# Patient Record
Sex: FEMALE | Race: OTHER | HISPANIC OR LATINO | URBAN - METROPOLITAN AREA
[De-identification: names, ages, dates, MRNs, and addresses within clinical notes are randomized per-mention and may not be internally consistent; named-entity substitution may affect disease eponyms.]

---
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
## 2023-04-17 ENCOUNTER — INPATIENT (INPATIENT)
Facility: HOSPITAL | Age: 18
LOS: 59 days | Discharge: HOME | DRG: 754 | End: 2023-06-16
Attending: PSYCHIATRY & NEUROLOGY | Admitting: PSYCHIATRY & NEUROLOGY
Payer: MEDICAID

## 2023-04-17 VITALS
HEIGHT: 60 IN | TEMPERATURE: 99 F | SYSTOLIC BLOOD PRESSURE: 137 MMHG | HEART RATE: 109 BPM | WEIGHT: 173.94 LBS | DIASTOLIC BLOOD PRESSURE: 94 MMHG | OXYGEN SATURATION: 98 % | RESPIRATION RATE: 18 BRPM

## 2023-04-17 LAB
ANION GAP SERPL CALC-SCNC: 12 MMOL/L — SIGNIFICANT CHANGE UP (ref 7–14)
APAP SERPL-MCNC: <5 UG/ML — LOW (ref 10–30)
BASOPHILS # BLD AUTO: 0.06 K/UL — SIGNIFICANT CHANGE UP (ref 0–0.2)
BASOPHILS NFR BLD AUTO: 0.4 % — SIGNIFICANT CHANGE UP (ref 0–1)
BUN SERPL-MCNC: 16 MG/DL — SIGNIFICANT CHANGE UP (ref 10–20)
CALCIUM SERPL-MCNC: 9.5 MG/DL — SIGNIFICANT CHANGE UP (ref 8.4–10.5)
CHLORIDE SERPL-SCNC: 100 MMOL/L — SIGNIFICANT CHANGE UP (ref 98–110)
CO2 SERPL-SCNC: 25 MMOL/L — SIGNIFICANT CHANGE UP (ref 17–32)
CREAT SERPL-MCNC: 0.7 MG/DL — SIGNIFICANT CHANGE UP (ref 0.3–1)
EGFR: 128 ML/MIN/1.73M2 — SIGNIFICANT CHANGE UP
EOSINOPHIL # BLD AUTO: 0.08 K/UL — SIGNIFICANT CHANGE UP (ref 0–0.7)
EOSINOPHIL NFR BLD AUTO: 0.5 % — SIGNIFICANT CHANGE UP (ref 0–8)
ETHANOL SERPL-MCNC: <10 MG/DL — SIGNIFICANT CHANGE UP
GLUCOSE SERPL-MCNC: 100 MG/DL — HIGH (ref 70–99)
HCT VFR BLD CALC: 39.6 % — SIGNIFICANT CHANGE UP (ref 37–47)
HGB BLD-MCNC: 13.5 G/DL — SIGNIFICANT CHANGE UP (ref 12–16)
IMM GRANULOCYTES NFR BLD AUTO: 0.7 % — HIGH (ref 0.1–0.3)
LYMPHOCYTES # BLD AUTO: 23.2 % — SIGNIFICANT CHANGE UP (ref 20.5–51.1)
LYMPHOCYTES # BLD AUTO: 3.67 K/UL — HIGH (ref 1.2–3.4)
MCHC RBC-ENTMCNC: 28.8 PG — SIGNIFICANT CHANGE UP (ref 27–31)
MCHC RBC-ENTMCNC: 34.1 G/DL — SIGNIFICANT CHANGE UP (ref 32–37)
MCV RBC AUTO: 84.6 FL — SIGNIFICANT CHANGE UP (ref 81–99)
MONOCYTES # BLD AUTO: 0.84 K/UL — HIGH (ref 0.1–0.6)
MONOCYTES NFR BLD AUTO: 5.3 % — SIGNIFICANT CHANGE UP (ref 1.7–9.3)
NEUTROPHILS # BLD AUTO: 11.03 K/UL — HIGH (ref 1.4–6.5)
NEUTROPHILS NFR BLD AUTO: 69.9 % — SIGNIFICANT CHANGE UP (ref 42.2–75.2)
NRBC # BLD: 0 /100 WBCS — SIGNIFICANT CHANGE UP (ref 0–0)
PLATELET # BLD AUTO: 435 K/UL — HIGH (ref 130–400)
PMV BLD: 9.2 FL — SIGNIFICANT CHANGE UP (ref 7.4–10.4)
POTASSIUM SERPL-MCNC: 3.6 MMOL/L — SIGNIFICANT CHANGE UP (ref 3.5–5)
POTASSIUM SERPL-SCNC: 3.6 MMOL/L — SIGNIFICANT CHANGE UP (ref 3.5–5)
RBC # BLD: 4.68 M/UL — SIGNIFICANT CHANGE UP (ref 4.2–5.4)
RBC # FLD: 13 % — SIGNIFICANT CHANGE UP (ref 11.5–14.5)
SALICYLATES SERPL-MCNC: <0.3 MG/DL — LOW (ref 4–30)
SODIUM SERPL-SCNC: 137 MMOL/L — SIGNIFICANT CHANGE UP (ref 135–146)
WBC # BLD: 15.79 K/UL — HIGH (ref 4.8–10.8)
WBC # FLD AUTO: 15.79 K/UL — HIGH (ref 4.8–10.8)

## 2023-04-17 PROCEDURE — 99285 EMERGENCY DEPT VISIT HI MDM: CPT

## 2023-04-17 PROCEDURE — 93010 ELECTROCARDIOGRAM REPORT: CPT

## 2023-04-18 DIAGNOSIS — F32.9 MAJOR DEPRESSIVE DISORDER, SINGLE EPISODE, UNSPECIFIED: ICD-10-CM

## 2023-04-18 DIAGNOSIS — F64.0 TRANSSEXUALISM: ICD-10-CM

## 2023-04-18 LAB
APPEARANCE UR: CLEAR — SIGNIFICANT CHANGE UP
BACTERIA # UR AUTO: ABNORMAL
BILIRUB UR-MCNC: NEGATIVE — SIGNIFICANT CHANGE UP
COLOR SPEC: YELLOW — SIGNIFICANT CHANGE UP
DIFF PNL FLD: NEGATIVE — SIGNIFICANT CHANGE UP
DRUG SCREEN 1, URINE RESULT: SIGNIFICANT CHANGE UP
EPI CELLS # UR: ABNORMAL /HPF
GLUCOSE UR QL: NEGATIVE MG/DL — SIGNIFICANT CHANGE UP
KETONES UR-MCNC: NEGATIVE — SIGNIFICANT CHANGE UP
LEUKOCYTE ESTERASE UR-ACNC: NEGATIVE — SIGNIFICANT CHANGE UP
NITRITE UR-MCNC: NEGATIVE — SIGNIFICANT CHANGE UP
PH UR: 6 — SIGNIFICANT CHANGE UP (ref 5–8)
PROT UR-MCNC: 30 MG/DL
RBC CASTS # UR COMP ASSIST: SIGNIFICANT CHANGE UP /HPF
SARS-COV-2 RNA SPEC QL NAA+PROBE: SIGNIFICANT CHANGE UP
SP GR SPEC: >=1.03 (ref 1.01–1.03)
UROBILINOGEN FLD QL: 0.2 MG/DL — SIGNIFICANT CHANGE UP
WBC UR QL: SIGNIFICANT CHANGE UP /HPF

## 2023-04-18 PROCEDURE — 80307 DRUG TEST PRSMV CHEM ANLYZR: CPT

## 2023-04-18 PROCEDURE — 81001 URINALYSIS AUTO W/SCOPE: CPT

## 2023-04-18 PROCEDURE — 84443 ASSAY THYROID STIM HORMONE: CPT

## 2023-04-18 PROCEDURE — 80178 ASSAY OF LITHIUM: CPT

## 2023-04-18 PROCEDURE — 80061 LIPID PANEL: CPT

## 2023-04-18 PROCEDURE — 83036 HEMOGLOBIN GLYCOSYLATED A1C: CPT

## 2023-04-18 PROCEDURE — 80354 DRUG SCREENING FENTANYL: CPT

## 2023-04-18 PROCEDURE — 83735 ASSAY OF MAGNESIUM: CPT

## 2023-04-18 PROCEDURE — 93005 ELECTROCARDIOGRAM TRACING: CPT

## 2023-04-18 PROCEDURE — 80053 COMPREHEN METABOLIC PANEL: CPT

## 2023-04-18 PROCEDURE — 99232 SBSQ HOSP IP/OBS MODERATE 35: CPT

## 2023-04-18 PROCEDURE — 73130 X-RAY EXAM OF HAND: CPT | Mod: RT

## 2023-04-18 PROCEDURE — 85027 COMPLETE CBC AUTOMATED: CPT

## 2023-04-18 RX ORDER — MIRTAZAPINE 45 MG/1
15 TABLET, ORALLY DISINTEGRATING ORAL AT BEDTIME
Refills: 0 | Status: DISCONTINUED | OUTPATIENT
Start: 2023-04-18 | End: 2023-04-18

## 2023-04-18 RX ORDER — SERTRALINE 25 MG/1
50 TABLET, FILM COATED ORAL DAILY
Refills: 0 | Status: DISCONTINUED | OUTPATIENT
Start: 2023-04-18 | End: 2023-04-20

## 2023-04-18 RX ORDER — HYDROXYZINE HCL 10 MG
50 TABLET ORAL EVERY 6 HOURS
Refills: 0 | Status: DISCONTINUED | OUTPATIENT
Start: 2023-04-18 | End: 2023-05-24

## 2023-04-18 RX ORDER — CLONAZEPAM 1 MG
0.25 TABLET ORAL DAILY
Refills: 0 | Status: DISCONTINUED | OUTPATIENT
Start: 2023-04-18 | End: 2023-04-18

## 2023-04-18 RX ORDER — HALOPERIDOL DECANOATE 100 MG/ML
5 INJECTION INTRAMUSCULAR EVERY 6 HOURS
Refills: 0 | Status: DISCONTINUED | OUTPATIENT
Start: 2023-04-18 | End: 2023-06-16

## 2023-04-18 RX ORDER — LANOLIN ALCOHOL/MO/W.PET/CERES
3 CREAM (GRAM) TOPICAL AT BEDTIME
Refills: 0 | Status: DISCONTINUED | OUTPATIENT
Start: 2023-04-18 | End: 2023-06-16

## 2023-04-18 RX ADMIN — Medication 3 MILLIGRAM(S): at 20:46

## 2023-04-18 RX ADMIN — Medication 0.25 MILLIGRAM(S): at 08:51

## 2023-04-18 RX ADMIN — SERTRALINE 50 MILLIGRAM(S): 25 TABLET, FILM COATED ORAL at 08:51
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
## 2023-04-18 NOTE — BH INPATIENT PSYCHIATRY ASSESSMENT NOTE - HPI (INCLUDE ILLNESS QUALITY, SEVERITY, DURATION, TIMING, CONTEXT, MODIFYING FACTORS, ASSOCIATED SIGNS AND SYMPTOMS)
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 suicide attempt.    Attempted to contact Dr. Cuenca at [facility]- phone number listed on hospital website no longer functioning    Med Rec confirmed with [facility] ([address] [place] [address])  - Mirtazapine 15 mg at bedtime  - Sertraline 25 mg daily  - Klonopin 0.5 mg PRN daily

## 2023-04-18 NOTE — CONSULT NOTE ADULT - SUBJECTIVE AND OBJECTIVE BOX
HOSPITALIST CONSULT for IPP History and Physical     LEIA PAZ  17y, Female  Allergy: No Known Allergies      CHIEF COMPLAINT:     HPI:    HPI:  The patient is a 18 yo transgender F->M, domiciled with brother, unemployed, with psych hx of gender dysphoria, anxiety and depression, no past psychiatric hospitalizations, no previous suicide attempts, who is presenting at recommendation of brother and cousin for increasing SI.    Interview was conducted with  Anthony ID [id number].    The patient on interview appears extremely flat, withdrawn, soft-spoken. She only provides very short responses. She says she is here because she told her brother about having suicidal thoughts recently. It is difficult to get a sense of the timeline of patient's symptoms. She says she has had SI on and off since 2015 which has been worsening recently. This afternoon, her thoughts progressed to wanting to overdose on medications or to cut her wrists or stab herself. Denies any prior attempt, and is unable to name any deterrent/protective factors other than fear of hurting her family members. Describes intense and persistent feeling of not wanting to be alive. Says very things like that she is living in "constant fear," that she doesn't think she is good enough, and that she is exasperated waiting and hoping to feel better. Describes poor sleep for at least several months, eating more than usual under stress, chronic low energy, and again no desire to live. Throughout interview patient at times is anxious and fidgety, other times more stressed appearing with strained/painful expression, and at times on verge of tears. Patient is agreeable to staying in the hospital to get further help.    See SW note for collateral from patient's cousin Vickie. (18 Apr 2023 03:26)    FAMILY HISTORY:    PAST MEDICAL & SURGICAL HISTORY:  Depression with suicidal ideation      No significant past surgical history          SOCIAL HISTORY  Social History:      Home Medications:      ROS  General: Denies fevers, chills, nightsweats, weight loss  HEENT: Denies headache, rhinorrhea, sore throat, eye pain  CV: Denies CP, palpitations  PULM: Denies SOB, cough  GI: Denies abdominal pain, diarrhea  : Denies dysuria, hematuria  MSK: Denies arthralgias  SKIN: Denies rash   NEURO: Denies paresthesias, weakness  PSYCH: Denies depression    VITALS:  T(F): 97, Max: 99 (04-17-23 @ 21:10)  HR: 91  BP: 123/85  RR: 16Vital Signs Last 24 Hrs  T(C): 36.1 (18 Apr 2023 05:33), Max: 37.2 (17 Apr 2023 21:10)  T(F): 97 (18 Apr 2023 05:33), Max: 99 (17 Apr 2023 21:10)  HR: 91 (18 Apr 2023 05:33) (91 - 109)  BP: 123/85 (18 Apr 2023 05:33) (123/85 - 137/94)  BP(mean): --  RR: 16 (18 Apr 2023 05:33) (16 - 18)  SpO2: 98% (18 Apr 2023 05:33) (98% - 98%)    Parameters below as of 17 Apr 2023 21:10  Patient On (Oxygen Delivery Method): room air        PHYSICAL EXAM:  Gen: NAD, resting in bed  HEENT: Normocephalic, atraumatic  Neck: supple, no lymphadenopathy  CV: Regular rate & regular rhythm  Lungs: CTABL no wheeze  Abdomen: Soft, NTND+ BS present  Ext: Warm, well perfused no CCE  Neuro: non focal, awake, CN II-XII intact   Skin: no rash, no erythema  Psych: no SI, HI, Hallucination     TESTS & MEASUREMENTS:                        13.5   15.79 )-----------( 435      ( 17 Apr 2023 22:20 )             39.6     04-17    137  |  100  |  16  ----------------------------<  100<H>  3.6   |  25  |  0.7    Ca    9.5      17 Apr 2023 22:20              COVID-19 PCR: NotDetec (18 Apr 2023 00:25)      QRS axis to [] ° and NSR at a rate of [] BPM. There was no atrial enlargement. There was no ventricular hypertrophy. There were no ST-T changes and all intervals were normal.      INFECTIOUS DISEASES TESTING      RADIOLOGY & ADDITIONAL TESTS:  I have personally reviewed the last Chest xray  CXR      CT      CARDIOLOGY TESTING  12 Lead ECG:   Ventricular Rate 102 BPM    Atrial Rate 102 BPM    P-R Interval 148 ms    QRS Duration 64 ms    Q-T Interval 346 ms    QTC Calculation(Bazett) 450 ms    P Axis 54 degrees    R Axis 89 degrees    T Axis 39 degrees    Diagnosis Line Sinus tachycardia  Otherwise normal ECG    Confirmed by Homero Fu ([id number]) on 4/18/2023 7:51:57 AM (04-17-23 @ 21:44)      MEDICATIONS  (STANDING):  clonazePAM  Tablet 0.25 milliGRAM(s) Oral daily  mirtazapine 15 milliGRAM(s) Oral at bedtime  sertraline 50 milliGRAM(s) Oral daily    MEDICATIONS  (PRN):      ANTIBIOTICS:      All available historical data has been reviewed    ASSESSMENT  17y F admitted with Major depressive disorder with single episode        PROBLEMS   HOSPITALIST CONSULT for IPP History and Physical     LEIA PAZ  17y, Female  Allergy: No Known Allergies      CHIEF COMPLAINT:     HPI:    HPI:  The patient is a 16 yo transgender F->M, domiciled with brother, unemployed, with psych hx of gender dysphoria, anxiety and depression, no past psychiatric hospitalizations, no previous suicide attempts, who is presenting at recommendation of brother and cousin for increasing SI.    Interview was conducted with  Anthony ID [id number].    The patient on interview appears extremely flat, withdrawn, soft-spoken. She only provides very short responses. She says she is here because she told her brother about having suicidal thoughts recently. It is difficult to get a sense of the timeline of patient's symptoms. She says she has had SI on and off since 2015 which has been worsening recently. This afternoon, her thoughts progressed to wanting to overdose on medications or to cut her wrists or stab herself. Denies any prior attempt, and is unable to name any deterrent/protective factors other than fear of hurting her family members. Describes intense and persistent feeling of not wanting to be alive. Says very things like that she is living in "constant fear," that she doesn't think she is good enough, and that she is exasperated waiting and hoping to feel better. Describes poor sleep for at least several months, eating more than usual under stress, chronic low energy, and again no desire to live. Throughout interview patient at times is anxious and fidgety, other times more stressed appearing with strained/painful expression, and at times on verge of tears. Patient is agreeable to staying in the hospital to get further help.    See SW note for collateral from patient's cousin Vickie. (18 Apr 2023 03:26)    FAMILY HISTORY:    PAST MEDICAL & SURGICAL HISTORY:  Depression with suicidal ideation      No significant past surgical history          SOCIAL HISTORY  Social History:      Home Medications:      ROS  General: Denies fevers, chills, nightsweats, weight loss  HEENT: Denies headache, rhinorrhea, sore throat, eye pain  CV: Denies CP, palpitations  PULM: Denies SOB, cough  GI: Denies abdominal pain, diarrhea  : Denies dysuria, hematuria  MSK: Denies arthralgias  SKIN: Denies rash   NEURO: Denies paresthesias, weakness  PSYCH: Denies depression    VITALS:  T(F): 97, Max: 99 (04-17-23 @ 21:10)  HR: 91  BP: 123/85  RR: 16Vital Signs Last 24 Hrs  T(C): 36.1 (18 Apr 2023 05:33), Max: 37.2 (17 Apr 2023 21:10)  T(F): 97 (18 Apr 2023 05:33), Max: 99 (17 Apr 2023 21:10)  HR: 91 (18 Apr 2023 05:33) (91 - 109)  BP: 123/85 (18 Apr 2023 05:33) (123/85 - 137/94)  BP(mean): --  RR: 16 (18 Apr 2023 05:33) (16 - 18)  SpO2: 98% (18 Apr 2023 05:33) (98% - 98%)    Parameters below as of 17 Apr 2023 21:10  Patient On (Oxygen Delivery Method): room air        PHYSICAL EXAM:  Gen: NAD, resting in bed, Khmer speaking - staff translated - pref to be called boaz  HEENT: Normocephalic, atraumatic  Neck: supple, no lymphadenopathy  CV: Regular rate & regular rhythm  Lungs: CTABL no wheeze  Abdomen: Soft, NTND+ BS present  Ext: Warm, well perfused no CCE  Neuro: non focal, awake, CN II-XII intact   Skin: no rash, no erythema  Psych: no SI, HI, Hallucination     TESTS & MEASUREMENTS:                        13.5   15.79 )-----------( 435      ( 17 Apr 2023 22:20 )             39.6     04-17    137  |  100  |  16  ----------------------------<  100<H>  3.6   |  25  |  0.7    Ca    9.5      17 Apr 2023 22:20              COVID-19 PCR: NotDetec (18 Apr 2023 00:25)      QRS axis to [] ° and NSR at a rate of [] BPM. There was no atrial enlargement. There was no ventricular hypertrophy. There were no ST-T changes and all intervals were normal.      INFECTIOUS DISEASES TESTING      RADIOLOGY & ADDITIONAL TESTS:  I have personally reviewed the last Chest xray  CXR      CT      CARDIOLOGY TESTING  12 Lead ECG:   Ventricular Rate 102 BPM    Atrial Rate 102 BPM    P-R Interval 148 ms    QRS Duration 64 ms    Q-T Interval 346 ms    QTC Calculation(Bazett) 450 ms    P Axis 54 degrees    R Axis 89 degrees    T Axis 39 degrees    Diagnosis Line Sinus tachycardia  Otherwise normal ECG    Confirmed by Homero Fu ([id number]) on 4/18/2023 7:51:57 AM (04-17-23 @ 21:44)      MEDICATIONS  (STANDING):  clonazePAM  Tablet 0.25 milliGRAM(s) Oral daily  mirtazapine 15 milliGRAM(s) Oral at bedtime  sertraline 50 milliGRAM(s) Oral daily    MEDICATIONS  (PRN):      ANTIBIOTICS:      All available historical data has been reviewed    ASSESSMENT  17y F admitted with Major depressive disorder with single episode        PROBLEMS

## 2023-04-18 NOTE — BH INPATIENT PSYCHIATRY ASSESSMENT NOTE - DESCRIPTION
The patient currently lives with his older brother in [place], he immigrated from [place] 7 months ago (though born in [place])and does not understand or speak English. His brother asked him to move to live with him because he was concerned that he was not doing well psychologically, and thought he may get better care in the [place]. He currently sees a psychiatrist and therapist at [facility], both of them which he likes. Parents still live in [place].

## 2023-04-18 NOTE — BH INPATIENT PSYCHIATRY ASSESSMENT NOTE - NSBHCHARTREVIEWVS_PSY_A_CORE FT
Vital Signs Last 24 Hrs  T(C): 36.1 (04-18-23 @ 05:33), Max: 37.2 (04-17-23 @ 21:10)  T(F): 97 (04-18-23 @ 05:33), Max: 99 (04-17-23 @ 21:10)  HR: 96 (04-18-23 @ 09:55) (91 - 109)  BP: 120/70 (04-18-23 @ 09:55) (120/70 - 137/94)  BP(mean): --  RR: 16 (04-18-23 @ 09:55) (16 - 18)  SpO2: 98% (04-18-23 @ 05:33) (98% - 98%)

## 2023-04-18 NOTE — ED BEHAVIORAL HEALTH NOTE - BEHAVIORAL HEALTH NOTE
========================     FOR EACH COLLATERAL     ========================     Collateral below has requested that the information provided remain confidential: Yes [  ] No [ X ]     Collateral below has provided information that patient is/may be unaware of: Yes [  ] No [ X ]     Patient gives permission to obtain collateral from _____:     (  ) Yes     ( X )  No     Rationale for overriding objection               (  ) Lack of capacity. Details: ________               ( X ) Assessing risk of danger to self/others. Details: ________     Rationale for selecting specific collateral source               (  ) Potential to impact risk of danger to self/others and no alternative equivalent. Details: _____     NAME: Vickie Carmen     NUMBER: [phone]     RELATIONSHIP: Cousin.      RELIABILITY: Reliable.      ========================     PATIENT DEMOGRAPHICS:     ========================     HPI     BASELINE FUNCTIONING: Patient is a 17-year-old female, domiciled with older brother, recently moved to [place] from [place], pphx of major depressive disorder, followed by a psychiatrist Dr. Vincent, has current medication list, no pmhx, no known allergies, hx of SI. At baseline, patient is quiet, belongs to a [employer] but does not engage, and does not socialize with peers.     DATE HPI STARTED: Two weeks ago.      DECOMPENSATION: Patient allegedly attempted to choke herself two weeks ago and had severe panic attacks. The patient was taken to the [facility] ER where she was discharged two hours later. Yesterday, the patient called her brother while he was at work saying goodbye and that she was going to overdose on her medications. The patient’s brother called her cousin and asked her to come over to his house to help pt. The brother drove home from work while the patient stayed on the phone. The cousin and brother both spoke with the patient and got her to agree to go to the emergency room.      VIOLENCE: Denied.      SUBSTANCE: Denied.      ========================     PAST PSYCHIATRIC HISTORY     ========================     DATE PAST PSYCHIATRIC HISTORY STARTED: A month ago.      MAIN PSYCHIATRIC DIAGNOSIS: Major Depressive Disorder.      PSYCHIATRIC HOSPITALIZATIONS: No hx of IPP admissions.      PRIOR ILLNESS: Patient is currently seen by psychiatrist, Dr. Vincent. Collateral was unable to provide the contact information but reported pt is being seeing once a week.      SUICIDALITY: Two weeks ago, patient endorsed SI with a plan to choke and stab herself. Patient has been getting random thoughts that she does not belong.          VIOLENCE: Denied.      SUBSTANCE USE: Denied.      ==============     OTHER HISTORY     ==============     CURRENT MEDICATION: Patient is prescribed list sertraline 25 mg clonazepam 0.5 mg, mirtazapine 15 mg.     MEDICAL HISTORY: No pmhx.      ALLERGIES: Denied.     LEGAL ISSUES: Denied.      FIREARM ACCESS: Denied.      SOCIAL HISTORY: Patient is currently transitioning from female to male. Patient reported a stressor as being judged for what she wears and thinks people are talking about her.      FAMILY HISTORY: Cousin and grandfather on mother’s side of the family has depression, Father also has depression.     DEVELOPMENTAL HISTORY: Unknown.      -----------------------------------------------     COVID Exposure Screen- collateral (i.e. third-party, chart review, belongings, etc; include EMS and ED staff)     ---------------------------------------------------     1. Has the patient had a COVID-19 test in the last 90 days? Unknown.     2. Has the patient tested positive for COVID-19 antibodies? Unknown.     3.Has the patient received 2 doses of the COVID-19 vaccine?  Unknown.     4. In the past 10 days, has the patient been around anyone with a positive COVID-19 test?* Unknown.     5.Has the patient been out of [place] within the past 10 days? Unknown.

## 2023-04-18 NOTE — UM REPORT PROGRESS NOTE - NSUMRMPROVIDER_GEN_A_CORE FT
INSURANCE: [employer]   ID# [id number]  [phone]      4/18- Spoke to Josefa, Auth # [id number]. Patient is approved from 4/18-4/22 with review due on 4/21.No CM is assigned please fax in clinicals on 4/21 to [phone].    4/19- [facility] approved 30 days. 4/18-5/17. Authorization # is [id number]. [facility] will reach out on 4/24 for the first weekly collaboration faith INSURANCE: [employer]   ID# [id number]  [phone]      4/18- Spoke to Josefa, Auth # [id number]. Patient is approved from 4/18-4/22 with review due on 4/21.No CM is assigned please fax in clinicals on 4/21 to [phone].    4/19- [facility] approved 30 days. 4/18-5/17. Authorization # is [id number]. [facility] will reach out on 4/24 for the first weekly collaboration upda  5-17 clinicals emailed to [place] awaiting determination  INSURANCE: [employer]   ID# [id number]  [phone]      4/18- Spoke to Josefa, Auth # [id number]. Patient is approved from 4/18-4/22 with review due on 4/21.No CM is assigned please fax in clinicals on 4/21 to [phone].    4/19- [facility] approved 30 days. 4/18-5/17. Authorization # is [id number]. [facility] will reach out on 4/24 for the first weekly collaboration upd  5-17 clinicals emailed to [place] awaiting determination approved LCD and review 5-24  INSURANCE: [employer]   ID# [id number]  [phone]      4/18- Spoke to Josefa, Auth # [id number]. Patient is approved from 4/18-4/22 with review due on 4/21.No CM is assigned please fax in clinicals on 4/21 to [phone].    4/19- [facility] approved 30 days. 4/18-5/17. Authorization # is [id number]. [facility] will reach out on 4/24 for the first weekly collaboration upda  5-17 clinicals emailed to [place] awaiting determination approved LCD and review 5-24 5-24 clinicals emailed to [place] awaiting determination   INSURANCE: [employer]   ID# [id number]  [phone]      4/18- Spoke to Josefa, Auth # [id number]. Patient is approved from 4/18-4/22 with review due on 4/21.No CM is assigned please fax in clinicals on 4/21 to [phone].    4/19- [facility] approved 30 days. 4/18-5/17. Authorization # is [id number]. [facility] will reach out on 4/24 for the first weekly collaboration upda    5-17 clinicals emailed to [place] awaiting determination approved LCD and review 5-24 5-24 clinicals emailed to [place] awaiting determination    5/25- Received a VM from [facility] approving 7 additional days. Covered 5/24-5/31. [facility]   INSURANCE: [employer]   ID# [id number]  [phone]      4/18- Spoke to Josefa, Auth # [id number]. Patient is approved from 4/18-4/22 with review due on 4/21.No CM is assigned please fax in clinicals on 4/21 to [phone].    4/19- [facility] approved 30 days. 4/18-5/17. Authorization # is [id number]. [facility] will reach out on 4/24 for the first weekly collaboration upda    5-17 clinicals emailed to [place] awaiting determination approved LCD and review 5-24 5-24 clinicals emailed to [place] awaiting determination    5/25- Received a VM from [facility] approving 7 additional days. Covered 5/24-5/31. Review on 5/31 with [facility].    5-31 clinicals emailed to [place] awaiting determination   INSURANCE: [employer]   ID# [id number]  [phone]      4/18- Spoke to Josefa, Auth # [id number]. Patient is approved from 4/18-4/22 with review due on 4/21.No CM is assigned please fax in clinicals on 4/21 to [phone].    4/19- [facility] approved 30 days. 4/18-5/17. Authorization # is [id number]. [facility] will reach out on 4/24 for the first weekly collaboration upda    5-17 clinicals emailed to [place] awaiting determination approved LCD and review 5-24 5-24 clinicals emailed to [place] awaiting determination    5/25- Received a VM from [facility] approving 7 additional days. Covered 5/24-5/31. [facility]    5-31 clinicals emailed to [place] awaiting determination approved LCD and [facility] 6[facility]   INSURANCE: [employer]   ID# [id number]  [phone]      4/18- Spoke to Josefa, Auth # [id number]. Patient is approved from 4/18-4/22 with review due on 4/21.No CM is assigned please fax in clinicals on 4/21 to [phone].    4/19- [facility] approved 30 days. 4/18-5/17. Authorization # is [id number]. [facility] will reach out on 4/24 for the first weekly collaboration upda    5-17 clinicals emailed to [place] awaiting determination approved LCD and review 5-24 5-24 clinicals emailed to [place] awaiting determination    5/25- Received a VM from [facility] approving 7 additional days. Covered 5/24-5/31. Review on 5/31 with [facility].    5-31 clinicals emailed to [place] awaiting determination approved LCD and review 6-5 w [facility]  6-5 clincial emailed to [facility] approved LCD and review 6-8 INSURANCE: [employer]   ID# [id number]  [phone]      4/18- Spoke to Josefa, Auth # [id number]. Patient is approved from 4/18-4/22 with review due on 4/21.No CM is assigned please fax in clinicals on 4/21 to [phone].    4/19- [facility] approved 30 days. 4/18-5/17. Authorization # is [id number]. [facility] will reach out on 4/24 for the first weekly collaboration upda    5-17 clinicals emailed to [place] awaiting determination approved LCD and review 5-24 5-24 clinicals emailed to [place] awaiting determination    5/25- Received a VM from [facility] approving 7 additional days. Covered 5/24-5/31. Review on 5/31 with [facility].    5-31 clinicals emailed to [place] awaiting determination approved LCD and review 6-5 w [facility]  6-5 clincial emailed to [facility] approved LCD and review 6-8 6/8/23 - Rita - emailed progress note to [facility] - waiting for determination INSURANCE: [employer]   ID# [id number]  [phone]      4/18- Spoke to Josefa, Auth # [id number]. Patient is approved from 4/18-4/22 with review due on 4/21.No CM is assigned please fax in clinicals on 4/21 to [phone].    4/19- [facility] approved 30 days. 4/18-5/17. Authorization # is [id number]. [facility] will reach out on 4/24 for the first weekly collaboration upda    5-17 clinicals emailed to [place] awaiting determination approved LCD and review 5-24 5-24 clinicals emailed to [place] awaiting determination    5/25- Received a VM from [facility] approving 7 additional days. Covered 5/24-5/31. Review on 5/31 with [facility].    5-31 clinicals emailed to [place] awaiting determination approved LCD and review 6-5 w [facility]  6-5 clincial emailed to [facility] approved LCD and review 6-8 6/8/23 - Rita - emailed progress note to [facility] - waiting for determination  6/9/23 (ANGELA Mei) Spoke to [facility], continued stay approved from 6/8 - 6/14.  LCD and review due on 6/14 INSURANCE: [employer]   ID# [id number]  [phone]      4/18- Spoke to Josefa, Auth # [id number]. Patient is approved from 4/18-4/22 with review due on 4/21.No CM is assigned please fax in clinicals on 4/21 to [phone].    4/19- [facility] approved 30 days. 4/18-5/17. Authorization # is [id number]. [facility] will reach out on 4/24 for the first weekly collaboration upda    5-17 clinicals emailed to [place] awaiting determination approved LCD and review 5-24 5-24 clinicals emailed to [place] awaiting determination    5/25- Received a VM from [facility] approving 7 additional days. Covered 5/24-5/31. Review on 5/31 with [facility].    5-31 clinicals emailed to [place] awaiting determination approved LCD and review 6-5 w [facility]  6-5 clincial emailed to [facility] approved LCD and review 6-8 6/8/23 - Rita - emailed progress note to [facility] - waiting for determination  6/9/23 (ANGELA Mei) Spoke to [facility], continued stay approved from 6/8 - 6/14.  LCD and review due on 6/14 6-15 review w [facility] 6-14, approved w LCD and review 6-16

## 2023-04-18 NOTE — ED BEHAVIORAL HEALTH ASSESSMENT NOTE - RISK ASSESSMENT
risk factors: withdrawn, unable to participate fully in assessment, recent SI, social isolation, socio-cultural stressors  protective: supportive family, engages in care, no past suicide attempts

## 2023-04-18 NOTE — ED PROVIDER NOTE - OBJECTIVE STATEMENT
17 year old female past medical history of anxiety, depression, prior suicide attempt with prior IPP comes to emergency room for feeling depressed and wants to kill herself. patient explains that she has had increasing depressive throughts and states she tried to kill herself last week by taking pills. was seen at [facility] and d/c as per patient and family. patient today expressing that she wants to cut or stab herself.

## 2023-04-18 NOTE — BH PATIENT PROFILE - TELEPHONIC ID NUMBER OF THE INTERPRETER
admission was done by Gray SANCHEZ interview who speaks Swedish fluently.  Documentation done By Morteza SANCHEZ.   admission was done by Gray SANCHEZ interview who speaks Irish fluently. Patient requested for interview to be done by RN.  Documentation done By Morteza SANCHEZ.

## 2023-04-18 NOTE — BH INPATIENT PSYCHIATRY ASSESSMENT NOTE - OTHER PAST PSYCHIATRIC HISTORY (INCLUDE DETAILS REGARDING ONSET, COURSE OF ILLNESS, INPATIENT/OUTPATIENT TREATMENT)
Sees outpatient psychiatrist Dr. Hodge, has therapist Ides there as well at [facility]  Never hospitalized

## 2023-04-18 NOTE — ED BEHAVIORAL HEALTH ASSESSMENT NOTE - NSBHINFOSOURCE_PSY_ALL_CORE
Regarding: diarrhea, abdominal pain  ----- Message from Marvin Shoemaker sent at 1/6/2020  6:04 AM CST -----  Patient Name: Sanjuanita Romo  Specialist or PCP Full Name:Suzie Chambers NP  Pregnant (If Yes, how long?):no  Symptoms:Persistent diarrhea, abdominal pain  Call Back #:[phone]  Is the patient’s permanent residence located in [place], [place], or a compact State? Yes [facility] [address]  Call Center Account #:[id number]             Patient/Collateral...

## 2023-04-18 NOTE — ED BEHAVIORAL HEALTH ASSESSMENT NOTE - HPI (INCLUDE ILLNESS QUALITY, SEVERITY, DURATION, TIMING, CONTEXT, MODIFYING FACTORS, ASSOCIATED SIGNS AND SYMPTOMS)
The patient is a 18 yo transgender F->M, domiciled with brother, unemployed, with psych hx of gender dysphoria, anxiety and depression, no past psychiatric hospitalizations, no previous suicide attempts, who is presenting at recommendation of brother and cousin for increasing SI.    Interview was conducted with  Anthony ID [id number].    The patient on interview appears extremely flat, withdrawn, soft-spoken. She only provides very short responses. She says she is here because she told her brother about having suicidal thoughts recently. It is difficult to get a sense of the timeline of patient's symptoms. She says she has had SI on and off since 2015 which has been worsening recently. This afternoon, her thoughts progressed to wanting to overdose on medications or to cut her wrists or stab herself. Denies any prior attempt, and is unable to name any deterrent/protective factors other than fear of hurting her family members. Describes intense and persistent feeling of not wanting to be alive. Says very things like that she is living in "constant fear," that she doesn't think she is good enough, and that she is exasperated waiting and hoping to feel better. Describes poor sleep for at least several months, eating more than usual under stress, chronic low energy, and again no desire to live. Throughout interview patient at times is anxious and fidgety, other times more stressed appearing with strained/painful expression, and at times on verge of tears. Patient is agreeable to staying in the hospital to get further help.    See SW note for collateral from patient's cousin Vickie.

## 2023-04-18 NOTE — ED BEHAVIORAL HEALTH ASSESSMENT NOTE - DESCRIPTION
calm, cooperative born in [facility] but lived a few years then moved back to [place], lived there 13y and came back. Parents remain in [place]. Pt lives with 21yo brother none

## 2023-04-18 NOTE — CONSULT NOTE ADULT - ASSESSMENT
#gender dysphoria, anxiety/depression - with suicidal ideation - medications and treatement per psych   #no significant medical hz - can check TSH, RPR, HIv, utox     recall prn

## 2023-04-18 NOTE — ED PROVIDER NOTE - CLINICAL SUMMARY MEDICAL DECISION MAKING FREE TEXT BOX
18 yo F, hx of depression, anxiety, previous suicide attempts here for assessment of worsening depression with plan to harm self either via overdose or stabbing herself.     Patient not sure why her symptoms are worsening. Denies substance use.    VS notable for tachycardia.    Patient well appearing, in no distress, has flat affect but is quite clear in her intent to harm herself.    She was medically cleared, seen by telepsych and accepted for involuntary IPP admission.

## 2023-04-18 NOTE — BH INPATIENT PSYCHIATRY ASSESSMENT NOTE - NSCOMMENTSUICRISKFACT_PSY_ALL_CORE
Chronic risk factors include history of trauma, history of persistent depressive symptoms, past suicide attempt and history of suicidal ideation. Current risk factors include recent suicide attempt via choking, recent immigration, not understanding/speaking well language of current residence, current gender dysphoria, and curre Chronic risk factors include history of trauma, history of persistent depressive symptoms,   and history of suicidal ideation. Current risk factors include recent self inflicted choking, recent immigration, not understanding/speaking well language of current residence, current gender dysphoria, and curre

## 2023-04-18 NOTE — BH INPATIENT PSYCHIATRY ASSESSMENT NOTE - CURRENT MEDICATION
MEDICATIONS  (STANDING):  clonazePAM  Tablet 0.25 milliGRAM(s) Oral daily  mirtazapine 15 milliGRAM(s) Oral at bedtime  sertraline 50 milliGRAM(s) Oral daily    MEDICATIONS  (PRN):   MEDICATIONS  (STANDING):  clonazePAM  Tablet 0.25 milliGRAM(s) Oral daily  melatonin. 3 milliGRAM(s) Oral at bedtime  sertraline 50 milliGRAM(s) Oral daily    MEDICATIONS  (PRN):  haloperidol     Tablet 5 milliGRAM(s) Oral every 6 hours PRN agitation  hydrOXYzine hydrochloride 50 milliGRAM(s) Oral every 6 hours PRN anxiety  LORazepam     Tablet 2 milliGRAM(s) Oral every 6 hours PRN agitation

## 2023-04-18 NOTE — BH INPATIENT PSYCHIATRY ASSESSMENT NOTE - NSTREATMENTCERTY_PSY_ALL_CORE
For information on Fall & Injury Prevention, visit: [email or web address] OR  [email or web address] OR  https://www.cdc.gov/steadi/patient.html Private car That inpatient services furnished since the previous certification or recertification were, and continue to be required: for treatment that could reasonably be expected to improve the patient's condition; or, for diagnostic study;    That the hospital records show that the services furnished were: intensive treatment services; admission and related services necessary for diagnostic study or equivalent services;    That the patient continues to need, on a daily basis active inpatient treatment furnished directly by or requiring the supervision of inpatient psychiatric facility personnel.

## 2023-04-18 NOTE — BH INPATIENT PSYCHIATRY ASSESSMENT NOTE - NSBHASSESSSUMMFT_PSY_ALL_CORE
The patient is a 16 yo transgender F->M, Belarusian-speaking, recently immigrated from [place], domiciled with brother, unemployed, with psych hx of gender dysphoria, anxiety and depression, no past psychiatric hospitalizations, previous suicide attempt via choking, who is presenting at recommendation of brother and cousin for increasing SI.    Upon admission patient endorses strong feelings of depression and exhibits very dysphoric and minimally reactive affect. He endorses escalating suicidal thoughts related to feelings of worthlessness and unhappiness over his appearance. The patient is currently admitted involuntarily on (:39 legals as he is at acutely elevated risk of suicide. Goals of care will be to decrease patient's suicidality. Will plan to gradually titrate down Klonopin as it can contribute to impulsivity as well as optimize antidepressant regimen.    04/18/23- Patient acutely dysphoric. Plan to dc mirtazapine as he notes excessive lethargy and hyperphagia, continue sertraline 50 mg    Impression: MDD vs gender dysphoria, R/O bipolar    #MDD #Gender dysphoria  - 9.39 legals  - Continue sertraline to 50mg (increased 04/18)  - Discontinue mirtazapine 15mg qhs as patient has hyperphagia and lethargy  - PRN melatonin for sleep phase disorder  - Continue Klonopin to 0.25mg daily, plan to titrate down  - Referral for outpatient endocrinologist    #Agitation  - For episodes of acute agitation can offer PO Haldol 5 mg/Ativan 2 mg/Benadryl 50 mg Q6H PRN. If refusing PO and is in acute risk of harm to self/other, can offer IM Haldol 5 mg/Ativan 2 mg/Benadryl 50 mg Q6H PRN. If given, please repeat EKG and hold antipsychotics if QTC>500  The patient is a 16 yo transgender F->M, Mexican-speaking, recently immigrated from [place], domiciled with brother, unemployed, with psych hx of gender dysphoria, anxiety and depression, no past psychiatric hospitalizations, previous suicide attempt via choking, who is presenting at recommendation of brother and cousin for increasing SI.    Upon admission patient endorses strong feelings of depression and exhibits very dysphoric and minimally reactive affect. He endorses escalating suicidal thoughts related to feelings of worthlessness and unhappiness over his appearance. The patient is currently admitted involuntarily on (:39 legals as he is at acutely elevated risk of suicide. Goals of care will be to decrease patient's suicidality. Will plan to gradually titrate down Klonopin as it can contribute to impulsivity as well as optimize antidepressant regimen.    04/18/23- Patient acutely dysphoric. Plan to dc mirtazapine as he notes excessive lethargy and hyperphagia, continue sertraline 50 mg    Impression: MDD vs gender dysphoria, R/O bipolar    #MDD #Gender dysphoria  - 9.39 legals  - Continue sertraline to 50mg (increased 04/18)  - Discontinue mirtazapine 15mg qhs as patient has hyperphagia and lethargy  - Melatonin for sleep phase disorder  - Continue Klonopin to 0.25mg daily, plan to titrate down  - Referral for outpatient endocrinologist    #Panic attacks  - PRN Atarax  - Continue to monitor  - Continue to decrease Klonopin    #Agitation  - For episodes of acute agitation can offer PO Haldol 5 mg/Ativan 2 mg/Benadryl 50 mg Q6H PRN. If refusing PO and is in acute risk of harm to self/other, can offer IM Haldol 5 mg/Ativan 2 mg/Benadryl 50 mg Q6H PRN. If given, please repeat EKG and hold antipsychotics if QTC>500  The patient is a 16 yo transgender F->M, Swazi-speaking, recently immigrated from [place], domiciled with brother, unemployed, with psych hx of gender dysphoria, anxiety and depression, no past psychiatric hospitalizations, previous suicide attempt via choking, who is presenting at recommendation of brother and cousin for increasing SI.    Upon admission patient endorses strong feelings of depression and exhibits very dysphoric and minimally reactive affect. He endorses escalating suicidal thoughts related to feelings of worthlessness and unhappiness over his appearance. The patient is currently admitted involuntarily on 9:39 legals as he is at acutely elevated risk of suicide. Goals of care will be to decrease patient's suicidality. Will plan to gradually titrate down Klonopin as it can contribute to impulsivity as well as optimize antidepressant regimen.    04/18/23- Patient acutely dysphoric. Plan to dc mirtazapine as he notes excessive lethargy and hyperphagia, continue sertraline 50 mg    Impression: MDD vs gender dysphoria, R/O bipolar    #MDD #Gender dysphoria  - 9.39 legals  - Continue sertraline to 50mg (increased 04/18)  - Discontinue mirtazapine 15mg qhs as patient has hyperphagia and lethargy  - Melatonin for sleep phase disorder  - Referral for outpatient endocrinologist  - Obtain working phone number for outpatient psychiatrist Dr. Cuenca at [facility]      #Panic attacks  - PRN Atarax  - Continue Klonopin to 0.25mg PRN daily  - Continue to monitor      #Agitation  - For episodes of acute agitation can offer PO Haldol 5 mg/Ativan 2 mg/Benadryl 50 mg Q6H PRN. If refusing PO and is in acute risk of harm to self/other, can offer IM Haldol 5 mg/Ativan 2 mg/Benadryl 50 mg Q6H PRN. If given, please repeat EKG and hold antipsychotics if QTC>500

## 2023-04-18 NOTE — BH INPATIENT PSYCHIATRY ASSESSMENT NOTE - NSREFERRALSOURCE_PSY_ALL_CORE
ED Admission Cimetidine Counseling:  I discussed with the patient the risks of Cimetidine including but not limited to gynecomastia, headache, diarrhea, nausea, drowsiness, arrhythmias, pancreatitis, skin rashes, psychosis, bone marrow suppression and kidney toxicity.

## 2023-04-18 NOTE — BH INPATIENT PSYCHIATRY ASSESSMENT NOTE - NSBHMETABOLIC_PSY_ALL_CORE_FT
BMI: BMI (kg/m2): 32.5 (04-18-23 @ 05:33)  HbA1c:   Glucose:   BP: 120/70 (04-18-23 @ 09:55) (120/70 - 137/94)  Lipid Panel:

## 2023-04-18 NOTE — ED BEHAVIORAL HEALTH NOTE - BEHAVIORAL HEALTH NOTE
===================     PRE-HOSPITAL COURSE     ===================     SOURCE: RN and secondhand ED documentation      DETAILS: Pt self-presented with family      ============     ED COURSE     ============     SOURCE: RN and secondhand ED documentation      ARRIVAL: Pt' self-presented with family      BELONGINGS: No belongings of note. All belongings were provided to hospital security, and patient currently in a gown with a 1:1 staff member.     BEHAVIOR:  Per RN, pt has been calm, cooperative and in behavioral control. RN reports pt is presenting due to endorsing depressive symptoms, and wanting to hurt herself. RN reports pt is AOx3, linear thought process, good eye contact and good hygiene/grooming. RN denies visible marks or bruises on pt. RN denies pt endorsing SI/HI in ED.      TREATMENT: None given     VISITORS RN reports pt’s cousins were at bedside earlier, however at this time pt does not have any family or social supports at bedside

## 2023-04-18 NOTE — ED PROVIDER NOTE - DISCUSSED CASE WITH MULTISELECT OPTIONS
Neurosurgery History & Physical    Patient ID: Abdullahi Salazar is a 72 y.o. male.    Chief Complaint   Patient presents with    Follow-up       Review of Systems   Constitutional: Negative for activity change, chills, fatigue and unexpected weight change.   HENT: Negative for hearing loss, tinnitus, trouble swallowing and voice change.    Eyes: Negative for visual disturbance.   Respiratory: Negative for apnea, chest tightness and shortness of breath.    Cardiovascular: Negative for chest pain and palpitations.   Gastrointestinal: Negative for abdominal pain, constipation, diarrhea, nausea and vomiting.   Genitourinary: Negative for difficulty urinating, dysuria and frequency.   Musculoskeletal: Negative for back pain, gait problem, neck pain and neck stiffness.   Skin: Negative for wound.   Neurological: Negative for dizziness, tremors, seizures, facial asymmetry, speech difficulty, weakness, light-headedness, numbness and headaches.   Psychiatric/Behavioral: Negative for confusion and decreased concentration.       Past Medical History:   Diagnosis Date    Actinic keratoses     Altered mental status 3/22/2015    Anticoagulant long-term use     Atrial fibrillation     CAD (coronary artery disease)     stents after bypass    Carotid artery stenosis 3/31/2015    CHF (congestive heart failure) 03/2018    Colon polyps 2011    repeat colonoscopy 2014    Combined hyperlipidemia associated with type 2 diabetes mellitus     Diabetes mellitus type II, uncontrolled     dx 2004    GERD (gastroesophageal reflux disease)     Grand mal seizure     last 2/2017    Hard of hearing     History of cardiac pacemaker 3/31/2015    HTN (hypertension)     Hydrocephalus 09/08/2017    Influenza A 1/2/2018    He got influenza a recently and was in the hospital in [place].  He had rhabdomyolyisis and acute kidney injury.  He also had an increased troponin.  He had a flu shot in early December.    Mitral valve insufficiency      Presence of automatic (implantable) cardiac defibrillator     Sleep apnea with use of continuous positive airway pressure (CPAP)     patient states he does not use CPAP anymore    Syncope 3/30/2015    Wears dentures     upper and lower    Wears hearing aid     sometimes     Social History     Socioeconomic History    Marital status:      Spouse name: Not on file    Number of children: Not on file    Years of education: Not on file    Highest education level: Not on file   Social Needs    Financial resource strain: Not on file    Food insecurity - worry: Not on file    Food insecurity - inability: Not on file    Transportation needs - medical: Not on file    Transportation needs - non-medical: Not on file   Occupational History    Not on file   Tobacco Use    Smoking status: Former Smoker     Packs/day: 2.00     Years: 25.00     Pack years: 50.00     Types: Cigarettes     Last attempt to quit: 1983     Years since quittin.7    Smokeless tobacco: Never Used    Tobacco comment: quit     Substance and Sexual Activity    Alcohol use: Yes     Alcohol/week: 0.6 oz     Types: 1 Cans of beer per week     Comment: rarely    Drug use: No    Sexual activity: Not on file   Other Topics Concern    Not on file   Social History Narrative    Not on file     Family History   Problem Relation Age of Onset    Stomach cancer Mother     Lung cancer Father     Diabetes Sister     Hypertension Sister     Heart disease Neg Hx     Stroke Neg Hx      Review of patient's allergies indicates:  No Known Allergies    Current Outpatient Medications:     aspirin (ECOTRIN) 81 MG EC tablet, Take 1 tablet (81 mg total) by mouth once daily., Disp: , Rfl: 0    atorvastatin (LIPITOR) 40 MG tablet, Take 1 tablet (40 mg total) by mouth once daily., Disp: 90 tablet, Rfl: 3    benazepril (LOTENSIN) 40 MG tablet, Take 1 tablet (40 mg total) by mouth once daily., Disp: 90 tablet, Rfl: 3    blood sugar  "diagnostic (ACCU-CHEK CLEOPATRA PLUS TEST STRP) Strp, TEST BLOOD SUGARS 4 TIMES DAILY, Disp: 150 strip, Rfl: PRN    carvedilol (COREG) 25 MG tablet, Take 1 tablet (25 mg total) by mouth 2 (two) times daily with meals., Disp: 180 tablet, Rfl: 3    clopidogrel (PLAVIX) 75 mg tablet, , Disp: , Rfl:     dabigatran etexilate (PRADAXA) 75 mg Cap, Take 1 capsule (75 mg total) by mouth 2 (two) times daily. "Do NOT break, chew, or open capsules.", Disp: 180 capsule, Rfl: 3    ezetimibe (ZETIA) 10 mg tablet, TAKE 1 TABLET ONE TIME DAILY, Disp: 90 tablet, Rfl: 3    insulin aspart U-100 (NOVOLOG) 100 unit/mL InPn pen, Humalog Sliding Scale tid before meals: 150-200 give 4 units 201-250 give 6 units 251-300 give 8 units 301-350 give 10 units > 350 give 12 units, Disp: 15 mL, Rfl: 0    insulin glargine, TOUJEO, (TOUJEO SOLOSTAR U-300 INSULIN) 300 unit/mL (1.5 mL) InPn pen, Inject 48 Units into the skin once daily., Disp: 6 Syringe, Rfl: 0    isosorbide mononitrate (IMDUR) 120 MG 24 hr tablet, Take 1 tablet (120 mg total) by mouth once daily., Disp: 30 tablet, Rfl: 1    isosorbide mononitrate (IMDUR) 60 MG 24 hr tablet, , Disp: , Rfl:     lancets (ACCU-CHEK SOFTCLIX LANCETS) Misc, 1 each by Misc.(Non-Drug; Combo Route) route 2 (two) times daily., Disp: 100 each, Rfl: 11    levETIRAcetam (KEPPRA) 500 MG Tab, Take 1.5 tablets (750 mg total) by mouth 2 (two) times daily., Disp: 270 tablet, Rfl: 0    multivitamin capsule, Take 1 capsule by mouth once daily., Disp: , Rfl:     nitroGLYCERIN (NITROSTAT) 0.4 MG SL tablet, Place 1 tablet (0.4 mg total) under the tongue every 5 (five) minutes as needed for Chest pain., Disp: 100 tablet, Rfl: 11    pen needle, diabetic (BD ULTRA-FINE SHORT PEN NEEDLE) 31 gauge x 5/16" Ndle, USE FOUR TIMES DAILY, Disp: 360 each, Rfl: 3    polyethylene glycol (GLYCOLAX) 17 gram PwPk, Take 17 g by mouth once daily., Disp: 30 packet, Rfl: 0    potassium chloride (KLOR-CON) 10 MEQ TbSR, Take 1 tablet " "(10 mEq total) by mouth once daily., Disp: 30 tablet, Rfl: 11    tamsulosin (FLOMAX) 0.4 mg Cp24, Take 1 capsule (0.4 mg total) by mouth once daily., Disp: 90 capsule, Rfl: 3    torsemide (DEMADEX) 20 MG Tab, Take 1 tablet (20 mg total) by mouth every morning., Disp: 90 tablet, Rfl: 3    hydrALAZINE (APRESOLINE) 100 MG tablet, Take 1 tablet (100 mg total) by mouth 2 (two) times daily., Disp: 60 tablet, Rfl: 2    Vitals:    09/26/18 1514   BP: (!) 184/93   BP Location: Right arm   Patient Position: Sitting   BP Method: Large (Automatic)   Pulse: 81   Resp: 17   Weight: 95.8 kg (211 lb 3.2 oz)   Height: 5' 7" (1.702 m)       Physical Exam   Constitutional: He is oriented to person, place, and time. He appears well-developed and well-nourished.   HENT:   Head: Normocephalic and atraumatic.   Eyes: Pupils are equal, round, and reactive to light.   Neck: Normal range of motion. Neck supple.   Cardiovascular: Normal rate.   Pulmonary/Chest: Effort normal.   Abdominal: He exhibits no distension.   Musculoskeletal: Normal range of motion. He exhibits no edema.   Neurological: He is alert and oriented to person, place, and time. He has an abnormal Tandem Gait Test. He has a normal Finger-Nose-Finger Test, a normal Heel to Villeda Test and a normal Romberg Test. Gait normal.   Reflex Scores:       Tricep reflexes are 2+ on the right side and 2+ on the left side.       Bicep reflexes are 2+ on the right side and 2+ on the left side.       Brachioradialis reflexes are 2+ on the right side and 2+ on the left side.       Patellar reflexes are 2+ on the right side and 2+ on the left side.       Achilles reflexes are 2+ on the right side and 2+ on the left side.  Skin: Skin is warm and dry.   Psychiatric: He has a normal mood and affect. His speech is normal and behavior is normal. Judgment and thought content normal.   Nursing note and vitals reviewed.      Neurologic Exam     Mental Status   Oriented to person, place, and time. "   Oriented to person.   Oriented to place.   Oriented to time.   Follows 3 step commands.   Attention: normal. Concentration: normal.   Speech: speech is normal   Level of consciousness: alert  Knowledge: consistent with education.   Able to name object. Able to read. Able to repeat. Able to write. Normal comprehension.     Cranial Nerves     CN II   Visual acuity: normal  Right visual field deficit: none  Left visual field deficit: none     CN III, IV, VI   Pupils are equal, round, and reactive to light.  Right pupil: Size: 3 mm. Shape: regular. Reactivity: brisk. Consensual response: intact.   Left pupil: Size: 3 mm. Shape: regular. Reactivity: brisk. Consensual response: intact.   CN III: no CN III palsy  CN VI: no CN VI palsy  Nystagmus: none   Diplopia: none  Ophthalmoparesis: none  Conjugate gaze: present    CN V   Right facial sensation deficit: none  Left facial sensation deficit: none    CN VII   Right facial weakness: none  Left facial weakness: none    CN VIII   Hearing: intact    CN IX, X   CN IX normal.   CN X normal.     CN XI   Right sternocleidomastoid strength: normal  Left sternocleidomastoid strength: normal  Right trapezius strength: normal  Left trapezius strength: normal    CN XII   Fasciculations: absent  Tongue deviation: none    Motor Exam   Muscle bulk: normal  Overall muscle tone: normal  Right arm pronator drift: absent  Left arm pronator drift: absent    Strength   Right neck flexion: 5/5  Left neck flexion: 5/5  Right neck extension: 5/5  Left neck extension: 5/5  Right deltoid: 5/5  Left deltoid: 5/5  Right biceps: 5/5  Left biceps: 5/5  Right triceps: 5/5  Left triceps: 5/5  Right wrist flexion: 5/5  Left wrist flexion: 5/5  Right wrist extension: 5/5  Left wrist extension: 5/5  Right interossei: 5/5  Left interossei: 5/5  Right abdominals: 5/5  Left abdominals: 5/5  Right iliopsoas: 5/5  Left iliopsoas: 5/5  Right quadriceps: 5/5  Left quadriceps: 5/5  Right hamstrin/5  Left  hamstrin/5  Right glutei: 5/5  Left glutei: 5/5  Right anterior tibial: 5/5  Left anterior tibial: 5/5  Right posterior tibial: 5/5  Left posterior tibial: 5/5  Right peroneal: 5/5  Left peroneal: 5/5  Right gastroc: 5/5  Left gastroc: 5/5    Sensory Exam   Right arm light touch: normal  Left arm light touch: normal  Right leg light touch: normal  Left leg light touch: normal  Right arm vibration: normal  Left arm vibration: normal  Right arm pinprick: normal  Left arm pinprick: normal    Gait, Coordination, and Reflexes     Gait  Gait: normal    Coordination   Romberg: negative  Finger to nose coordination: normal  Heel to shin coordination: normal  Tandem walking coordination: abnormal    Tremor   Resting tremor: absent  Intention tremor: absent  Action tremor: absent    Reflexes   Right brachioradialis: 2+  Left brachioradialis: 2+  Right biceps: 2+  Left biceps: 2+  Right triceps: 2+  Left triceps: 2+  Right patellar: 2+  Left patellar: 2+  Right achilles: 2+  Left achilles: 2+  Right Chavez: absent  Left Chavez: absent  Right ankle clonus: absent  Left ankle clonus: absent      Provider dictation:    Mr. Salazar presents is a known patient in our clinic.  He is 10 months status post  shunt for NPH.  He is following up due to chronic subdural hematomas present bilaterally last office visit.  He had continued aspirin and Plavix due to chronicity of the fluid collections.  He reports today with his wife with no new complications.  Denies headache denies any worsening of his cognition memory gait and urinary incontinence.  He is here today only for follow-up at my recommendation.    On physical examination, he has an abnormal tandem walk and a magnetic gate. He had an episode of incontinence while in the clinic.  He has a positive Romberg.      CT done on 2018 demonstrates resolution of right subdural hematoma, however left subdural hematoma has changed in chronicity to be a more acute 5 mm collection in  the left frontal.    I discussed with Mr. Salazar and his wife my concerns with new bleed in the left frontal.  I discussed the case with Dr. Oconnor.  We have recommended him to stop aspirin and Plavix for the next 2 weeks.  I've increased shunt to 2.5.  We will repeat CT of the head in 2 weeks.  If left subdural hematoma is stable without any acute hemorrhage, he will be able to start aspirin 325.  We will follow him closely to ensure resolution of left frontal hematoma.  Patient is currently asymptomatic however we discussed new image findings and ER precautions if he has any worsening symptoms.  He is currently on levetiracetam and will continue that for seizure prophylaxis.  I'll follow up with him in 2 weeks.    Visit Diagnosis:  Gait difficulty  -     Ambulatory Referral to Physical/Occupational Therapy    Normal pressure hydrocephalus syndrome  -     Ambulatory Referral to Physical/Occupational Therapy                Neurosurgery History & Physical    Patient ID: Abdullahi Salazar is a 72 y.o. male.    Chief Complaint   Patient presents with    Follow-up       Review of Systems   Constitutional: Negative for activity change, chills, fatigue and unexpected weight change.   HENT: Negative for hearing loss, tinnitus, trouble swallowing and voice change.    Eyes: Negative for visual disturbance.   Respiratory: Negative for apnea, chest tightness and shortness of breath.    Cardiovascular: Negative for chest pain and palpitations.   Gastrointestinal: Negative for abdominal pain, constipation, diarrhea, nausea and vomiting.   Genitourinary: Negative for difficulty urinating, dysuria and frequency.   Musculoskeletal: Negative for back pain, gait problem, neck pain and neck stiffness.   Skin: Negative for wound.   Neurological: Negative for dizziness, tremors, seizures, facial asymmetry, speech difficulty, weakness, light-headedness, numbness and headaches.   Psychiatric/Behavioral: Negative for confusion and decreased  concentration.       Past Medical History:   Diagnosis Date    Actinic keratoses     Altered mental status 3/22/2015    Anticoagulant long-term use     Atrial fibrillation     CAD (coronary artery disease)     stents after bypass    Carotid artery stenosis 3/31/2015    CHF (congestive heart failure) 2018    Colon polyps     repeat colonoscopy     Combined hyperlipidemia associated with type 2 diabetes mellitus     Diabetes mellitus type II, uncontrolled     dx 2004    GERD (gastroesophageal reflux disease)     Grand mal seizure     last 2017    Hard of hearing     History of cardiac pacemaker 3/31/2015    HTN (hypertension)     Hydrocephalus 2017    Influenza A 2018    He got influenza a recently and was in the hospital in [place].  He had rhabdomyolyisis and acute kidney injury.  He also had an increased troponin.  He had a flu shot in early December.    Mitral valve insufficiency     Presence of automatic (implantable) cardiac defibrillator     Sleep apnea with use of continuous positive airway pressure (CPAP)     patient states he does not use CPAP anymore    Syncope 3/30/2015    Wears dentures     upper and lower    Wears hearing aid     sometimes     Social History     Socioeconomic History    Marital status:      Spouse name: Not on file    Number of children: Not on file    Years of education: Not on file    Highest education level: Not on file   Social Needs    Financial resource strain: Not on file    Food insecurity - worry: Not on file    Food insecurity - inability: Not on file    Transportation needs - medical: Not on file    Transportation needs - non-medical: Not on file   Occupational History    Not on file   Tobacco Use    Smoking status: Former Smoker     Packs/day: 2.00     Years: 25.00     Pack years: 50.00     Types: Cigarettes     Last attempt to quit: 1983     Years since quittin.7    Smokeless tobacco: Never Used     "Tobacco comment: quit 1983    Substance and Sexual Activity    Alcohol use: Yes     Alcohol/week: 0.6 oz     Types: 1 Cans of beer per week     Comment: rarely    Drug use: No    Sexual activity: Not on file   Other Topics Concern    Not on file   Social History Narrative    Not on file     Family History   Problem Relation Age of Onset    Stomach cancer Mother     Lung cancer Father     Diabetes Sister     Hypertension Sister     Heart disease Neg Hx     Stroke Neg Hx      Review of patient's allergies indicates:  No Known Allergies    Current Outpatient Medications:     aspirin (ECOTRIN) 81 MG EC tablet, Take 1 tablet (81 mg total) by mouth once daily., Disp: , Rfl: 0    atorvastatin (LIPITOR) 40 MG tablet, Take 1 tablet (40 mg total) by mouth once daily., Disp: 90 tablet, Rfl: 3    benazepril (LOTENSIN) 40 MG tablet, Take 1 tablet (40 mg total) by mouth once daily., Disp: 90 tablet, Rfl: 3    blood sugar diagnostic (ACCU-CHEK CLEOPATRA PLUS TEST STRP) Strp, TEST BLOOD SUGARS 4 TIMES DAILY, Disp: 150 strip, Rfl: PRN    carvedilol (COREG) 25 MG tablet, Take 1 tablet (25 mg total) by mouth 2 (two) times daily with meals., Disp: 180 tablet, Rfl: 3    clopidogrel (PLAVIX) 75 mg tablet, , Disp: , Rfl:     dabigatran etexilate (PRADAXA) 75 mg Cap, Take 1 capsule (75 mg total) by mouth 2 (two) times daily. "Do NOT break, chew, or open capsules.", Disp: 180 capsule, Rfl: 3    ezetimibe (ZETIA) 10 mg tablet, TAKE 1 TABLET ONE TIME DAILY, Disp: 90 tablet, Rfl: 3    insulin aspart U-100 (NOVOLOG) 100 unit/mL InPn pen, Humalog Sliding Scale tid before meals: 150-200 give 4 units 201-250 give 6 units 251-300 give 8 units 301-350 give 10 units > 350 give 12 units, Disp: 15 mL, Rfl: 0    insulin glargine, TOUJEO, (TOUJEO SOLOSTAR U-300 INSULIN) 300 unit/mL (1.5 mL) InPn pen, Inject 48 Units into the skin once daily., Disp: 6 Syringe, Rfl: 0    isosorbide mononitrate (IMDUR) 120 MG 24 hr tablet, Take 1 tablet " "(120 mg total) by mouth once daily., Disp: 30 tablet, Rfl: 1    isosorbide mononitrate (IMDUR) 60 MG 24 hr tablet, , Disp: , Rfl:     lancets (ACCU-CHEK SOFTCLIX LANCETS) Misc, 1 each by Misc.(Non-Drug; Combo Route) route 2 (two) times daily., Disp: 100 each, Rfl: 11    levETIRAcetam (KEPPRA) 500 MG Tab, Take 1.5 tablets (750 mg total) by mouth 2 (two) times daily., Disp: 270 tablet, Rfl: 0    multivitamin capsule, Take 1 capsule by mouth once daily., Disp: , Rfl:     nitroGLYCERIN (NITROSTAT) 0.4 MG SL tablet, Place 1 tablet (0.4 mg total) under the tongue every 5 (five) minutes as needed for Chest pain., Disp: 100 tablet, Rfl: 11    pen needle, diabetic (BD ULTRA-FINE SHORT PEN NEEDLE) 31 gauge x 5/16" Ndle, USE FOUR TIMES DAILY, Disp: 360 each, Rfl: 3    polyethylene glycol (GLYCOLAX) 17 gram PwPk, Take 17 g by mouth once daily., Disp: 30 packet, Rfl: 0    potassium chloride (KLOR-CON) 10 MEQ TbSR, Take 1 tablet (10 mEq total) by mouth once daily., Disp: 30 tablet, Rfl: 11    tamsulosin (FLOMAX) 0.4 mg Cp24, Take 1 capsule (0.4 mg total) by mouth once daily., Disp: 90 capsule, Rfl: 3    torsemide (DEMADEX) 20 MG Tab, Take 1 tablet (20 mg total) by mouth every morning., Disp: 90 tablet, Rfl: 3    hydrALAZINE (APRESOLINE) 100 MG tablet, Take 1 tablet (100 mg total) by mouth 2 (two) times daily., Disp: 60 tablet, Rfl: 2    Vitals:    09/26/18 1514   BP: (!) 184/93   BP Location: Right arm   Patient Position: Sitting   BP Method: Large (Automatic)   Pulse: 81   Resp: 17   Weight: 95.8 kg (211 lb 3.2 oz)   Height: 5' 7" (1.702 m)       Physical Exam   Constitutional: He is oriented to person, place, and time. He appears well-developed and well-nourished.   HENT:   Head: Normocephalic and atraumatic.   Eyes: Pupils are equal, round, and reactive to light.   Neck: Normal range of motion. Neck supple.   Cardiovascular: Normal rate.    Pulmonary/Chest: Effort normal.   Abdominal: He exhibits no distension. "   Musculoskeletal: Normal range of motion. He exhibits no edema.   Neurological: He is alert and oriented to person, place, and time. He has an abnormal Tandem Gait Test. He has a normal Finger-Nose-Finger Test, a normal Heel to Villeda Test and a normal Romberg Test. Gait normal.   Reflex Scores:       Tricep reflexes are 2+ on the right side and 2+ on the left side.       Bicep reflexes are 2+ on the right side and 2+ on the left side.       Brachioradialis reflexes are 2+ on the right side and 2+ on the left side.       Patellar reflexes are 2+ on the right side and 2+ on the left side.       Achilles reflexes are 2+ on the right side and 2+ on the left side.  Skin: Skin is warm and dry.   Psychiatric: He has a normal mood and affect. His speech is normal and behavior is normal. Judgment and thought content normal.   Nursing note and vitals reviewed.      Neurologic Exam     Mental Status   Oriented to person, place, and time.   Oriented to person.   Oriented to place.   Oriented to time.   Follows 3 step commands.   Attention: normal. Concentration: normal.   Speech: speech is normal   Level of consciousness: alert  Knowledge: consistent with education.   Able to name object. Able to read. Able to repeat. Able to write. Normal comprehension.     Cranial Nerves     CN II   Visual acuity: normal  Right visual field deficit: none  Left visual field deficit: none     CN III, IV, VI   Pupils are equal, round, and reactive to light.  Right pupil: Size: 3 mm. Shape: regular. Reactivity: brisk. Consensual response: intact.   Left pupil: Size: 3 mm. Shape: regular. Reactivity: brisk. Consensual response: intact.   CN III: no CN III palsy  CN VI: no CN VI palsy  Nystagmus: none   Diplopia: none  Ophthalmoparesis: none  Conjugate gaze: present    CN V   Right facial sensation deficit: none  Left facial sensation deficit: none    CN VII   Right facial weakness: none  Left facial weakness: none    CN VIII   Hearing:  intact    CN IX, X   CN IX normal.   CN X normal.     CN XI   Right sternocleidomastoid strength: normal  Left sternocleidomastoid strength: normal  Right trapezius strength: normal  Left trapezius strength: normal    CN XII   Fasciculations: absent  Tongue deviation: none    Motor Exam   Muscle bulk: normal  Overall muscle tone: normal  Right arm pronator drift: absent  Left arm pronator drift: absent    Strength   Right neck flexion: 5/5  Left neck flexion: 5/5  Right neck extension: 5/5  Left neck extension: 5/5  Right deltoid: 5/5  Left deltoid: 5/5  Right biceps: 5/5  Left biceps: 5/5  Right triceps: 5/5  Left triceps: 5/5  Right wrist flexion: 5/5  Left wrist flexion: 5/5  Right wrist extension: 5/5  Left wrist extension: 5/5  Right interossei: 5/5  Left interossei: 5/5  Right abdominals: 5/5  Left abdominals: 5/5  Right iliopsoas: 5/5  Left iliopsoas: 5/5  Right quadriceps: 5/5  Left quadriceps: 5/5  Right hamstrin/5  Left hamstrin/5  Right glutei: 5/5  Left glutei: 5/5  Right anterior tibial: 5/5  Left anterior tibial: 5/5  Right posterior tibial: 5/5  Left posterior tibial: 5/5  Right peroneal: 5/5  Left peroneal: 5/5  Right gastroc: 5/5  Left gastroc: 5/5    Sensory Exam   Right arm light touch: normal  Left arm light touch: normal  Right leg light touch: normal  Left leg light touch: normal  Right arm vibration: normal  Left arm vibration: normal  Right arm pinprick: normal  Left arm pinprick: normal    Gait, Coordination, and Reflexes     Gait  Gait: normal    Coordination   Romberg: negative  Finger to nose coordination: normal  Heel to shin coordination: normal  Tandem walking coordination: abnormal    Tremor   Resting tremor: absent  Intention tremor: absent  Action tremor: absent    Reflexes   Right brachioradialis: 2+  Left brachioradialis: 2+  Right biceps: 2+  Left biceps: 2+  Right triceps: 2+  Left triceps: 2+  Right patellar: 2+  Left patellar: 2+  Right achilles: 2+  Left achilles:  2+  Right Chavez: absent  Left Chavez: absent  Right ankle clonus: absent  Left ankle clonus: absent      Provider dictation:    Mr. Marie billings is a known patient in our clinic.  He is presenting today for follow-up evaluation of his normal pressure hydrocephalus and subdural hematoma.  He initially was scheduled with Dr. Oconnor for a carotid endarterectomy due to multiple TIAs.  It was determined that after a CHF exacerbation canceled his surgery that he would have endovascular stenting done of the carotid.  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 The patient recovered well and was discharged home.  He has had worsening of his gait and incontinence recently.  His shunt was turned off last office visit due to the subdural hematoma.    On physical examination, he continues to have a slow magnetic gait.  He is pleasant and cooperative.    CT of the head shows chronic membrane formation in the left convexity no evidence of acute or chronic subdural at this time.    At this time I will turn his shunt back to 1.5 as it was previously.  I have discussed avoidance of falls.  We will follow-up with him in 1 month to see if he has made any improvement as he has had significant regression of his NPH since he has not been able to have his shunt programmed on.  I would like him to start physical therapy for gait retraining fall prevention.    Visit Diagnosis:  Gait difficulty  -     Ambulatory Referral to Physical/Occupational Therapy    Normal pressure hydrocephalus syndrome  -     Ambulatory Referral to Physical/Occupational Therapy                   Consultant

## 2023-04-19 LAB
AMPHET UR-MCNC: NEGATIVE — SIGNIFICANT CHANGE UP
BARBITURATES UR SCN-MCNC: NEGATIVE — SIGNIFICANT CHANGE UP
BENZODIAZ UR-MCNC: NEGATIVE — SIGNIFICANT CHANGE UP
COCAINE METAB.OTHER UR-MCNC: NEGATIVE — SIGNIFICANT CHANGE UP
FENTANYL UR QL: NEGATIVE — SIGNIFICANT CHANGE UP
METHADONE UR-MCNC: NEGATIVE — SIGNIFICANT CHANGE UP
OPIATES UR-MCNC: NEGATIVE — SIGNIFICANT CHANGE UP
OXYCODONE UR-MCNC: NEGATIVE — SIGNIFICANT CHANGE UP
PCP UR-MCNC: NEGATIVE — SIGNIFICANT CHANGE UP
PROPOXYPHENE QUALITATIVE URINE RESULT: NEGATIVE — SIGNIFICANT CHANGE UP
THC UR QL: NEGATIVE — SIGNIFICANT CHANGE UP

## 2023-04-19 PROCEDURE — 99231 SBSQ HOSP IP/OBS SF/LOW 25: CPT

## 2023-04-19 RX ADMIN — Medication 3 MILLIGRAM(S): at 20:55

## 2023-04-19 RX ADMIN — SERTRALINE 50 MILLIGRAM(S): 25 TABLET, FILM COATED ORAL at 12:01

## 2023-04-19 NOTE — BH INPATIENT PSYCHIATRY PROGRESS NOTE - NSBHCHARTREVIEWVS_PSY_A_CORE FT
Vital Signs Last 24 Hrs  T(C): --  T(F): --  HR: 102 (04-19-23 @ 15:49) (62 - 102)  BP: 114/74 (04-19-23 @ 15:49) (103/80 - 122/76)  BP(mean): --  RR: 16 (04-19-23 @ 15:49) (16 - 18)  SpO2: --

## 2023-04-19 NOTE — BH INPATIENT PSYCHIATRY PROGRESS NOTE - PRN MEDS
MEDICATIONS  (PRN):  clonazePAM  Tablet 0.25 milliGRAM(s) Oral daily PRN panic attacks  haloperidol     Tablet 5 milliGRAM(s) Oral every 6 hours PRN agitation  hydrOXYzine hydrochloride 50 milliGRAM(s) Oral every 6 hours PRN anxiety  LORazepam     Tablet 2 milliGRAM(s) Oral every 6 hours PRN agitation

## 2023-04-19 NOTE — BH INPATIENT PSYCHIATRY PROGRESS NOTE - NSBHASSESSSUMMFT_PSY_ALL_CORE
The patient is a 16 yo transgender F->M, Niuean-speaking, recently immigrated from [place], domiciled with brother, unemployed, with psych hx of gender dysphoria, anxiety and depression, no past psychiatric hospitalizations, previous suicide attempt via choking, who is presenting at recommendation of brother and cousin for increasing SI.    Upon admission patient endorses strong feelings of depression and exhibits very dysphoric and minimally reactive affect. He endorses escalating suicidal thoughts related to feelings of worthlessness and unhappiness over his appearance. The patient is currently admitted involuntarily on 9:39 legals as he is at acutely elevated risk of suicide. Goals of care will be to decrease patient's suicidality. Will plan to gradually titrate down Klonopin as it can contribute to impulsivity as well as optimize antidepressant regimen.    04/18/23- Patient acutely dysphoric. Plan to dc mirtazapine as he notes excessive lethargy and hyperphagia, continue sertraline 50 mg    04/19/2023  Patient remains with thoughts of suicide and concerns about his future, however he feels safe while in a structured setting with people around him  Impression: MDD vs gender dysphoria, R/O bipolar    #MDD #Gender dysphoria  - 9.39 legals  - Continue sertraline to 50mg (increased 04/18)  - Discontinue mirtazapine 15mg qhs as patient has hyperphagia and lethargy  - Melatonin for sleep phase disorder  - Referral for outpatient endocrinologist  - Obtain working phone number for outpatient psychiatrist Dr. Cuenca at [facility]      #Panic attacks  - PRN Atarax  - Continue Klonopin to 0.25mg PRN daily  - Continue to monitor      #Agitation  - For episodes of acute agitation can offer PO Haldol 5 mg/Ativan 2 mg/Benadryl 50 mg Q6H PRN. If refusing PO and is in acute risk of harm to self/other, can offer IM Haldol 5 mg/Ativan 2 mg/Benadryl 50 mg Q6H PRN. If given, please repeat EKG and hold antipsychotics if QTC>500

## 2023-04-19 NOTE — BH INPATIENT PSYCHIATRY PROGRESS NOTE - NSBHFUPINTERVALHXFT_PSY_A_CORE
Met with Radamse, a 17-year-old man from [facility].  He reports feeling a little better than he did when he first came in, at which time he was pretty scared and confused.  He reports he feels like his mood is a little better and we discussed possibly the medication being helpful.   He laughs good-naturedly at the writer's broken Sami.

## 2023-04-19 NOTE — BH INPATIENT PSYCHIATRY PROGRESS NOTE - NSBHMETABOLIC_PSY_ALL_CORE_FT
BMI: BMI (kg/m2): 32.5 (04-18-23 @ 05:33)  HbA1c:   Glucose:   BP: 114/74 (04-19-23 @ 15:49) (103/80 - 137/94)  Lipid Panel:

## 2023-04-19 NOTE — BH INPATIENT PSYCHIATRY PROGRESS NOTE - CURRENT MEDICATION
MEDICATIONS  (STANDING):  melatonin. 3 milliGRAM(s) Oral at bedtime  sertraline 50 milliGRAM(s) Oral daily    MEDICATIONS  (PRN):  clonazePAM  Tablet 0.25 milliGRAM(s) Oral daily PRN panic attacks  haloperidol     Tablet 5 milliGRAM(s) Oral every 6 hours PRN agitation  hydrOXYzine hydrochloride 50 milliGRAM(s) Oral every 6 hours PRN anxiety  LORazepam     Tablet 2 milliGRAM(s) Oral every 6 hours PRN agitation

## 2023-04-20 PROCEDURE — 99231 SBSQ HOSP IP/OBS SF/LOW 25: CPT

## 2023-04-20 RX ORDER — SERTRALINE 25 MG/1
75 TABLET, FILM COATED ORAL DAILY
Refills: 0 | Status: DISCONTINUED | OUTPATIENT
Start: 2023-04-20 | End: 2023-04-23

## 2023-04-20 RX ADMIN — SERTRALINE 50 MILLIGRAM(S): 25 TABLET, FILM COATED ORAL at 08:05

## 2023-04-20 RX ADMIN — Medication 3 MILLIGRAM(S): at 20:23

## 2023-04-20 NOTE — BH INPATIENT PSYCHIATRY PROGRESS NOTE - NSBHCHARTREVIEWVS_PSY_A_CORE FT
Vital Signs Last 24 Hrs  T(C): --  T(F): --  HR: 89 (04-20-23 @ 09:14) (89 - 102)  BP: 125/80 (04-20-23 @ 09:14) (114/74 - 125/80)  BP(mean): --  RR: 18 (04-20-23 @ 09:14) (16 - 18)  SpO2: --

## 2023-04-20 NOTE — BH INPATIENT PSYCHIATRY PROGRESS NOTE - NSBHMETABOLIC_PSY_ALL_CORE_FT
BMI: BMI (kg/m2): 32.5 (04-18-23 @ 05:33)  HbA1c:   Glucose:   BP: 125/80 (04-20-23 @ 09:14) (103/80 - 137/94)  Lipid Panel:

## 2023-04-20 NOTE — BH INPATIENT PSYCHIATRY PROGRESS NOTE - NSBHMSESPABN_PSY_A_CORE
Decreased productivity Implemented All Universal Safety Interventions:  [place] to call system. Call bell, personal items and telephone within reach. Instruct patient to call for assistance. Room bathroom lighting operational. Non-slip footwear when patient is off stretcher. Physically safe environment: no spills, clutter or unnecessary equipment. Stretcher in lowest position, wheels locked, appropriate side rails in place.

## 2023-04-20 NOTE — BH INPATIENT PSYCHIATRY PROGRESS NOTE - NSBHASSESSSUMMFT_PSY_ALL_CORE
The patient is a 16 yo transgender F->M, Uzbek-speaking, recently immigrated from [place], domiciled with brother, unemployed, with psych hx of gender dysphoria, anxiety and depression, no past psychiatric hospitalizations, previous suicide attempt via choking, who is presenting at recommendation of brother and cousin for increasing SI.    Upon admission patient endorses strong feelings of depression and exhibits very dysphoric and minimally reactive affect. He endorses escalating suicidal thoughts related to feelings of worthlessness and unhappiness over his appearance. The patient is currently admitted involuntarily on 9:39 legals as he is at acutely elevated risk of suicide. Goals of care will be to decrease patient's suicidality. Will plan to gradually titrate down Klonopin as it can contribute to impulsivity as well as optimize antidepressant regimen.    04/18/23- Patient acutely dysphoric. Plan to dc mirtazapine as he notes excessive lethargy and hyperphagia, continue sertraline 50 mg  04/19/2023  Patient remains with thoughts of suicide and concerns about his future, however he feels safe while in a structured setting with people around him  04/20/23 Patient had no thoughts of suicide during interview, appeared brighter and occasionally smiled. Agreeable to increase antidepressants    Impression: MDD vs gender dysphoria, R/O bipolar    #MDD #Gender dysphoria  - 9.39 legals  - Increase sertraline to 75mg (increased 04/18)  - Discontinue mirtazapine 15mg qhs as patient has hyperphagia and lethargy  - Melatonin for sleep phase disorder  - Referral for outpatient endocrinologist  - Obtain working phone number for outpatient psychiatrist Dr. Cuenca at [facility]      #Panic attacks  - PRN Atarax  - Continue Klonopin to 0.25mg PRN daily  - Continue to monitor      #Agitation  - For episodes of acute agitation can offer PO Haldol 5 mg/Ativan 2 mg/Benadryl 50 mg Q6H PRN. If refusing PO and is in acute risk of harm to self/other, can offer IM Haldol 5 mg/Ativan 2 mg/Benadryl 50 mg Q6H PRN. If given, please repeat EKG and hold antipsychotics if QTC>500

## 2023-04-20 NOTE — BH INPATIENT PSYCHIATRY PROGRESS NOTE - NSBHFUPINTERVALHXFT_PSY_A_CORE
Nursing reports no acute events overnight. Per chart review the patient has not required any behavioral PRNS since the last assessment.    Chart reviewed, patient seen and evaluated in AM. On approach, the patient was lying in his bed, but sat up to speak with the writer. He appears brighter than the day before yesterday. Despite appearing brighter he was laconic, and answered rather superficially stating "fine" to most of the writer's open-ended questions. We discussed his strangulation attempt two weeks ago, and he clarified that it was an attempt to distract himself from the pain he felt in his chest, rather than an actual suicide attempt. He stated that he attempted it one other time, about a month ago, for the same reason.  He denies any current thoughts of wanting to hurt himself or end his life. He is agreeable having his medication increased.  Patient endorsed OK sleep and appetite.     No SI/HI/AVH elicited.

## 2023-04-20 NOTE — PSYCHIATRIC REHAB INITIAL EVALUATION - TELEPHONIC ID NUMBER OF THE INTERPRETER
admission was done by Gray SANCHEZ interview who speaks Estonian fluently. Patient requested for interview to be done by RN.  Documentation done By Morteza SANCHEZ.

## 2023-04-21 PROCEDURE — 99231 SBSQ HOSP IP/OBS SF/LOW 25: CPT

## 2023-04-21 RX ADMIN — SERTRALINE 75 MILLIGRAM(S): 25 TABLET, FILM COATED ORAL at 08:09

## 2023-04-21 RX ADMIN — Medication 3 MILLIGRAM(S): at 20:30

## 2023-04-21 NOTE — BH SAFETY PLAN - ENVIRONMENT SAFETY 1:
One way I would make my environment safe would be to stay away from certain people, places and things.

## 2023-04-21 NOTE — BH SAFETY PLAN - ENVIRONMENT SAFETY 2:
Another way I would make my environment safe would be to call for help once I get into one of these bad moods.

## 2023-04-21 NOTE — BH INPATIENT PSYCHIATRY PROGRESS NOTE - NSBHMETABOLIC_PSY_ALL_CORE_FT
BMI: BMI (kg/m2): 32.5 (04-18-23 @ 05:33)  HbA1c:   Glucose:   BP: 127/65 (04-24-23 @ 08:48) (104/72 - 128/72)  Lipid Panel:

## 2023-04-21 NOTE — BH INPATIENT PSYCHIATRY PROGRESS NOTE - NSBHASSESSSUMMFT_PSY_ALL_CORE
The patient is a 18 yo transgender F->M, Liberian-speaking, recently immigrated from [place], domiciled with brother, unemployed, with psych hx of gender dysphoria, anxiety and depression, no past psychiatric hospitalizations, previous suicide attempt via choking, who is presenting at recommendation of brother and cousin for increasing SI.    Upon admission patient endorses strong feelings of depression and exhibits very dysphoric and minimally reactive affect. He endorses escalating suicidal thoughts related to feelings of worthlessness and unhappiness over his appearance. The patient is currently admitted involuntarily on 9:39 legals as he is at acutely elevated risk of suicide. Goals of care will be to decrease patient's suicidality. Will plan to gradually titrate down Klonopin as it can contribute to impulsivity as well as optimize antidepressant regimen.    04/18/23- Patient acutely dysphoric. Plan to dc mirtazapine as he notes excessive lethargy and hyperphagia, continue sertraline 50 mg  04/19/2023  Patient remains with thoughts of suicide and concerns about his future, however he feels safe while in a structured setting with people around him  04/20/23 Patient had no thoughts of suicide during interview, appeared brighter and occasionally smiled. Agreeable to increase antidepressants    Impression: MDD vs gender dysphoria, R/O bipolar    #MDD #Gender dysphoria  - 9.39 legals  - Increase sertraline to 75mg (increased 04/18)  - Discontinue mirtazapine 15mg qhs as patient has hyperphagia and lethargy  - Melatonin for sleep phase disorder  - Referral for outpatient endocrinologist  - Obtain working phone number for outpatient psychiatrist Dr. Cuenca at [facility]      #Panic attacks  - PRN Atarax  - Continue Klonopin to 0.25mg PRN daily  - Continue to monitor      #Agitation  - For episodes of acute agitation can offer PO Haldol 5 mg/Ativan 2 mg/Benadryl 50 mg Q6H PRN. If refusing PO and is in acute risk of harm to self/other, can offer IM Haldol 5 mg/Ativan 2 mg/Benadryl 50 mg Q6H PRN. If given, please repeat EKG and hold antipsychotics if QTC>500

## 2023-04-21 NOTE — BH INPATIENT PSYCHIATRY PROGRESS NOTE - NSBHCHARTREVIEWVS_PSY_A_CORE FT
Vital Signs Last 24 Hrs  T(C): 36.8 (04-24-23 @ 08:48), Max: 36.8 (04-24-23 @ 08:48)  T(F): 98.2 (04-24-23 @ 08:48), Max: 98.2 (04-24-23 @ 08:48)  HR: 68 (04-24-23 @ 08:48) (68 - 94)  BP: 127/65 (04-24-23 @ 08:48) (124/64 - 127/65)  BP(mean): --  RR: 18 (04-24-23 @ 08:48) (18 - 18)  SpO2: --

## 2023-04-21 NOTE — BH INPATIENT PSYCHIATRY PROGRESS NOTE - NSBHFUPINTERVALHXFT_PSY_A_CORE
Nursing reports no acute events overnight. Per chart review the patient has not required any behavioral PRNS since the last assessment.    Chart reviewed, patient seen and evaluated in AM. On approach, the patient was in the dining room, waiting to have lunch. He remains very quiet, and appeared somewhat nervous (continued to play with his rubber band on his wrist throughout the interview), but he smiled throughout the interview. He continues to give short, superficial answers to how he's doing, generally stating that he is "fine".     He denies any current thoughts of wanting to hurt himself or end his life.   Patient endorsed OK sleep and appetite.     No SI/HI/AVH elicited.

## 2023-04-21 NOTE — BH INPATIENT PSYCHIATRY PROGRESS NOTE - CURRENT MEDICATION
MEDICATIONS  (STANDING):  melatonin. 3 milliGRAM(s) Oral at bedtime  sertraline 100 milliGRAM(s) Oral daily    MEDICATIONS  (PRN):  clonazePAM  Tablet 0.25 milliGRAM(s) Oral daily PRN panic attacks  haloperidol     Tablet 5 milliGRAM(s) Oral every 6 hours PRN agitation  hydrOXYzine hydrochloride 50 milliGRAM(s) Oral every 6 hours PRN anxiety  LORazepam     Tablet 2 milliGRAM(s) Oral every 6 hours PRN agitation

## 2023-04-22 PROCEDURE — 99231 SBSQ HOSP IP/OBS SF/LOW 25: CPT | Mod: GC

## 2023-04-22 RX ADMIN — Medication 3 MILLIGRAM(S): at 20:12

## 2023-04-22 RX ADMIN — SERTRALINE 75 MILLIGRAM(S): 25 TABLET, FILM COATED ORAL at 08:58

## 2023-04-22 NOTE — BH INPATIENT PSYCHIATRY PROGRESS NOTE - NSBHCHARTREVIEWVS_PSY_A_CORE FT
Vital Signs Last 24 Hrs  T(C): 36.2 (04-22-23 @ 07:36), Max: 36.7 (04-21-23 @ 15:50)  T(F): 97.2 (04-22-23 @ 07:36), Max: 98 (04-21-23 @ 15:50)  HR: 86 (04-22-23 @ 07:36) (86 - 86)  BP: 119/53 (04-22-23 @ 07:36) (119/53 - 119/82)  BP(mean): --  RR: 18 (04-22-23 @ 07:36) (16 - 18)  SpO2: --

## 2023-04-22 NOTE — BH INPATIENT PSYCHIATRY PROGRESS NOTE - CURRENT MEDICATION
MEDICATIONS  (STANDING):  melatonin. 3 milliGRAM(s) Oral at bedtime  sertraline 75 milliGRAM(s) Oral daily    MEDICATIONS  (PRN):  clonazePAM  Tablet 0.25 milliGRAM(s) Oral daily PRN panic attacks  haloperidol     Tablet 5 milliGRAM(s) Oral every 6 hours PRN agitation  hydrOXYzine hydrochloride 50 milliGRAM(s) Oral every 6 hours PRN anxiety  LORazepam     Tablet 2 milliGRAM(s) Oral every 6 hours PRN agitation

## 2023-04-22 NOTE — BH INPATIENT PSYCHIATRY PROGRESS NOTE - NSBHFUPINTERVALHXFT_PSY_A_CORE
Nursing reports no acute events overnight. Per chart review the patient has not required any behavioral PRNs since the last assessment.    Chart reviewed, patient seen and evaluated in AM. On approach, the patient was sitting in bed, snapping rubber band against his wrist. Patient interviewed in Singaporean by writer. Reports that he is snapping rubber band against wrist to help with his nerves. Offered medications to help cut down nerves, but declined. Declined to discuss what is causing anxiety/worry. States that his mood is better, rating depression as a 5 today compared to a 10 when he came in to inpatient.  Reports sleeping well overnight and eating, energy level is good. Denies AVH and paranoia. Denies thoughts of self harm and suicidal ideation. Smiled during interview, states he does not need anything.

## 2023-04-22 NOTE — BH INPATIENT PSYCHIATRY PROGRESS NOTE - NSBHASSESSSUMMFT_PSY_ALL_CORE
The patient is a 16 yo transgender F->M, Emirati-speaking, recently immigrated from [place], domiciled with brother, unemployed, with psych hx of gender dysphoria, anxiety and depression, no past psychiatric hospitalizations, previous suicide attempt via choking, who is presenting at recommendation of brother and cousin for increasing SI.    Upon admission patient endorsed strong feelings of depression and exhibited very dysphoric and minimally reactive affect. He endorsed escalating suicidal thoughts related to feelings of worthlessness and unhappiness over his appearance. Today, pt reported decrease in feelings of depression by about 50% since initial admission, smiled during certain parts of interview. Remains with signs of anxiety and depressed mood, though seeming to improve. Denies suicidal ideation and thoughts of self harm. Requires continued hospitalization for stabilization.    04/18/23- Patient acutely dysphoric. Plan to dc mirtazapine as he notes excessive lethargy and hyperphagia, continue sertraline 50 mg  04/19/2023  Patient remains with thoughts of suicide and concerns about his future, however he feels safe while in a structured setting with people around him  04/20/23 Patient had no thoughts of suicide during interview, appeared brighter and occasionally smiled. Agreeable to increase antidepressants    Impression: MDD vs gender dysphoria, R/O bipolar    #MDD #Gender dysphoria  - 9.39 legals  - Continue sertraline to 75mg (increased 04/18)  - Melatonin for sleep phase disorder  - Referral for outpatient endocrinologist  - Obtain working phone number for outpatient psychiatrist Dr. Cuenca at [facility]    #Panic attacks  - PRN Atarax  - Continue Klonopin to 0.25mg PRN daily  - Continue to monitor    #Agitation  - For episodes of acute agitation can offer PO Haldol 5 mg/Ativan 2 mg/Benadryl 50 mg Q6H PRN. If refusing PO and is in acute risk of harm to self/other, can offer IM Haldol 5 mg/Ativan 2 mg/Benadryl 50 mg Q6H PRN. If given, please repeat EKG and hold antipsychotics if QTC>500

## 2023-04-22 NOTE — BH INPATIENT PSYCHIATRY PROGRESS NOTE - NSBHMETABOLIC_PSY_ALL_CORE_FT
BMI: BMI (kg/m2): 32.5 (04-18-23 @ 05:33)  HbA1c:   Glucose:   BP: 119/53 (04-22-23 @ 07:36) (114/74 - 127/77)  Lipid Panel:

## 2023-04-22 NOTE — BH INPATIENT PSYCHIATRY PROGRESS NOTE - NSBHATTESTCOMMENTATTENDFT_PSY_A_CORE
Interviewed patient with the resident Dr. Buckley. As per nurses, patient has been doing well, without acute behavioral problems, staying in the room. Patient was seen fidgeting with the rubber bad, looking mildly anxious, soft-spoken, friendly and polite. Lexii reports feeling better, with improved mood, no SI, good sleep and appetite. Agree with resident's assessment and plan.

## 2023-04-23 PROCEDURE — 99231 SBSQ HOSP IP/OBS SF/LOW 25: CPT

## 2023-04-23 RX ORDER — SERTRALINE 25 MG/1
100 TABLET, FILM COATED ORAL DAILY
Refills: 0 | Status: DISCONTINUED | OUTPATIENT
Start: 2023-04-24 | End: 2023-04-27

## 2023-04-23 RX ADMIN — SERTRALINE 75 MILLIGRAM(S): 25 TABLET, FILM COATED ORAL at 08:08

## 2023-04-23 RX ADMIN — Medication 3 MILLIGRAM(S): at 20:18

## 2023-04-23 NOTE — BH INPATIENT PSYCHIATRY PROGRESS NOTE - NSBHCHARTREVIEWVS_PSY_A_CORE FT
Returned call no answer left message for a return call-GAVIN   Vital Signs Last 24 Hrs  T(C): 36.2 (04-23-23 @ 07:39), Max: 36.2 (04-23-23 @ 07:39)  T(F): 97.1 (04-23-23 @ 07:39), Max: 97.1 (04-23-23 @ 07:39)  HR: 74 (04-23-23 @ 07:39) (74 - 90)  BP: 104/72 (04-23-23 @ 07:39) (104/72 - 128/72)  BP(mean): --  RR: 18 (04-23-23 @ 07:39) (16 - 18)  SpO2: --

## 2023-04-23 NOTE — BH INPATIENT PSYCHIATRY PROGRESS NOTE - NSBHMETABOLIC_PSY_ALL_CORE_FT
BMI: BMI (kg/m2): 32.5 (04-18-23 @ 05:33)  HbA1c:   Glucose:   BP: 104/72 (04-23-23 @ 07:39) (104/72 - 128/72)  Lipid Panel:

## 2023-04-23 NOTE — BH INPATIENT PSYCHIATRY PROGRESS NOTE - CURRENT MEDICATION
MEDICATIONS  (STANDING):  melatonin. 3 milliGRAM(s) Oral at bedtime    MEDICATIONS  (PRN):  clonazePAM  Tablet 0.25 milliGRAM(s) Oral daily PRN panic attacks  haloperidol     Tablet 5 milliGRAM(s) Oral every 6 hours PRN agitation  hydrOXYzine hydrochloride 50 milliGRAM(s) Oral every 6 hours PRN anxiety  LORazepam     Tablet 2 milliGRAM(s) Oral every 6 hours PRN agitation

## 2023-04-23 NOTE — BH INPATIENT PSYCHIATRY PROGRESS NOTE - NSBHASSESSSUMMFT_PSY_ALL_CORE
Lexii Vaughan is 17 year old transgender man F->M with a history of Gender dysphoria, anxiety and depression, who was admitted to the psychiatric floor for worsening suicidal ideations.   Patient appears to be less depressed , continues to have suicidal thoughts but seemingly less intrusive .   At this time, patient continues to be a danger to herself and continues to need inpatient psychiatric hospitalization for medication mamamgment and symptom stabilization. We recommend increasing her Zoloft [id number] p.o daily .     Plan  1. Increase to Zoloft 100mg p.o daily   2. Continue other management

## 2023-04-23 NOTE — BH INPATIENT PSYCHIATRY PROGRESS NOTE - NSBHFUPINTERVALHXFT_PSY_A_CORE
Patient seen and interviewed in her room.   She reports that she understands some English . She reports that she is not as depressed as she used to be  bit continues to feels very depressed , when asked if she is still having suicidal thoughts , she states " little bit ". She was offered an increase in the dose of her Zoloft from 75mg p.o daily to 100mg p.o daily. patient was agreeable .   According to the nursing staff, patient is complaint with her medications , is in good behavioral control but is very depressed and isolates .

## 2023-04-24 DIAGNOSIS — F41.0 PANIC DISORDER [EPISODIC PAROXYSMAL ANXIETY]: ICD-10-CM

## 2023-04-24 PROCEDURE — 99231 SBSQ HOSP IP/OBS SF/LOW 25: CPT

## 2023-04-24 RX ADMIN — Medication 3 MILLIGRAM(S): at 20:33

## 2023-04-24 RX ADMIN — SERTRALINE 100 MILLIGRAM(S): 25 TABLET, FILM COATED ORAL at 08:00

## 2023-04-24 NOTE — BH INPATIENT PSYCHIATRY PROGRESS NOTE - NSBHMETABOLIC_PSY_ALL_CORE_FT
BMI: BMI (kg/m2): 32.5 (04-18-23 @ 05:33)  HbA1c:   Glucose:   BP: 126/59 (04-25-23 @ 09:27) (104/72 - 131/83)  Lipid Panel:

## 2023-04-24 NOTE — BH INPATIENT PSYCHIATRY PROGRESS NOTE - OTHER
Depression appears to be improving, he is more talkative today than on initial eval Anxious, less depressed, overall improving

## 2023-04-24 NOTE — BH INPATIENT PSYCHIATRY PROGRESS NOTE - NSBHCHARTREVIEWVS_PSY_A_CORE FT
Vital Signs Last 24 Hrs  T(C): 36.7 (04-25-23 @ 09:27), Max: 37.1 (04-24-23 @ 15:55)  T(F): 98.1 (04-25-23 @ 09:27), Max: 98.8 (04-24-23 @ 15:55)  HR: 81 (04-25-23 @ 09:27) (81 - 101)  BP: 126/59 (04-25-23 @ 09:27) (126/59 - 131/83)  BP(mean): --  RR: 16 (04-25-23 @ 09:27) (16 - 20)  SpO2: --

## 2023-04-24 NOTE — BH INPATIENT PSYCHIATRY PROGRESS NOTE - NSBHFUPINTERVALHXFT_PSY_A_CORE
Nursing reports no acute events overnight. Per chart review the patient has not required any behavioral PRNS since the last assessment.    Chart reviewed, patient seen and evaluated in AM. On approach, patient was sitting up in bed and greeted the writer. Patient reports that he feels "fine" now, but that last night he had a panic attack where his heart started beating very quickly and he felt like he was going to die. He called his cousin Vickie, which made him feel better and alleviated his distress. The writer informed him that he has PRN Klonopin for this instances. The patient also stated that he has had these kinds of panic attacks about 4x per month for the last few months, often brought on by anxiety. He states that being around family and snapping his rubber bands can help him, along with the Klonopin. In addition, the patient states that he has social anxiety which is part of what has been preventing him from working, or going out to find a larger trans community. He has a history of bullying and he worries that people will think he's "weird".   Patient endorsed OK sleep and appetite.     No SI/HI/AVH elicited.

## 2023-04-24 NOTE — BH INPATIENT PSYCHIATRY PROGRESS NOTE - NSBHASSESSSUMMFT_PSY_ALL_CORE
The patient is a 16 yo transgender F->M, Sinhala-speaking, recently immigrated from [place], domiciled with brother, unemployed, with psych hx of gender dysphoria, anxiety and depression, no past psychiatric hospitalizations, previous suicide attempt via choking, who is presenting at recommendation of brother and cousin for increasing SI.    Upon admission patient endorses strong feelings of depression and exhibits very dysphoric and minimally reactive affect. He endorses escalating suicidal thoughts related to feelings of worthlessness and unhappiness over his appearance. During the course of his hospitalization, his mood and affect has improved, he has become more talkative, and today on 04/24 he talked about his social anxiety and panic attacks. The patient is currently admitted involuntarily on 9:39 legals as he is at acutely elevated risk of suicide. Goals of care will be to decrease patient's suicidality. Will plan to gradually titrate down Klonopin as it can contribute to impulsivity as well as optimize antidepressant regimen.  Requires continued hospitalization for stabilization.    04/18/23- Patient acutely dysphoric. Plan to dc mirtazapine as he notes excessive lethargy and hyperphagia, continue sertraline 50 mg  04/19/2023  Patient remains with thoughts of suicide and concerns about his future, however he feels safe while in a structured setting with people around him  04/20/23 Patient had no thoughts of suicide during interview, appeared brighter and occasionally smiled. Agreeable to increase antidepressants  04/24 Patient notes panic attack last night, alleviated talking with cousin. Brighter, future oriented. Endorses social anxiety.      Impression: MDD vs gender dysphoria, R/O bipolar    #MDD #Gender dysphoria #Social Anxiety Diosrder #Panic attacks  - 9.39 legals  - Continue sertraline to 75mg (increased 04/18)  - Melatonin for sleep phase disorder  - Referral for outpatient endocrinologist  - Left voicemail with Dr. Vincent at [facility], [phone]  - Contact Kandice Acosta who work s with LGBTQ community for connection to trans affirming care; inform patient about Samuel    #Panic attacks  - PRN Atarax  - Continue Klonopin to 0.25mg PRN daily  - Continue to monitor    #Agitation  - For episodes of acute agitation can offer PO Haldol 5 mg/Ativan 2 mg/Benadryl 50 mg Q6H PRN. If refusing PO and is in acute risk of harm to self/other, can offer IM Haldol 5 mg/Ativan 2 mg/Benadryl 50 mg Q6H PRN. If given, please repeat EKG and hold antipsychotics if QTC>500

## 2023-04-25 PROCEDURE — 99231 SBSQ HOSP IP/OBS SF/LOW 25: CPT

## 2023-04-25 RX ORDER — ALPRAZOLAM 0.25 MG
0.5 TABLET ORAL ONCE
Refills: 0 | Status: DISCONTINUED | OUTPATIENT
Start: 2023-04-25 | End: 2023-04-25

## 2023-04-25 RX ORDER — CLONAZEPAM 1 MG
0.25 TABLET ORAL DAILY
Refills: 0 | Status: DISCONTINUED | OUTPATIENT
Start: 2023-04-26 | End: 2023-05-01

## 2023-04-25 RX ADMIN — Medication 0.5 MILLIGRAM(S): at 14:37

## 2023-04-25 RX ADMIN — Medication 3 MILLIGRAM(S): at 20:40

## 2023-04-25 RX ADMIN — SERTRALINE 100 MILLIGRAM(S): 25 TABLET, FILM COATED ORAL at 08:03
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
## 2023-04-25 NOTE — BH INPATIENT PSYCHIATRY PROGRESS NOTE - NSBHFUPINTERVALHXFT_PSY_A_CORE
Nursing reports no acute events overnight, state that he appears brighter. Per SW, patient's brother just lost his job and patient feels pressure to go back home to start working to help make money. Per chart review the patient has not required any behavioral PRNS since the last assessment.    Chart reviewed, patient seen and evaluated in AM. On approach, the patient was watching TV alone in the dayroom, while other patients were attending group. He intermittently smiled, and told the writer that he feels "better" and "less anxious". He played with the rubber bands around his wrist less than in the first few days that he was admitted. He reports that he has had no panic attack in the last 24 hours. The patient does report that his social anxiety interferes with his ability to ask for things from people on the unit, for example a medication when he feels like he will have a panic attack. He does however feel comfortable speaking with his family, and contact with them also helps him when he is having a panic attack. He states that he has been eating well, but that he woke up for about one hour last night in the middle of the night. He was able to fall asleep afterwards.  Patient endorsed OK sleep and appetite.     No SI/HI/AVH elicited.

## 2023-04-25 NOTE — BH INPATIENT PSYCHIATRY PROGRESS NOTE - OTHER
Depression appears to be improving, he continues to be more talkative today than on initial eval Still playing with rubber band around wrist, but less intensely than before Anxious, less depressed, overall improving

## 2023-04-25 NOTE — BH INPATIENT PSYCHIATRY PROGRESS NOTE - NSBHMETABOLIC_PSY_ALL_CORE_FT
BMI: BMI (kg/m2): 32.5 (04-18-23 @ 05:33)  HbA1c:   Glucose:   BP: 126/59 (04-25-23 @ 09:27) (104/72 - 131/83)  Lipid Panel:  BMI: BMI (kg/m2): 32.5 (04-18-23 @ 05:33)  HbA1c:   Glucose:   BP: 145/93 (04-25-23 @ 15:34) (104/72 - 145/93)  Lipid Panel:

## 2023-04-25 NOTE — BH INPATIENT PSYCHIATRY PROGRESS NOTE - NSBHASSESSSUMMFT_PSY_ALL_CORE
The patient is a 18 yo transgender F->M, Serbian-speaking, recently immigrated from [place], domiciled with brother, unemployed, with psych hx of gender dysphoria, anxiety and depression, no past psychiatric hospitalizations, previous suicide attempt via choking, who is presenting at recommendation of brother and cousin for increasing SI.    Upon admission patient endorses strong feelings of depression and exhibits very dysphoric and minimally reactive affect. He endorses escalating suicidal thoughts related to feelings of worthlessness and unhappiness over his appearance. During the course of his hospitalization, his mood and affect has improved, he has become more talkative. Today on 04/25 he spoke about how he did not have a panic attack in the last 24 hours, but how he still feels like his social anxiety prevents him from advocating for his needs while on the unit. Of note, the patient's brother recently lost his job and the patient has been feeling like he has a duty to get discharged in order to find a job and help financially. He is therefore at an increased risk of minimizing his symptoms. The patient is currently admitted involuntarily on 9:39 legals as he is at acutely elevated risk of suicide. Chronic risk factors include history of trauma, history of persistent depressive symptoms,   and history of suicidal ideation. Current risk factors include recent self inflicted choking, age, recent immigration, not understanding/speaking well language of current residence, current gender dysphoria, current social anxiety, current panic attacks and financial stressors. Goals of care will be to decrease patient's suicidality. Requires continued hospitalization for stabilization.    04/18/23- Patient acutely dysphoric. Plan to dc mirtazapine as he notes excessive lethargy and hyperphagia, continue sertraline 50 mg  04/19/2023  Patient remains with thoughts of suicide and concerns about his future, however he feels safe while in a structured setting with people around him  04/20/23 Patient had no thoughts of suicide during interview, appeared brighter and occasionally smiled. Agreeable to increase antidepressants  04/24 Patient notes panic attack last night, alleviated talking with cousin. Brighter, future oriented. Endorses social anxiety.  04/25/23 Patient did not have panic attack in last 24 hrs. Still appears brighter on unit and during interview today. Voiced that his social anxiety hinders his ability to advocate for himself while on unit    Impression: MDD vs gender dysphoria, R/O bipolar    #MDD #Gender dysphoria #Social Anxiety Diosrder #Panic attacks  - 9.39 legals  - Continue sertraline to 75mg (increased 04/18)  - Melatonin for sleep phase disorder  - Referral for outpatient endocrinologist  - Left voicemail with Dr. Vincent at [facility], [phone]  - Contact Pauljaime Acosta who work s with LGBTQ community for connection to trans affirming care; inform patient about Samuel    #Panic attacks  - PRN Atarax  - Continue Klonopin to 0.25mg PRN daily  - Continue to monitor    #Agitation  - For episodes of acute agitation can offer PO Haldol 5 mg/Ativan 2 mg/Benadryl 50 mg Q6H PRN. If refusing PO and is in acute risk of harm to self/other, can offer IM Haldol 5 mg/Ativan 2 mg/Benadryl 50 mg Q6H PRN. If given, please repeat EKG and hold antipsychotics if QTC>500   The patient is a 16 yo transgender F->M, Polish-speaking, recently immigrated from [place], domiciled with brother, unemployed, with psych hx of gender dysphoria, anxiety and depression, no past psychiatric hospitalizations, previous suicide attempt via choking, who is presenting at recommendation of brother and cousin for increasing SI.    Upon admission patient endorses strong feelings of depression and exhibits very dysphoric and minimally reactive affect. He endorses escalating suicidal thoughts related to feelings of worthlessness and unhappiness over his appearance. During the course of his hospitalization, his mood and affect has improved, he has become more talkative. Today on 04/25 he spoke about how he did not have a panic attack in the last 24 hours, but how he still feels like his social anxiety prevents him from advocating for his needs while on the unit. Of note, the patient's brother recently lost his job and the patient has been feeling like he has a duty to get discharged in order to find a job and help financially. He is therefore at an increased risk of minimizing his symptoms. The patient is currently admitted involuntarily on 9:39 legals as he is at acutely elevated risk of suicide. Chronic risk factors include history of trauma, history of persistent depressive symptoms,   and history of suicidal ideation. Current risk factors include recent self inflicted choking, age, recent immigration, not understanding/speaking well language of current residence, current gender dysphoria, current social anxiety, current panic attacks and financial stressors. Goals of care will be to decrease patient's suicidality. Requires continued hospitalization for stabilization.    04/18/23- Patient acutely dysphoric. Plan to dc mirtazapine as he notes excessive lethargy and hyperphagia, continue sertraline 50 mg  04/19/2023  Patient remains with thoughts of suicide and concerns about his future, however he feels safe while in a structured setting with people around him  04/20/23 Patient had no thoughts of suicide during interview, appeared brighter and occasionally smiled. Agreeable to increase antidepressants  04/24 Patient notes panic attack last night, alleviated talking with cousin. Brighter, future oriented. Endorses social anxiety.  04/25/23 Patient did not have panic attack in last 24 hrs. Still appears brighter on unit and during interview today. Voiced that his social anxiety hinders his ability to advocate for himself while on unit    Impression: MDD vs gender dysphoria, R/O bipolar    #MDD #Gender dysphoria #Social Anxiety Diosrder #Panic attacks  - 9.39 legals  - Continue sertraline to 75mg (increased 04/18)  - Melatonin for sleep phase disorder  - Referral for outpatient endocrinologist  - Left voicemail with Dr. Vincent at [facility], [phone]  - Contact Kandice Acosta who work s with LGBTQ community for connection to trans affirming care; inform patient about Samuel  - Plan for meeting with family 4 pm on Friday    #Panic attacks  - PRN Atarax  - Continue Klonopin to 0.25mg PRN daily  - Continue to monitor    #Agitation  - For episodes of acute agitation can offer PO Haldol 5 mg/Ativan 2 mg/Benadryl 50 mg Q6H PRN. If refusing PO and is in acute risk of harm to self/other, can offer IM Haldol 5 mg/Ativan 2 mg/Benadryl 50 mg Q6H PRN. If given, please repeat EKG and hold antipsychotics if QTC>500

## 2023-04-25 NOTE — BH INPATIENT PSYCHIATRY PROGRESS NOTE - NSBHCHARTREVIEWVS_PSY_A_CORE FT
Vital Signs Last 24 Hrs  T(C): 36.7 (04-25-23 @ 09:27), Max: 37.1 (04-24-23 @ 15:55)  T(F): 98.1 (04-25-23 @ 09:27), Max: 98.8 (04-24-23 @ 15:55)  HR: 81 (04-25-23 @ 09:27) (81 - 101)  BP: 126/59 (04-25-23 @ 09:27) (126/59 - 131/83)  BP(mean): --  RR: 16 (04-25-23 @ 09:27) (16 - 20)  SpO2: --     Vital Signs Last 24 Hrs  T(C): 36.7 (04-25-23 @ 09:27), Max: 37.1 (04-24-23 @ 15:55)  T(F): 98.1 (04-25-23 @ 09:27), Max: 98.8 (04-24-23 @ 15:55)  HR: 113 (04-25-23 @ 15:34) (81 - 113)  BP: 145/93 (04-25-23 @ 15:34) (126/59 - 145/93)  BP(mean): --  RR: 16 (04-25-23 @ 15:34) (16 - 20)  SpO2: --

## 2023-04-26 PROCEDURE — 99231 SBSQ HOSP IP/OBS SF/LOW 25: CPT

## 2023-04-26 RX ADMIN — Medication 3 MILLIGRAM(S): at 20:33

## 2023-04-26 RX ADMIN — SERTRALINE 100 MILLIGRAM(S): 25 TABLET, FILM COATED ORAL at 08:06

## 2023-04-26 NOTE — BH INPATIENT PSYCHIATRY PROGRESS NOTE - NSBHASSESSSUMMFT_PSY_ALL_CORE
The patient is a 18 yo transgender F->M, Syriac-speaking, recently immigrated from [place], domiciled with brother, unemployed, with psych hx of gender dysphoria, anxiety and depression, no past psychiatric hospitalizations, previous suicide attempt via choking, who is presenting at recommendation of brother and cousin for increasing SI.    Upon admission patient endorses strong feelings of depression and exhibits very dysphoric and minimally reactive affect. He endorses escalating suicidal thoughts related to feelings of worthlessness and unhappiness over his appearance. During the course of his hospitalization, his mood and affect has improved, he has become more talkative. Today on 04/25 he spoke about how he did not have a panic attack in the last 24 hours, but how he still feels like his social anxiety prevents him from advocating for his needs while on the unit. Of note, the patient's brother recently lost his job and the patient has been feeling like he has a duty to get discharged in order to find a job and help financially. He is therefore at an increased risk of minimizing his symptoms. The patient is currently admitted involuntarily on 9:39 legals as he is at acutely elevated risk of suicide. Chronic risk factors include history of trauma, history of persistent depressive symptoms,   and history of suicidal ideation. Current risk factors include recent self inflicted choking, age, recent immigration, not understanding/speaking well language of current residence, current gender dysphoria, current social anxiety, current panic attacks and financial stressors. Goals of care will be to decrease patient's suicidality. Requires continued hospitalization for stabilization.    04/18/23- Patient acutely dysphoric. Plan to dc mirtazapine as he notes excessive lethargy and hyperphagia, continue sertraline 50 mg  04/19/2023  Patient remains with thoughts of suicide and concerns about his future, however he feels safe while in a structured setting with people around him  04/20/23 Patient had no thoughts of suicide during interview, appeared brighter and occasionally smiled. Agreeable to increase antidepressants  04/24 Patient notes panic attack last night, alleviated talking with cousin. Brighter, future oriented. Endorses social anxiety.  04/25/23 Patient did not have panic attack in last 24 hrs. Still appears brighter on unit and during interview today. Voiced that his social anxiety hinders his ability to advocate for himself while on unit  04/26   Patient is somewhat less anxious,  spending  time outside his room and is cheerful at times.      Impression: MDD vs gender dysphoria, R/O bipolar    #MDD #Gender dysphoria #Social Anxiety Diosrder #Panic attacks  - 9.39 legals  - Continue sertraline to 75mg (increased 04/18)  - Melatonin for sleep phase disorder  - Referral for outpatient endocrinologist  - Left voicemail with Dr. Vincent at [facility], [phone]  - Contact Kandice Acosta who work s with LGBTQ community for connection to trans affirming care; inform patient about Jorge Alberto-rafa  - Plan for meeting with family 4 pm on Friday    #Panic attacks  - PRN Atarax  - Continue Klonopin to 0.25mg PRN daily  - Continue to monitor    #Agitation  - For episodes of acute agitation can offer PO Haldol 5 mg/Ativan 2 mg/Benadryl 50 mg Q6H PRN. If refusing PO and is in acute risk of harm to self/other, can offer IM Haldol 5 mg/Ativan 2 mg/Benadryl 50 mg Q6H PRN. If given, please repeat EKG and hold antipsychotics if QTC>500

## 2023-04-26 NOTE — BH INPATIENT PSYCHIATRY PROGRESS NOTE - NSBHMSEMOOD_PSY_A_CORE
Returned a call to pt, no answer. 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    Depressed/Anxious/Other
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
## 2023-04-26 NOTE — BH INPATIENT PSYCHIATRY PROGRESS NOTE - OTHER
Still playing with rubber band around wrist, but less intensely than before Anxious. Experiences depression intermittently. Depression appears to be improving,  His mood and affect seem to very between anxious and mildly dysphoric, and euthymic.

## 2023-04-26 NOTE — BH INPATIENT PSYCHIATRY PROGRESS NOTE - NSBHMETABOLIC_PSY_ALL_CORE_FT
BMI: BMI (kg/m2): 32.5 (04-18-23 @ 05:33)  HbA1c:   Glucose:   BP: 144/84 (04-26-23 @ 15:50) (126/59 - 145/93)  Lipid Panel:

## 2023-04-26 NOTE — BH INPATIENT PSYCHIATRY PROGRESS NOTE - NSBHCHARTREVIEWVS_PSY_A_CORE FT
Vital Signs Last 24 Hrs  T(C): 36.7 (04-26-23 @ 15:50), Max: 36.7 (04-26-23 @ 07:34)  T(F): 98 (04-26-23 @ 15:50), Max: 98.1 (04-26-23 @ 07:34)  HR: 107 (04-26-23 @ 15:50) (99 - 107)  BP: 144/84 (04-26-23 @ 15:50) (129/82 - 144/84)  BP(mean): --  RR: 18 (04-26-23 @ 15:50) (18 - 18)  SpO2: --

## 2023-04-27 PROCEDURE — 99232 SBSQ HOSP IP/OBS MODERATE 35: CPT

## 2023-04-27 RX ORDER — SERTRALINE 25 MG/1
150 TABLET, FILM COATED ORAL DAILY
Refills: 0 | Status: DISCONTINUED | OUTPATIENT
Start: 2023-04-27 | End: 2023-05-01

## 2023-04-27 RX ADMIN — SERTRALINE 100 MILLIGRAM(S): 25 TABLET, FILM COATED ORAL at 08:21

## 2023-04-27 RX ADMIN — Medication 3 MILLIGRAM(S): at 20:09

## 2023-04-27 RX ADMIN — Medication 50 MILLIGRAM(S): at 17:05

## 2023-04-27 RX ADMIN — Medication 0.25 MILLIGRAM(S): at 17:05

## 2023-04-27 NOTE — BH INPATIENT PSYCHIATRY PROGRESS NOTE - NSBHATTESTCOMMENTATTENDFT_PSY_A_CORE
The patient is a 18 yo transgender F->M, Indonesian-speaking, recently immigrated from [place], domiciled with brother, unemployed, with psych hx of gender dysphoria, anxiety and depression, no past psychiatric hospitalizations, previous suicide attempt via choking, who is presenting at recommendation of brother and cousin for increasing SI. Patient was seen. Case was discussed with resident physician. I agree with the above assessment and plan.

## 2023-04-27 NOTE — BH INPATIENT PSYCHIATRY PROGRESS NOTE - NSBHMETABOLIC_PSY_ALL_CORE_FT
BMI: BMI (kg/m2): 32.5 (04-18-23 @ 05:33)  HbA1c:   Glucose:   BP: 129/72 (04-27-23 @ 08:07) (126/59 - 145/93)  Lipid Panel:
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
## 2023-04-27 NOTE — BH INPATIENT PSYCHIATRY PROGRESS NOTE - NSBHATTESTBILLING_PSY_A_CORE
41401-Tcrqmizmrg OBS or IP - low complexity OR 25-34 mins 10085-Ivzowtpgel OBS or IP - moderate complexity OR 35-49 mins

## 2023-04-27 NOTE — BH INPATIENT PSYCHIATRY PROGRESS NOTE - NSBHASSESSSUMMFT_PSY_ALL_CORE
The patient is a 18 yo transgender F->M, Maltese-speaking, recently immigrated from [place], domiciled with brother, unemployed, with psych hx of gender dysphoria, anxiety and depression, no past psychiatric hospitalizations, previous suicide attempt via choking, who is presenting at recommendation of brother and cousin for increasing SI.    Upon admission patient endorses strong feelings of depression and exhibits very dysphoric and minimally reactive affect. He endorses escalating suicidal thoughts related to feelings of worthlessness and unhappiness over his appearance. During the course of his hospitalization, his mood and affect has improved, he has become more talkative. Today on 04/25 he spoke about how he did not have a panic attack in the last 24 hours, but how he still feels like his social anxiety prevents him from advocating for his needs while on the unit. Of note, the patient's brother recently lost his job and the patient has been feeling like he has a duty to get discharged in order to find a job and help financially. He is therefore at an increased risk of minimizing his symptoms. The patient is currently admitted involuntarily on 9:39 legals as he is at acutely elevated risk of suicide. Chronic risk factors include history of trauma, history of persistent depressive symptoms,   and history of suicidal ideation. Current risk factors include recent self inflicted choking, age, recent immigration, not understanding/speaking well language of current residence, current gender dysphoria, current social anxiety, current panic attacks and financial stressors. Goals of care will be to decrease patient's suicidality. Requires continued hospitalization for stabilization.    04/18/23- Patient acutely dysphoric. Plan to dc mirtazapine as he notes excessive lethargy and hyperphagia, continue sertraline 50 mg  04/19/2023  Patient remains with thoughts of suicide and concerns about his future, however he feels safe while in a structured setting with people around him  04/20/23 Patient had no thoughts of suicide during interview, appeared brighter and occasionally smiled. Agreeable to increase antidepressants  04/24 Patient notes panic attack last night, alleviated talking with cousin. Brighter, future oriented. Endorses social anxiety.  04/25/23 Patient did not have panic attack in last 24 hrs. Still appears brighter on unit and during interview today. Voiced that his social anxiety hinders his ability to advocate for himself while on unit  04/26   Patient is somewhat less anxious,  spending  time outside his room and is cheerful at times.      Impression: MDD vs gender dysphoria, R/O bipolar    #MDD #Gender dysphoria #Social Anxiety Diosrder #Panic attacks  - 9.39 legals  - Continue sertraline to 75mg (increased 04/18)  - Melatonin for sleep phase disorder  - Referral for outpatient endocrinologist  - Left voicemail with [facility] Vincent at [facility], [phone]  - Contact Kandice Acosta who work s with LGBTQ community for connection to trans affirming care; inform patient about Jorge Alberto-rafa  - Plan for meeting with family 4 pm on Friday    #Panic attacks  - PRN Atarax  - Continue Klonopin to 0.25mg PRN daily  - Continue to monitor    #Agitation  - For episodes of acute agitation can offer PO Haldol 5 mg/Ativan 2 mg/Benadryl 50 mg Q6H PRN. If refusing PO and is in acute risk of harm to self/other, can offer IM Haldol 5 mg/Ativan 2 mg/Benadryl 50 mg Q6H PRN. If given, please repeat EKG and hold antipsychotics if QTC>500   The patient is a 18 yo transgender F->M, Hungarian-speaking, recently immigrated from [place], domiciled with brother, unemployed, with psych hx of gender dysphoria, anxiety and depression, no past psychiatric hospitalizations, previous suicide attempt via choking, who is presenting at recommendation of brother and cousin for increasing SI.    Upon admission patient endorses strong feelings of depression and exhibits very dysphoric and minimally reactive affect. He endorses escalating suicidal thoughts related to feelings of worthlessness and unhappiness over his appearance. During the course of his hospitalization, his mood and affect has improved, he has become more talkative. . Of note, the patient's brother recently lost his job and the patient has been feeling like he has a duty to get discharged in order to find a job and help financially. He is therefore at an increased risk of minimizing his symptoms. The patient is currently admitted involuntarily on 9:39 legals as he is at acutely elevated risk of suicide. Chronic risk factors include history of trauma, history of persistent depressive symptoms,   and history of suicidal ideation. Current risk factors include recent self inflicted choking, age, recent immigration, not understanding/speaking well language of current residence, current gender dysphoria, current social anxiety, current panic attacks and financial stressors. Goals of care will be to decrease patient's suicidality. Requires continued hospitalization for stabilization.    04/18/23- Patient acutely dysphoric. Plan to dc mirtazapine as he notes excessive lethargy and hyperphagia, continue sertraline 50 mg  04/19/2023  Patient remains with thoughts of suicide and concerns about his future, however he feels safe while in a structured setting with people around him  04/20/23 Patient had no thoughts of suicide during interview, appeared brighter and occasionally smiled. Agreeable to increase antidepressants  04/24 Patient notes panic attack last night, alleviated talking with cousin. Brighter, future oriented. Endorses social anxiety.  04/25/23 Patient did not have panic attack in last 24 hrs. Still appears brighter on unit and during interview today. Voiced that his social anxiety hinders his ability to advocate for himself while on unit  04/26   Patient is somewhat less anxious,  spending  time outside his room and is cheerful at times.  04/27/23 Patient notes still having intermittent anxiety, however he is now able to leave his room and his speech is much more productive. Affect continues to be more supple than on initial presentation. Plan to increase Zoloft to 150 mg, with eventual plan to maximize it to treat his social anxiety disorder.      Impression: MDD vs Social Anxiety disorder with Panic attacks vs Gender dysphoria, R/O bipolar    #MDD #Gender dysphoria #Social Anxiety Diosrder #Panic attacks  - 9.39 legals  - Increase Sertraline to 150 mg (starting 04/27)  - Melatonin for sleep phase disorder  - Referral for outpatient endocrinologist  - Left voicemail with Dr. Vincent at [facility], [phone]  - Contact Kandice Acosta who work s with LGBTQ community for connection to trans affirming care; inform patient about Jorge AlbertoJoelKym  - Plan for meeting with family 4 pm on Friday    #Panic attacks  - PRN Atarax  - Continue Klonopin to 0.25mg PRN daily  - Continue to monitor    #Agitation  - For episodes of acute agitation can offer PO Haldol 5 mg/Ativan 2 mg/Benadryl 50 mg Q6H PRN. If refusing PO and is in acute risk of harm to self/other, can offer IM Haldol 5 mg/Ativan 2 mg/Benadryl 50 mg Q6H PRN. If given, please repeat EKG and hold antipsychotics if QTC>500   The patient is a 18 yo transgender F->M, Polish-speaking, recently immigrated from [place], domiciled with brother, unemployed, with psych hx of gender dysphoria, anxiety and depression, no past psychiatric hospitalizations, previous suicide attempt via choking, who is presenting at recommendation of brother and cousin for increasing SI.    Upon admission patient endorses strong feelings of depression and exhibits very dysphoric and minimally reactive affect. He endorses escalating suicidal thoughts related to feelings of worthlessness and unhappiness over his appearance. During the course of his hospitalization, his mood and affect has improved, he has become more talkative. . Of note, the patient's brother recently lost his job and the patient has been feeling like he has a duty to get discharged in order to find a job and help financially. He is therefore at an increased risk of minimizing his symptoms. The patient is currently admitted involuntarily on 9:39 legals as he is at acutely elevated risk of suicide. Chronic risk factors include history of trauma, history of persistent depressive symptoms,   and history of suicidal ideation. Current risk factors include recent self inflicted choking, age, recent immigration, not understanding/speaking well language of current residence, current gender dysphoria, current social anxiety, current panic attacks and financial stressors. Goals of care will be to decrease patient's suicidality. Requires continued hospitalization for stabilization.    04/18/23- Patient acutely dysphoric. Plan to dc mirtazapine as he notes excessive lethargy and hyperphagia, continue sertraline 50 mg  04/19/2023  Patient remains with thoughts of suicide and concerns about his future, however he feels safe while in a structured setting with people around him  04/20/23 Patient had no thoughts of suicide during interview, appeared brighter and occasionally smiled. Agreeable to increase antidepressants  04/24 Patient notes panic attack last night, alleviated talking with cousin. Brighter, future oriented. Endorses social anxiety.  04/25/23 Patient did not have panic attack in last 24 hrs. Still appears brighter on unit and during interview today. Voiced that his social anxiety hinders his ability to advocate for himself while on unit  04/26   Patient is somewhat less anxious,  spending  time outside his room and is cheerful at times.  04/27/23 Patient notes still having intermittent anxiety, however he is now able to leave his room and his speech is much more productive. Affect continues to be more supple than on initial presentation. Plan to increase Zoloft to 150 mg 04/28, with eventual plan to maximize it to treat his social anxiety disorder.      Impression: MDD vs Social Anxiety disorder with Panic attacks vs Gender dysphoria, R/O bipolar    #MDD #Gender dysphoria #Social Anxiety Diosrder #Panic attacks  - 9.39 legals  - Increase Sertraline to 150 mg (starting 04/28)  - Melatonin for sleep phase disorder  - Referral for outpatient endocrinologist  - Left voicemail with Dr. Vincent at [facility], [phone]  - Left VM with  Kandice Acosta who work s with LGBTQ community for connection to trans affirming care  - Call Teagan Braunaza [phone], patient's therapist  - Plan for meeting with family 4 pm on Friday  - Referral on discharge to the [employer] (suicide hotline) and Jorge Alberto Mejía    #Panic attacks  - PRN Atarax  - Continue Klonopin to 0.25mg PRN daily  - Continue to monitor    #Agitation  - For episodes of acute agitation can offer PO Haldol 5 mg/Ativan 2 mg/Benadryl 50 mg Q6H PRN. If refusing PO and is in acute risk of harm to self/other, can offer IM Haldol 5 mg/Ativan 2 mg/Benadryl 50 mg Q6H PRN. If given, please repeat EKG and hold antipsychotics if QTC>500

## 2023-04-27 NOTE — BH INPATIENT PSYCHIATRY PROGRESS NOTE - NSBHCHARTREVIEWVS_PSY_A_CORE FT
Vital Signs Last 24 Hrs  T(C): 36 (04-27-23 @ 08:07), Max: 36.7 (04-26-23 @ 15:50)  T(F): 96.8 (04-27-23 @ 08:07), Max: 98 (04-26-23 @ 15:50)  HR: 113 (04-27-23 @ 08:07) (107 - 113)  BP: 129/72 (04-27-23 @ 08:07) (129/72 - 144/84)  BP(mean): --  RR: 18 (04-27-23 @ 08:07) (18 - 18)  SpO2: --

## 2023-04-27 NOTE — BH INPATIENT PSYCHIATRY PROGRESS NOTE - OTHER
Still playing with rubber band around wrist, but less intensely than before Depression appears to be improving,  His mood and affect seem to very between anxious and mildly dysphoric, and euthymic. Anxious. Experiences depression intermittently. Productivity is increasing Depression appears to be improving,  still appears mildly anxious

## 2023-04-27 NOTE — BH INPATIENT PSYCHIATRY PROGRESS NOTE - CURRENT MEDICATION
MEDICATIONS  (STANDING):  melatonin. 3 milliGRAM(s) Oral at bedtime  sertraline 100 milliGRAM(s) Oral daily    MEDICATIONS  (PRN):  clonazePAM  Tablet 0.25 milliGRAM(s) Oral daily PRN panic attacks  haloperidol     Tablet 5 milliGRAM(s) Oral every 6 hours PRN agitation  hydrOXYzine hydrochloride 50 milliGRAM(s) Oral every 6 hours PRN anxiety  LORazepam     Tablet 2 milliGRAM(s) Oral every 6 hours PRN agitation   MEDICATIONS  (STANDING):  melatonin. 3 milliGRAM(s) Oral at bedtime  sertraline 150 milliGRAM(s) Oral daily    MEDICATIONS  (PRN):  clonazePAM  Tablet 0.25 milliGRAM(s) Oral daily PRN panic attacks  haloperidol     Tablet 5 milliGRAM(s) Oral every 6 hours PRN agitation  hydrOXYzine hydrochloride 50 milliGRAM(s) Oral every 6 hours PRN anxiety  LORazepam     Tablet 2 milliGRAM(s) Oral every 6 hours PRN agitation

## 2023-04-28 PROCEDURE — 99232 SBSQ HOSP IP/OBS MODERATE 35: CPT

## 2023-04-28 RX ADMIN — Medication 3 MILLIGRAM(S): at 21:03

## 2023-04-28 RX ADMIN — SERTRALINE 150 MILLIGRAM(S): 25 TABLET, FILM COATED ORAL at 08:12

## 2023-04-28 NOTE — BH INPATIENT PSYCHIATRY PROGRESS NOTE - OTHER
Anxious. Experiences depression intermittently. Depression appears to be improving,  still appears mildly anxious Productivity is increasing Anxious. Experiences depression intermittently. At time of the interview was smiling and had supple affect. Depression appears to be improving,  still appears mildly anxious; stated mood today is "good" however he continues to endorse feelings of self hatred and not wanting to live

## 2023-04-28 NOTE — BH INPATIENT PSYCHIATRY PROGRESS NOTE - NSBHMETABOLIC_PSY_ALL_CORE_FT
BMI: BMI (kg/m2): 32.5 (04-18-23 @ 05:33)  HbA1c:   Glucose:   BP: 129/89 (04-28-23 @ 08:10) (126/59 - 145/93)  Lipid Panel:  BMI: BMI (kg/m2): 32.5 (04-18-23 @ 05:33)  HbA1c:   Glucose:   BP: 129/89 (04-28-23 @ 08:10) (129/72 - 145/93)  Lipid Panel:

## 2023-04-28 NOTE — BH INPATIENT PSYCHIATRY PROGRESS NOTE - ADDITIONAL DETAILS / COMMENTS
More talkative.    More talkative. Feelings of self-hatred persist, patient believes may be alleviated by gender affirmative medical care

## 2023-04-28 NOTE — BH INPATIENT PSYCHIATRY PROGRESS NOTE - NSBHATTESTCOMMENTATTENDFT_PSY_A_CORE
The patient is a 18 yo transgender F->M, Turkish-speaking, recently immigrated from [place], domiciled with brother, unemployed, with psych hx of gender dysphoria, anxiety and depression, no past psychiatric hospitalizations, previous suicide attempt via choking, who is presenting at recommendation of brother and cousin for increasing SI. Patient was seen. Case was discussed with resident physician. I agree with the above assessment and plan.

## 2023-04-28 NOTE — BH INPATIENT PSYCHIATRY PROGRESS NOTE - NSBHASSESSSUMMFT_PSY_ALL_CORE
The patient is a 16 yo transgender F->M, Kinyarwanda-speaking, recently immigrated from [place], domiciled with brother, unemployed, with psych hx of gender dysphoria, anxiety and depression, no past psychiatric hospitalizations, previous suicide attempt via choking, who is presenting at recommendation of brother and cousin for increasing SI.    Upon admission patient endorses strong feelings of depression and exhibits very dysphoric and minimally reactive affect. He endorses escalating suicidal thoughts related to feelings of worthlessness and unhappiness over his appearance. During the course of his hospitalization, his mood and affect has improved, he has become more talkative. . Of note, the patient's brother recently lost his job and the patient has been feeling like he has a duty to get discharged in order to find a job and help financially. He is therefore at an increased risk of minimizing his symptoms. The patient is currently admitted involuntarily on 9:39 legals as he is at acutely elevated risk of suicide. Chronic risk factors include history of trauma, history of persistent depressive symptoms,   and history of suicidal ideation. Current risk factors include recent self inflicted choking, age, recent immigration, not understanding/speaking well language of current residence, current gender dysphoria, current social anxiety, current panic attacks and financial stressors. Goals of care will be to decrease patient's suicidality. Requires continued hospitalization for stabilization.    04/18/23- Patient acutely dysphoric. Plan to dc mirtazapine as he notes excessive lethargy and hyperphagia, continue sertraline 50 mg  04/19/2023  Patient remains with thoughts of suicide and concerns about his future, however he feels safe while in a structured setting with people around him  04/20/23 Patient had no thoughts of suicide during interview, appeared brighter and occasionally smiled. Agreeable to increase antidepressants  04/24 Patient notes panic attack last night, alleviated talking with cousin. Brighter, future oriented. Endorses social anxiety.  04/25/23 Patient did not have panic attack in last 24 hrs. Still appears brighter on unit and during interview today. Voiced that his social anxiety hinders his ability to advocate for himself while on unit  04/26   Patient is somewhat less anxious,  spending  time outside his room and is cheerful at times.  04/27/23 Patient notes still having intermittent anxiety, however he is now able to leave his room and his speech is much more productive. Affect continues to be more supple than on initial presentation. Plan to increase Zoloft to 150 mg 04/28, with eventual plan to maximize it to treat his social anxiety disorder.      Impression: MDD vs Social Anxiety disorder with Panic attacks vs Gender dysphoria, R/O bipolar    #MDD #Gender dysphoria #Social Anxiety Diosrder #Panic attacks  - 9.39 legals  - Increase Sertraline to 150 mg (starting 04/28)  - Melatonin for sleep phase disorder  - Referral for outpatient endocrinologist  - Left voicemail with Dr. Vincent at [facility], [phone]  - Left VM with  Kandice Acosta who work s with LGBTQ community for connection to trans affirming care  - Call Teagan Braunaza [phone], patient's therapist  - Plan for meeting with family 4 pm on Friday  - Referral on discharge to the [employer] (suicide hotline) and Jorge Alberto Mejía    #Panic attacks  - PRN Atarax  - Continue Klonopin to 0.25mg PRN daily  - Continue to monitor    #Agitation  - For episodes of acute agitation can offer PO Haldol 5 mg/Ativan 2 mg/Benadryl 50 mg Q6H PRN. If refusing PO and is in acute risk of harm to self/other, can offer IM Haldol 5 mg/Ativan 2 mg/Benadryl 50 mg Q6H PRN. If given, please repeat EKG and hold antipsychotics if QTC>500   The patient is a 16 yo transgender F->M, Tamazight-speaking, recently immigrated from [place], domiciled with brother, unemployed, with psych hx of gender dysphoria, anxiety and depression, no past psychiatric hospitalizations, previous suicide attempt via choking, who is presenting at recommendation of brother and cousin for increasing SI.    Upon admission patient endorses strong feelings of depression and exhibits very dysphoric and minimally reactive affect. He endorses escalating suicidal thoughts related to feelings of worthlessness and unhappiness over his appearance. During the course of his hospitalization, his mood and affect has improved, he has become more talkative. . Of note, the patient's brother recently lost his job and the patient has been feeling like he has a duty to get discharged in order to find a job and help financially. He is therefore at an increased risk of minimizing his symptoms. The patient is currently admitted involuntarily on 9:39 legals as he is at acutely elevated risk of suicide. Chronic risk factors include history of trauma, history of persistent depressive symptoms,   and history of suicidal ideation. Current risk factors include recent self inflicted choking, age, recent immigration, not understanding/speaking well language of current residence, current gender dysphoria, current social anxiety, current panic attacks and financial stressors. Goals of care will be to decrease patient's suicidality. Requires continued hospitalization for stabilization.    04/18/23- Patient acutely dysphoric. Plan to dc mirtazapine as he notes excessive lethargy and hyperphagia, continue sertraline 50 mg  04/19/2023  Patient remains with thoughts of suicide and concerns about his future, however he feels safe while in a structured setting with people around him  04/20/23 Patient had no thoughts of suicide during interview, appeared brighter and occasionally smiled. Agreeable to increase antidepressants  04/24 Patient notes panic attack last night, alleviated talking with cousin. Brighter, future oriented. Endorses social anxiety.  04/25/23 Patient did not have panic attack in last 24 hrs. Still appears brighter on unit and during interview today. Voiced that his social anxiety hinders his ability to advocate for himself while on unit  04/26   Patient is somewhat less anxious,  spending  time outside his room and is cheerful at times.  04/27/23 Patient notes still having intermittent anxiety, however he is now able to leave his room and his speech is much more productive. Affect continues to be more supple than on initial presentation. Plan to increase Zoloft to 150 mg 04/28, with eventual plan to maximize it to treat his social anxiety disorder.  04/28 Yesterday patient had another panic attack when bullied about his gender expression and received Klonopin and Atarax PRN. Though he states his mood is "good" and has a more supple affect than on initial presentation, he continues to endorse feelings of self hatred (which he believes may be alleviated by gender affirmative care) as well as wish to no longer be alive (though no active SI)    Impression: MDD vs Social Anxiety disorder with Panic attacks vs Gender dysphoria, R/O bipolar    #MDD #Gender dysphoria #Social Anxiety Diosrder #Panic attacks  - 9.39 legals  - Increase Sertraline to 150 mg (starting 04/28)  - Melatonin for sleep phase disorder  - Referral for outpatient endocrinologist  - Left voicemail with Dr. Vincent at [facility], [phone]  - Left VM with  Kandice Acosta who work s with LGBTQ community for connection to trans affirming care  - Call Teagan Braunaza [phone], patient's therapist  - Plan for meeting with family 4 pm on Friday  - Referral on discharge to the [employer] (suicide hotline) and Jorge Alberto Mejía    #Panic attacks  - PRN Atarax  - Continue Klonopin to 0.25mg PRN daily  - Continue to monitor    #Agitation  - For episodes of acute agitation can offer PO Haldol 5 mg/Ativan 2 mg/Benadryl 50 mg Q6H PRN. If refusing PO and is in acute risk of harm to self/other, can offer IM Haldol 5 mg/Ativan 2 mg/Benadryl 50 mg Q6H PRN. If given, please repeat EKG and hold antipsychotics if QTC>500

## 2023-04-28 NOTE — BH INPATIENT PSYCHIATRY PROGRESS NOTE - NSBHFUPINTERVALHXFT_PSY_A_CORE
Nursing reports no acute events overnight. Per chart review the patient required Atarax and Klonopin yesterday at 17:00.    Chart reviewed, patient seen and evaluated in AM. On approach, .... Patient reports ________________.   Patient endorsed OK sleep and appetite.     No SI/HI/AVH elicited.  Nursing reports that another patient bullied this patient about his gender presentation and the patient because very anxious and receded to his bathroom and sat in the shower. Per chart review the patient required Atarax and Klonopin yesterday at 17:00.    Chart reviewed, patient seen and evaluated in AM. On approach, the patient was watching TV in the day room. Patient reports that another patient had started bullying him about his gender presentation, taunting him and telling him that he looked like a woman. He states that he started panicking after this, and started feeling intense feelings of self hatred. He began hitting himself in the face to "punish" himself because he hates himself so much. He states that he thinks he mostly hates his appearance, and that he may feel better if he were to seek medical treatment for gender dysphoria. The writer discussed with him the option of going to [facility], which has American speaking providers as well as community support groups. The patient appeared a little undecided, especially in regards to the support groups. He states that his mood now is "good", though he states that he feels like life is not really worth living but he does not want to end it because of how it would hurt his family, so he would "suffer" to keep them happy. The patient reports no side effects from the medication.       No SI/HI/AVH elicited.

## 2023-04-28 NOTE — BH INPATIENT PSYCHIATRY PROGRESS NOTE - CURRENT MEDICATION
MEDICATIONS  (STANDING):  melatonin. 3 milliGRAM(s) Oral at bedtime  sertraline 150 milliGRAM(s) Oral daily    MEDICATIONS  (PRN):  clonazePAM  Tablet 0.25 milliGRAM(s) Oral daily PRN panic attacks  haloperidol     Tablet 5 milliGRAM(s) Oral every 6 hours PRN agitation  hydrOXYzine hydrochloride 50 milliGRAM(s) Oral every 6 hours PRN anxiety  LORazepam     Tablet 2 milliGRAM(s) Oral every 6 hours PRN agitation

## 2023-04-29 RX ADMIN — SERTRALINE 150 MILLIGRAM(S): 25 TABLET, FILM COATED ORAL at 08:04

## 2023-04-29 RX ADMIN — Medication 3 MILLIGRAM(S): at 20:26

## 2023-04-30 RX ADMIN — SERTRALINE 150 MILLIGRAM(S): 25 TABLET, FILM COATED ORAL at 07:58

## 2023-04-30 RX ADMIN — Medication 3 MILLIGRAM(S): at 20:21

## 2023-05-01 PROCEDURE — 99231 SBSQ HOSP IP/OBS SF/LOW 25: CPT

## 2023-05-01 RX ORDER — SERTRALINE 25 MG/1
200 TABLET, FILM COATED ORAL DAILY
Refills: 0 | Status: DISCONTINUED | OUTPATIENT
Start: 2023-05-01 | End: 2023-05-11

## 2023-05-01 RX ORDER — CLONAZEPAM 1 MG
0.25 TABLET ORAL DAILY
Refills: 0 | Status: DISCONTINUED | OUTPATIENT
Start: 2023-05-01 | End: 2023-05-05

## 2023-05-01 RX ADMIN — Medication 3 MILLIGRAM(S): at 20:07

## 2023-05-01 RX ADMIN — SERTRALINE 150 MILLIGRAM(S): 25 TABLET, FILM COATED ORAL at 08:04

## 2023-05-01 NOTE — BH INPATIENT PSYCHIATRY PROGRESS NOTE - NSBHFUPINTERVALHXFT_PSY_A_CORE
Nursing reports no acute events overnight. Per chart review the patient has not required any behavioral PRNS since the last assessment and has been taking his medications as prescribed.    Chart reviewed, patient seen and evaluated in AM. On approach, .... Patient reports ________________.   Patient endorsed OK sleep and appetite.     No SI/HI/AVH elicited.  Nursing reports no acute events overnight. Per chart review the patient has not required any behavioral PRNS since the last assessment and has been taking his medications as prescribed.    Chart reviewed, patient seen and evaluated in AM. On approach, patient was well-related, and talkative, but appeared anxious as he was fidgiting more than usual with his rubber band. Of note, a new medical student was present in the room with the writer and patient. Patient reports that overall the weekend was fine, and that he enjoyed some visits. However, he states that he still feels that "life is not for me" and voices passive suicidal ideation. He states that yesterday when he was thinking about what to do with his life he started feeling very panicky, and had a full blown panic attack. However, he felt like he would "bother" the staff by asking for medication or help. The writer encouraged him to ask, and that the purpose of the staff being present is just to help him. He shrugged and did not appear completely convinced to do so. He states that from childhood and early school experiences he has been very hesitant to ask for help. He denied hitting himself or attempting to hurt himself in any other way yesterday.      No active SI/HI/AVH elicited.

## 2023-05-01 NOTE — BH INPATIENT PSYCHIATRY PROGRESS NOTE - NSBHASSESSSUMMFT_PSY_ALL_CORE
The patient is a 16 yo transgender F->M, Faroese-speaking, recently immigrated from [place], domiciled with brother, unemployed, with psych hx of gender dysphoria, anxiety and depression, no past psychiatric hospitalizations, previous suicide attempt via choking, who is presenting at recommendation of brother and cousin for increasing SI.    Upon admission patient endorses strong feelings of depression and exhibits very dysphoric and minimally reactive affect. He endorses escalating suicidal thoughts related to feelings of worthlessness and unhappiness over his appearance. During the course of his hospitalization, his mood and affect has improved, he has become more talkative. . Of note, the patient's brother recently lost his job and the patient has been feeling like he has a duty to get discharged in order to find a job and help financially. He is therefore at an increased risk of minimizing his symptoms. The patient is currently admitted involuntarily on 9:39 legals as he is at acutely elevated risk of suicide. Chronic risk factors include history of trauma, history of persistent depressive symptoms,   and history of suicidal ideation. Current risk factors include recent self inflicted choking, age, recent immigration, not understanding/speaking well language of current residence, current gender dysphoria, current social anxiety, current panic attacks and financial stressors. Goals of care will be to decrease patient's suicidality. Requires continued hospitalization for stabilization.    04/18/23- Patient acutely dysphoric. Plan to dc mirtazapine as he notes excessive lethargy and hyperphagia, continue sertraline 50 mg  04/19/2023  Patient remains with thoughts of suicide and concerns about his future, however he feels safe while in a structured setting with people around him  04/20/23 Patient had no thoughts of suicide during interview, appeared brighter and occasionally smiled. Agreeable to increase antidepressants  04/24 Patient notes panic attack last night, alleviated talking with cousin. Brighter, future oriented. Endorses social anxiety.  04/25/23 Patient did not have panic attack in last 24 hrs. Still appears brighter on unit and during interview today. Voiced that his social anxiety hinders his ability to advocate for himself while on unit  04/26   Patient is somewhat less anxious,  spending  time outside his room and is cheerful at times.  04/27/23 Patient notes still having intermittent anxiety, however he is now able to leave his room and his speech is much more productive. Affect continues to be more supple than on initial presentation. Plan to increase Zoloft to 150 mg 04/28, with eventual plan to maximize it to treat his social anxiety disorder.  04/28 Yesterday patient had another panic attack when bullied about his gender expression and received Klonopin and Atarax PRN. Though he states his mood is "good" and has a more supple affect than on initial presentation, he continues to endorse feelings of self hatred (which he believes may be alleviated by gender affirmative care) as well as wish to no longer be alive (though no active SI)    Impression: MDD vs Social Anxiety disorder with Panic attacks vs Gender dysphoria, R/O bipolar    #MDD #Gender dysphoria #Social Anxiety Diosrder #Panic attacks  - 9.39 legals  - Increase Sertraline to 150 mg (starting 04/28)  - Melatonin for sleep phase disorder  - Referral for outpatient endocrinologist  - Left voicemail with Dr. Vincent at [facility], [phone]  - Left VM with  Kandice Acosta who work s with LGBTQ community for connection to trans affirming care  - Call Teagan Braunaza [phone], patient's therapist  - Plan for meeting with family 4 pm on Friday  - Referral on discharge to [facility] [employer] [facility] (suicide hotline) and [facility]    #Panic attacks  - PRN Atarax  - Continue Klonopin to 0.25mg PRN daily  - Continue to monitor    #Agitation  - For episodes of acute agitation can offer PO Haldol 5 mg/Ativan 2 mg/Benadryl 50 mg Q6H PRN. If refusing PO and is in acute risk of harm to self/other, can offer IM Haldol 5 mg/Ativan 2 mg/Benadryl 50 mg Q6H PRN. If given, please repeat EKG and hold antipsychotics if QTC>500   The patient is a 18 yo transgender F->M, Vietnamese-speaking, recently immigrated from [place], domiciled with brother, unemployed, with psych hx of gender dysphoria, anxiety and depression, no past psychiatric hospitalizations, previous suicide attempt via choking, who is presenting at recommendation of brother and cousin for increasing SI.    Upon admission patient endorses strong feelings of depression and exhibits very dysphoric and minimally reactive affect. He endorses escalating suicidal thoughts related to feelings of worthlessness and unhappiness over his appearance. During the course of his hospitalization, his mood and affect has improved, he has become more talkative. . Of note, the patient's brother recently lost his job and the patient has been feeling like he has a duty to get discharged in order to find a job and help financially. He is therefore at an increased risk of minimizing his symptoms. The patient is currently admitted involuntarily on 9:39 legals as he is at acutely elevated risk of suicide. Chronic risk factors include history of trauma, history of persistent depressive symptoms,   and history of suicidal ideation. Current risk factors include recent self inflicted choking, age, recent immigration, not understanding/speaking well language of current residence, current gender dysphoria, current social anxiety, current panic attacks and financial stressors. Goals of care will be to decrease patient's suicidality. Requires continued hospitalization for stabilization.    04/18/23- Patient acutely dysphoric. Plan to dc mirtazapine as he notes excessive lethargy and hyperphagia, continue sertraline 50 mg  04/19/2023  Patient remains with thoughts of suicide and concerns about his future, however he feels safe while in a structured setting with people around him  04/20/23 Patient had no thoughts of suicide during interview, appeared brighter and occasionally smiled. Agreeable to increase antidepressants  04/24 Patient notes panic attack last night, alleviated talking with cousin. Brighter, future oriented. Endorses social anxiety.  04/25/23 Patient did not have panic attack in last 24 hrs. Still appears brighter on unit and during interview today. Voiced that his social anxiety hinders his ability to advocate for himself while on unit  04/26   Patient is somewhat less anxious,  spending  time outside his room and is cheerful at times.  04/27/23 Patient notes still having intermittent anxiety, however he is now able to leave his room and his speech is much more productive. Affect continues to be more supple than on initial presentation. Plan to increase Zoloft to 150 mg 04/28, with eventual plan to maximize it to treat his social anxiety disorder.  04/28 Yesterday patient had another panic attack when bullied about his gender expression and received Klonopin and Atarax PRN. Though he states his mood is "good" and has a more supple affect than on initial presentation, he continues to endorse feelings of self hatred (which he believes may be alleviated by gender affirmative care) as well as wish to no longer be alive (though no active SI)  05/01 Patient continues to endorse passive SI, appeared anxious though smiling during interview. Reported panic attack yesterday. Ok with plan to increase Zoloft to 200 mg    Impression: MDD vs Social Anxiety disorder with Panic attacks vs Gender dysphoria, R/O bipolar    #MDD #Gender dysphoria #Social Anxiety Diosrder #Panic attacks  - 9.39 legals  - Increase Sertraline to 200 mg (starting 05/02)  - Melatonin for sleep phase disorder  - Referral for outpatient endocrinologist  - Left voicemail with Dr. Vincent at [facility], [phone]  - Left VM with  Kandice Acosta who work s with LGBTQ community for connection to trans affirming care  - Call Teagan Escobar [phone], patient's therapist  - Plan for meeting with family 4 pm on Friday  - Referral on discharge to the [email or web address] [facility] (suicide hotline) and [facility]    #Panic attacks  - PRN Atarax  - Continue Klonopin to 0.25mg PRN daily  - Continue to monitor    #Agitation  - For episodes of acute agitation can offer PO Haldol 5 mg/Ativan 2 mg/Benadryl 50 mg Q6H PRN. If refusing PO and is in acute risk of harm to self/other, can offer IM Haldol 5 mg/Ativan 2 mg/Benadryl 50 mg Q6H PRN. If given, please repeat EKG and hold antipsychotics if QTC>500

## 2023-05-01 NOTE — BH INPATIENT PSYCHIATRY PROGRESS NOTE - OTHER
Depression appears to be improving,  still appears mildly anxious; stated mood today is "good" however he continues to endorse feelings of self hatred and not wanting to live Anxious. Experiences depression intermittently. At time of the interview was smiling and had supple affect. Productivity is increasing Anxious. Experiences depression intermittently. At time of the smiling but appeared anxious Depression appears to be improving,  still appears anxious, snapping rubber band more today than usual

## 2023-05-01 NOTE — BH INPATIENT PSYCHIATRY PROGRESS NOTE - NSBHMETABOLIC_PSY_ALL_CORE_FT
BMI: BMI (kg/m2): 32.5 (04-18-23 @ 05:33)  HbA1c:   Glucose:   BP: 123/79 (04-30-23 @ 15:42) (122/88 - 129/93)  Lipid Panel:  BMI: BMI (kg/m2): 32.5 (04-18-23 @ 05:33)  HbA1c:   Glucose:   BP: 127/85 (05-01-23 @ 07:59) (122/88 - 129/93)  Lipid Panel:

## 2023-05-01 NOTE — BH INPATIENT PSYCHIATRY PROGRESS NOTE - NSBHCHARTREVIEWVS_PSY_A_CORE FT
Vital Signs Last 24 Hrs  T(C): 37.3 (04-30-23 @ 15:42), Max: 37.3 (04-30-23 @ 15:42)  T(F): 99.1 (04-30-23 @ 15:42), Max: 99.1 (04-30-23 @ 15:42)  HR: 90 (04-30-23 @ 15:42) (90 - 90)  BP: 123/79 (04-30-23 @ 15:42) (123/79 - 123/79)  BP(mean): --  RR: 18 (04-30-23 @ 15:42) (18 - 18)  SpO2: --     Vital Signs Last 24 Hrs  T(C): 36.7 (05-01-23 @ 07:59), Max: 37.3 (04-30-23 @ 15:42)  T(F): 98.1 (05-01-23 @ 07:59), Max: 99.1 (04-30-23 @ 15:42)  HR: 104 (05-01-23 @ 07:59) (90 - 104)  BP: 127/85 (05-01-23 @ 07:59) (123/79 - 127/85)  BP(mean): --  RR: 18 (05-01-23 @ 07:59) (18 - 18)  SpO2: --

## 2023-05-01 NOTE — BH INPATIENT PSYCHIATRY PROGRESS NOTE - ADDITIONAL DETAILS / COMMENTS
More talkative. Feelings of self-hatred persist, patient believes may be alleviated by gender affirmative medical care More talkative. Feelings of self-hatred persist, feels uncomfortable asking for help

## 2023-05-01 NOTE — BH INPATIENT PSYCHIATRY PROGRESS NOTE - NSBHATTESTCOMMENTATTENDFT_PSY_A_CORE
The patient is a 16 yo transgender F->M, Yakut-speaking, recently immigrated from [place], domiciled with brother, unemployed, with psych hx of gender dysphoria, anxiety and depression, no past psychiatric hospitalizations, previous suicide attempt via choking, who is presenting at recommendation of brother and cousin for increasing SI. Patient was seen. Case was discussed with resident physician. I agree with the above assessment and plan.  Case discussed with resident physician and patient was seen. I agree with the above assessment and plan.

## 2023-05-02 PROCEDURE — 99231 SBSQ HOSP IP/OBS SF/LOW 25: CPT

## 2023-05-02 RX ADMIN — SERTRALINE 200 MILLIGRAM(S): 25 TABLET, FILM COATED ORAL at 08:04

## 2023-05-02 RX ADMIN — Medication 3 MILLIGRAM(S): at 20:33

## 2023-05-02 NOTE — BH INPATIENT PSYCHIATRY PROGRESS NOTE - NSBHASSESSSUMMFT_PSY_ALL_CORE
The patient is a 18 yo transgender F->M, Mohawk-speaking, recently immigrated from [place], domiciled with brother, unemployed, with psych hx of gender dysphoria, anxiety and depression, no past psychiatric hospitalizations, previous suicide attempt via choking, who is presenting at recommendation of brother and cousin for increasing SI.    Upon admission patient endorses strong feelings of depression and exhibits very dysphoric and minimally reactive affect. He endorses escalating suicidal thoughts related to feelings of worthlessness and unhappiness over his appearance. During the course of his hospitalization, his mood and affect has improved, he has become more talkative. . Of note, the patient's brother recently lost his job and the patient has been feeling like he has a duty to get discharged in order to find a job and help financially. He is therefore at an increased risk of minimizing his symptoms. The patient is currently admitted involuntarily on 9:39 legals as he is at acutely elevated risk of suicide. Chronic risk factors include history of trauma, history of persistent depressive symptoms,   and history of suicidal ideation. Current risk factors include recent self inflicted choking, age, recent immigration, not understanding/speaking well language of current residence, current gender dysphoria, current social anxiety, current panic attacks and financial stressors. Goals of care will be to decrease patient's suicidality. Requires continued hospitalization for stabilization.    04/18/23- Patient acutely dysphoric. Plan to dc mirtazapine as he notes excessive lethargy and hyperphagia, continue sertraline 50 mg  04/19/2023  Patient remains with thoughts of suicide and concerns about his future, however he feels safe while in a structured setting with people around him  04/20/23 Patient had no thoughts of suicide during interview, appeared brighter and occasionally smiled. Agreeable to increase antidepressants  04/24 Patient notes panic attack last night, alleviated talking with cousin. Brighter, future oriented. Endorses social anxiety.  04/25/23 Patient did not have panic attack in last 24 hrs. Still appears brighter on unit and during interview today. Voiced that his social anxiety hinders his ability to advocate for himself while on unit  04/26   Patient is somewhat less anxious,  spending  time outside his room and is cheerful at times.  04/27/23 Patient notes still having intermittent anxiety, however he is now able to leave his room and his speech is much more productive. Affect continues to be more supple than on initial presentation. Plan to increase Zoloft to 150 mg 04/28, with eventual plan to maximize it to treat his social anxiety disorder.  04/28 Yesterday patient had another panic attack when bullied about his gender expression and received Klonopin and Atarax PRN. Though he states his mood is "good" and has a more supple affect than on initial presentation, he continues to endorse feelings of self hatred (which he believes may be alleviated by gender affirmative care) as well as wish to no longer be alive (though no active SI)  05/01 Patient continues to endorse passive SI, appeared anxious though smiling during interview. Reported panic attack yesterday. Ok with plan to increase Zoloft to 200 mg    Impression: MDD vs Social Anxiety disorder with Panic attacks vs Gender dysphoria, R/O bipolar    #MDD #Gender dysphoria #Social Anxiety Diosrder #Panic attacks  - 9.39 legals  - Increase Sertraline to 200 mg (starting 05/02)  - Melatonin for sleep phase disorder  - Referral for outpatient endocrinologist  - Left voicemail with Dr. Vincent at [facility], [phone]  - Left VM with  Kandice Acosta who work s with LGBTQ community for connection to trans affirming care  - Call Teagan Escobar [phone], patient's therapist  - Plan for meeting with family 4 pm on Friday  - Referral on discharge to the [employer] (suicide hotline) and [facility]    #Panic attacks  - PRN Atarax  - Continue Klonopin to 0.25mg PRN daily  - Continue to monitor    #Agitation  - For episodes of acute agitation can offer PO Haldol 5 mg/Ativan 2 mg/Benadryl 50 mg Q6H PRN. If refusing PO and is in acute risk of harm to self/other, can offer IM Haldol 5 mg/Ativan 2 mg/Benadryl 50 mg Q6H PRN. If given, please repeat EKG and hold antipsychotics if QTC>500   The patient is a 18 yo transgender F->M, Nepali-speaking, recently immigrated from [place], domiciled with brother, unemployed, with psych hx of gender dysphoria, anxiety and depression, no past psychiatric hospitalizations, previous suicide attempt via choking, who is presenting at recommendation of brother and cousin for increasing SI.    Upon admission patient endorses strong feelings of depression and exhibits very dysphoric and minimally reactive affect. He endorses escalating suicidal thoughts related to feelings of worthlessness and unhappiness over his appearance. During the course of his hospitalization, his mood and affect has improved, he has become more talkative. . Of note, the patient's brother recently lost his job and the patient has been feeling like he has a duty to get discharged in order to find a job and help financially. He is therefore at an increased risk of minimizing his symptoms. The patient is currently admitted involuntarily on 9:39 legals as he is at acutely elevated risk of suicide. Chronic risk factors include history of trauma, history of persistent depressive symptoms,   and history of suicidal ideation. Current risk factors include recent self inflicted choking, age, recent immigration, not understanding/speaking well language of current residence, current gender dysphoria, current social anxiety, current panic attacks and financial stressors. Goals of care will be to decrease patient's suicidality. Requires continued hospitalization for stabilization.    04/18/23- Patient acutely dysphoric. Plan to dc mirtazapine as he notes excessive lethargy and hyperphagia, continue sertraline 50 mg  04/19/2023  Patient remains with thoughts of suicide and concerns about his future, however he feels safe while in a structured setting with people around him  04/20/23 Patient had no thoughts of suicide during interview, appeared brighter and occasionally smiled. Agreeable to increase antidepressants  04/24 Patient notes panic attack last night, alleviated talking with cousin. Brighter, future oriented. Endorses social anxiety.  04/25/23 Patient did not have panic attack in last 24 hrs. Still appears brighter on unit and during interview today. Voiced that his social anxiety hinders his ability to advocate for himself while on unit  04/26   Patient is somewhat less anxious,  spending  time outside his room and is cheerful at times.  04/27/23 Patient notes still having intermittent anxiety, however he is now able to leave his room and his speech is much more productive. Affect continues to be more supple than on initial presentation. Plan to increase Zoloft to 150 mg 04/28, with eventual plan to maximize it to treat his social anxiety disorder.  04/28 Yesterday patient had another panic attack when bullied about his gender expression and received Klonopin and Atarax PRN. Though he states his mood is "good" and has a more supple affect than on initial presentation, he continues to endorse feelings of self hatred (which he believes may be alleviated by gender affirmative care) as well as wish to no longer be alive (though no active SI)  05/01 Patient continues to endorse passive SI, appeared anxious though smiling during interview. Reported panic attack yesterday. Ok with plan to increase Zoloft to 200 mg  05/02 Pt received first dose of 200 mg Zoloft. Had panic attack yesterday triggered by existential doubts and feelings of self hatred, which resulted in him hitting himself. Today less anxious but continues to endorse ambivalence about wanting to be alive.    Impression: MDD vs Social Anxiety disorder with Panic attacks vs Gender dysphoria, R/O bipolar    #MDD #Gender dysphoria #Social Anxiety Diosrder #Panic attacks  - 9.39 legals  - Increase Sertraline to 200 mg (starting 05/02)  - Melatonin for sleep phase disorder  - Referral for outpatient endocrinologist  - Left vo[facility] Vincent at [facility], [phone]  - Left VM with  Kandice Acosta who work s with LGBTQ community for connection to trans affirming care  - Call Teagan Escobar [phone], patient's therapist  - Plan for meeting with family 4 pm on Friday  - Referral on discharge to the [employer] (suicide hotline) and [facility]    #Panic attacks  - PRN Atarax  - Continue Klonopin to 0.25mg PRN daily  - Continue to monitor    #Agitation  - For episodes of acute agitation can offer PO Haldol 5 mg/Ativan 2 mg/Benadryl 50 mg Q6H PRN. If refusing PO and is in acute risk of harm to self/other, can offer IM Haldol 5 mg/Ativan 2 mg/Benadryl 50 mg Q6H PRN. If given, please repeat EKG and hold antipsychotics if QTC>500   The patient is a 16 yo transgender F->M, Maltese-speaking, recently immigrated from [place], domiciled with brother, unemployed, with psych hx of gender dysphoria, anxiety and depression, no past psychiatric hospitalizations, previous suicide attempt via choking, who is presenting at recommendation of brother and cousin for increasing SI.    Upon admission patient endorses strong feelings of depression and exhibits very dysphoric and minimally reactive affect. He endorses escalating suicidal thoughts related to feelings of worthlessness and unhappiness over his appearance. During the course of his hospitalization, his mood and affect has improved, he has become more talkative. . Of note, the patient's brother recently lost his job and the patient has been feeling like he has a duty to get discharged in order to find a job and help financially. He is therefore at an increased risk of minimizing his symptoms. The patient is currently admitted involuntarily on 9:39 legals as he is at acutely elevated risk of suicide. Chronic risk factors include history of trauma, history of persistent depressive symptoms,   and history of suicidal ideation. Current risk factors include recent self inflicted choking, age, recent immigration, not understanding/speaking well language of current residence, current gender dysphoria, current social anxiety, current panic attacks and financial stressors. Goals of care will be to decrease patient's suicidality. Requires continued hospitalization for stabilization.    04/18/23- Patient acutely dysphoric. Plan to dc mirtazapine as he notes excessive lethargy and hyperphagia, continue sertraline 50 mg  04/19/2023  Patient remains with thoughts of suicide and concerns about his future, however he feels safe while in a structured setting with people around him  04/20/23 Patient had no thoughts of suicide during interview, appeared brighter and occasionally smiled. Agreeable to increase antidepressants  04/24 Patient notes panic attack last night, alleviated talking with cousin. Brighter, future oriented. Endorses social anxiety.  04/25/23 Patient did not have panic attack in last 24 hrs. Still appears brighter on unit and during interview today. Voiced that his social anxiety hinders his ability to advocate for himself while on unit  04/26   Patient is somewhat less anxious,  spending  time outside his room and is cheerful at times.  04/27/23 Patient notes still having intermittent anxiety, however he is now able to leave his room and his speech is much more productive. Affect continues to be more supple than on initial presentation. Plan to increase Zoloft to 150 mg 04/28, with eventual plan to maximize it to treat his social anxiety disorder.  04/28 Yesterday patient had another panic attack when bullied about his gender expression and received Klonopin and Atarax PRN. Though he states his mood is "good" and has a more supple affect than on initial presentation, he continues to endorse feelings of self hatred (which he believes may be alleviated by gender affirmative care) as well as wish to no longer be alive (though no active SI)  05/01 Patient continues to endorse passive SI, appeared anxious though smiling during interview. Reported panic attack yesterday. Ok with plan to increase Zoloft to 200 mg  05/02 Pt received first dose of 200 mg Zoloft. Had panic attack yesterday triggered by existential doubts and feelings of self hatred, which resulted in him hitting himself. Today less anxious but continues to endorse ambivalence about wanting to be alive.    Impression: MDD vs Social Anxiety disorder with Panic attacks vs Gender dysphoria, R/O bipolar    #MDD #Gender dysphoria #Social Anxiety Diosrder #Panic attacks  - 9.39 legals  - Increase Sertraline to 200 mg (starting 05/02)  - Melatonin for sleep phase disorder  - Referral for outpatient endocrinologist  - Left voicemail with Dr. Vincent at [facility], [phone]  - Left VM with  Kandice Acosta who work s with LGBTQ community for connection to trans affirming care  - Left  Teagan Escobar [phone], patient's therapist  - Plan for meeting with family 4 pm on Friday  - Referral on discharge to the [employer] [facility] (suicide hotline) and [facility]  -  TSH    #Panic attacks  - PRN Atarax  - Continue Klonopin to 0.25mg PRN daily  - Continue to monitor    #Agitation  - For episodes of acute agitation can offer PO Haldol 5 mg/Ativan 2 mg/Benadryl 50 mg Q6H PRN. If refusing PO and is in acute risk of harm to self/other, can offer IM Haldol 5 mg/Ativan 2 mg/Benadryl 50 mg Q6H PRN. If given, please repeat EKG and hold antipsychotics if QTC>500

## 2023-05-02 NOTE — BH INPATIENT PSYCHIATRY PROGRESS NOTE - OTHER
Productivity is increasing Anxious. Experiences depression intermittently. At time of the smiling but appeared anxious Depression appears to be improving,  still appears anxious, snapping rubber band more today than usual Anxious. Experiences self hatred intermittently Appeared less anxious today, less fidgeting with rubberband

## 2023-05-02 NOTE — BH INPATIENT PSYCHIATRY PROGRESS NOTE - NSBHMETABOLIC_PSY_ALL_CORE_FT
BMI: BMI (kg/m2): 32.5 (04-18-23 @ 05:33)  HbA1c:   Glucose:   BP: 120/91 (05-01-23 @ 15:31) (120/91 - 129/93)  Lipid Panel:  BMI: BMI (kg/m2): 32.5 (04-18-23 @ 05:33)  HbA1c:   Glucose:   BP: 120/79 (05-02-23 @ 07:45) (120/79 - 127/85)  Lipid Panel:

## 2023-05-02 NOTE — BH INPATIENT PSYCHIATRY PROGRESS NOTE - CURRENT MEDICATION
MEDICATIONS  (STANDING):  melatonin. 3 milliGRAM(s) Oral at bedtime  sertraline 200 milliGRAM(s) Oral daily    MEDICATIONS  (PRN):  clonazePAM  Tablet 0.25 milliGRAM(s) Oral daily PRN panic attacks  haloperidol     Tablet 5 milliGRAM(s) Oral every 6 hours PRN agitation  hydrOXYzine hydrochloride 50 milliGRAM(s) Oral every 6 hours PRN anxiety  LORazepam     Tablet 2 milliGRAM(s) Oral every 6 hours PRN agitation

## 2023-05-02 NOTE — BH INPATIENT PSYCHIATRY PROGRESS NOTE - NSBHATTESTCOMMENTATTENDFT_PSY_A_CORE
The patient is a 18 yo transgender F->M, Luxembourgish-speaking, recently immigrated from [place], domiciled with brother, unemployed, with psych hx of gender dysphoria, anxiety and depression, no past psychiatric hospitalizations, previous suicide attempt via choking, who is presenting at recommendation of brother and cousin for increasing SI. Patient was seen. Case was discussed with resident physician. I agree with the above assessment and plan.  Case discussed with resident physician and patient was seen. I agree with the above assessment and plan.

## 2023-05-02 NOTE — BH INPATIENT PSYCHIATRY PROGRESS NOTE - ADDITIONAL DETAILS / COMMENTS
More talkative. Feelings of self-hatred persist, feels uncomfortable asking for help Feelings of self-hatred persist, feels uncomfortable asking for help. Panic attack yesterday resulting in patient hitting himself

## 2023-05-02 NOTE — BH INPATIENT PSYCHIATRY PROGRESS NOTE - NSBHFUPINTERVALHXFT_PSY_A_CORE
Nursing reports no acute events overnight. Per chart review the patient has not required any behavioral PRNS since the last assessment.    Chart reviewed, patient seen and evaluated in AM. On approach, .... Patient reports ________________.   Patient endorsed OK sleep and appetite.     No SI/HI/AVH elicited.  Nursing reports no acute events overnight. Nursing reports that he has been spending more time in the day room, but he is still sometimes seem reclusive in the bathroom. Per chart review the patient has not required any behavioral PRNS since the last assessment.    Chart reviewed, patient seen and evaluated in AM. Though his affect remains supple and he continues to smile intermittently, the patient reports feeling "mas o menos" (more or less ok), but that he had another panic attack yesterday in the context of thoughts of existential dread and not wanting to be alive. He states that at that time he started hitting his head. When the writer asked why he did not use his rubber band, he states that it is because he felt like he wanted something stronger. The writer mentioned that the patient could also use ice to self soothe, and let him know that the psychiatric staff would be made aware to offer that to him if they see him panicking.     Patient endorsed OK sleep and appetite.   No SI/HI/AVH elicited.

## 2023-05-02 NOTE — BH INPATIENT PSYCHIATRY PROGRESS NOTE - NSBHCHARTREVIEWVS_PSY_A_CORE FT
Vital Signs Last 24 Hrs  T(C): 36.8 (05-01-23 @ 15:31), Max: 36.8 (05-01-23 @ 15:31)  T(F): 98.3 (05-01-23 @ 15:31), Max: 98.3 (05-01-23 @ 15:31)  HR: 92 (05-01-23 @ 15:31) (92 - 104)  BP: 120/91 (05-01-23 @ 15:31) (120/91 - 127/85)  BP(mean): --  RR: 16 (05-01-23 @ 15:31) (16 - 18)  SpO2: --     Vital Signs Last 24 Hrs  T(C): 36.6 (05-02-23 @ 07:45), Max: 36.8 (05-01-23 @ 15:31)  T(F): 97.9 (05-02-23 @ 07:45), Max: 98.3 (05-01-23 @ 15:31)  HR: 96 (05-02-23 @ 07:45) (92 - 96)  BP: 120/79 (05-02-23 @ 07:45) (120/79 - 120/91)  BP(mean): --  RR: 16 (05-02-23 @ 07:45) (16 - 16)  SpO2: --

## 2023-05-03 LAB — TSH SERPL-MCNC: 1.37 UIU/ML — SIGNIFICANT CHANGE UP (ref 0.27–4.2)

## 2023-05-03 PROCEDURE — 99231 SBSQ HOSP IP/OBS SF/LOW 25: CPT

## 2023-05-03 RX ADMIN — SERTRALINE 200 MILLIGRAM(S): 25 TABLET, FILM COATED ORAL at 08:25

## 2023-05-03 RX ADMIN — Medication 3 MILLIGRAM(S): at 20:36

## 2023-05-03 NOTE — BH INPATIENT PSYCHIATRY PROGRESS NOTE - NSBHMETABOLIC_PSY_ALL_CORE_FT
BMI: BMI (kg/m2): 32.5 (04-18-23 @ 05:33)  HbA1c:   Glucose:   BP: 128/92 (05-03-23 @ 15:30) (119/85 - 128/92)  Lipid Panel:

## 2023-05-03 NOTE — BH INPATIENT PSYCHIATRY PROGRESS NOTE - OTHER
Productivity is increasing Anxious. Experiences self hatred intermittently Appeared less anxious today, less fidgeting with rubberband

## 2023-05-03 NOTE — BH INPATIENT PSYCHIATRY PROGRESS NOTE - NSBHFUPINTERVALHXFT_PSY_A_CORE
Patient seen by writer and to medical students,  1 of whom is a native [place] speaker  Patient clearly enjoyed speaking to someone and was fluent in his language.  On discussion he describes continuing fluctuant mood and panic attacks.  Panic attack is accompanied by dread and "self-hatred".  When asked if he wants to kill himself he replies that he would not kill himself because of his family but does deny an urge to kill himself.   On closer discussion he reports his despondency is due to feeling like he does not have "a place in the world".

## 2023-05-03 NOTE — BH INPATIENT PSYCHIATRY PROGRESS NOTE - NSBHCHARTREVIEWVS_PSY_A_CORE FT
Vital Signs Last 24 Hrs  T(C): 37.2 (05-03-23 @ 15:30), Max: 37.2 (05-03-23 @ 15:30)  T(F): 99 (05-03-23 @ 15:30), Max: 99 (05-03-23 @ 15:30)  HR: 99 (05-03-23 @ 15:30) (99 - 111)  BP: 128/92 (05-03-23 @ 15:30) (119/85 - 128/92)  BP(mean): --  RR: 16 (05-03-23 @ 15:30) (16 - 18)  SpO2: --

## 2023-05-03 NOTE — BH INPATIENT PSYCHIATRY PROGRESS NOTE - ADDITIONAL DETAILS / COMMENTS
Feelings of self-hatred persist, feels uncomfortable asking for help. Panic attack yesterday resulting in patient hitting himself

## 2023-05-03 NOTE — BH INPATIENT PSYCHIATRY PROGRESS NOTE - NSBHASSESSSUMMFT_PSY_ALL_CORE
The patient is a 18 yo transgender F->M, Swedish-speaking, recently immigrated from [place], domiciled with brother, unemployed, with psych hx of gender dysphoria, anxiety and depression, no past psychiatric hospitalizations, previous suicide attempt via choking, who is presenting at recommendation of brother and cousin for increasing SI.    Upon admission patient endorses strong feelings of depression and exhibits very dysphoric and minimally reactive affect. He endorses escalating suicidal thoughts related to feelings of worthlessness and unhappiness over his appearance. During the course of his hospitalization, his mood and affect has improved, he has become more talkative. . Of note, the patient's brother recently lost his job and the patient has been feeling like he has a duty to get discharged in order to find a job and help financially. He is therefore at an increased risk of minimizing his symptoms. The patient is currently admitted involuntarily on 9:39 legals as he is at acutely elevated risk of suicide. Chronic risk factors include history of trauma, history of persistent depressive symptoms,   and history of suicidal ideation. Current risk factors include recent self inflicted choking, age, recent immigration, not understanding/speaking well language of current residence, current gender dysphoria, current social anxiety, current panic attacks and financial stressors. Goals of care will be to decrease patient's suicidality. Requires continued hospitalization for stabilization.    04/18/23- Patient acutely dysphoric. Plan to dc mirtazapine as he notes excessive lethargy and hyperphagia, continue sertraline 50 mg  04/19/2023  Patient remains with thoughts of suicide and concerns about his future, however he feels safe while in a structured setting with people around him  04/20/23 Patient had no thoughts of suicide during interview, appeared brighter and occasionally smiled. Agreeable to increase antidepressants  04/24 Patient notes panic attack last night, alleviated talking with cousin. Brighter, future oriented. Endorses social anxiety.  04/25/23 Patient did not have panic attack in last 24 hrs. Still appears brighter on unit and during interview today. Voiced that his social anxiety hinders his ability to advocate for himself while on unit  04/26   Patient is somewhat less anxious,  spending  time outside his room and is cheerful at times.  04/27/23 Patient notes still having intermittent anxiety, however he is now able to leave his room and his speech is much more productive. Affect continues to be more supple than on initial presentation. Plan to increase Zoloft to 150 mg 04/28, with eventual plan to maximize it to treat his social anxiety disorder.  04/28 Yesterday patient had another panic attack when bullied about his gender expression and received Klonopin and Atarax PRN. Though he states his mood is "good" and has a more supple affect than on initial presentation, he continues to endorse feelings of self hatred (which he believes may be alleviated by gender affirmative care) as well as wish to no longer be alive (though no active SI)  05/01 Patient continues to endorse passive SI, appeared anxious though smiling during interview. Reported panic attack yesterday. Ok with plan to increase Zoloft to 200 mg  05/02 Pt received first dose of 200 mg Zoloft. Had panic attack yesterday triggered by existential doubts and feelings of self hatred, which resulted in him hitting himself. Today less anxious but continues to endorse ambivalence about wanting to be alive.  5/3/23   patient had significant panic attack accompanied by dread and thoughts of self destruction.  When asked if suicidal patient replies I cannot kill myself because of my family.    Impression: MDD vs Social Anxiety disorder with Panic attacks vs Gender dysphoria, R/O bipolar    #MDD #Gender dysphoria #Social Anxiety Diosrder #Panic attacks  - 9.39 legals  - Increase Sertraline to 200 mg (starting 05/02)  - Melatonin for sleep phase disorder  - Referral for outpatient endocrinologist  - Left voicemail with Dr. Vincent at [facility], [phone]  - Left VM with  Kandice Acosta who work s with LGBTQ community for connection to trans affirming care  - Left  Teagan Escobar [phone], patient's therapist  - Plan for meeting with family 4 pm on Friday  - Referral on discharge to [facility] (suicide hotline) and [facility]  -  TSH    #Panic attacks  - PRN Atarax  - Continue Klonopin to 0.25mg PRN daily  - Continue to monitor    #Agitation  - For episodes of acute agitation can offer PO Haldol 5 mg/Ativan 2 mg/Benadryl 50 mg Q6H PRN. If refusing PO and is in acute risk of harm to self/other, can offer IM Haldol 5 mg/Ativan 2 mg/Benadryl 50 mg Q6H PRN. If given, please repeat EKG and hold antipsychotics if QTC>500

## 2023-05-04 PROCEDURE — 99231 SBSQ HOSP IP/OBS SF/LOW 25: CPT

## 2023-05-04 RX ORDER — ARIPIPRAZOLE 15 MG/1
2 TABLET ORAL DAILY
Refills: 0 | Status: DISCONTINUED | OUTPATIENT
Start: 2023-05-04 | End: 2023-05-08

## 2023-05-04 RX ADMIN — Medication 3 MILLIGRAM(S): at 20:12

## 2023-05-04 RX ADMIN — ARIPIPRAZOLE 2 MILLIGRAM(S): 15 TABLET ORAL at 15:18

## 2023-05-04 RX ADMIN — SERTRALINE 200 MILLIGRAM(S): 25 TABLET, FILM COATED ORAL at 08:06

## 2023-05-04 NOTE — BH INPATIENT PSYCHIATRY PROGRESS NOTE - ADDITIONAL DETAILS / COMMENTS
Feelings of self-hatred persist, feels uncomfortable asking for help. Panic attack yesterday resulting in patient hitting himself Feelings of self-hatred persist

## 2023-05-04 NOTE — BH INPATIENT PSYCHIATRY PROGRESS NOTE - NSBHMETABOLIC_PSY_ALL_CORE_FT
BMI: BMI (kg/m2): 32.5 (04-18-23 @ 05:33)  HbA1c:   Glucose:   BP: 128/92 (05-03-23 @ 15:30) (119/85 - 128/92)  Lipid Panel:  BMI: BMI (kg/m2): 32.5 (04-18-23 @ 05:33)  HbA1c:   Glucose:   BP: 122/82 (05-04-23 @ 08:15) (119/85 - 128/92)  Lipid Panel:

## 2023-05-04 NOTE — BH INPATIENT PSYCHIATRY PROGRESS NOTE - NSBHCHARTREVIEWVS_PSY_A_CORE FT
Vital Signs Last 24 Hrs  T(C): 37.2 (05-03-23 @ 15:30), Max: 37.2 (05-03-23 @ 15:30)  T(F): 99 (05-03-23 @ 15:30), Max: 99 (05-03-23 @ 15:30)  HR: 99 (05-03-23 @ 15:30) (99 - 111)  BP: 128/92 (05-03-23 @ 15:30) (119/85 - 128/92)  BP(mean): --  RR: 16 (05-03-23 @ 15:30) (16 - 18)  SpO2: --     Vital Signs Last 24 Hrs  T(C): 36.6 (05-04-23 @ 08:15), Max: 37.2 (05-03-23 @ 15:30)  T(F): 97.8 (05-04-23 @ 08:15), Max: 99 (05-03-23 @ 15:30)  HR: 107 (05-04-23 @ 08:15) (99 - 107)  BP: 122/82 (05-04-23 @ 08:15) (122/82 - 128/92)  BP(mean): --  RR: 16 (05-04-23 @ 08:15) (16 - 16)  SpO2: --

## 2023-05-04 NOTE — BH INPATIENT PSYCHIATRY PROGRESS NOTE - NSBHASSESSSUMMFT_PSY_ALL_CORE
The patient is a 18 yo transgender F->M, Yi-speaking, recently immigrated from [place], domiciled with brother, unemployed, with psych hx of gender dysphoria, anxiety and depression, no past psychiatric hospitalizations, previous suicide attempt via choking, who is presenting at recommendation of brother and cousin for increasing SI.    Upon admission patient endorses strong feelings of depression and exhibits very dysphoric and minimally reactive affect. He endorses escalating suicidal thoughts related to feelings of worthlessness and unhappiness over his appearance. During the course of his hospitalization, his mood and affect has improved, he has become more talkative. . Of note, the patient's brother recently lost his job and the patient has been feeling like he has a duty to get discharged in order to find a job and help financially. He is therefore at an increased risk of minimizing his symptoms. The patient is currently admitted involuntarily on 9:39 legals as he is at acutely elevated risk of suicide. Chronic risk factors include history of trauma, history of persistent depressive symptoms,   and history of suicidal ideation. Current risk factors include recent self inflicted choking, age, recent immigration, not understanding/speaking well language of current residence, current gender dysphoria, current social anxiety, current panic attacks and financial stressors. Goals of care will be to decrease patient's suicidality. Requires continued hospitalization for stabilization.    04/18/23- Patient acutely dysphoric. Plan to dc mirtazapine as he notes excessive lethargy and hyperphagia, continue sertraline 50 mg  04/19/2023  Patient remains with thoughts of suicide and concerns about his future, however he feels safe while in a structured setting with people around him  04/20/23 Patient had no thoughts of suicide during interview, appeared brighter and occasionally smiled. Agreeable to increase antidepressants  04/24 Patient notes panic attack last night, alleviated talking with cousin. Brighter, future oriented. Endorses social anxiety.  04/25/23 Patient did not have panic attack in last 24 hrs. Still appears brighter on unit and during interview today. Voiced that his social anxiety hinders his ability to advocate for himself while on unit  04/26   Patient is somewhat less anxious,  spending  time outside his room and is cheerful at times.  04/27/23 Patient notes still having intermittent anxiety, however he is now able to leave his room and his speech is much more productive. Affect continues to be more supple than on initial presentation. Plan to increase Zoloft to 150 mg 04/28, with eventual plan to maximize it to treat his social anxiety disorder.  04/28 Yesterday patient had another panic attack when bullied about his gender expression and received Klonopin and Atarax PRN. Though he states his mood is "good" and has a more supple affect than on initial presentation, he continues to endorse feelings of self hatred (which he believes may be alleviated by gender affirmative care) as well as wish to no longer be alive (though no active SI)  05/01 Patient continues to endorse passive SI, appeared anxious though smiling during interview. Reported panic attack yesterday. Ok with plan to increase Zoloft to 200 mg  05/02 Pt received first dose of 200 mg Zoloft. Had panic attack yesterday triggered by existential doubts and feelings of self hatred, which resulted in him hitting himself. Today less anxious but continues to endorse ambivalence about wanting to be alive.  5/3/23   patient had significant panic attack accompanied by dread and thoughts of self destruction.  When asked if suicidal patient replies I cannot kill myself because of my family.    Impression: MDD vs Social Anxiety disorder with Panic attacks vs Gender dysphoria, R/O bipolar    #MDD #Gender dysphoria #Social Anxiety Diosrder #Panic attacks  - 9.39 legals  - Increase Sertraline to 200 mg (starting 05/02)  - Melatonin for sleep phase disorder  - Referral for outpatient endocrinologist  - Left voicemail with Dr. Vincent at [facility], [phone]  - Left VM with  Kandice Acosta who work s with LGBTQ community for connection to trans affirming care  - Left  Teagan Escobar [phone], patient's therapist  - Plan for meeting with family 4 pm on Friday  - Referral on discharge to [facility] (suicide hotline) and [facility]  -  TSH    #Panic attacks  - PRN Atarax  - Continue Klonopin to 0.25mg PRN daily  - Continue to monitor    #Agitation  - For episodes of acute agitation can offer PO Haldol 5 mg/Ativan 2 mg/Benadryl 50 mg Q6H PRN. If refusing PO and is in acute risk of harm to self/other, can offer IM Haldol 5 mg/Ativan 2 mg/Benadryl 50 mg Q6H PRN. If given, please repeat EKG and hold antipsychotics if QTC>500   The patient is a 18 yo transgender F->M, Wolof-speaking, recently immigrated from [place], domiciled with brother, unemployed, with psych hx of gender dysphoria, anxiety and depression, no past psychiatric hospitalizations, previous suicide attempt via choking, who is presenting at recommendation of brother and cousin for increasing SI.    Upon admission patient endorses strong feelings of depression and exhibits very dysphoric and minimally reactive affect. He endorses escalating suicidal thoughts related to feelings of worthlessness and unhappiness over his appearance. During the course of his hospitalization, his mood and affect has improved, he has become more talkative. . f note, the patient's brother recently lost his job and the patient has been feeling like he has a duty to get discharged in order to find a job and help financially. He is therefore at an increased risk of minimizing his symptoms. The patient was admitted involuntarily on 9:39 legals as he has been acutely elevated risk of suicide, now changed to voluntary legals. Chronic risk factors include history of trauma, history of persistent depressive symptoms,   and history of suicidal ideation. Current risk factors include recent self inflicted choking, age, recent immigration, not understanding/speaking well language of current residence, current gender dysphoria, current social anxiety, current panic attacks and financial stressors. Goals of care will be to decrease patient's suicidality. Requires continued hospitalization for stabilization.    04/18/23- Patient acutely dysphoric. Plan to dc mirtazapine as he notes excessive lethargy and hyperphagia, continue sertraline 50 mg  04/19/2023  Patient remains with thoughts of suicide and concerns about his future, however he feels safe while in a structured setting with people around him  04/20/23 Patient had no thoughts of suicide during interview, appeared brighter and occasionally smiled. Agreeable to increase antidepressants  04/24 Patient notes panic attack last night, alleviated talking with cousin. Brighter, future oriented. Endorses social anxiety.  04/25/23 Patient did not have panic attack in last 24 hrs. Still appears brighter on unit and during interview today. Voiced that his social anxiety hinders his ability to advocate for himself while on unit  04/26   Patient is somewhat less anxious,  spending  time outside his room and is cheerful at times.  04/27/23 Patient notes still having intermittent anxiety, however he is now able to leave his room and his speech is much more productive. Affect continues to be more supple than on initial presentation. Plan to increase Zoloft to 150 mg 04/28, with eventual plan to maximize it to treat his social anxiety disorder.  04/28 Yesterday patient had another panic attack when bullied about his gender expression and received Klonopin and Atarax PRN. Though he states his mood is "good" and has a more supple affect than on initial presentation, he continues to endorse feelings of self hatred (which he believes may be alleviated by gender affirmative care) as well as wish to no longer be alive (though no active SI)  05/01 Patient continues to endorse passive SI, appeared anxious though smiling during interview. Reported panic attack yesterday. Ok with plan to increase Zoloft to 200 mg  05/02 Pt received first dose of 200 mg Zoloft. Had panic attack yesterday triggered by existential doubts and feelings of self hatred, which resulted in him hitting himself. Today less anxious but continues to endorse ambivalence about wanting to be alive.  5/3/23   patient had significant panic attack accompanied by dread and thoughts of self destruction.  When asked if suicidal patient replies I cannot kill myself because of my family.  05/04- Acutely nervous, will add 3 mg abilify    Impression: MDD vs Social Anxiety disorder with Panic attacks vs Gender dysphoria, R/O bipolar    #MDD #Gender dysphoria #Social Anxiety Diosrder #Panic attacks  - 9.39 legals  - Increase Sertraline to 200 mg (starting 05/02)  - Melatonin for sleep phase disorder  - Referral for outpatient endocrinologist  - Left voicemail with Dr. Vincent at [facility], [phone]  - Left VM with  Kandice Acosta who work s with LGBTQ community for connection to trans affirming care  - Left  Teagan Escobar [phone], patient's therapist  - Plan for meeting with family 4 pm on Friday  - Referral on discharge to the [employer] [facility] (suicide hotline) and [facility]  -  TSH    #Panic attacks  - PRN Atarax  - Continue Klonopin to 0.25mg PRN daily  - Continue to monitor    #Agitation  - For episodes of acute agitation can offer PO Haldol 5 mg/Ativan 2 mg/Benadryl 50 mg Q6H PRN. If refusing PO and is in acute risk of harm to self/other, can offer IM Haldol 5 mg/Ativan 2 mg/Benadryl 50 mg Q6H PRN. If given, please repeat EKG and hold antipsychotics if QTC>500   The patient is a 18 yo transgender F->M, Serbian-speaking, recently immigrated from [place], domiciled with brother, unemployed, with psych hx of gender dysphoria, anxiety and depression, no past psychiatric hospitalizations, previous suicide attempt via choking, who is presenting at recommendation of brother and cousin for increasing SI.    Upon admission patient endorses strong feelings of depression and exhibits very dysphoric and minimally reactive affect. He endorses escalating suicidal thoughts related to feelings of worthlessness and unhappiness over his appearance. During the course of his hospitalization, his mood and affect has improved, he has become more talkative. . f note, the patient's brother recently lost his job and the patient has been feeling like he has a duty to get discharged in order to find a job and help financially. He is therefore at an increased risk of minimizing his symptoms. The patient was admitted involuntarily on 9:39 legals as he has been acutely elevated risk of suicide, now changed to voluntary legals. Chronic risk factors include history of trauma, history of persistent depressive symptoms,   and history of suicidal ideation. Current risk factors include recent self inflicted choking, age, recent immigration, not understanding/speaking well language of current residence, current gender dysphoria, current social anxiety, current panic attacks and financial stressors. Goals of care will be to decrease patient's suicidality as his current risk factors place him at acutely elevated risk. Requires continued hospitalization for stabilization.    04/18/23- Patient acutely dysphoric. Plan to dc mirtazapine as he notes excessive lethargy and hyperphagia, continue sertraline 50 mg  04/19/2023  Patient remains with thoughts of suicide and concerns about his future, however he feels safe while in a structured setting with people around him  04/20/23 Patient had no thoughts of suicide during interview, appeared brighter and occasionally smiled. Agreeable to increase antidepressants  04/24 Patient notes panic attack last night, alleviated talking with cousin. Brighter, future oriented. Endorses social anxiety.  04/25/23 Patient did not have panic attack in last 24 hrs. Still appears brighter on unit and during interview today. Voiced that his social anxiety hinders his ability to advocate for himself while on unit  04/26   Patient is somewhat less anxious,  spending  time outside his room and is cheerful at times.  04/27/23 Patient notes still having intermittent anxiety, however he is now able to leave his room and his speech is much more productive. Affect continues to be more supple than on initial presentation. Plan to increase Zoloft to 150 mg 04/28, with eventual plan to maximize it to treat his social anxiety disorder.  04/28 Yesterday patient had another panic attack when bullied about his gender expression and received Klonopin and Atarax PRN. Though he states his mood is "good" and has a more supple affect than on initial presentation, he continues to endorse feelings of self hatred (which he believes may be alleviated by gender affirmative care) as well as wish to no longer be alive (though no active SI)  05/01 Patient continues to endorse passive SI, appeared anxious though smiling during interview. Reported panic attack yesterday. Ok with plan to increase Zoloft to 200 mg  05/02 Pt received first dose of 200 mg Zoloft. Had panic attack yesterday triggered by existential doubts and feelings of self hatred, which resulted in him hitting himself. Today less anxious but continues to endorse ambivalence about wanting to be alive.  5/3/23   patient had significant panic attack accompanied by dread and thoughts of self destruction.  When asked if suicidal patient replies I cannot kill myself because of my family.  05/04- Acutely anxious, more so than previously (however interviewed in large group), will add 2 mg abilify. Vickie in process of enrolling [facility] as patient in [facility]    Impression: MDD vs Social Anxiety disorder with Panic attacks vs Gender dysphoria, R/O bipolar    #MDD #Gender dysphoria #Social Anxiety Disorder #Panic attacks  - 9.39 legals changed to 9.14  - Start Abilify 2 mg 05/04  - Cont Sertraline to 200 mg (starting 05/02)  - Melatonin for sleep phase disorder  - Referral for outpatient endocrinologist  - Left voicemail with Dr. Vincent at [facility], [phone]  - Left VM with  Kandice Acosta who work s with LGBTQ community for connection to trans affirming care  - Left  Teagan Escobar [phone], patient's therapist  - Referral on discharge to [facility] (suicide hotline) and [facility]  - [facility] WNL    #Panic attacks  - PRN Atarax  - Continue Klonopin to 0.25mg PRN daily  - Continue to monitor    #Agitation  - For episodes of acute agitation can offer PO Haldol 5 mg/Ativan 2 mg/Benadryl 50 mg Q6H PRN. If refusing PO and is in acute risk of harm to self/other, can offer IM Haldol 5 mg/Ativan 2 mg/Benadryl 50 mg Q6H PRN. If given, please repeat EKG and hold antipsychotics if QTC>500

## 2023-05-04 NOTE — BH INPATIENT PSYCHIATRY PROGRESS NOTE - NSBHFUPINTERVALHXFT_PSY_A_CORE
Nursing reports no acute events overnight. Per chart review the patient has not required any behavioral PRNS since the last assessment.    Chart reviewed, patient seen and evaluated in AM. On approach, .... Patient reports ________________.   Patient endorsed OK sleep and appetite.     No SI/HI/AVH elicited.  Nursing reports no acute events overnight. Per chart review the patient has not required any behavioral PRNS since the last assessment.    Chart reviewed, patient seen and evaluated in AM. The patient was interviewed in a setting with a relatively large number of people present, including social work staff, nursing and medical students. The patient appeared more anxious than on past interviews, he was fidgeting with his rubber band, and tugging at his clothes and hair. Despite his clear anxiety, he was still cooperative with interview and smiled at times. Patient reports that he feels like his medication is helping him, and that he did not have a panic attack in the last 24 hours, however he continues to endorse passive SI.       No SI/HI/AVH elicited.     Medical students reached out to Ramu- She will start filling out the forms for Nalini and begin the process of enrolling him as a patient. [facility] states that they have Bruneian speaking providers, they accept his insurance, and they have appointments as early as next week.

## 2023-05-04 NOTE — BH INPATIENT PSYCHIATRY PROGRESS NOTE - CURRENT MEDICATION
MEDICATIONS  (STANDING):  melatonin. 3 milliGRAM(s) Oral at bedtime  sertraline 200 milliGRAM(s) Oral daily    MEDICATIONS  (PRN):  clonazePAM  Tablet 0.25 milliGRAM(s) Oral daily PRN panic attacks  haloperidol     Tablet 5 milliGRAM(s) Oral every 6 hours PRN agitation  hydrOXYzine hydrochloride 50 milliGRAM(s) Oral every 6 hours PRN anxiety  LORazepam     Tablet 2 milliGRAM(s) Oral every 6 hours PRN agitation   MEDICATIONS  (STANDING):  ARIPiprazole 2 milliGRAM(s) Oral daily  melatonin. 3 milliGRAM(s) Oral at bedtime  sertraline 200 milliGRAM(s) Oral daily    MEDICATIONS  (PRN):  clonazePAM  Tablet 0.25 milliGRAM(s) Oral daily PRN panic attacks  haloperidol     Tablet 5 milliGRAM(s) Oral every 6 hours PRN agitation  hydrOXYzine hydrochloride 50 milliGRAM(s) Oral every 6 hours PRN anxiety  LORazepam     Tablet 2 milliGRAM(s) Oral every 6 hours PRN agitation

## 2023-05-04 NOTE — BH INPATIENT PSYCHIATRY PROGRESS NOTE - NSBHATTESTCOMMENTATTENDFT_PSY_A_CORE
Case discussed with resident physician and patient was seen. I agree with the above assessment and plan.

## 2023-05-04 NOTE — BH INPATIENT PSYCHIATRY PROGRESS NOTE - OTHER
Appeared less anxious today, less fidgeting with rubberband Productivity is increasing Anxious. Experiences self hatred intermittently More anxious than usual, he was interviewed in a relatively large group of ppl keeps stating "I have no place in this world" Nervous repetitive grooming movements, fidgeting with rubberband Passive SI Mostly constricted though smiling through parts of interview

## 2023-05-05 PROCEDURE — 99231 SBSQ HOSP IP/OBS SF/LOW 25: CPT

## 2023-05-05 RX ORDER — CLONAZEPAM 1 MG
0.25 TABLET ORAL DAILY
Refills: 0 | Status: DISCONTINUED | OUTPATIENT
Start: 2023-05-05 | End: 2023-05-11

## 2023-05-05 RX ADMIN — Medication 3 MILLIGRAM(S): at 20:01

## 2023-05-05 RX ADMIN — SERTRALINE 200 MILLIGRAM(S): 25 TABLET, FILM COATED ORAL at 08:22

## 2023-05-05 RX ADMIN — ARIPIPRAZOLE 2 MILLIGRAM(S): 15 TABLET ORAL at 08:21

## 2023-05-05 NOTE — BH INPATIENT PSYCHIATRY PROGRESS NOTE - NSBHMETABOLIC_PSY_ALL_CORE_FT
BMI: BMI (kg/m2): 32.5 (04-18-23 @ 05:33)  HbA1c:   Glucose:   BP: 116/77 (05-04-23 @ 15:43) (116/77 - 128/92)  Lipid Panel:

## 2023-05-05 NOTE — BH INPATIENT PSYCHIATRY PROGRESS NOTE - OTHER
Nervous repetitive grooming movements are improved from yesterday, fidgeting with rubberband increases when discussing panic attacks Improved compared to yesterday, interviewed in smaller group Anxious. Experiences self hatred intermittently keeps stating "I have no place in this world" Passive SI Mostly constricted though smiling through parts of interview

## 2023-05-05 NOTE — BH INPATIENT PSYCHIATRY PROGRESS NOTE - NSBHMETABOLIC_PSY_ALL_CORE_FT
BMI: BMI (kg/m2): 32.5 (04-18-23 @ 05:33)  HbA1c:   Glucose:   BP: 121/83 (05-05-23 @ 08:25) (116/77 - 128/92)  Lipid Panel:

## 2023-05-05 NOTE — BH INPATIENT PSYCHIATRY PROGRESS NOTE - NSBHFUPINTERVALHXFT_PSY_A_CORE
Nursing reports no events o/n, per chart review pt has not received behavioral PRNs.    On approach, Radames is sleeping comfortably in bed, however was arousable to voice and was cooperative during interview. Pt was less anxious than yesterday while being interviewed in a smaller group and smiles intermittently during interview. He reports a panic attack yesterday evening in which he punched his stomach and arms and hit his head against the wall. He states the attack was precipitated by thinking repeatedly about how he feels like he has no place in this world. He continues to endorse passive SI, but continues to state that he wants to stay in this world for the sake of his family. He continues to endorse an inability to verbally communicate his needs to staff, but worked with the team to create signs that he can show to staff in order to indicate his needs.    No HI, no AVH elicited.

## 2023-05-05 NOTE — BH INPATIENT PSYCHIATRY PROGRESS NOTE - NSBHCHARTREVIEWVS_PSY_A_CORE FT
Vital Signs Last 24 Hrs  T(C): 36.7 (05-05-23 @ 08:25), Max: 36.7 (05-04-23 @ 15:43)  T(F): 98.1 (05-05-23 @ 08:25), Max: 98.1 (05-05-23 @ 08:25)  HR: 111 (05-05-23 @ 08:25) (97 - 111)  BP: 121/83 (05-05-23 @ 08:25) (116/77 - 121/83)  BP(mean): --  RR: 18 (05-05-23 @ 08:25) (16 - 18)  SpO2: --

## 2023-05-05 NOTE — BH INPATIENT PSYCHIATRY PROGRESS NOTE - OTHER
keeps stating "I have no place in this world" Nervous repetitive grooming movements, fidgeting with rubberband Anxious. Experiences self hatred intermittently Mostly constricted though smiling through parts of interview Passive SI More anxious than usual, he was interviewed in a relatively large group of ppl

## 2023-05-05 NOTE — BH INPATIENT PSYCHIATRY PROGRESS NOTE - NSBHFUPINTERVALHXFT_PSY_A_CORE
Nursing reports no acute events overnight. Per chart review the patient has not required any behavioral PRNS since the last assessment.    Chart reviewed, patient seen and evaluated in AM. On approach, .... Patient reports ________________.   Patient endorsed OK sleep and appetite.     No SI/HI/AVH elicited.

## 2023-05-05 NOTE — BH INPATIENT PSYCHIATRY PROGRESS NOTE - NSBHATTESTCOMMENTATTENDFT_PSY_A_CORE
A palpitation is the feeling that your heartbeat is irregular or is faster than normal. It may feel like your heart is fluttering or skipping a beat. They may be caused by many things, including smoking, caffeine, alcohol, stress, and certain medicines. Although most causes of palpitations are not serious, palpitations can be a sign of a serious medical problem. Avoid caffeine, alcohol, and tobacco products at home. Try to reduce stress and anxiety and make sure to get plenty of rest.     SEEK IMMEDIATE MEDICAL CARE IF YOU HAVE ANY OF THE FOLLOWING SYMPTOMS: chest pain, shortness of breath, severe headache, dizziness/lightheadedness, or fainting.     You were evaluated in the Emergency Department for fever, cough and palpitatons  You were examined by a physician and an EKG and chest xray performed.     At this time, you do not meet our hospital's criteria for coronavirus testing and we are unable to test you specifically for the COVID-19 virus that is causing many infections around the world; however, we still recommend that you stay home and self-quarantine (avoid contact with other people) for the next 2 weeks.      We recommend that you:  1. Continue your home medications as prescribed.  2. Take Tylenol, 2 extra strength tablets, up to every 6 hours as needed for pain or any fever.  3. Drink plenty of fluids.  4. Call your primary care doctor tomorrow for follow-up.  5. Avoid contact with people and self-quarantine at home for the next 2-weeks.    *** Return immediately (or call 911) if you have increased difficulty breathing, vomiting, chest pain, or develop other new/concerning symptoms. ***    YOU WERE SEEN IN THE ED FOR A LIKELY VIRAL ILLNESS. WE CANNOT PERFORM DEFINITIVE TESTING AT THIS TIME FOR THE NOVEL CORONAVIRUS.    YOU SHOULD SELF-QUARANTINE FOR 14 DAYS TO AVOID POTENTIAL SPREAD OF THIS VIRUS.    For outpatient coronavirus testing centers you may contact the following resources:  -[place] Novel Coronavirus 24/7 Hotline: (198)-564-7072  -[facility]. To locate you local center: [employer]  -"[facility] Urgent Care: [phone]  -[facility] testing centers, including [facility] testing center: call [phone] for an appointment.    What is a coronavirus?  Coronaviruses are a large family of viruses that cause illnesses ranging from the common cold to more severe diseases such as Middle East Respiratory Syndrome (MERS) and Severe Acute Respiratory Syndrome (SARS).    What is Novel Coronavirus (COVID-19)?  The Centers for Disease Control and Prevention (CDC) is closely monitoring the outbreak caused by COVID-19. For the latest information about COVID-19, visit the CDC website at CDC.gov/Coronavirus    How are coronaviruses spread?  Coronaviruses can be transmitted from person to person, usually after close contact with an infected  person (for example, in a household, workplace, or healthcare setting), via droplets that become airborne after a cough or sneeze. These droplets can then infect a nearby person. Transmission can also occur by touching recently contaminated surfaces.    Is there a treatment for a COVID-19?  There is no specific treatment for disease caused by COVID-19. However, many of the symptoms can be treated based on the patient’s clinical condition. Supportive care for infected persons can be highly effective.    What are the symptoms of coronavirus infection?  It depends on the virus, but common signs include fever and/or respiratory symptoms such as cough and shortness of breath. In more severe cases, infection can cause pneumonia, severe acute respiratory syndrome, kidney failure and even death. Fortunately, most cases of COVID-19 have an illness no different than the influenza (flu), with a majority of these patients having mild symptoms and overall mortality which appears to be not much different than the flu.    What can I do to protect myself?  The best precautionary measures:  – washing your hands  – covering your cough  – disinfecting surfaces  – it is also advisable to avoid close contact with anyone showing symptoms of respiratory illness such as coughing and sneezing  – those with symptoms should wear a surgical mask when around others    What can I do to protect those around me?  If you have been identified as someone who may be infected with COVID-19, we recommend you follow the self-isolation procedures outlined on the following page to protect those around you and to limit the spread of this virus.    We recommend the below precautionary steps from now until 14 days from when you returned from your travel or date of your last known possible contact:    — Do not go to work, school or public areas. Avoid using public transportation, ridesharing or taxis.  — As much as possible, separate yourself from other people in your home. If you can, you should stay in a room and away from other people. Also, you should use a separate bathroom if available.  — Wear the supplied mask whenever you are around other people.  — If you have a non-urgent medical appointment, please reschedule for a later date. If the appointment is urgent, please call the health care provider and tell them that you are on self-isolation for possible COVID-19. This will help the health care provider’s office take steps to keep other people from getting infected or exposed. If you can reschedule routine appointments, do so.  — Wash your hands often with soap and water for at least 15 to 20 seconds or clean your hands with an alcohol-based hand  that contains 60 to 95% alcohol, covering all surfaces of your hands and rubbing them together until they feel dry. Soap and water should be used preferentially if hands are visibly dirty.  — Cover your mouth and nose with a tissue when you cough or sneeze. Throw used tissues in a lined trash can. Immediately wash your hands.  — Avoid touching your eyes, nose, and mouth with your hands.  — Avoid sharing personal household items. You should not share dishes, drinking glasses, cups, eating utensils, towels, or bedding with other people or pets in your home. After using these items, they should be washed thoroughly with soap and water.  — Clean and disinfect all “high-touch” surfaces every day. High touch surfaces include counters, tabletops, doorknobs, light switches, remote controls, bathroom fixtures, toilets, phones, keyboards, tablets, and bedside tables. Also, clean any surfaces that may have blood, stool, or body fluids on them. Case discussed with resident physician and patient was seen. I agree with the above assessment and plan.

## 2023-05-05 NOTE — BH CHART NOTE - NSEVENTNOTEFT_PSY_ALL_CORE
Called family member, Vickie, who reported that she has not yet started working on the intake form for [facility], but that she will ask Rhett, the pt's brother, to work on it. Vickie said she will call us back to update us on the progress of the form.

## 2023-05-05 NOTE — BH INPATIENT PSYCHIATRY PROGRESS NOTE - NSBHCHARTREVIEWVS_PSY_A_CORE FT
Vital Signs Last 24 Hrs  T(C): 36.7 (05-04-23 @ 15:43), Max: 36.7 (05-04-23 @ 15:43)  T(F): 98 (05-04-23 @ 15:43), Max: 98 (05-04-23 @ 15:43)  HR: 97 (05-04-23 @ 15:43) (97 - 107)  BP: 116/77 (05-04-23 @ 15:43) (116/77 - 122/82)  BP(mean): --  RR: 16 (05-04-23 @ 15:43) (16 - 16)  SpO2: --

## 2023-05-05 NOTE — BH INPATIENT PSYCHIATRY PROGRESS NOTE - NSBHASSESSSUMMFT_PSY_ALL_CORE
The patient is a 18 yo transgender F->M, Turkish-speaking, recently immigrated from [place], domiciled with brother, unemployed, with psych hx of gender dysphoria, anxiety and depression, no past psychiatric hospitalizations, previous suicide attempt via choking, who is presenting at recommendation of brother and cousin for increasing SI.    Upon admission patient endorses strong feelings of depression and exhibits very dysphoric and minimally reactive affect. He endorses escalating suicidal thoughts related to feelings of worthlessness and unhappiness over his appearance. During the course of his hospitalization, his mood and affect has improved, he has become more talkative. . f note, the patient's brother recently lost his job and the patient has been feeling like he has a duty to get discharged in order to find a job and help financially. He is therefore at an increased risk of minimizing his symptoms. The patient was admitted involuntarily on 9:39 legals as he has been acutely elevated risk of suicide, now changed to voluntary legals. Chronic risk factors include history of trauma, history of persistent depressive symptoms,   and history of suicidal ideation. Current risk factors include recent self inflicted choking, age, recent immigration, not understanding/speaking well language of current residence, current gender dysphoria, current social anxiety, current panic attacks and financial stressors. Goals of care will be to decrease patient's suicidality as his current risk factors place him at acutely elevated risk. Requires continued hospitalization for stabilization.    04/18/23- Patient acutely dysphoric. Plan to dc mirtazapine as he notes excessive lethargy and hyperphagia, continue sertraline 50 mg  04/19/2023  Patient remains with thoughts of suicide and concerns about his future, however he feels safe while in a structured setting with people around him  04/20/23 Patient had no thoughts of suicide during interview, appeared brighter and occasionally smiled. Agreeable to increase antidepressants  04/24 Patient notes panic attack last night, alleviated talking with cousin. Brighter, future oriented. Endorses social anxiety.  04/25/23 Patient did not have panic attack in last 24 hrs. Still appears brighter on unit and during interview today. Voiced that his social anxiety hinders his ability to advocate for himself while on unit  04/26   Patient is somewhat less anxious,  spending  time outside his room and is cheerful at times.  04/27/23 Patient notes still having intermittent anxiety, however he is now able to leave his room and his speech is much more productive. Affect continues to be more supple than on initial presentation. Plan to increase Zoloft to 150 mg 04/28, with eventual plan to maximize it to treat his social anxiety disorder.  04/28 Yesterday patient had another panic attack when bullied about his gender expression and received Klonopin and Atarax PRN. Though he states his mood is "good" and has a more supple affect than on initial presentation, he continues to endorse feelings of self hatred (which he believes may be alleviated by gender affirmative care) as well as wish to no longer be alive (though no active SI)  05/01 Patient continues to endorse passive SI, appeared anxious though smiling during interview. Reported panic attack yesterday. Ok with plan to increase Zoloft to 200 mg  05/02 Pt received first dose of 200 mg Zoloft. Had panic attack yesterday triggered by existential doubts and feelings of self hatred, which resulted in him hitting himself. Today less anxious but continues to endorse ambivalence about wanting to be alive.  5/3/23   patient had significant panic attack accompanied by dread and thoughts of self destruction.  When asked if suicidal patient replies I cannot kill myself because of my family.  05/04- Acutely anxious, more so than previously (however interviewed in large group), will add 2 mg abilify. Vickie in process of enrolling Radames as patient in [facility]    Impression: MDD vs Social Anxiety disorder with Panic attacks vs Gender dysphoria, R/O bipolar    #MDD #Gender dysphoria #Social Anxiety Disorder #Panic attacks  - 9.39 legals changed to 9.14  - Start Abilify 2 mg 05/04  - Cont Sertraline to 200 mg (starting 05/02)  - Melatonin for sleep phase disorder  - Referral for outpatient endocrinologist  - Left voicemail with Dr. [facility]cent at [facility], [phone]  - Left VM with  Kandice Acosta who work s with LGBTQ community for connection to trans affirming care  - Left  Teagan Escobar [phone], patient's therapist  - Referral on discharge to the [employer] [facility] (suicide hotline) and [facility]  - [facility] WNL    #Panic attacks  - PRN Atarax  - Continue Klonopin to 0.25mg PRN daily  - Continue to monitor    #Agitation  - For episodes of acute agitation can offer PO Haldol 5 mg/Ativan 2 mg/Benadryl 50 mg Q6H PRN. If refusing PO and is in acute risk of harm to self/other, can offer IM Haldol 5 mg/Ativan 2 mg/Benadryl 50 mg Q6H PRN. If given, please repeat EKG and hold antipsychotics if QTC>500

## 2023-05-05 NOTE — BH INPATIENT PSYCHIATRY PROGRESS NOTE - NSBHASSESSSUMMFT_PSY_ALL_CORE
The patient is a 18 yo transgender F->M, Polish-speaking, recently immigrated from [place], domiciled with brother, unemployed, with psych hx of gender dysphoria, anxiety and depression, no past psychiatric hospitalizations, previous suicide attempt via choking, who is presenting at recommendation of brother and cousin for increasing SI.    Upon admission patient endorses strong feelings of depression and exhibits very dysphoric and minimally reactive affect. He endorses escalating suicidal thoughts related to feelings of worthlessness and unhappiness over his appearance. During the course of his hospitalization, his mood and affect has improved, he has become more talkative. . Of note, the patient's brother recently lost his job and the patient has been feeling like he has a duty to get discharged in order to find a job and help financially. He is therefore at an increased risk of minimizing his symptoms. The patient was admitted involuntarily on 9:39 legals as he has been acutely elevated risk of suicide, now changed to voluntary legals. Chronic risk factors include history of trauma, history of persistent depressive symptoms,   and history of suicidal ideation. Current risk factors include recent self inflicted choking, age, recent immigration, not understanding/speaking well language of current residence, current gender dysphoria, current social anxiety, current panic attacks and financial stressors. Goals of care will be to decrease patient's suicidality as his current risk factors place him at acutely elevated risk. Requires continued hospitalization for stabilization.    04/18/23- Patient acutely dysphoric. Plan to dc mirtazapine as he notes excessive lethargy and hyperphagia, continue sertraline 50 mg  04/19/2023  Patient remains with thoughts of suicide and concerns about his future, however he feels safe while in a structured setting with people around him  04/20/23 Patient had no thoughts of suicide during interview, appeared brighter and occasionally smiled. Agreeable to increase antidepressants  04/24 Patient notes panic attack last night, alleviated talking with cousin. Brighter, future oriented. Endorses social anxiety.  04/25/23 Patient did not have panic attack in last 24 hrs. Still appears brighter on unit and during interview today. Voiced that his social anxiety hinders his ability to advocate for himself while on unit  04/26   Patient is somewhat less anxious,  spending  time outside his room and is cheerful at times.  04/27/23 Patient notes still having intermittent anxiety, however he is now able to leave his room and his speech is much more productive. Affect continues to be more supple than on initial presentation. Plan to increase Zoloft to 150 mg 04/28, with eventual plan to maximize it to treat his social anxiety disorder.  04/28 Yesterday patient had another panic attack when bullied about his gender expression and received Klonopin and Atarax PRN. Though he states his mood is "good" and has a more supple affect than on initial presentation, he continues to endorse feelings of self hatred (which he believes may be alleviated by gender affirmative care) as well as wish to no longer be alive (though no active SI)  05/01 Patient continues to endorse passive SI, appeared anxious though smiling during interview. Reported panic attack yesterday. Ok with plan to increase Zoloft to 200 mg  05/02 Pt received first dose of 200 mg Zoloft. Had panic attack yesterday triggered by existential doubts and feelings of self hatred, which resulted in him hitting himself. Today less anxious but continues to endorse ambivalence about wanting to be alive.  5/3/23   patient had significant panic attack accompanied by dread and thoughts of self destruction.  When asked if suicidal patient replies I cannot kill myself because of my family.  05/04- Acutely anxious, more so than previously (however interviewed in large group), will add 2 mg abilify. [facility]  5/[facility] - Pt endorses panic attack the evening before during which he hit himself in the stomach and arms and banged his head on the wall due to self hatred and feeling like he does not have a place in this world. Created signs with patient that he can use to communicate with staff nonverbally; pt stated that he will try his best to use them. Does not report adverse effects from Abilify.    Impression: MDD vs Social Anxiety disorder with Panic attacks vs Gender dysphoria, R/O bipolar    #MDD #Gender dysphoria #Social Anxiety Disorder #Panic attacks  - 9.39 legals changed to 9.14  - Start Abilify 2 mg 05/04  - Cont Sertraline to 200 mg (starting 05/02)  - Melatonin for sleep phase disorder  - Referral for outpatient endocrinologist  - Left vo[facility] at [facility], [phone]  - Left VM with  Kandice Acosta who work s with LGBTQ community for connection to trans affirming care  - Left VM Teagan Escobar [phone], patient's therapist  - Referral on discharge to the [employer] (suicide hotline) and [facility]  - [facility] WNL    #Panic attacks  - PRN Atarax  - Continue Klonopin to 0.25mg PRN daily  - Continue to monitor    #Agitation  - For episodes of acute agitation can offer PO Haldol 5 mg/Ativan 2 mg/Benadryl 50 mg Q6H PRN. If refusing PO and is in acute risk of harm to self/other, can offer IM Haldol 5 mg/Ativan 2 mg/Benadryl 50 mg Q6H PRN. If given, please repeat EKG and hold antipsychotics if QTC>500

## 2023-05-05 NOTE — BH INPATIENT PSYCHIATRY PROGRESS NOTE - CURRENT MEDICATION
MEDICATIONS  (STANDING):  ARIPiprazole 2 milliGRAM(s) Oral daily  melatonin. 3 milliGRAM(s) Oral at bedtime  sertraline 200 milliGRAM(s) Oral daily    MEDICATIONS  (PRN):  clonazePAM  Tablet 0.25 milliGRAM(s) Oral daily PRN panic attacks  haloperidol     Tablet 5 milliGRAM(s) Oral every 6 hours PRN agitation  hydrOXYzine hydrochloride 50 milliGRAM(s) Oral every 6 hours PRN anxiety  LORazepam     Tablet 2 milliGRAM(s) Oral every 6 hours PRN agitation

## 2023-05-06 LAB
A1C WITH ESTIMATED AVERAGE GLUCOSE RESULT: 5.7 % — HIGH (ref 4–5.6)
CHOLEST SERPL-MCNC: 206 MG/DL — HIGH
ESTIMATED AVERAGE GLUCOSE: 117 MG/DL — HIGH (ref 68–114)
HDLC SERPL-MCNC: 47 MG/DL — LOW
LIPID PNL WITH DIRECT LDL SERPL: 133 MG/DL — HIGH
NON HDL CHOLESTEROL: 159 MG/DL — HIGH
TRIGL SERPL-MCNC: 131 MG/DL — SIGNIFICANT CHANGE UP

## 2023-05-06 RX ADMIN — Medication 3 MILLIGRAM(S): at 20:13

## 2023-05-06 RX ADMIN — SERTRALINE 200 MILLIGRAM(S): 25 TABLET, FILM COATED ORAL at 09:12

## 2023-05-06 RX ADMIN — ARIPIPRAZOLE 2 MILLIGRAM(S): 15 TABLET ORAL at 09:12

## 2023-05-07 RX ADMIN — ARIPIPRAZOLE 2 MILLIGRAM(S): 15 TABLET ORAL at 08:19

## 2023-05-07 RX ADMIN — Medication 3 MILLIGRAM(S): at 20:33

## 2023-05-07 RX ADMIN — SERTRALINE 200 MILLIGRAM(S): 25 TABLET, FILM COATED ORAL at 08:19

## 2023-05-08 PROCEDURE — 99231 SBSQ HOSP IP/OBS SF/LOW 25: CPT

## 2023-05-08 RX ORDER — ARIPIPRAZOLE 15 MG/1
5 TABLET ORAL DAILY
Refills: 0 | Status: DISCONTINUED | OUTPATIENT
Start: 2023-05-08 | End: 2023-05-16

## 2023-05-08 RX ADMIN — ARIPIPRAZOLE 2 MILLIGRAM(S): 15 TABLET ORAL at 08:22

## 2023-05-08 RX ADMIN — SERTRALINE 200 MILLIGRAM(S): 25 TABLET, FILM COATED ORAL at 08:23

## 2023-05-08 RX ADMIN — Medication 3 MILLIGRAM(S): at 20:35

## 2023-05-08 NOTE — BH INPATIENT PSYCHIATRY PROGRESS NOTE - NSBHMETABOLIC_PSY_ALL_CORE_FT
BMI: BMI (kg/m2): 32.5 (04-18-23 @ 05:33)  HbA1c: A1C with Estimated Average Glucose Result: 5.7 % (05-06-23 @ 08:53)    Glucose:   BP: 102/76 (05-07-23 @ 16:57) (102/76 - 121/83)  Lipid Panel: Date/Time: 05-06-23 @ 08:53  Cholesterol, Serum: 206  Direct LDL: --  HDL Cholesterol, Serum: 47  Total Cholesterol/HDL Ration Measurement: --  Triglycerides, Serum: 131   BMI: BMI (kg/m2): 32.5 (04-18-23 @ 05:33)  HbA1c: A1C with Estimated Average Glucose Result: 5.7 % (05-06-23 @ 08:53)    Glucose:   BP: 107/63 (05-08-23 @ 08:16) (102/76 - 116/80)  Lipid Panel: Date/Time: 05-06-23 @ 08:53  Cholesterol, Serum: 206  Direct LDL: --  HDL Cholesterol, Serum: 47  Total Cholesterol/HDL Ration Measurement: --  Triglycerides, Serum: 131

## 2023-05-08 NOTE — BH INPATIENT PSYCHIATRY PROGRESS NOTE - NSBHCHARTREVIEWVS_PSY_A_CORE FT
Vital Signs Last 24 Hrs  T(C): 35.9 (05-07-23 @ 16:57), Max: 35.9 (05-07-23 @ 16:57)  T(F): 96.7 (05-07-23 @ 16:57), Max: 96.7 (05-07-23 @ 16:57)  HR: 111 (05-07-23 @ 16:57) (111 - 111)  BP: 102/76 (05-07-23 @ 16:57) (102/76 - 102/76)  BP(mean): --  RR: 18 (05-07-23 @ 16:57) (18 - 18)  SpO2: --     Vital Signs Last 24 Hrs  T(C): 36 (05-08-23 @ 08:16), Max: 36 (05-08-23 @ 08:16)  T(F): 96.8 (05-08-23 @ 08:16), Max: 96.8 (05-08-23 @ 08:16)  HR: 102 (05-08-23 @ 08:16) (102 - 111)  BP: 107/63 (05-08-23 @ 08:16) (102/76 - 107/63)  BP(mean): --  RR: 16 (05-08-23 @ 08:16) (16 - 18)  SpO2: --

## 2023-05-08 NOTE — BH INPATIENT PSYCHIATRY PROGRESS NOTE - OTHER
Mostly constricted though smiling through parts of interview Nervous repetitive grooming movements are improved from yesterday, fidgeting with rubberband increases when discussing panic attacks keeps stating "I have no place in this world" Improved compared to yesterday, interviewed in smaller group Anxious. Experiences self hatred intermittently Passive SI Depressed affect when speaking about SI Mood becomes markedly more depressed when discussing SI and reasons to live Fidgeting with rubber band intensifies when discussing SI and reasons to live

## 2023-05-08 NOTE — BH INPATIENT PSYCHIATRY PROGRESS NOTE - CURRENT MEDICATION
MEDICATIONS  (STANDING):  ARIPiprazole 2 milliGRAM(s) Oral daily  melatonin. 3 milliGRAM(s) Oral at bedtime  sertraline 200 milliGRAM(s) Oral daily    MEDICATIONS  (PRN):  clonazePAM  Tablet 0.25 milliGRAM(s) Oral daily PRN panic attacks  haloperidol     Tablet 5 milliGRAM(s) Oral every 6 hours PRN agitation  hydrOXYzine hydrochloride 50 milliGRAM(s) Oral every 6 hours PRN anxiety  LORazepam     Tablet 2 milliGRAM(s) Oral every 6 hours PRN agitation   MEDICATIONS  (STANDING):  ARIPiprazole 5 milliGRAM(s) Oral daily  melatonin. 3 milliGRAM(s) Oral at bedtime  sertraline 200 milliGRAM(s) Oral daily    MEDICATIONS  (PRN):  clonazePAM  Tablet 0.25 milliGRAM(s) Oral daily PRN panic attacks  haloperidol     Tablet 5 milliGRAM(s) Oral every 6 hours PRN agitation  hydrOXYzine hydrochloride 50 milliGRAM(s) Oral every 6 hours PRN anxiety  LORazepam     Tablet 2 milliGRAM(s) Oral every 6 hours PRN agitation

## 2023-05-08 NOTE — BH INPATIENT PSYCHIATRY PROGRESS NOTE - NSBHFUPINTERVALHXFT_PSY_A_CORE
Nursing reports no acute events overnight. Per chart review the patient has not required any behavioral PRNS since the last assessment.    Chart reviewed, patient seen and evaluated in AM. On approach, .... Patient reports ________________.   Patient endorsed OK sleep and appetite.     No SI/HI/AVH elicited.  Nursing reports no acute events overnight. Per chart review the patient has not required any behavioral PRNS since the last assessment.    Chart reviewed, patient seen and evaluated in AM. On approach, pt is sitting comfortably in a chair in the dayroom, playing with rubber band, and greets team with a smile. Patient reports that he did not have any panic attacks over the weekend. Patient endorsed OK sleep and appetite. Continues to express passive SI, but states that his reasons for living are his mom and sister. He states that other than his family, he does not have a reason to live. When asked further questions about SI, pt's affect becomes depressed and he starts to fidget with the rubber band more intensely. When asked about the signs created to help communicate with staff, pt states that he did not need to use them over the weekend.

## 2023-05-08 NOTE — BH INPATIENT PSYCHIATRY PROGRESS NOTE - NSBHASSESSSUMMFT_PSY_ALL_CORE
The patient is a 16 yo transgender F->M, Mohawk-speaking, recently immigrated from [place], domiciled with brother, unemployed, with psych hx of gender dysphoria, anxiety and depression, no past psychiatric hospitalizations, previous suicide attempt via choking, who is presenting at recommendation of brother and cousin for increasing SI.    Upon admission patient endorses strong feelings of depression and exhibits very dysphoric and minimally reactive affect. He endorses escalating suicidal thoughts related to feelings of worthlessness and unhappiness over his appearance. During the course of his hospitalization, his mood and affect has improved, he has become more talkative. . Of note, the patient's brother recently lost his job and the patient has been feeling like he has a duty to get discharged in order to find a job and help financially. He is therefore at an increased risk of minimizing his symptoms. The patient was admitted involuntarily on 9:39 legals as he has been acutely elevated risk of suicide, now changed to voluntary legals. Chronic risk factors include history of trauma, history of persistent depressive symptoms,   and history of suicidal ideation. Current risk factors include recent self inflicted choking, age, recent immigration, not understanding/speaking well language of current residence, current gender dysphoria, current social anxiety, current panic attacks and financial stressors. Goals of care will be to decrease patient's suicidality as his current risk factors place him at acutely elevated risk. Requires continued hospitalization for stabilization.    04/18/23- Patient acutely dysphoric. Plan to dc mirtazapine as he notes excessive lethargy and hyperphagia, continue sertraline 50 mg  04/19/2023  Patient remains with thoughts of suicide and concerns about his future, however he feels safe while in a structured setting with people around him  04/20/23 Patient had no thoughts of suicide during interview, appeared brighter and occasionally smiled. Agreeable to increase antidepressants  04/24 Patient notes panic attack last night, alleviated talking with cousin. Brighter, future oriented. Endorses social anxiety.  04/25/23 Patient did not have panic attack in last 24 hrs. Still appears brighter on unit and during interview today. Voiced that his social anxiety hinders his ability to advocate for himself while on unit  04/26   Patient is somewhat less anxious,  spending  time outside his room and is cheerful at times.  04/27/23 Patient notes still having intermittent anxiety, however he is now able to leave his room and his speech is much more productive. Affect continues to be more supple than on initial presentation. Plan to increase Zoloft to 150 mg 04/28, with eventual plan to maximize it to treat his social anxiety disorder.  04/28 Yesterday patient had another panic attack when bullied about his gender expression and received Klonopin and Atarax PRN. Though he states his mood is "good" and has a more supple affect than on initial presentation, he continues to endorse feelings of self hatred (which he believes may be alleviated by gender affirmative care) as well as wish to no longer be alive (though no active SI)  05/01 Patient continues to endorse passive SI, appeared anxious though smiling during interview. Reported panic attack yesterday. Ok with plan to increase Zoloft to 200 mg  05/02 Pt received first dose of 200 mg Zoloft. Had panic attack yesterday triggered by existential doubts and feelings of self hatred, which resulted in him hitting himself. Today less anxious but continues to endorse ambivalence about wanting to be alive.  5/3/23   patient had significant panic attack accompanied by dread and thoughts of self destruction.  When asked if suicidal patient replies I cannot kill myself because of my family.  05/04- Acutely anxious, more so than previously (however interviewed in large group), will add 2 mg abilify. [facility] process [facility]  [facility] - Pt endorses panic attack the evening before during which he hit himself in the stomach and arms and banged his head on the wall due to self hatred and feeling like he does not have a place in this world. Created signs with patient that he can use to communicate with staff nonverbally; pt stated that he will try his best to use them. Does not report adverse effects from Abilify.    Impression: MDD vs Social Anxiety disorder with Panic attacks vs Gender dysphoria, R/O bipolar    #MDD #Gender dysphoria #Social Anxiety Disorder #Panic attacks  - 9.39 legals changed to 9.14  - Start Abilify 2 mg 05/04  - Cont Sertraline to 200 mg (starting 05/02)  - Melatonin for sleep phase disorder  - Referral for outpatient endocrinologist  - Left voicemail with Dr. Vincent at [facility], [phone]  - Left VM with  Kandice Acosta who work s with LGBTQ community for connection to trans affirming care  - Left VM Teagan Escobar [phone], patient's therapist  - Referral on discharge to [facility] (suicide hotline) and [facility]  - TSH WNL    #Panic attacks  - PRN Atarax  - Continue Klonopin to 0.25mg PRN daily  - Continue to monitor    #Agitation  - For episodes of acute agitation can offer PO Haldol 5 mg/Ativan 2 mg/Benadryl 50 mg Q6H PRN. If refusing PO and is in acute risk of harm to self/other, can offer IM Haldol 5 mg/Ativan 2 mg/Benadryl 50 mg Q6H PRN. If given, please repeat EKG and hold antipsychotics if QTC>500   The patient is a 18 yo transgender F->M, Hungarian-speaking, recently immigrated from [place], domiciled with brother, unemployed, with psych hx of gender dysphoria, anxiety and depression, no past psychiatric hospitalizations, previous suicide attempt via choking, who is presenting at recommendation of brother and cousin for increasing SI.    Upon admission patient endorses strong feelings of depression and exhibits very dysphoric and minimally reactive affect. He endorses escalating suicidal thoughts related to feelings of worthlessness and unhappiness over his appearance. During the course of his hospitalization, his mood and affect has improved, he has become more talkative. . Of note, the patient's brother recently lost his job and the patient has been feeling like he has a duty to get discharged in order to find a job and help financially. He is therefore at an increased risk of minimizing his symptoms. The patient was admitted involuntarily on 9:39 legals as he has been acutely elevated risk of suicide, now changed to voluntary legals. Chronic risk factors include history of trauma, history of persistent depressive symptoms,   and history of suicidal ideation. Current risk factors include recent self inflicted choking, age, recent immigration, not understanding/speaking well language of current residence, current gender dysphoria, current social anxiety, current panic attacks and financial stressors. Goals of care will be to decrease patient's suicidality as his current risk factors place him at acutely elevated risk. Requires continued hospitalization for stabilization.    04/18/23- Patient acutely dysphoric. Plan to dc mirtazapine as he notes excessive lethargy and hyperphagia, continue sertraline 50 mg  04/19/2023  Patient remains with thoughts of suicide and concerns about his future, however he feels safe while in a structured setting with people around him  04/20/23 Patient had no thoughts of suicide during interview, appeared brighter and occasionally smiled. Agreeable to increase antidepressants  04/24 Patient notes panic attack last night, alleviated talking with cousin. Brighter, future oriented. Endorses social anxiety.  04/25/23 Patient did not have panic attack in last 24 hrs. Still appears brighter on unit and during interview today. Voiced that his social anxiety hinders his ability to advocate for himself while on unit  04/26   Patient is somewhat less anxious,  spending  time outside his room and is cheerful at times.  04/27/23 Patient notes still having intermittent anxiety, however he is now able to leave his room and his speech is much more productive. Affect continues to be more supple than on initial presentation. Plan to increase Zoloft to 150 mg 04/28, with eventual plan to maximize it to treat his social anxiety disorder.  04/28 Yesterday patient had another panic attack when bullied about his gender expression and received Klonopin and Atarax PRN. Though he states his mood is "good" and has a more supple affect than on initial presentation, he continues to endorse feelings of self hatred (which he believes may be alleviated by gender affirmative care) as well as wish to no longer be alive (though no active SI)  05/01 Patient continues to endorse passive SI, appeared anxious though smiling during interview. Reported panic attack yesterday. Ok with plan to increase Zoloft to 200 mg  05/02 Pt received first dose of 200 mg Zoloft. Had panic attack yesterday triggered by existential doubts and feelings of self hatred, which resulted in him hitting himself. Today less anxious but continues to endorse ambivalence about wanting to be alive.  5/3/23   patient had significant panic attack accompanied by dread and thoughts of self destruction.  When asked if suicidal patient replies I cannot kill myself because of my family.  05/04- Acutely anxious, more so than previously (however interviewed in large group), will add 2 mg abilify. [facility]ick[facility] in process [facility]  5/5 - Pt endorses panic attack the evening before during which he hit himself in the stomach and arms and banged his head on the wall due to self hatred and feeling like he does not have a place in this world. Created signs with patient that he can use to communicate with staff nonverbally; pt stated that he will try his best to use them. Does not report adverse effects from Abilify.    Impression: MDD vs Social Anxiety disorder with Panic attacks vs Gender dysphoria, R/O bipolar    #MDD #Gender dysphoria #Social Anxiety Disorder #Panic attacks  - 9.39 legals changed to 9.14  - Increase Abilify 5 mg 05/08  - Cont Sertraline to 200 mg (starting 05/02)  - Melatonin for sleep phase disorder  - Referral for outpatient endocrinologist  - Left voicemail with Dr. Vincent at [facility], [phone]  - Left VM with  Kandice Acosta who work s with LGBTQ community for connection to trans affirming care  - Left VM Teagan Escobar [phone], patient's therapist  - Referral on discharge to [facility] (suicide hotline) and [facility]  - TSH WNL    #Panic attacks  - PRN Atarax  - Continue Klonopin to 0.25mg PRN daily  - Continue to monitor    #Agitation  - For episodes of acute agitation can offer PO Haldol 5 mg/Ativan 2 mg/Benadryl 50 mg Q6H PRN. If refusing PO and is in acute risk of harm to self/other, can offer IM Haldol 5 mg/Ativan 2 mg/Benadryl 50 mg Q6H PRN. If given, please repeat EKG and hold antipsychotics if QTC>500   The patient is a 16 yo transgender F->M, Thai-speaking, recently immigrated from [place], domiciled with brother, unemployed, with psych hx of gender dysphoria, anxiety and depression, no past psychiatric hospitalizations, previous suicide attempt via choking, who is presenting at recommendation of brother and cousin for increasing SI.    Upon admission patient endorses strong feelings of depression and exhibits very dysphoric and minimally reactive affect. He endorses escalating suicidal thoughts related to feelings of worthlessness and unhappiness over his appearance. During the course of his hospitalization, his mood and affect has improved, he has become more talkative. . Of note, the patient's brother recently lost his job and the patient has been feeling like he has a duty to get discharged in order to find a job and help financially. He is therefore at an increased risk of minimizing his symptoms. The patient was admitted involuntarily on 9:39 legals as he has been acutely elevated risk of suicide, now changed to voluntary legals. Chronic risk factors include history of trauma, history of persistent depressive symptoms,   and history of suicidal ideation. Current risk factors include recent self inflicted choking, age, recent immigration, not understanding/speaking well language of current residence, current gender dysphoria, current social anxiety, current panic attacks and financial stressors. Goals of care will be to decrease patient's suicidality as his current risk factors place him at acutely elevated risk. Requires continued hospitalization for stabilization.    04/18/23- Patient acutely dysphoric. Plan to dc mirtazapine as he notes excessive lethargy and hyperphagia, continue sertraline 50 mg  04/19/2023  Patient remains with thoughts of suicide and concerns about his future, however he feels safe while in a structured setting with people around him  04/20/23 Patient had no thoughts of suicide during interview, appeared brighter and occasionally smiled. Agreeable to increase antidepressants  04/24 Patient notes panic attack last night, alleviated talking with cousin. Brighter, future oriented. Endorses social anxiety.  04/25/23 Patient did not have panic attack in last 24 hrs. Still appears brighter on unit and during interview today. Voiced that his social anxiety hinders his ability to advocate for himself while on unit  04/26   Patient is somewhat less anxious,  spending  time outside his room and is cheerful at times.  04/27/23 Patient notes still having intermittent anxiety, however he is now able to leave his room and his speech is much more productive. Affect continues to be more supple than on initial presentation. Plan to increase Zoloft to 150 mg 04/28, with eventual plan to maximize it to treat his social anxiety disorder.  04/28 Yesterday patient had another panic attack when bullied about his gender expression and received Klonopin and Atarax PRN. Though he states his mood is "good" and has a more supple affect than on initial presentation, he continues to endorse feelings of self hatred (which he believes may be alleviated by gender affirmative care) as well as wish to no longer be alive (though no active SI)  05/01 Patient continues to endorse passive SI, appeared anxious though smiling during interview. Reported panic attack yesterday. Ok with plan to increase Zoloft to 200 mg  05/02 Pt received first dose of 200 mg Zoloft. Had panic attack yesterday triggered by existential doubts and feelings of self hatred, which resulted in him hitting himself. Today less anxious but continues to endorse ambivalence about wanting to be alive.  5/3/23   patient had significant panic attack accompanied by dread and thoughts of self destruction.  When asked if suicidal patient replies I cannot kill myself because of my family.  05/04- Acutely anxious, more so than previously (however interviewed in large group), will add 2 mg abilify. [facility]  5/5 - Pt endorses panic attack the evening before during which he hit himself in the stomach and arms and banged his head on the wall due to self hatred and feeling like he does not have a place in this world. Created signs with patient that he can use to communicate with staff nonverbally; pt stated that he will try his best to use them. Does not report adverse effects from Abilify.  5/8- Pt denies any panic attacks over the weekend. Pt states that he did not need to use the signs that he created together with the team to communicate with staff. Continues to endorse passive SI and states that his reason for living is his family, but other than that, he does not feel like he has a reason to live.    Impression: MDD vs Social Anxiety disorder with Panic attacks vs Gender dysphoria, R/O bipolar    #MDD #Gender dysphoria #Social Anxiety Disorder #Panic attacks  - 9.39 legals changed to 9.14  - Increase Abilify 5 mg 05/08  - Cont Sertraline to 200 mg (starting 05/02)  - Melatonin for sleep phase disorder  - Referral for outpatient endocrinologist  - Left voicemail with Dr. Vincent at [facility], [phone]  - Left VM with  Kandice Acosta who work s with LGBTQ community for connection to trans affirming care  - Left VM Teagan Escobar [phone], patient's therapist  - Referral on discharge to [facility] (suicide hotline) and [facility]  - TSH WNL    #Panic attacks  - PRN Atarax  - Continue Klonopin to 0.25mg PRN daily  - Continue to monitor    #Agitation  - For episodes of acute agitation can offer PO Haldol 5 mg/Ativan 2 mg/Benadryl 50 mg Q6H PRN. If refusing PO and is in acute risk of harm to self/other, can offer IM Haldol 5 mg/Ativan 2 mg/Benadryl 50 mg Q6H PRN. If given, please repeat EKG and hold antipsychotics if QTC>500

## 2023-05-09 PROCEDURE — 93010 ELECTROCARDIOGRAM REPORT: CPT

## 2023-05-09 RX ORDER — HYDROXYZINE HCL 10 MG
1 TABLET ORAL
Qty: 56 | Refills: 0
Start: 2023-05-09 | End: 2023-05-22

## 2023-05-09 RX ORDER — SERTRALINE 25 MG/1
2 TABLET, FILM COATED ORAL
Qty: 28 | Refills: 0
Start: 2023-05-09 | End: 2023-05-22

## 2023-05-09 RX ORDER — ARIPIPRAZOLE 15 MG/1
1 TABLET ORAL
Qty: 14 | Refills: 0
Start: 2023-05-09 | End: 2023-05-22

## 2023-05-09 RX ADMIN — SERTRALINE 200 MILLIGRAM(S): 25 TABLET, FILM COATED ORAL at 08:12

## 2023-05-09 RX ADMIN — ARIPIPRAZOLE 5 MILLIGRAM(S): 15 TABLET ORAL at 08:12

## 2023-05-09 RX ADMIN — Medication 0.25 MILLIGRAM(S): at 17:07

## 2023-05-09 RX ADMIN — Medication 3 MILLIGRAM(S): at 20:19

## 2023-05-09 NOTE — BH INPATIENT PSYCHIATRY DISCHARGE NOTE - OTHER PAST PSYCHIATRIC HISTORY (INCLUDE DETAILS REGARDING ONSET, COURSE OF ILLNESS, INPATIENT/OUTPATIENT TREATMENT)
Sees outpatient psychiatrist Dr. Hodge, has therapist Ides there as well at [facility]  Never hospitalized

## 2023-05-09 NOTE — BH INPATIENT PSYCHIATRY DISCHARGE NOTE - HOSPITAL COURSE
The patient is a 16 yo transgender F->M, Danish-speaking, recently immigrated from [place], domiciled with brother, unemployed, with psych hx of gender dysphoria, anxiety and depression, no past psychiatric hospitalizations, previous suicide attempt via choking, who is presenting at recommendation of brother and cousin for increasing SI.    Upon admission patient endorses strong feelings of depression and exhibits very dysphoric and minimally reactive affect. He endorses escalating suicidal thoughts related to feelings of worthlessness and unhappiness over his appearance. The patient's Zoloft was maximized to 200 mg to treat is social anxiety disorder, and Abilify was added as augmentation for his depression and anxiety. During the course of his hospitalization, his mood and affect has improved, he has become more talkative and his affect became more supple and less dysphoric. His suicidal ideation changed from active with plan, to passive. He had several panic attacks and instances of self harming following these attacks (hitting himself in the head and chest), however these instances decreases throughout the hospitalization.  The family are in arline process of enrolling him into gender affirmative care with Jorge Alberto and [facility] which may help alleviate his gender dysphoria and feelings of self hatred. He will continue follow up with his therapist and psychiatrist at [facility] after discharge.    The patient is stable and ready for discharge.     Patient denies suicidal/homicidal  intent, or plan on interview. Patient denies symptoms of  sera, PTSD, or psychosis at this time. Patient also denies recent use of alcohol, nicotine, or illicit substances. The patient is a 18 yo transgender F->M, Finnish-speaking, recently immigrated from [place], domiciled with brother, unemployed, with psych hx of gender dysphoria, anxiety and depression, no past psychiatric hospitalizations, previous suicide attempt via choking, who is presenting at recommendation of brother and cousin for increasing SI.    Upon admission patient endorses strong feelings of depression and exhibits very dysphoric and minimally reactive affect. He endorses escalating suicidal thoughts related to feelings of worthlessness and unhappiness over his appearance. During the course of his hospitalization, his mood and affect initially improved, he became become more talkative. However, throughout the course of the hospitalization he started having near-daily anxiety attacks which he has been coping with by head banging or punching/scratching his limbs. His depression seems to have alleviated overall, however he suffers from  frequent mood shifts and anxiety/panic attacks which he has difficulty coping with. These shifts are often triggered by misgendering by others or missing his mother, and are often prevented when he is in the company of Finnish-speaking staff and is socially engaged. The patient was admitted involuntarily on 9:39 legals as he has been acutely elevated risk of suicide, now changed to voluntary legals. Chronic risk factors include history of trauma, history of persistent depressive symptoms, and history of suicidal ideation. Current risk factors include recent self inflicted choking, age, recent immigration, not understanding/speaking well language of current residence, current gender dysphoria, current social anxiety, current panic attacks and financial stressors.     Patient has not had a severe panic attack where patient expressed suicidal ideations in over one week; last recorded was 6/8 which was relieved with PRN medications within 20 minutes. Patient has developed coping strategies allowing him to experience periods of anxiety without hurting himself. We have been able to ensure patient is able to contract for safety, established a plan for outpatient follow-up appointments immediately after discharge from inpatient unit, completed titration of anti-depressant medication to max dose lexapro and anxiolytic medications such as klonopin to stabilize anxiety symptoms. We have taken patient off of 1:1 constant observation and observed that patient was in fact able to utilize proper coping strategies during periods of anxiety without suicidal ideations, self harm. We have goals of getting patient connected to partial hospitalization program with  to be set up via bridge from outpatient provider. At this time there has been significant improvement in patient's symptoms of depression and anxiety. Pt was seen, evaluated, chart reviewed.  As per nursing staff, no events overnight. On evaluation, pt reports that he is doing well. Offered no new complaints. Is compliant with medication, denies negative side effects. Endorsed good sleep and appetite. Denied A/V hallucinations. Denied paranoia. Denied suicidal/homicidal ideation, intent or plan. Pt is for discharge today. Agreeable with outpatient follow up.    5/31- Pt denies symptoms of suicidal ideation, or anxious episodes over the past day, but states that he does have homicidal ideations. On approach, patient has improved affect as compared to previous day. Continue lexapro 10 mg PO qd, Klonopin 1 mg PO qd, lithium 450 mg PO BID.   6/1- denies SI/HI. anxiety overnight, thoughts of self-harm during this; alleviated by speaking to 1:1. c/w lexapro 10 qd, Klonopin 1 qd, lithium 450 BID.  6/2- endorses passive SI. anxiety last afternoon with thoughts of self-harm; alleviated by PRN Atarax 50 mg. c/w lexapro 10 qd, Klonopin 1 qd, lithium 450 BID.  6/5- added additional klonopin 1mg for noon in addition to the 1mg dose at night  6/7- endorses passive SI, anxiety in the morning, resolving without pharmacological intervention.  6/8- patient had anxiety attack with severe suicidal ideations requiring PRN haldol and atarax  6/9- increase lexapro 10mg qd to 20mg qd  6-  patient hit hand against the wall bruising it significantly and requiring an x-ray. negative for acute fracture or dislocation     Impression: MDD vs Social Anxiety disorder with Panic attacks vs Gender dysphoria vs cPTSD, R/O bipolar    #MDD   #Gender dysphoria   #Social Anxiety Disorder   - 9.39 legals changed to 9.13  - c/w Lithium 450 mg BID 5/23 for suicidality and stabilization of affect; lithium level 5/30- 0.30  - c/w Lexapro 20mg qd (started 5/24, increased 6/9)  - Discontinued Effexor 150 mg, 05/23  - Discontinued Abilify 5 mg, 5/16   - Tapered off Zoloft  - Melatonin for sleep phase disorder  - Left voicemail with Dr. Vincent at [facility], [phone]  - Teagan Escobar [phone], patient's therapist  - TSH WNL    #Panic attacks  - PRN Atarax  - c/w Klonopin 1 mg BID at noon and at night    #Gender Dysphoria  - The following programs contacted: [facility] (does not have anyone to send to the hospital), [facility] (only provides services for homeless individuals), [facility] (left voicemail), [facility] (phone number out of service), [facility], [facility] [place] [facility] [place] (reached out to them, meeting with Radames on 5/26), [facility] (voicemail [facility]), [facility], have [facility] but referred to Jorge Alberto Mejía)  - Referral on discharge to the [facility] (suicide hotline) and [facility] ([facility] has enrolled patient)  - [facility], Lisa, [phone] came and spoke with patient 6/6/23

## 2023-05-09 NOTE — BH INPATIENT PSYCHIATRY PROGRESS NOTE - OTHER
Fidgeting with rubber band intensifies when discussing SI and reasons to live Mood becomes markedly more depressed when discussing SI and reasons to live Depressed affect when speaking about SI Passive SI

## 2023-05-09 NOTE — BH INPATIENT PSYCHIATRY DISCHARGE NOTE - NSBHDCSIGEVENTSFT_PSY_A_CORE
Several panic attacks, often provoked by feelings of self hatred and/or after patients on the units bullied him for his gender expression

## 2023-05-09 NOTE — BH INPATIENT PSYCHIATRY PROGRESS NOTE - NSBHCHARTREVIEWVS_PSY_A_CORE FT
Vital Signs Last 24 Hrs  T(C): 36.9 (05-08-23 @ 16:37), Max: 36.9 (05-08-23 @ 16:37)  T(F): 98.5 (05-08-23 @ 16:37), Max: 98.5 (05-08-23 @ 16:37)  HR: 101 (05-08-23 @ 16:37) (101 - 102)  BP: 111/72 (05-08-23 @ 16:37) (107/63 - 111/72)  BP(mean): --  RR: 16 (05-08-23 @ 16:37) (16 - 16)  SpO2: --     Vital Signs Last 24 Hrs  T(C): 36.7 (05-09-23 @ 07:37), Max: 36.9 (05-08-23 @ 16:37)  T(F): 98 (05-09-23 @ 07:37), Max: 98.5 (05-08-23 @ 16:37)  HR: 96 (05-09-23 @ 07:37) (96 - 101)  BP: 111/72 (05-08-23 @ 16:37) (111/72 - 111/72)  BP(mean): --  RR: 16 (05-09-23 @ 07:37) (16 - 16)  SpO2: --

## 2023-05-09 NOTE — BH INPATIENT PSYCHIATRY PROGRESS NOTE - NSBHFUPINTERVALHXFT_PSY_A_CORE
Nursing reports no acute events overnight. Per chart review the patient has not required any behavioral PRNS since the last assessment.    Chart reviewed, patient seen and evaluated in AM. On approach, .... Patient reports ________________.   Patient endorsed OK sleep and appetite.     No SI/HI/AVH elicited.  Nursing reports no acute events overnight. Per chart review the patient has not required any behavioral PRNS since the last assessment.    Chart reviewed, patient seen and evaluated in AM. On approach, pt is sitting comfortably in dayroom and escorts team to his room to speak in private. In his room, pt sits comfortably on bed. Patient reports no change in mood since yesterday, no panic attacks, and no self harm. Patient endorsed OK sleep and appetite. Continues to endorse passive SI and state that family is his only reason to stay alive. States that he would feel comfortable going back home and that he is looking forward to cooking at home and being with his brother.     Nursing reports no acute events overnight. Per chart review the patient has not required any behavioral PRNS since the last assessment.    Chart reviewed, patient seen and evaluated in AM. On approach, pt is sitting comfortably in dayroom and escorts team to his room to speak in private. In his room, pt sits comfortably on bed, smiles intermittently throughout the interview. Patient reports no change in mood since yesterday, no panic attacks, and no self harm. Patient endorsed OK sleep and appetite. Continues to endorse passive SI and state that family is his only reason to stay alive. States that he would feel comfortable going back home and that he is looking forward to cooking at home and being with his brother.

## 2023-05-09 NOTE — BH INPATIENT PSYCHIATRY DISCHARGE NOTE - NSDCCPCAREPLAN_GEN_ALL_CORE_FT
PRINCIPAL DISCHARGE DIAGNOSIS  Diagnosis: Major depression  Assessment and Plan of Treatment:       SECONDARY DISCHARGE DIAGNOSES  Diagnosis: Suicidal ideation  Assessment and Plan of Treatment:     Diagnosis: Social anxiety disorder  Assessment and Plan of Treatment:     Diagnosis: Panic attacks  Assessment and Plan of Treatment:

## 2023-05-09 NOTE — BH INPATIENT PSYCHIATRY DISCHARGE NOTE - NSBHMETABOLIC_PSY_ALL_CORE_FT
BMI: BMI (kg/m2): 32.5 (04-18-23 @ 05:33)  HbA1c: A1C with Estimated Average Glucose Result: 5.7 % (05-06-23 @ 08:53)    Glucose:   BP: 111/72 (05-08-23 @ 16:37) (102/76 - 111/72)  Lipid Panel: Date/Time: 05-06-23 @ 08:53  Cholesterol, Serum: 206  Direct LDL: --  HDL Cholesterol, Serum: 47  Total Cholesterol/HDL Ration Measurement: --  Triglycerides, Serum: 131

## 2023-05-09 NOTE — BH INPATIENT PSYCHIATRY DISCHARGE NOTE - NSBHSUICIDESTATUS_PSY_ALL_CORE
Patient no longer has active SI with plan, it is passive. Medications have been optimized with Zoloft increased to 200 mg, and Abilify 5 mg added which patient is tolerating well. Panic attacks have remitted in frequency. Family in process of enrolling patient for gender affirmative care.

## 2023-05-09 NOTE — BH INPATIENT PSYCHIATRY PROGRESS NOTE - NSBHMETABOLIC_PSY_ALL_CORE_FT
BMI: BMI (kg/m2): 32.5 (04-18-23 @ 05:33)  HbA1c: A1C with Estimated Average Glucose Result: 5.7 % (05-06-23 @ 08:53)    Glucose:   BP: 111/72 (05-08-23 @ 16:37) (102/76 - 116/79)  Lipid Panel: Date/Time: 05-06-23 @ 08:53  Cholesterol, Serum: 206  Direct LDL: --  HDL Cholesterol, Serum: 47  Total Cholesterol/HDL Ration Measurement: --  Triglycerides, Serum: 131   BMI: BMI (kg/m2): 32.5 (04-18-23 @ 05:33)  HbA1c: A1C with Estimated Average Glucose Result: 5.7 % (05-06-23 @ 08:53)    Glucose:   BP: 111/72 (05-08-23 @ 16:37) (102/76 - 111/72)  Lipid Panel: Date/Time: 05-06-23 @ 08:53  Cholesterol, Serum: 206  Direct LDL: --  HDL Cholesterol, Serum: 47  Total Cholesterol/HDL Ration Measurement: --  Triglycerides, Serum: 131

## 2023-05-09 NOTE — BH INPATIENT PSYCHIATRY DISCHARGE NOTE - NSBHANTIPSYCHOTIC_PSY_ALL_CORE
Progress Note - [facility] 38 y o  male MRN: [id number]  Unit/Bed#: [facility]ado 02 Encounter: [id number]        REQUIRED DOCUMENTATION:     1  This service was provided via Telemedicine  2  Provider located at [facility]  3  TeleMed provider: Aimee Gregory MD and Aileen Chance MD  4  Identify all parties in room with patient during tele consult:  Technician staff helping with iPad  5  Patient was then informed that this was a Telemedicine visit and that the exam was being conducted confidentially over secure lines  My office door was closed  The patient was notified the following individuals were present in the room Aileen Chance MD   Patient acknowledged consent and understanding of privacy and security of the Telemedicine visit, and gave us permission to have the assistant stay in the room in order to assist with the history and to conduct the exam   I informed the patient that I have reviewed their record in Epic and presented the opportunity for them to ask any questions regarding the visit today  The patient agreed to participate  Mohsen Sherwood is a 38 y o  male with past medical history alcohol use disorder, past psychiatric history bipolar disorder, PTSD, alcohol use disorder, multiple psychiatric inpatient admissions, who was brought in by EMS and police fro psychiatric evaluation after violent behavior, after psychiatric decompensation following inpatient behavioral health admission at [facility], discharged 03/23/2022 in the setting of medication noncompliance, homelessness  302 completed  On arrival in the ED, patient was aggressively posturing, threatening, at 1 point grabbed the attending physician and pulled him, received Geodon 60 milligrams p o  with 2 milligrams Ativan for aggression and agitation  Patient on interview was pressured speech, disorganized, tangential, poor insight and judgment into his situation    He was difficult to redirect at times, but is agreeable with further psychiatric care  He seems to state that he likes long-acting injectables, because he feels best when these are given and he does not have to remember to get medications every day  He cites this as the source of his worsening condition  He hopes to be on the injection, and have a good support set up so that he can make it to his appointments  He states that antipsychotic long-acting injections years much easier than oral medications  Currently he denies any issues with sleep or appetite, stating he feels well rested  He denies any suicidal ideation, stating he wants to live forever  He denies any HI or AVH  Denies anxiety or depression, and feels safe in the hospital   Throughout interview, patient was easily distracted, tangential towards various topics such as being a  or police holding him down prior to admission or a history of being given intramuscular injections  He understands that there will be a hearing tomorrow morning and is agreeable  Behavior over the last 24 hours: improved  Sleep: normal  Appetite: adequate  Medication side effects: none verbalized  ROS: Complete review of systems is negative except as noted above      Mental Status Exam:  Appearance:  alert, good eye contact, appears stated age, casually dressed and marginal grooming/hygiene   Behavior:  calm and limited cooperativity   Motor: restless and fidgety and normal gait and balance   Speech:  pressured, hyperverbal and coherent   Mood:  "good"   Affect:  labile   Thought Process:  disorganized, tangential, racing of thoughts   Thought Content: mild paranoia   Perceptual disturbances: no reported hallucinations and does not appear to be responding to internal stimuli at this time   Risk Potential: No active or passive suicidal or homicidal ideation was verbalized during interview   Cognition: oriented to self and situation and cognition not formally tested Insight:  Poor   Judgment: Poor     Risks, benefits and possible side effects of Medications:   Risks, benefits, and possible side effects of medications have been explained to the patient, who verbalizes understanding  Labs: I have personally reviewed all pertinent laboratory results  Assessment/Plan    Diagnosis:  Psychosis, unspecified  Previous diagnosis of bipolar 1 disorder with manic episodes      Recommended Treatment:    Patient requires inpatient psychiatric hospitalization   Case Management following for inpatient placement   1-1 for patient and staff safety   Continue Klonopin 1 mg b i d  for anxiety and mood stability   Continue Depakote 500 mg b i d  for mood stability   Continue Zyprexa 5 mg 4 times daily for mood stability  · 302 completed, 303 hearing morning of 4/19    Discussed with primary team    Aimee Gregory MD    This note was not shared with the patient due to this is a psychotherapy note Yes... No

## 2023-05-09 NOTE — BH INPATIENT PSYCHIATRY DISCHARGE NOTE - DESCRIPTION
The patient currently lives with his older brother in [place], he immigrated from [place] 7 months ago (though born in the [place])and does not understand or speak English. His brother asked him to move to live with him because he was concerned that he was not doing well psychologically, and thought he may get better care in the [place]. He currently sees a psychiatrist and therapist at [facility], both of them which he likes. Parents still live in [place].

## 2023-05-09 NOTE — CHART NOTE - NSCHARTNOTEFT_GEN_A_CORE
Writer alerted by nursing staff that patient was having asymptomatic tachycardia of 140. Bp WNL. STAT EKG put in.

## 2023-05-09 NOTE — BH INPATIENT PSYCHIATRY DISCHARGE NOTE - NSDCMRMEDTOKEN_GEN_ALL_CORE_FT
ARIPiprazole 5 mg oral tablet: 1 tab(s) orally once a day  hydrOXYzine hydrochloride 50 mg oral tablet: 1 tab(s) orally every 6 hours as needed for anxiety  sertraline 100 mg oral tablet: 2 tab(s) orally once a day   clonazePAM 1 mg oral tablet: 1 tab(s) orally 2 times a day x 7 days MDD: 2 mg  escitalopram 20 mg oral tablet: 1 tab(s) orally once a day MDD: 20 mg  hydrOXYzine hydrochloride 50 mg oral tablet: 1 tab(s) orally every 6 hours as needed for anxiety MDD: 200 mg  lithium 150 mg oral capsule: 3 cap(s) orally 2 times a day MDD: 900 mg

## 2023-05-09 NOTE — BH INPATIENT PSYCHIATRY PROGRESS NOTE - CURRENT MEDICATION
MEDICATIONS  (STANDING):  ARIPiprazole 5 milliGRAM(s) Oral daily  melatonin. 3 milliGRAM(s) Oral at bedtime  sertraline 200 milliGRAM(s) Oral daily    MEDICATIONS  (PRN):  clonazePAM  Tablet 0.25 milliGRAM(s) Oral daily PRN panic attacks  haloperidol     Tablet 5 milliGRAM(s) Oral every 6 hours PRN agitation  hydrOXYzine hydrochloride 50 milliGRAM(s) Oral every 6 hours PRN anxiety  LORazepam     Tablet 2 milliGRAM(s) Oral every 6 hours PRN agitation
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
## 2023-05-09 NOTE — BH INPATIENT PSYCHIATRY PROGRESS NOTE - NSBHASSESSSUMMFT_PSY_ALL_CORE
The patient is a 16 yo transgender F->M, Kinyarwanda-speaking, recently immigrated from [place], domiciled with brother, unemployed, with psych hx of gender dysphoria, anxiety and depression, no past psychiatric hospitalizations, previous suicide attempt via choking, who is presenting at recommendation of brother and cousin for increasing SI.    Upon admission patient endorses strong feelings of depression and exhibits very dysphoric and minimally reactive affect. He endorses escalating suicidal thoughts related to feelings of worthlessness and unhappiness over his appearance. During the course of his hospitalization, his mood and affect has improved, he has become more talkative. . Of note, the patient's brother recently lost his job and the patient has been feeling like he has a duty to get discharged in order to find a job and help financially. He is therefore at an increased risk of minimizing his symptoms. The patient was admitted involuntarily on 9:39 legals as he has been acutely elevated risk of suicide, now changed to voluntary legals. Chronic risk factors include history of trauma, history of persistent depressive symptoms,   and history of suicidal ideation. Current risk factors include recent self inflicted choking, age, recent immigration, not understanding/speaking well language of current residence, current gender dysphoria, current social anxiety, current panic attacks and financial stressors. Goals of care will be to decrease patient's suicidality as his current risk factors place him at acutely elevated risk. Requires continued hospitalization for stabilization.    04/18/23- Patient acutely dysphoric. Plan to dc mirtazapine as he notes excessive lethargy and hyperphagia, continue sertraline 50 mg  04/19/2023  Patient remains with thoughts of suicide and concerns about his future, however he feels safe while in a structured setting with people around him  04/20/23 Patient had no thoughts of suicide during interview, appeared brighter and occasionally smiled. Agreeable to increase antidepressants  04/24 Patient notes panic attack last night, alleviated talking with cousin. Brighter, future oriented. Endorses social anxiety.  04/25/23 Patient did not have panic attack in last 24 hrs. Still appears brighter on unit and during interview today. Voiced that his social anxiety hinders his ability to advocate for himself while on unit  04/26   Patient is somewhat less anxious,  spending  time outside his room and is cheerful at times.  04/27/23 Patient notes still having intermittent anxiety, however he is now able to leave his room and his speech is much more productive. Affect continues to be more supple than on initial presentation. Plan to increase Zoloft to 150 mg 04/28, with eventual plan to maximize it to treat his social anxiety disorder.  04/28 Yesterday patient had another panic attack when bullied about his gender expression and received Klonopin and Atarax PRN. Though he states his mood is "good" and has a more supple affect than on initial presentation, he continues to endorse feelings of self hatred (which he believes may be alleviated by gender affirmative care) as well as wish to no longer be alive (though no active SI)  05/01 Patient continues to endorse passive SI, appeared anxious though smiling during interview. Reported panic attack yesterday. Ok with plan to increase Zoloft to 200 mg  05/02 Pt received first dose of 200 mg Zoloft. Had panic attack yesterday triggered by existential doubts and feelings of self hatred, which resulted in him hitting himself. Today less anxious but continues to endorse ambivalence about wanting to be alive.  5/3/23   patient had significant panic attack accompanied by dread and thoughts of self destruction.  When asked if suicidal patient replies I cannot kill myself because of my family.  05/04- Acutely anxious, more so than previously (however interviewed in large group), will add 2 mg abilify. [facility]  5/5 - Pt endorses panic attack the evening before during which he hit himself in the stomach and arms and banged his head on the wall due to self hatred and feeling like he does not have a place in this world. Created signs with patient that he can use to communicate with staff nonverbally; pt stated that he will try his best to use them. Does not report adverse effects from Abilify.  5/8- Pt denies any panic attacks over the weekend. Pt states that he did not need to use the signs that he created together with the team to communicate with staff. Continues to endorse passive SI and states that his reason for living is his family, but other than that, he does not feel like he has a reason to live. Abilify increased to 5 mg    Impression: MDD vs Social Anxiety disorder with Panic attacks vs Gender dysphoria, R/O bipolar    #MDD #Gender dysphoria #Social Anxiety Disorder #Panic attacks  - 9.39 legals changed to 9.14  - Increase Abilify 5 mg 05/08  - Cont Sertraline to 200 mg (starting 05/02)  - Melatonin for sleep phase disorder  - Referral for outpatient endocrinologist  - Left voicemail with Dr. Vincent at [facility], [phone]  - Left VM with  Kandice Acosta who work s with LGBTQ community for connection to trans affirming care  - Left VM Teagan Escobar [phone], patient's therapist  - Referral on discharge to [facility] (suicide hotline) and [facility]  - TSH WNL    #Panic attacks  - PRN Atarax  - Continue Klonopin to 0.25mg PRN daily  - Continue to monitor    #Agitation  - For episodes of acute agitation can offer PO Haldol 5 mg/Ativan 2 mg/Benadryl 50 mg Q6H PRN. If refusing PO and is in acute risk of harm to self/other, can offer IM Haldol 5 mg/Ativan 2 mg/Benadryl 50 mg Q6H PRN. If given, please repeat EKG and hold antipsychotics if QTC>500   The patient is a 18 yo transgender F->M, Persian-speaking, recently immigrated from [place], domiciled with brother, unemployed, with psych hx of gender dysphoria, anxiety and depression, no past psychiatric hospitalizations, previous suicide attempt via choking, who is presenting at recommendation of brother and cousin for increasing SI.    Upon admission patient endorses strong feelings of depression and exhibits very dysphoric and minimally reactive affect. He endorses escalating suicidal thoughts related to feelings of worthlessness and unhappiness over his appearance. During the course of his hospitalization, his mood and affect has improved, he has become more talkative. . Of note, the patient's brother recently lost his job and the patient has been feeling like he has a duty to get discharged in order to find a job and help financially. He is therefore at an increased risk of minimizing his symptoms. The patient was admitted involuntarily on 9:39 legals as he has been acutely elevated risk of suicide, now changed to voluntary legals. Chronic risk factors include history of trauma, history of persistent depressive symptoms,   and history of suicidal ideation. Current risk factors include recent self inflicted choking, age, recent immigration, not understanding/speaking well language of current residence, current gender dysphoria, current social anxiety, current panic attacks and financial stressors. Goals of care will be to decrease patient's suicidality as his current risk factors place him at acutely elevated risk. Requires continued hospitalization for stabilization.    04/18/23- Patient acutely dysphoric. Plan to dc mirtazapine as he notes excessive lethargy and hyperphagia, continue sertraline 50 mg  04/19/2023  Patient remains with thoughts of suicide and concerns about his future, however he feels safe while in a structured setting with people around him  04/20/23 Patient had no thoughts of suicide during interview, appeared brighter and occasionally smiled. Agreeable to increase antidepressants  04/24 Patient notes panic attack last night, alleviated talking with cousin. Brighter, future oriented. Endorses social anxiety.  04/25/23 Patient did not have panic attack in last 24 hrs. Still appears brighter on unit and during interview today. Voiced that his social anxiety hinders his ability to advocate for himself while on unit  04/26   Patient is somewhat less anxious,  spending  time outside his room and is cheerful at times.  04/27/23 Patient notes still having intermittent anxiety, however he is now able to leave his room and his speech is much more productive. Affect continues to be more supple than on initial presentation. Plan to increase Zoloft to 150 mg 04/28, with eventual plan to maximize it to treat his social anxiety disorder.  04/28 Yesterday patient had another panic attack when bullied about his gender expression and received Klonopin and Atarax PRN. Though he states his mood is "good" and has a more supple affect than on initial presentation, he continues to endorse feelings of self hatred (which he believes may be alleviated by gender affirmative care) as well as wish to no longer be alive (though no active SI)  05/01 Patient continues to endorse passive SI, appeared anxious though smiling during interview. Reported panic attack yesterday. Ok with plan to increase Zoloft to 200 mg  05/02 Pt received first dose of 200 mg Zoloft. Had panic attack yesterday triggered by existential doubts and feelings of self hatred, which resulted in him hitting himself. Today less anxious but continues to endorse ambivalence about wanting to be alive.  5/3/23   patient had significant panic attack accompanied by dread and thoughts of self destruction.  When asked if suicidal patient replies I cannot kill myself because of my family.  05/04- Acutely anxious, more so than previously (however interviewed in large group), will add 2 mg abilify. Vickie in process of enrolling Rad[facility] as patient in [facility]  5/5 - Pt endorses panic attack the evening before during which he hit himself in the stomach and arms and banged his head on the wall due to self hatred and feeling like he does not have a place in this world. Created signs with patient that he can use to communicate with staff nonverbally; pt stated that he will try his best to use them. Does not report adverse effects from Abilify.  5/8- Pt denies any panic attacks over the weekend. Pt states that he did not need to use the signs that he created together with the team to communicate with staff. Continues to endorse passive SI and states that his reason for living is his family, but other than that, he does not feel like he has a reason to live. Abilify increased to 5 mg  5/9- Pt denies any panic attacks since yesterday, but continues to endorse passive SI and state that his family is his only reason for living. Will continue Abilify 5 mg.    Impression: MDD vs Social Anxiety disorder with Panic attacks vs Gender dysphoria, R/O bipolar    #MDD #Gender dysphoria #Social Anxiety Disorder #Panic attacks  - 9.39 legals changed to 9.14  - Increase Abilify 5 mg 05/08, will continue at same dose  - Cont Sertraline to 200 mg (starting 05/02)  - Melatonin for sleep phase disorder  - Referral for outpatient endocrinologist  - Left voicemail with [facility]. Vincent at [facility], [phone]  - Left VM with  Kandice Acosta who work s with LGBTQ community for connection to trans affirming care  - Left  Teagan Escobar [phone], patient's therapist  - Referral on discharge to [facility] (suicide hotline) and [facility]  - TSH WNL    #Panic attacks  - PRN Atarax  - Continue Klonopin to 0.25mg PRN daily  - Continue to monitor    #Agitation  - For episodes of acute agitation can offer PO Haldol 5 mg/Ativan 2 mg/Benadryl 50 mg Q6H PRN. If refusing PO and is in acute risk of harm to self/other, can offer IM Haldol 5 mg/Ativan 2 mg/Benadryl 50 mg Q6H PRN. If given, please repeat EKG and hold antipsychotics if QTC>500   The patient is a 16 yo transgender F->M, Telugu-speaking, recently immigrated from [place], domiciled with brother, unemployed, with psych hx of gender dysphoria, anxiety and depression, no past psychiatric hospitalizations, previous suicide attempt via choking, who is presenting at recommendation of brother and cousin for increasing SI.    Upon admission patient endorses strong feelings of depression and exhibits very dysphoric and minimally reactive affect. He endorses escalating suicidal thoughts related to feelings of worthlessness and unhappiness over his appearance. During the course of his hospitalization, his mood and affect has improved, he has become more talkative. . Of note, the patient's brother recently lost his job and the patient has been feeling like he has a duty to get discharged in order to find a job and help financially. He is therefore at an increased risk of minimizing his symptoms. The patient was admitted involuntarily on 9:39 legals as he has been acutely elevated risk of suicide, now changed to voluntary legals. Chronic risk factors include history of trauma, history of persistent depressive symptoms,   and history of suicidal ideation. Current risk factors include recent self inflicted choking, age, recent immigration, not understanding/speaking well language of current residence, current gender dysphoria, current social anxiety, current panic attacks and financial stressors. Goals of care will be to decrease patient's suicidality as his current risk factors place him at acutely elevated risk. Requires continued hospitalization for stabilization.    04/18/23- Patient acutely dysphoric. Plan to dc mirtazapine as he notes excessive lethargy and hyperphagia, continue sertraline 50 mg  04/19/2023  Patient remains with thoughts of suicide and concerns about his future, however he feels safe while in a structured setting with people around him  04/20/23 Patient had no thoughts of suicide during interview, appeared brighter and occasionally smiled. Agreeable to increase antidepressants  04/24 Patient notes panic attack last night, alleviated talking with cousin. Brighter, future oriented. Endorses social anxiety.  04/25/23 Patient did not have panic attack in last 24 hrs. Still appears brighter on unit and during interview today. Voiced that his social anxiety hinders his ability to advocate for himself while on unit  04/26   Patient is somewhat less anxious,  spending  time outside his room and is cheerful at times.  04/27/23 Patient notes still having intermittent anxiety, however he is now able to leave his room and his speech is much more productive. Affect continues to be more supple than on initial presentation. Plan to increase Zoloft to 150 mg 04/28, with eventual plan to maximize it to treat his social anxiety disorder.  04/28 Yesterday patient had another panic attack when bullied about his gender expression and received Klonopin and Atarax PRN. Though he states his mood is "good" and has a more supple affect than on initial presentation, he continues to endorse feelings of self hatred (which he believes may be alleviated by gender affirmative care) as well as wish to no longer be alive (though no active SI)  05/01 Patient continues to endorse passive SI, appeared anxious though smiling during interview. Reported panic attack yesterday. Ok with plan to increase Zoloft to 200 mg  05/02 Pt received first dose of 200 mg Zoloft. Had panic attack yesterday triggered by existential doubts and feelings of self hatred, which resulted in him hitting himself. Today less anxious but continues to endorse ambivalence about wanting to be alive.  5/3/23   patient had significant panic attack accompanied by dread and thoughts of self destruction.  When asked if suicidal patient replies I cannot kill myself because of my family.  05/04- Acutely anxious, more so than previously (however interviewed in large group), will add 2 mg abilify. Vickie in process of enrolling [facility] as patient in [facility]  5/5 - Pt endorses panic attack the evening before during which he hit himself in the stomach and arms and banged his head on the wall due to self hatred and feeling like he does not have a place in this world. Created signs with patient that he can use to communicate with staff nonverbally; pt stated that he will try his best to use them. Does not report adverse effects from Abilify.  5/8- Pt denies any panic attacks over the weekend. Pt states that he did not need to use the signs that he created together with the team to communicate with staff. Continues to endorse passive SI and states that his reason for living is his family, but other than that, he does not feel like he has a reason to live. Abilify increased to 5 mg  5/9- Pt denies any panic attacks since yesterday, but continues to endorse passive SI and state that his family is his only reason for living. Will continue Abilify 5 mg.    Impression: MDD vs Social Anxiety disorder with Panic attacks vs Gender dysphoria, R/O bipolar    #MDD #Gender dysphoria #Social Anxiety Disorder #Panic attacks  - 9.39 legals changed to 9.14  - Increase Abilify 5 mg 05/08, will continue at same dose  - Cont Sertraline to 200 mg (starting 05/02)  - Melatonin for sleep phase disorder  - Left voicemail with [facility]. Vincent at [facility], [phone]  - Left VM with  Kandice Acosta who work s with LGBTQ community for connection to trans affirming care  - Left  Teagan Escobar [phone], patient's therapist  - Referral on discharge to [facility] (suicide hotline) and [facility]  - TSH WNL    #Panic attacks  - PRN Atarax  - Continue Klonopin to 0.25mg PRN daily  - Continue to monitor    #Agitation  - For episodes of acute agitation can offer PO Haldol 5 mg/Ativan 2 mg/Benadryl 50 mg Q6H PRN. If refusing PO and is in acute risk of harm to self/other, can offer IM Haldol 5 mg/Ativan 2 mg/Benadryl 50 mg Q6H PRN. If given, please repeat EKG and hold antipsychotics if QTC>500

## 2023-05-10 PROCEDURE — 99231 SBSQ HOSP IP/OBS SF/LOW 25: CPT

## 2023-05-10 RX ADMIN — Medication 3 MILLIGRAM(S): at 20:09

## 2023-05-10 RX ADMIN — SERTRALINE 200 MILLIGRAM(S): 25 TABLET, FILM COATED ORAL at 08:21

## 2023-05-10 RX ADMIN — Medication 0.25 MILLIGRAM(S): at 09:39

## 2023-05-10 RX ADMIN — ARIPIPRAZOLE 5 MILLIGRAM(S): 15 TABLET ORAL at 08:22

## 2023-05-10 NOTE — BH DISCHARGE NOTE NURSING/SOCIAL WORK/PSYCH REHAB - NSCDUDCCRISIS_PSY_A_CORE
[facility]  1 [phone]) [facility] ([phone])  Text "WELL" to 56477  Website: www.[place].Socialance/.National Suicide Prevention Lifeline 5 (566) 784-3372/.  [facility]  1 (014) LIFENET (580-4618)/988 Suicide and Crisis Lifeline

## 2023-05-10 NOTE — BH DISCHARGE NOTE NURSING/SOCIAL WORK/PSYCH REHAB - NSDCADDINFO1FT_PSY_ALL_CORE
In-Person appointment with Therapist Teagan  In-Person appointment with Therapist Teagan & Dr Jones

## 2023-05-10 NOTE — BH INPATIENT PSYCHIATRY PROGRESS NOTE - NSBHCHARTREVIEWVS_PSY_A_CORE FT
Vital Signs Last 24 Hrs  T(C): 36.7 (05-10-23 @ 08:49), Max: 36.9 (05-09-23 @ 16:01)  T(F): 98 (05-10-23 @ 08:49), Max: 98.4 (05-09-23 @ 16:01)  HR: 97 (05-10-23 @ 08:49) (97 - 135)  BP: 117/81 (05-10-23 @ 08:49) (117/81 - 121/83)  BP(mean): --  RR: 18 (05-10-23 @ 09:02) (16 - 18)  SpO2: --

## 2023-05-10 NOTE — BH DISCHARGE NOTE NURSING/SOCIAL WORK/PSYCH REHAB - PATIENT PORTAL LINK FT
You can access the FollowMyHealth Patient Portal offered by [facility] by registering at the following website: [email or web address]. By joining [employer]’s FollowMyHealth portal, you will also be able to view your health information using other applications (apps) compatible with our system.

## 2023-05-10 NOTE — BH INPATIENT PSYCHIATRY PROGRESS NOTE - NSBHATTESTCOMMENTATTENDFT_PSY_A_CORE
Patient was seen by resident and then by myself at the end of the day. Collateral from Nursing: Patient reported to family during family meeting that he attempted to harm himself earlier today by wrapping a blanket around his neck. Patient was asked about the incident and endorsed it had happened. He stated he was just wanting to feel something. He stated he does not want to be alive. He denied any plans or intent to harm himself. He reported feeling depressed and having little hope for the future. He reported feeling as though he would feel the same if he were at home or in the hospital. Plan for discharge tomorrow was discussed with the possibility of lengthening the hospital stay also discussed.

## 2023-05-10 NOTE — BH DISCHARGE NOTE NURSING/SOCIAL WORK/PSYCH REHAB - NSDCNEXTLEVELWHO_PSY_ALL_CORE_FT
WILBERT to [email or web address] WILBERT to [email or web address] & [phone]

## 2023-05-10 NOTE — BH INPATIENT PSYCHIATRY PROGRESS NOTE - NSBHMETABOLIC_PSY_ALL_CORE_FT
BMI: BMI (kg/m2): 32.5 (04-18-23 @ 05:33)  HbA1c: A1C with Estimated Average Glucose Result: 5.7 % (05-06-23 @ 08:53)    Glucose:   BP: 117/81 (05-10-23 @ 08:49) (102/76 - 121/83)  Lipid Panel: Date/Time: 05-06-23 @ 08:53  Cholesterol, Serum: 206  Direct LDL: --  HDL Cholesterol, Serum: 47  Total Cholesterol/HDL Ration Measurement: --  Triglycerides, Serum: 131

## 2023-05-10 NOTE — BH INPATIENT PSYCHIATRY PROGRESS NOTE - NSBHFUPINTERVALHXFT_PSY_A_CORE
Nursing reports pt was anxiious yesterday and required prn klonopin.    Chart reviewed, patient seen and evaluated in AM. On approach, pt is sitting comfortably in bedroom. Pt reports she was having some panic yesterday and got a klonopin prn. Reports is still anxious and will ask for another klonopin.  Patient reports no change in mood since yesterday. Patient endorsed OK sleep and appetite. Continues to endorse passive SI and state that family is his only reason to stay alive. States that he would feel comfortable going back home and that he is looking forward to cooking at home and being with his brother.

## 2023-05-11 DIAGNOSIS — F40.10 SOCIAL PHOBIA, UNSPECIFIED: ICD-10-CM

## 2023-05-11 PROCEDURE — 99231 SBSQ HOSP IP/OBS SF/LOW 25: CPT

## 2023-05-11 RX ORDER — SERTRALINE 25 MG/1
100 TABLET, FILM COATED ORAL DAILY
Refills: 0 | Status: COMPLETED | OUTPATIENT
Start: 2023-05-12 | End: 2023-05-12

## 2023-05-11 RX ORDER — CLONAZEPAM 1 MG
0.5 TABLET ORAL DAILY
Refills: 0 | Status: DISCONTINUED | OUTPATIENT
Start: 2023-05-11 | End: 2023-05-16

## 2023-05-11 RX ORDER — SERTRALINE 25 MG/1
50 TABLET, FILM COATED ORAL DAILY
Refills: 0 | Status: DISCONTINUED | OUTPATIENT
Start: 2023-05-13 | End: 2023-05-15

## 2023-05-11 RX ORDER — CLONAZEPAM 1 MG
0.25 TABLET ORAL DAILY
Refills: 0 | Status: DISCONTINUED | OUTPATIENT
Start: 2023-05-11 | End: 2023-05-11

## 2023-05-11 RX ORDER — VENLAFAXINE HCL 75 MG
112.5 CAPSULE, EXT RELEASE 24 HR ORAL DAILY
Refills: 0 | Status: DISCONTINUED | OUTPATIENT
Start: 2023-05-13 | End: 2023-05-15

## 2023-05-11 RX ORDER — CLONAZEPAM 1 MG
0.5 TABLET ORAL ONCE
Refills: 0 | Status: DISCONTINUED | OUTPATIENT
Start: 2023-05-11 | End: 2023-05-11

## 2023-05-11 RX ORDER — VENLAFAXINE HCL 75 MG
75 CAPSULE, EXT RELEASE 24 HR ORAL ONCE
Refills: 0 | Status: COMPLETED | OUTPATIENT
Start: 2023-05-12 | End: 2023-05-12

## 2023-05-11 RX ADMIN — ARIPIPRAZOLE 5 MILLIGRAM(S): 15 TABLET ORAL at 08:15

## 2023-05-11 RX ADMIN — Medication 3 MILLIGRAM(S): at 20:32

## 2023-05-11 RX ADMIN — SERTRALINE 200 MILLIGRAM(S): 25 TABLET, FILM COATED ORAL at 08:15

## 2023-05-11 RX ADMIN — Medication 0.5 MILLIGRAM(S): at 10:27

## 2023-05-11 NOTE — BH INPATIENT PSYCHIATRY PROGRESS NOTE - NSICDXBHSECONDARYDX_PSY_ALL_CORE
Gender dysphoria in adult   F64.0  Panic attacks   F41.0   Gender dysphoria in adult   F64.0  Panic attacks   F41.0  Social anxiety disorder   F40.10

## 2023-05-11 NOTE — BH INPATIENT PSYCHIATRY PROGRESS NOTE - NSBHASSESSSUMMFT_PSY_ALL_CORE
The patient is a 16 yo transgender F->M, Nepali-speaking, recently immigrated from [place], domiciled with brother, unemployed, with psych hx of gender dysphoria, anxiety and depression, no past psychiatric hospitalizations, previous suicide attempt via choking, who is presenting at recommendation of brother and cousin for increasing SI.    Upon admission patient endorses strong feelings of depression and exhibits very dysphoric and minimally reactive affect. He endorses escalating suicidal thoughts related to feelings of worthlessness and unhappiness over his appearance. During the course of his hospitalization, his mood and affect has improved, he has become more talkative. . Of note, the patient's brother recently lost his job and the patient has been feeling like he has a duty to get discharged in order to find a job and help financially. He is therefore at an increased risk of minimizing his symptoms. The patient was admitted involuntarily on 9:39 legals as he has been acutely elevated risk of suicide, now changed to voluntary legals. Chronic risk factors include history of trauma, history of persistent depressive symptoms,   and history of suicidal ideation. Current risk factors include recent self inflicted choking, age, recent immigration, not understanding/speaking well language of current residence, current gender dysphoria, current social anxiety, current panic attacks and financial stressors. Goals of care will be to decrease patient's suicidality as his current risk factors place him at acutely elevated risk. Requires continued hospitalization for stabilization.    04/18/23- Patient acutely dysphoric. Plan to dc mirtazapine as he notes excessive lethargy and hyperphagia, continue sertraline 50 mg  04/19/2023  Patient remains with thoughts of suicide and concerns about his future, however he feels safe while in a structured setting with people around him  04/20/23 Patient had no thoughts of suicide during interview, appeared brighter and occasionally smiled. Agreeable to increase antidepressants  04/24 Patient notes panic attack last night, alleviated talking with cousin. Brighter, future oriented. Endorses social anxiety.  04/25/23 Patient did not have panic attack in last 24 hrs. Still appears brighter on unit and during interview today. Voiced that his social anxiety hinders his ability to advocate for himself while on unit  04/26   Patient is somewhat less anxious,  spending  time outside his room and is cheerful at times.  04/27/23 Patient notes still having intermittent anxiety, however he is now able to leave his room and his speech is much more productive. Affect continues to be more supple than on initial presentation. Plan to increase Zoloft to 150 mg 04/28, with eventual plan to maximize it to treat his social anxiety disorder.  04/28 Yesterday patient had another panic attack when bullied about his gender expression and received Klonopin and Atarax PRN. Though he states his mood is "good" and has a more supple affect than on initial presentation, he continues to endorse feelings of self hatred (which he believes may be alleviated by gender affirmative care) as well as wish to no longer be alive (though no active SI)  05/01 Patient continues to endorse passive SI, appeared anxious though smiling during interview. Reported panic attack yesterday. Ok with plan to increase Zoloft to 200 mg  05/02 Pt received first dose of 200 mg Zoloft. Had panic attack yesterday triggered by existential doubts and feelings of self hatred, which resulted in him hitting himself. Today less anxious but continues to endorse ambivalence about wanting to be alive.  5/3/23   patient had significant panic attack accompanied by dread and thoughts of self destruction.  When asked if suicidal patient replies I cannot kill myself because of my family.  05/04- Acutely anxious, more so than previously (however interviewed in large group), will add 2 mg abilify. Vickie in process of enrolling [facility] as patient in [facility]  5/5 - Pt endorses panic attack the evening before during which he hit himself in the stomach and arms and banged his head on the wall due to self hatred and feeling like he does not have a place in this world. Created signs with patient that he can use to communicate with staff nonverbally; pt stated that he will try his best to use them. Does not report adverse effects from Abilify.  5/8- Pt denies any panic attacks over the weekend. Pt states that he did not need to use the signs that he created together with the team to communicate with staff. Continues to endorse passive SI and states that his reason for living is his family, but other than that, he does not feel like he has a reason to live. Abilify increased to 5 mg  5/9- Pt denies any panic attacks since yesterday, but continues to endorse passive SI and state that his family is his only reason for living. Will continue Abilify 5 mg.  5/10- Pt states that he had a panic attack the evening prior, during which he punched himself in the stomach and banged his head against the wall. Stated again that he does not want to be alive and does not have a place in this world.  5/11- Pt stated that yesterday, he wrapped a blanket around his neck because he "wanted to feel something" and wanted to "turn off my brain." He stated that he did not do this with the intention to end his life. He stated he does not want to be alive, but cannot die because it would hurt his family. Will decrease Zoloft to 100 mg on 5/12 with the intention of cross-titrating to venlafaxine. Will continue Abilify 5 mg.    Impression: MDD vs Social Anxiety disorder with Panic attacks vs Gender dysphoria, R/O bipolar    #MDD #Gender dysphoria #Social Anxiety Disorder #Panic attacks  - 9.39 legals changed to 9.14  - Increase Abilify 5 mg 05/08, will continue at same dose  - Cont Sertraline to 200 mg (starting 05/02), decreasing Sertraline to 100 mg on 5/12 with the intention of cross-titrating to venlafaxine  - Melatonin for sleep phase disorder  - Left voicemail with Dr. Vincent at [facility], [phone]  - Left VM with  Kandice Acosta who work s with LGBTQ community for connection to trans affirming care  - Left  Teagan Escobar [phone], patient's therapist  - Referral on discharge to the [facility] (suicide hotline) and [facility]  - TSH WNL    #Panic attacks  - PRN Atarax  - Continue Klonopin to 0.25mg PRN daily, will increase Klonipin to 0.5 mg PRN starting 5/11  - Continue to monitor    #Agitation  - For episodes of acute agitation can offer PO Haldol 5 mg/Ativan 2 mg/Benadryl 50 mg Q6H PRN. If refusing PO and is in acute risk of harm to self/other, can offer IM Haldol 5 mg/Ativan 2 mg/Benadryl 50 mg Q6H PRN. If given, please repeat EKG and hold antipsychotics if QTC>500   The patient is a 18 yo transgender F->M, Tajik-speaking, recently immigrated from [place], domiciled with brother, unemployed, with psych hx of gender dysphoria, anxiety and depression, no past psychiatric hospitalizations, previous suicide attempt via choking, who is presenting at recommendation of brother and cousin for increasing SI.    Upon admission patient endorses strong feelings of depression and exhibits very dysphoric and minimally reactive affect. He endorses escalating suicidal thoughts related to feelings of worthlessness and unhappiness over his appearance. During the course of his hospitalization, his mood and affect has improved, he has become more talkative. . Of note, the patient's brother recently lost his job and the patient has been feeling like he has a duty to get discharged in order to find a job and help financially. He is therefore at an increased risk of minimizing his symptoms. The patient was admitted involuntarily on 9:39 legals as he has been acutely elevated risk of suicide, now changed to voluntary legals. Chronic risk factors include history of trauma, history of persistent depressive symptoms,   and history of suicidal ideation. Current risk factors include recent self inflicted choking, age, recent immigration, not understanding/speaking well language of current residence, current gender dysphoria, current social anxiety, current panic attacks and financial stressors. Goals of care will be to decrease patient's suicidality as his current risk factors place him at acutely elevated risk. Requires continued hospitalization for stabilization.    04/18/23- Patient acutely dysphoric. Plan to dc mirtazapine as he notes excessive lethargy and hyperphagia, continue sertraline 50 mg  04/19/2023  Patient remains with thoughts of suicide and concerns about his future, however he feels safe while in a structured setting with people around him  04/20/23 Patient had no thoughts of suicide during interview, appeared brighter and occasionally smiled. Agreeable to increase antidepressants  04/24 Patient notes panic attack last night, alleviated talking with cousin. Brighter, future oriented. Endorses social anxiety.  04/25/23 Patient did not have panic attack in last 24 hrs. Still appears brighter on unit and during interview today. Voiced that his social anxiety hinders his ability to advocate for himself while on unit  04/26   Patient is somewhat less anxious,  spending  time outside his room and is cheerful at times.  04/27/23 Patient notes still having intermittent anxiety, however he is now able to leave his room and his speech is much more productive. Affect continues to be more supple than on initial presentation. Plan to increase Zoloft to 150 mg 04/28, with eventual plan to maximize it to treat his social anxiety disorder.  04/28 Yesterday patient had another panic attack when bullied about his gender expression and received Klonopin and Atarax PRN. Though he states his mood is "good" and has a more supple affect than on initial presentation, he continues to endorse feelings of self hatred (which he believes may be alleviated by gender affirmative care) as well as wish to no longer be alive (though no active SI)  05/01 Patient continues to endorse passive SI, appeared anxious though smiling during interview. Reported panic attack yesterday. Ok with plan to increase Zoloft to 200 mg  05/02 Pt received first dose of 200 mg Zoloft. Had panic attack yesterday triggered by existential doubts and feelings of self hatred, which resulted in him hitting himself. Today less anxious but continues to endorse ambivalence about wanting to be alive.  5/3/23   patient had significant panic attack accompanied by dread and thoughts of self destruction.  When asked if suicidal patient replies I cannot kill myself because of my family.  05/04- Acutely anxious, more so than previously (however interviewed in large group), will add 2 mg abilify. Vickie in process of enrolling Radames as patient in [facility]  5/5 - Pt endorses panic attack the evening before during which he hit himself in the stomach and arms and banged his head on the wall due to self hatred and feeling like he does not have a place in this world. Created signs with patient that he can use to communicate with staff nonverbally; pt stated that he will try his best to use them. Does not report adverse effects from Abilify.  5/8- Pt denies any panic attacks over the weekend. Pt states that he did not need to use the signs that he created together with the team to communicate with staff. Continues to endorse passive SI and states that his reason for living is his family, but other than that, he does not feel like he has a reason to live. Abilify increased to 5 mg  5/9- Pt denies any panic attacks since yesterday, but continues to endorse passive SI and state that his family is his only reason for living. Will continue Abilify 5 mg.  5/10- Pt states that he had a panic attack the evening prior, during which he punched himself in the stomach and banged his head against the wall. Stated again that he does not want to be alive and does not have a place in this world.  5/11- Pt stated that yesterday, he wrapped a blanket around his neck because he "wanted to feel something" and wanted to "turn off my brain." He stated that he did not do this with the intention to end his life. He stated he does not want to be alive, but cannot die because it would hurt his family. Will decrease Zoloft to 100 mg on 5/12 with the intention of cross-titrating to venlafaxine. Will continue Abilify 5 mg.    Impression: MDD vs Social Anxiety disorder with Panic attacks vs Gender dysphoria, R/O bipolar    #MDD #Gender dysphoria #Social Anxiety Disorder #Panic attacks  - 9.39 legals changed to 9.14  - Continue Abilify 5 mg 05/08  - Cont Sertraline to 200 mg (starting 05/02), decreasing Sertraline to 100 mg on 5/12 with the intention of cross-titrating to venlafaxine  - Start Venlafaxine 75 mg (starting 05/12)  - Klonopin 0.5 qhs for panic attacks  - Melatonin for sleep phase disorder  - Left voicemail with Dr. Vincent at [facility], [phone]  - Left VM with  Kandice Acosta who work s with LGBTQ community for connection to trans affirming care  - Left  Teaagn Escobar [phone], patient's therapist  - Referral on discharge to the [employer] [facility] (suicide hotline) and [facility]  - TSH WNL    #Panic attacks  - PRN Atarax  - Continue Klonopin to 0.25mg PRN daily, will increase Klonipin to 0.5 mg PRN starting 5/11 PRN daily  - Continue to monitor    #Agitation  - For episodes of acute agitation can offer PO Haldol 5 mg/Ativan 2 mg/Benadryl 50 mg Q6H PRN. If refusing PO and is in acute risk of harm to self/other, can offer IM Haldol 5 mg/Ativan 2 mg/Benadryl 50 mg Q6H PRN. If given, please repeat EKG and hold antipsychotics if QTC>500

## 2023-05-11 NOTE — BH INPATIENT PSYCHIATRY PROGRESS NOTE - CURRENT MEDICATION
MEDICATIONS  (STANDING):  ARIPiprazole 5 milliGRAM(s) Oral daily  melatonin. 3 milliGRAM(s) Oral at bedtime    MEDICATIONS  (PRN):  clonazePAM  Tablet 0.5 milliGRAM(s) Oral once PRN anxiety  clonazePAM  Tablet 0.5 milliGRAM(s) Oral daily PRN panic attack/anxiety  haloperidol     Tablet 5 milliGRAM(s) Oral every 6 hours PRN agitation  hydrOXYzine hydrochloride 50 milliGRAM(s) Oral every 6 hours PRN anxiety  LORazepam     Tablet 2 milliGRAM(s) Oral every 6 hours PRN agitation   MEDICATIONS  (STANDING):  ARIPiprazole 5 milliGRAM(s) Oral daily  melatonin. 3 milliGRAM(s) Oral at bedtime    MEDICATIONS  (PRN):  clonazePAM  Tablet 0.5 milliGRAM(s) Oral daily PRN panic attack/anxiety  haloperidol     Tablet 5 milliGRAM(s) Oral every 6 hours PRN agitation  hydrOXYzine hydrochloride 50 milliGRAM(s) Oral every 6 hours PRN anxiety  LORazepam     Tablet 2 milliGRAM(s) Oral every 6 hours PRN agitation

## 2023-05-11 NOTE — BH INPATIENT PSYCHIATRY PROGRESS NOTE - PRN MEDS
MEDICATIONS  (PRN):  clonazePAM  Tablet 0.5 milliGRAM(s) Oral once PRN anxiety  clonazePAM  Tablet 0.5 milliGRAM(s) Oral daily PRN panic attack/anxiety  haloperidol     Tablet 5 milliGRAM(s) Oral every 6 hours PRN agitation  hydrOXYzine hydrochloride 50 milliGRAM(s) Oral every 6 hours PRN anxiety  LORazepam     Tablet 2 milliGRAM(s) Oral every 6 hours PRN agitation   MEDICATIONS  (PRN):  clonazePAM  Tablet 0.5 milliGRAM(s) Oral daily PRN panic attack/anxiety  haloperidol     Tablet 5 milliGRAM(s) Oral every 6 hours PRN agitation  hydrOXYzine hydrochloride 50 milliGRAM(s) Oral every 6 hours PRN anxiety  LORazepam     Tablet 2 milliGRAM(s) Oral every 6 hours PRN agitation

## 2023-05-11 NOTE — BH INPATIENT PSYCHIATRY PROGRESS NOTE - NSBHMETABOLIC_PSY_ALL_CORE_FT
BMI: BMI (kg/m2): 32.5 (04-18-23 @ 05:33)  HbA1c: A1C with Estimated Average Glucose Result: 5.7 % (05-06-23 @ 08:53)    Glucose:   BP: 115/84 (05-11-23 @ 08:09) (111/72 - 122/82)  Lipid Panel: Date/Time: 05-06-23 @ 08:53  Cholesterol, Serum: 206  Direct LDL: --  HDL Cholesterol, Serum: 47  Total Cholesterol/HDL Ration Measurement: --  Triglycerides, Serum: 131

## 2023-05-11 NOTE — BH INPATIENT PSYCHIATRY PROGRESS NOTE - NSBHATTESTCOMMENTATTENDFT_PSY_A_CORE
The patient is a 18 yo transgender F->M, Norwegian-speaking, recently immigrated from [place], domiciled with brother, unemployed, with psych hx of gender dysphoria, anxiety and depression, no past psychiatric hospitalizations, previous suicide attempt via choking, who is presenting at recommendation of brother and cousin for increasing SI.  I agree with the above assessment and plan. Patient was seen and assessed, case was discussed with resident including assessment and plan.

## 2023-05-11 NOTE — BH INPATIENT PSYCHIATRY PROGRESS NOTE - NSBHFUPINTERVALHXFT_PSY_A_CORE
Nursing reports that pt told family member visiting that he tried to self harm by wrapping a blanket around his neck.     Chart reviewed, patient seen and evaluated in AM. On approach, pt is sitting comfortably in dayroom and escorts team to his room to speak in private. In his room, pt sits comfortably on bed, smiles intermittently throughout the interview. Patient reports no change in mood since yesterday, no panic attacks, and no self harm. Patient endorsed OK sleep and appetite. Continues to endorse passive SI and state that family is his only reason to stay alive. States that he would feel comfortable going back home and that he is looking forward to cooking at home and being with his brother.   Nursing reports that pt told family member visiting that he tried to self harm by wrapping a blanket around his neck. Klonipin ordered at 10 AM today.    Chart reviewed, patient seen and evaluated in AM. On approach, pt is standing in dayroom and accompanies team to his room. In his room, pt sits on bed looking distressed. Throughout the interview, pt's distress continues to increase, to the point that he breaks the rubber band in his hand and requests another. Patient admits to wrapping a blanket around his neck and states that he did it in order "to feel something" and "to try to turn off my brain." States that he wants to die, but he cannot because of his family, so he feels like he is "living in hell." When asked if he would like to stay in the unit or be discharged, he said that he feels the same no matter where he goes, but consented to be kept in the unit for longer. He states that when he is at home, he feels okay when he is cooking, talking to his mom, or doing something to keep himself distracted, but when he is not doing anything to keep himself distracted, his thoughts of self hatred and passive SI worsen. He stated that his current medication regimen has made him feel a little better, but not much. When asked how long he has had these feelings of self hatred, he stated that he cannot remember a time when he did not have them.    Nursing reports that pt told family member visiting that he tried to self harm by wrapping a blanket around his neck. Klonipin 0.5 mg ordered at 10 AM today as STAT dose for anxiety.    Chart reviewed, patient seen and evaluated in AM. On approach, pt is standing in dayroom and accompanies team to his room. In his room, the pt sits on his bed looking distressed, dysthymic and constricted. Throughout the interview, pt's distress continues to increase, to the point that he breaks the rubber band in his hand and requests another. Patient admits to wrapping a blanket around his neck and states that he did it in order "to feel something" and "to try to turn off my brain." States that he wants to die, but he cannot because of his family, so he feels like he is "living in hell." When asked if he would like to stay in the unit or be discharged, he said that he feels the same no matter where he goes, but consented to be kept in the unit for longer. He states that when he is at home, he feels okay when he is cooking, talking to his mom, or doing something to keep himself distracted, but when he is not doing anything to keep himself distracted, his thoughts of self hatred and passive SI worsen. He stated he feels a bit ambivalent about having a 1:1 in place because he's worried that when he wants to walk around he's causing issues for the 1:1, but he does not feel safe from attempting self harm again. He stated that his current medication regimen has made him feel a little better, but not much. When asked how long he has had these feelings of self hatred, he stated that he cannot remember a time when he did not have them.     His 1:1 found a note written yesterday, directed towards his family, stating that he hates himself, that he laments being part of the world, and that he is not strong like his family say he is.

## 2023-05-11 NOTE — BH INPATIENT PSYCHIATRY PROGRESS NOTE - OTHER
Passive SI Repeats several times that he does not pertain to this world and that he does not want to be alive Passive SI, ruminating on thoughts of self hatred and feeling like he does not have a place in this world More depressed and anxious that upon prior assessment

## 2023-05-11 NOTE — BH INPATIENT PSYCHIATRY PROGRESS NOTE - NSBHCHARTREVIEWVS_PSY_A_CORE FT
Vital Signs Last 24 Hrs  T(C): 36.8 (05-11-23 @ 08:09), Max: 36.8 (05-10-23 @ 15:50)  T(F): 98.2 (05-11-23 @ 08:09), Max: 98.3 (05-10-23 @ 15:50)  HR: 99 (05-11-23 @ 08:09) (99 - 122)  BP: 115/84 (05-11-23 @ 08:09) (115/84 - 122/82)  BP(mean): --  RR: 18 (05-11-23 @ 08:09) (16 - 18)  SpO2: --

## 2023-05-12 RX ADMIN — ARIPIPRAZOLE 5 MILLIGRAM(S): 15 TABLET ORAL at 08:33

## 2023-05-12 RX ADMIN — Medication 75 MILLIGRAM(S): at 08:34

## 2023-05-12 RX ADMIN — SERTRALINE 100 MILLIGRAM(S): 25 TABLET, FILM COATED ORAL at 08:33

## 2023-05-12 RX ADMIN — Medication 3 MILLIGRAM(S): at 20:17

## 2023-05-12 NOTE — BH INPATIENT PSYCHIATRY PROGRESS NOTE - OTHER
Repeats several times that he does not pertain to this world and that he does not want to be alive More depressed and anxious that upon prior assessment Passive SI, ruminating on thoughts of self hatred and feeling like he does not have a place in this world Depression and anxiety seem mildly improved compared to prior assessment. at times when anxious has difficulty with word finding Increased repetitive hand movements, increased playing with rubberband

## 2023-05-12 NOTE — BH INPATIENT PSYCHIATRY PROGRESS NOTE - CURRENT MEDICATION
MEDICATIONS  (STANDING):  ARIPiprazole 5 milliGRAM(s) Oral daily  melatonin. 3 milliGRAM(s) Oral at bedtime  sertraline 100 milliGRAM(s) Oral daily  venlafaxine XR 75 milliGRAM(s) Oral once    MEDICATIONS  (PRN):  clonazePAM  Tablet 0.5 milliGRAM(s) Oral daily PRN panic attack/anxiety  haloperidol     Tablet 5 milliGRAM(s) Oral every 6 hours PRN agitation  hydrOXYzine hydrochloride 50 milliGRAM(s) Oral every 6 hours PRN anxiety  LORazepam     Tablet 2 milliGRAM(s) Oral every 6 hours PRN agitation   MEDICATIONS  (STANDING):  ARIPiprazole 5 milliGRAM(s) Oral daily  melatonin. 3 milliGRAM(s) Oral at bedtime    MEDICATIONS  (PRN):  clonazePAM  Tablet 0.5 milliGRAM(s) Oral daily PRN panic attack/anxiety  haloperidol     Tablet 5 milliGRAM(s) Oral every 6 hours PRN agitation  hydrOXYzine hydrochloride 50 milliGRAM(s) Oral every 6 hours PRN anxiety  LORazepam     Tablet 2 milliGRAM(s) Oral every 6 hours PRN agitation

## 2023-05-12 NOTE — BH INPATIENT PSYCHIATRY PROGRESS NOTE - NSBHCHARTREVIEWVS_PSY_A_CORE FT
Vital Signs Last 24 Hrs  T(C): 35.7 (05-11-23 @ 15:50), Max: 36.8 (05-11-23 @ 08:09)  T(F): 96.2 (05-11-23 @ 15:50), Max: 98.2 (05-11-23 @ 08:09)  HR: 117 (05-11-23 @ 15:50) (99 - 117)  BP: 133/93 (05-11-23 @ 15:50) (115/84 - 133/93)  BP(mean): --  RR: 16 (05-11-23 @ 15:50) (16 - 18)  SpO2: --     Vital Signs Last 24 Hrs  T(C): 36.1 (05-12-23 @ 07:58), Max: 36.1 (05-12-23 @ 07:58)  T(F): 96.9 (05-12-23 @ 07:58), Max: 96.9 (05-12-23 @ 07:58)  HR: 133 (05-12-23 @ 07:58) (117 - 133)  BP: 116/72 (05-12-23 @ 07:58) (116/72 - 133/93)  BP(mean): --  RR: 18 (05-12-23 @ 07:58) (16 - 18)  SpO2: --

## 2023-05-12 NOTE — BH INPATIENT PSYCHIATRY PROGRESS NOTE - PRN MEDS
MEDICATIONS  (PRN):  clonazePAM  Tablet 0.5 milliGRAM(s) Oral daily PRN panic attack/anxiety  haloperidol     Tablet 5 milliGRAM(s) Oral every 6 hours PRN agitation  hydrOXYzine hydrochloride 50 milliGRAM(s) Oral every 6 hours PRN anxiety  LORazepam     Tablet 2 milliGRAM(s) Oral every 6 hours PRN agitation

## 2023-05-12 NOTE — BH INPATIENT PSYCHIATRY PROGRESS NOTE - NSBHMETABOLIC_PSY_ALL_CORE_FT
BMI: BMI (kg/m2): 32.5 (04-18-23 @ 05:33)  HbA1c: A1C with Estimated Average Glucose Result: 5.7 % (05-06-23 @ 08:53)    Glucose:   BP: 133/93 (05-11-23 @ 15:50) (115/84 - 133/93)  Lipid Panel: Date/Time: 05-06-23 @ 08:53  Cholesterol, Serum: 206  Direct LDL: --  HDL Cholesterol, Serum: 47  Total Cholesterol/HDL Ration Measurement: --  Triglycerides, Serum: 131   BMI: BMI (kg/m2): 32.5 (04-18-23 @ 05:33)  HbA1c: A1C with Estimated Average Glucose Result: 5.7 % (05-06-23 @ 08:53)    Glucose:   BP: 116/72 (05-12-23 @ 07:58) (115/84 - 133/93)  Lipid Panel: Date/Time: 05-06-23 @ 08:53  Cholesterol, Serum: 206  Direct LDL: --  HDL Cholesterol, Serum: 47  Total Cholesterol/HDL Ration Measurement: --  Triglycerides, Serum: 131

## 2023-05-12 NOTE — BH INPATIENT PSYCHIATRY PROGRESS NOTE - NSBHFUPINTERVALHXFT_PSY_A_CORE
Nursing reports no acute events overnight. Per chart review the patient has not required any behavioral PRNS since the last assessment.    Chart reviewed, patient seen and evaluated in AM. On approach, .... Patient reports ________________.   Patient endorsed OK sleep and appetite.     No SI/HI/AVH elicited.  Nursing reports no acute events overnight. Per chart review the patient has not required any behavioral PRNS since the last assessment.    Chart reviewed, patient seen and evaluated in AM. On approach, pt is sitting in dayroom with 1:1, watching TV. Patient reports no panic attacks yesterday, but still reports that he feels "sad" and that he feels as though he is "descending into darkness". Team explained adjustments to his medication regimen. Pt was notably less anxious compared to last conversation and was playing with rubber band less vigorously, but still had depressed affect and did not smile at team like he had done during previously interactions.      Nursing reports no acute events overnight. Per chart review the patient has not required any behavioral PRNS since the last assessment.    Chart reviewed, patient seen and evaluated in AM. On approach, pt is sitting in dayroom with 1:1, watching TV. Patient reports no panic attacks yesterday, but still reports that he feels "sad" and that he feels as though he is "descending into darkness". Team explained adjustments to his medication regimen. Pt was notably less anxious compared to last conversation and was playing with rubber band less vigorously, but still had depressed affect and did not smile at team like he had done during previously interactions.    Patient was re-evaluated with the attending present. He appeared notably more anxious during this interview, was playing much more vigorously with his rubber band (which broke during the interview), and had increased self grooming behaviors (touching hair) and repetitive anxious hand movements. He spoke about his self hatred and his feeling that he does not have "a place in this world".

## 2023-05-12 NOTE — BH INPATIENT PSYCHIATRY PROGRESS NOTE - NSBHATTESTCOMMENTATTENDFT_PSY_A_CORE
The patient is a 18 yo transgender F->M, Northern Irish-speaking, recently immigrated from [place], domiciled with brother, unemployed, with psych hx of gender dysphoria, anxiety and depression, no past psychiatric hospitalizations, previous suicide attempt via choking, who is presenting at recommendation of brother and cousin for increasing SI.  I agree with the above assessment and plan. Patient was seen and assessed, case was discussed with resident including assessment and plan.  The patient is a 16 yo transgender F->M, Bangladeshi-speaking, recently immigrated from [place], domiciled with brother, unemployed, with psych hx of gender dysphoria, anxiety and depression, no past psychiatric hospitalizations, previous suicide attempt via choking, who is presenting at recommendation of brother and cousin for increasing SI. The patient's recent arrival in the , the multiple factors contributing to isolation as well as dislocation, pose a significant challenge. We need a better understanding of the patient's social environment outside the hospital - to which he will be returning - in order to help the patient create a safe discharge plan with a path toward better adaptation and recovery. DBT skills may be helpful (Distress Tolerance and Interpersonal Effectiveness) as well as socialization with peers pursuing gender transitions.   I agree with the above assessment and plan. Patient was seen and assessed, case was discussed with resident including assessment and plan.

## 2023-05-12 NOTE — BH INPATIENT PSYCHIATRY PROGRESS NOTE - NSBHASSESSSUMMFT_PSY_ALL_CORE
The patient is a 16 yo transgender F->M, Swedish-speaking, recently immigrated from [place], domiciled with brother, unemployed, with psych hx of gender dysphoria, anxiety and depression, no past psychiatric hospitalizations, previous suicide attempt via choking, who is presenting at recommendation of brother and cousin for increasing SI.    Upon admission patient endorses strong feelings of depression and exhibits very dysphoric and minimally reactive affect. He endorses escalating suicidal thoughts related to feelings of worthlessness and unhappiness over his appearance. During the course of his hospitalization, his mood and affect has improved, he has become more talkative. . Of note, the patient's brother recently lost his job and the patient has been feeling like he has a duty to get discharged in order to find a job and help financially. He is therefore at an increased risk of minimizing his symptoms. The patient was admitted involuntarily on 9:39 legals as he has been acutely elevated risk of suicide, now changed to voluntary legals. Chronic risk factors include history of trauma, history of persistent depressive symptoms,   and history of suicidal ideation. Current risk factors include recent self inflicted choking, age, recent immigration, not understanding/speaking well language of current residence, current gender dysphoria, current social anxiety, current panic attacks and financial stressors. Goals of care will be to decrease patient's suicidality as his current risk factors place him at acutely elevated risk. Requires continued hospitalization for stabilization.    04/18/23- Patient acutely dysphoric. Plan to dc mirtazapine as he notes excessive lethargy and hyperphagia, continue sertraline 50 mg  04/19/2023  Patient remains with thoughts of suicide and concerns about his future, however he feels safe while in a structured setting with people around him  04/20/23 Patient had no thoughts of suicide during interview, appeared brighter and occasionally smiled. Agreeable to increase antidepressants  04/24 Patient notes panic attack last night, alleviated talking with cousin. Brighter, future oriented. Endorses social anxiety.  04/25/23 Patient did not have panic attack in last 24 hrs. Still appears brighter on unit and during interview today. Voiced that his social anxiety hinders his ability to advocate for himself while on unit  04/26   Patient is somewhat less anxious,  spending  time outside his room and is cheerful at times.  04/27/23 Patient notes still having intermittent anxiety, however he is now able to leave his room and his speech is much more productive. Affect continues to be more supple than on initial presentation. Plan to increase Zoloft to 150 mg 04/28, with eventual plan to maximize it to treat his social anxiety disorder.  04/28 Yesterday patient had another panic attack when bullied about his gender expression and received Klonopin and Atarax PRN. Though he states his mood is "good" and has a more supple affect than on initial presentation, he continues to endorse feelings of self hatred (which he believes may be alleviated by gender affirmative care) as well as wish to no longer be alive (though no active SI)  05/01 Patient continues to endorse passive SI, appeared anxious though smiling during interview. Reported panic attack yesterday. Ok with plan to increase Zoloft to 200 mg  05/02 Pt received first dose of 200 mg Zoloft. Had panic attack yesterday triggered by existential doubts and feelings of self hatred, which resulted in him hitting himself. Today less anxious but continues to endorse ambivalence about wanting to be alive.  5/3/23   patient had significant panic attack accompanied by dread and thoughts of self destruction.  When asked if suicidal patient replies I cannot kill myself because of my family.  05/04- Acutely anxious, more so than previously (however interviewed in large group), will add 2 mg abilify. Vickie in process of enrolling [facility] as patient in [facility]  5/5 - Pt endorses panic attack the evening before during which he hit himself in the stomach and arms and banged his head on the wall due to self hatred and feeling like he does not have a place in this world. Created signs with patient that he can use to communicate with staff nonverbally; pt stated that he will try his best to use them. Does not report adverse effects from Abilify.  5/8- Pt denies any panic attacks over the weekend. Pt states that he did not need to use the signs that he created together with the team to communicate with staff. Continues to endorse passive SI and states that his reason for living is his family, but other than that, he does not feel like he has a reason to live. Abilify increased to 5 mg  5/9- Pt denies any panic attacks since yesterday, but continues to endorse passive SI and state that his family is his only reason for living. Will continue Abilify 5 mg.  5/10- Pt states that he had a panic attack the evening prior, during which he punched himself in the stomach and banged his head against the wall. Stated again that he does not want to be alive and does not have a place in this world.  5/11- Pt stated that yesterday, he wrapped a blanket around his neck because he "wanted to feel something" and wanted to "turn off my brain." He stated that he did not do this with the intention to end his life. He stated he does not want to be alive, but cannot die because it would hurt his family. Will decrease Zoloft to 100 mg on 5/12 with the intention of cross-titrating to venlafaxine. Will continue Abilify 5 mg.    Impression: MDD vs Social Anxiety disorder with Panic attacks vs Gender dysphoria, R/O bipolar    #MDD #Gender dysphoria #Social Anxiety Disorder #Panic attacks  - 9.39 legals changed to 9.14  - Continue Abilify 5 mg 05/08  - Cont Sertraline to 200 mg (starting 05/02), decreasing Sertraline to 100 mg on 5/12 with the intention of cross-titrating to venlafaxine  - Start Venlafaxine 75 mg (starting 05/12)  - Klonopin 0.5 qhs for panic attacks  - Melatonin for sleep phase disorder  - Left voicemail with [facility]. Vincent at [facility], [phone]  - Left VM with  Kandice Acosta who work s with LGBTQ community for connection to trans affirming care  - Left  Teagan Escobar [phone], patient's therapist  - Referral on discharge to the [facility] (suicide hotline) and [facility]  - TSH WNL    #Panic attacks  - PRN Atarax  - Continue Klonopin to 0.25mg PRN daily, will increase Klonipin to 0.5 mg PRN starting 5/11 PRN daily  - Continue to monitor    #Agitation  - For episodes of acute agitation can offer PO Haldol 5 mg/Ativan 2 mg/Benadryl 50 mg Q6H PRN. If refusing PO and is in acute risk of harm to self/other, can offer IM Haldol 5 mg/Ativan 2 mg/Benadryl 50 mg Q6H PRN. If given, please repeat EKG and hold antipsychotics if QTC>500   The patient is a 18 yo transgender F->M, Yi-speaking, recently immigrated from [place], domiciled with brother, unemployed, with psych hx of gender dysphoria, anxiety and depression, no past psychiatric hospitalizations, previous suicide attempt via choking, who is presenting at recommendation of brother and cousin for increasing SI.    Upon admission patient endorses strong feelings of depression and exhibits very dysphoric and minimally reactive affect. He endorses escalating suicidal thoughts related to feelings of worthlessness and unhappiness over his appearance. During the course of his hospitalization, his mood and affect has improved, he has become more talkative. . Of note, the patient's brother recently lost his job and the patient has been feeling like he has a duty to get discharged in order to find a job and help financially. He is therefore at an increased risk of minimizing his symptoms. The patient was admitted involuntarily on 9:39 legals as he has been acutely elevated risk of suicide, now changed to voluntary legals. Chronic risk factors include history of trauma, history of persistent depressive symptoms,   and history of suicidal ideation. Current risk factors include recent self inflicted choking, age, recent immigration, not understanding/speaking well language of current residence, current gender dysphoria, current social anxiety, current panic attacks and financial stressors. Goals of care will be to decrease patient's suicidality as his current risk factors place him at acutely elevated risk. Requires continued hospitalization for stabilization.    04/18/23- Patient acutely dysphoric. Plan to dc mirtazapine as he notes excessive lethargy and hyperphagia, continue sertraline 50 mg  04/19/2023  Patient remains with thoughts of suicide and concerns about his future, however he feels safe while in a structured setting with people around him  04/20/23 Patient had no thoughts of suicide during interview, appeared brighter and occasionally smiled. Agreeable to increase antidepressants  04/24 Patient notes panic attack last night, alleviated talking with cousin. Brighter, future oriented. Endorses social anxiety.  04/25/23 Patient did not have panic attack in last 24 hrs. Still appears brighter on unit and during interview today. Voiced that his social anxiety hinders his ability to advocate for himself while on unit  04/26   Patient is somewhat less anxious,  spending  time outside his room and is cheerful at times.  04/27/23 Patient notes still having intermittent anxiety, however he is now able to leave his room and his speech is much more productive. Affect continues to be more supple than on initial presentation. Plan to increase Zoloft to 150 mg 04/28, with eventual plan to maximize it to treat his social anxiety disorder.  04/28 Yesterday patient had another panic attack when bullied about his gender expression and received Klonopin and Atarax PRN. Though he states his mood is "good" and has a more supple affect than on initial presentation, he continues to endorse feelings of self hatred (which he believes may be alleviated by gender affirmative care) as well as wish to no longer be alive (though no active SI)  05/01 Patient continues to endorse passive SI, appeared anxious though smiling during interview. Reported panic attack yesterday. Ok with plan to increase Zoloft to 200 mg  05/02 Pt received first dose of 200 mg Zoloft. Had panic attack yesterday triggered by existential doubts and feelings of self hatred, which resulted in him hitting himself. Today less anxious but continues to endorse ambivalence about wanting to be alive.  5/3/23   patient had significant panic attack accompanied by dread and thoughts of self destruction.  When asked if suicidal patient replies I cannot kill myself because of my family.  05/04- Acutely anxious, more so than previously (however interviewed in large group), will add 2 mg abilify. Vickie in process of enrolling Radames as patient in [facility]  5/5 - Pt endorses panic attack the evening before during which he hit himself in the stomach and arms and banged his head on the wall due to self hatred and feeling like he does not have a place in this world. Created signs with patient that he can use to communicate with staff nonverbally; pt stated that he will try his best to use them. Does not report adverse effects from Abilify.  5/8- Pt denies any panic attacks over the weekend. Pt states that he did not need to use the signs that he created together with the team to communicate with staff. Continues to endorse passive SI and states that his reason for living is his family, but other than that, he does not feel like he has a reason to live. Abilify increased to 5 mg  5/9- Pt denies any panic attacks since yesterday, but continues to endorse passive SI and state that his family is his only reason for living. Will continue Abilify 5 mg.  5/10- Pt states that he had a panic attack the evening prior, during which he punched himself in the stomach and banged his head against the wall. Stated again that he does not want to be alive and does not have a place in this world.  5/11- Pt stated that yesterday, he wrapped a blanket around his neck because he "wanted to feel something" and wanted to "turn off my brain." He stated that he did not do this with the intention to end his life. He stated he does not want to be alive, but cannot die because it would hurt his family. Will decrease Zoloft to 100 mg on 5/12 with the intention of cross-titrating to venlafaxine. Will continue Abilify 5 mg.  5/12 - Pt reported no panic attacks or attempts at self-harm since last conversation. Stated that he "feels sad" and like he is "descending into darkness." Explained to pt that we would be switching from Zoloft to Effexor. Gave Zoloft 100 mg today, will also be starting Effexor 75 mg today.     Impression: MDD vs Social Anxiety disorder with Panic attacks vs Gender dysphoria, R/O bipolar    #MDD #Gender dysphoria #Social Anxiety Disorder #Panic attacks  - 9.39 legals changed to 9.14  - Continue Abilify 5 mg 05/08  - Cont Sertraline to 200 mg (starting 05/02), decreasing Sertraline to 100 mg on 5/12, decrease Sertraline to 50 mg on 5/13  - Start Venlafaxine 75 mg (starting 05/12), increase to Venlafaxine 112.5 mg on 5/13  - Klonopin 0.5 qhs for panic attacks  - Melatonin for sleep phase disorder  - Left voicemail with Dr. Vincent at [facility], [phone]  - Left VM with  Kandice Acosta who work s with LGBTQ community for connection to trans affirming care  - Left  Teagan Escobar [phone], patient's therapist  - Referral on discharge to the [facility] (suicide hotline) and [facility]  - TSH WNL    #Panic attacks  - PRN Atarax  - Continue Klonopin to 0.25mg PRN daily, will increase Klonipin to 0.5 mg PRN starting 5/11 PRN daily  - Continue to monitor    #Agitation  - For episodes of acute agitation can offer PO Haldol 5 mg/Ativan 2 mg/Benadryl 50 mg Q6H PRN. If refusing PO and is in acute risk of harm to self/other, can offer IM Haldol 5 mg/Ativan 2 mg/Benadryl 50 mg Q6H PRN. If given, please repeat EKG and hold antipsychotics if QTC>500

## 2023-05-13 PROCEDURE — 99231 SBSQ HOSP IP/OBS SF/LOW 25: CPT | Mod: GC

## 2023-05-13 RX ADMIN — SERTRALINE 50 MILLIGRAM(S): 25 TABLET, FILM COATED ORAL at 08:39

## 2023-05-13 RX ADMIN — Medication 112.5 MILLIGRAM(S): at 08:40

## 2023-05-13 RX ADMIN — ARIPIPRAZOLE 5 MILLIGRAM(S): 15 TABLET ORAL at 08:39

## 2023-05-13 RX ADMIN — Medication 3 MILLIGRAM(S): at 20:07

## 2023-05-13 NOTE — BH INPATIENT PSYCHIATRY PROGRESS NOTE - NSBHCHARTREVIEWVS_PSY_A_CORE FT
Vital Signs Last 24 Hrs  T(C): 36.1 (05-13-23 @ 10:01), Max: 36.1 (05-13-23 @ 10:01)  T(F): 96.9 (05-13-23 @ 10:01), Max: 96.9 (05-13-23 @ 10:01)  HR: 126 (05-13-23 @ 10:01) (112 - 126)  BP: 108/69 (05-13-23 @ 10:01) (108/69 - 116/78)  BP(mean): --  RR: 18 (05-13-23 @ 10:01) (18 - 18)  SpO2: --

## 2023-05-13 NOTE — BH INPATIENT PSYCHIATRY PROGRESS NOTE - NSBHFUPINTERVALHXFT_PSY_A_CORE
Nursing reports no acute events overnight. Per chart review the patient has not required any behavioral PRNs since the last assessment.    Chart reviewed, patient seen and evaluated in AM. On approach, pt is sitting in dayroom with 1:1, watching TV. Patient reports no panic attacks yesterday, but still reports that he feels "sad" and that he feels as though he is "descending into darkness". Team explained adjustments to his medication regimen. Pt was notably less anxious compared to last conversation and was playing with rubber band less vigorously, but still had depressed affect and did not smile at team like he had done during previously interactions.    Patient was re-evaluated with the attending present. He appeared notably more anxious during this interview, was playing much more vigorously with his rubber band (which broke during the interview), and had increased self grooming behaviors (touching hair) and repetitive anxious hand movements. He spoke about his self hatred and his feeling that he does not have "a place in this world".      Nursing reports no acute events overnight. Per chart review the patient has not required any behavioral PRNs since the last assessment.    Patient interviewed in Finnish by writer. Patient describes mood is "not too bad, not too good." Describes having thoughts of death that are worse when he sits alone in his room. When sitting in day room and watching tv or doing word search describes being able to distract self enough and not have negative thoughts. Denies intent/desire to kill self, but reports thoughts of overdosing on pills and cutting veins to kill self. Thoughts of family and not wanting to hurt them prevents him from wanting to kill self/acting on thoughts.    Reports he is sleeping, though feels tired upon awakening and is eating ok. Visible on unit in day room and attending groups. Notable less rubber band pulling and use compared to last evaluation with this writer. Smiles, brighter affect.

## 2023-05-13 NOTE — BH INPATIENT PSYCHIATRY PROGRESS NOTE - CURRENT MEDICATION
MEDICATIONS  (STANDING):  ARIPiprazole 5 milliGRAM(s) Oral daily  melatonin. 3 milliGRAM(s) Oral at bedtime  sertraline 50 milliGRAM(s) Oral daily  venlafaxine .5 milliGRAM(s) Oral daily    MEDICATIONS  (PRN):  clonazePAM  Tablet 0.5 milliGRAM(s) Oral daily PRN panic attack/anxiety  haloperidol     Tablet 5 milliGRAM(s) Oral every 6 hours PRN agitation  hydrOXYzine hydrochloride 50 milliGRAM(s) Oral every 6 hours PRN anxiety  LORazepam     Tablet 2 milliGRAM(s) Oral every 6 hours PRN agitation

## 2023-05-13 NOTE — BH INPATIENT PSYCHIATRY PROGRESS NOTE - NSBHASSESSSUMMFT_PSY_ALL_CORE
The patient is a 18 yo transgender F->M, Sinhala-speaking, recently immigrated from [place], domiciled with brother, unemployed, with psych hx of gender dysphoria, anxiety and depression, no past psychiatric hospitalizations, previous suicide attempt via choking, who is presenting at recommendation of brother and cousin for increasing SI.    Upon admission patient endorses strong feelings of depression and exhibits very dysphoric and minimally reactive affect. He endorses escalating suicidal thoughts related to feelings of worthlessness and unhappiness over his appearance. During the course of his hospitalization, his mood and affect has improved, he has become more talkative. . Of note, the patient's brother recently lost his job and the patient has been feeling like he has a duty to get discharged in order to find a job and help financially. He is therefore at an increased risk of minimizing his symptoms. The patient was admitted involuntarily on 9:39 legals as he has been acutely elevated risk of suicide, now changed to voluntary legals. Chronic risk factors include history of trauma, history of persistent depressive symptoms,   and history of suicidal ideation. Current risk factors include recent self inflicted choking, age, recent immigration, not understanding/speaking well language of current residence, current gender dysphoria, current social anxiety, current panic attacks and financial stressors. Goals of care will be to decrease patient's suicidality as his current risk factors place him at acutely elevated risk. Requires continued hospitalization for stabilization.    04/18/23- Patient acutely dysphoric. Plan to dc mirtazapine as he notes excessive lethargy and hyperphagia, continue sertraline 50 mg  04/19/2023  Patient remains with thoughts of suicide and concerns about his future, however he feels safe while in a structured setting with people around him  04/20/23 Patient had no thoughts of suicide during interview, appeared brighter and occasionally smiled. Agreeable to increase antidepressants  04/24 Patient notes panic attack last night, alleviated talking with cousin. Brighter, future oriented. Endorses social anxiety.  04/25/23 Patient did not have panic attack in last 24 hrs. Still appears brighter on unit and during interview today. Voiced that his social anxiety hinders his ability to advocate for himself while on unit  04/26   Patient is somewhat less anxious,  spending  time outside his room and is cheerful at times.  04/27/23 Patient notes still having intermittent anxiety, however he is now able to leave his room and his speech is much more productive. Affect continues to be more supple than on initial presentation. Plan to increase Zoloft to 150 mg 04/28, with eventual plan to maximize it to treat his social anxiety disorder.  04/28 Yesterday patient had another panic attack when bullied about his gender expression and received Klonopin and Atarax PRN. Though he states his mood is "good" and has a more supple affect than on initial presentation, he continues to endorse feelings of self hatred (which he believes may be alleviated by gender affirmative care) as well as wish to no longer be alive (though no active SI)  05/01 Patient continues to endorse passive SI, appeared anxious though smiling during interview. Reported panic attack yesterday. Ok with plan to increase Zoloft to 200 mg  05/02 Pt received first dose of 200 mg Zoloft. Had panic attack yesterday triggered by existential doubts and feelings of self hatred, which resulted in him hitting himself. Today less anxious but continues to endorse ambivalence about wanting to be alive.  5/3/23   patient had significant panic attack accompanied by dread and thoughts of self destruction.  When asked if suicidal patient replies I cannot kill myself because of my family.  05/04- Acutely anxious, more so than previously (however interviewed in large group), will add 2 mg abilify. Vickie in process of enrolling [facility] as patient in [facility]  5/5 - Pt endorses panic attack the evening before during which he hit himself in the stomach and arms and banged his head on the wall due to self hatred and feeling like he does not have a place in this world. Created signs with patient that he can use to communicate with staff nonverbally; pt stated that he will try his best to use them. Does not report adverse effects from Abilify.  5/8- Pt denies any panic attacks over the weekend. Pt states that he did not need to use the signs that he created together with the team to communicate with staff. Continues to endorse passive SI and states that his reason for living is his family, but other than that, he does not feel like he has a reason to live. Abilify increased to 5 mg  5/9- Pt denies any panic attacks since yesterday, but continues to endorse passive SI and state that his family is his only reason for living. Will continue Abilify 5 mg.  5/10- Pt states that he had a panic attack the evening prior, during which he punched himself in the stomach and banged his head against the wall. Stated again that he does not want to be alive and does not have a place in this world.  5/11- Pt stated that yesterday, he wrapped a blanket around his neck because he "wanted to feel something" and wanted to "turn off my brain." He stated that he did not do this with the intention to end his life. He stated he does not want to be alive, but cannot die because it would hurt his family. Will decrease Zoloft to 100 mg on 5/12 with the intention of cross-titrating to venlafaxine. Will continue Abilify 5 mg.  5/12 - Pt reported no panic attacks or attempts at self-harm since last conversation. Stated that he "feels sad" and like he is "descending into darkness." Explained to pt that we would be switching from Zoloft to Effexor. Gave Zoloft 100 mg today, will also be starting Effexor 75 mg today.     Impression: MDD vs Social Anxiety disorder with Panic attacks vs Gender dysphoria, R/O bipolar    #MDD #Gender dysphoria #Social Anxiety Disorder #Panic attacks  - 9.39 legals changed to 9.14  - Continue Abilify 5 mg 05/08  - Cont Sertraline to 200 mg (starting 05/02), decreasing Sertraline to 100 mg on 5/12, decrease Sertraline to 50 mg on 5/13  - Start Venlafaxine 75 mg (starting 05/12), increase to Venlafaxine 112.5 mg on 5/13  - Klonopin 0.5 qhs for panic attacks  - Melatonin for sleep phase disorder  - Left voicemail with Dr. Vincent at [facility], [phone]  - Left VM with  Kandice Acosta who work s with LGBTQ community for connection to trans affirming care  - Left  Teagan Escobar [phone], patient's therapist  - Referral on discharge to [facility] (suicide hotline) and [facility]  - TSH WNL    #Panic attacks  - PRN Atarax  - Continue Klonopin to 0.25mg PRN daily, will increase Klonipin to 0.5 mg PRN starting 5/11 PRN daily  - Continue to monitor    #Agitation  - For episodes of acute agitation can offer PO Haldol 5 mg/Ativan 2 mg/Benadryl 50 mg Q6H PRN. If refusing PO and is in acute risk of harm to self/other, can offer IM Haldol 5 mg/Ativan 2 mg/Benadryl 50 mg Q6H PRN. If given, please repeat EKG and hold antipsychotics if QTC>500   The patient is a 18 yo transgender F->M, Maltese-speaking, recently immigrated from [place], domiciled with brother, unemployed, with psych hx of gender dysphoria, anxiety and depression, no past psychiatric hospitalizations, previous suicide attempt via choking, who is presenting at recommendation of brother and cousin for increasing suicidal ideation.    Patient reporting mood is "not too bad, not too good." Reports negative thoughts and passive suicidal ideation when mind is not occupied/distracted by watching tv or doing a word search. Protective factor for acting on suicidal ideation is family and not wanting to harm them. In contact with family. Appears brighter and less anxious compared to past evaluation with this writer. No rubber band snapping during evaluation. Continues to require inpt admission for stabilization.    Impression: MDD vs Social Anxiety disorder with Panic attacks vs Gender dysphoria, R/O bipolar    #MDD #Gender dysphoria #Social Anxiety Disorder #Panic attacks  - Continue Abilify 5 mg   - Cont Sertraline to 50 mg with plan to continue taper  - Continue Venlafaxine 112.5 mg daily  - Klonopin 0.5 qhs for panic attacks  - Melatonin for sleep phase disorder    #Panic attacks  - PRN Atarax  - Continue Klonipin to 0.5 mg PRN daily    #Agitation  - For episodes of acute agitation can offer PO Haldol 5 mg/Ativan 2 mg/Benadryl 50 mg Q6H PRN. If refusing PO and is in acute risk of harm to self/other, can offer IM Haldol 5 mg/Ativan 2 mg/Benadryl 50 mg Q6H PRN. If given, please repeat EKG and hold antipsychotics if QTC>500

## 2023-05-13 NOTE — BH INPATIENT PSYCHIATRY PROGRESS NOTE - NSBHMETABOLIC_PSY_ALL_CORE_FT
BMI: BMI (kg/m2): 32.5 (04-18-23 @ 05:33)  HbA1c: A1C with Estimated Average Glucose Result: 5.7 % (05-06-23 @ 08:53)    Glucose:   BP: 108/69 (05-13-23 @ 10:01) (108/69 - 133/93)  Lipid Panel: Date/Time: 05-06-23 @ 08:53  Cholesterol, Serum: 206  Direct LDL: --  HDL Cholesterol, Serum: 47  Total Cholesterol/HDL Ration Measurement: --  Triglycerides, Serum: 131

## 2023-05-13 NOTE — BH INPATIENT PSYCHIATRY PROGRESS NOTE - NSBHATTESTCOMMENTATTENDFT_PSY_A_CORE
The patient is a 18 yo transgender F->M, Tajik-speaking, recently immigrated from [place], domiciled with brother, unemployed, with psych hx of gender dysphoria, anxiety and depression, no past psychiatric hospitalizations, previous suicide attempt via choking, who is presenting at recommendation of brother and cousin for increasing SI. The patient's recent arrival in the , the multiple factors contributing to isolation as well as dislocation, pose a significant challenge. We need a better understanding of the patient's social environment outside the hospital - to which he will be returning - in order to help the patient create a safe discharge plan with a path toward better adaptation and recovery. DBT skills may be helpful (Distress Tolerance and Interpersonal Effectiveness) as well as socialization with peers pursuing gender transitions.   I agree with the above assessment and plan. Patient was seen and assessed, case was discussed with resident including assessment and plan.  Patient was interviewed with the resident DR. Buckley. Patient presents calm and cooperative, some rubber band pulling was observed during the interview but appears to be less intense. The patient is a 18 yo transgender F->M, Stateless-speaking, recently immigrated from [place], domiciled with brother, unemployed, with psych hx of gender dysphoria, anxiety and depression, no past psychiatric hospitalizations, previous suicide attempt via choking, who is presenting at recommendation of brother and cousin for increasing SI. The patient's recent arrival in the , the multiple factors contributing to isolation as well as dislocation, pose a significant challenge. We need a better understanding of the patient's social environment outside the hospital - to which he will be returning - in order to help the patient create a safe discharge plan with a path toward better adaptation and recovery. DBT skills may be helpful as well as socialization with peers pursuing gender transitions.   I agree with the above assessment and plan. Patient was seen and assessed, case was discussed with resident including assessment and plan.

## 2023-05-13 NOTE — BH INPATIENT PSYCHIATRY PROGRESS NOTE - OTHER
Passive SI, ruminating on thoughts of self hatred and feeling like he does not have a place in this world No Increased repetitive hand movements, increased playing with rubberband at times when anxious has difficulty with word finding Decreased repetitive hand movements compared to prior evaluation with writer, not playing with rubber band throughout interview. Passive SI, thoughts of overdosing or cutting veins.

## 2023-05-14 DIAGNOSIS — F41.9 ANXIETY DISORDER, UNSPECIFIED: ICD-10-CM

## 2023-05-14 PROCEDURE — 99231 SBSQ HOSP IP/OBS SF/LOW 25: CPT

## 2023-05-14 RX ADMIN — ARIPIPRAZOLE 5 MILLIGRAM(S): 15 TABLET ORAL at 08:26

## 2023-05-14 RX ADMIN — Medication 0.5 MILLIGRAM(S): at 18:25

## 2023-05-14 RX ADMIN — Medication 3 MILLIGRAM(S): at 20:59

## 2023-05-14 RX ADMIN — SERTRALINE 50 MILLIGRAM(S): 25 TABLET, FILM COATED ORAL at 08:26

## 2023-05-14 RX ADMIN — Medication 112.5 MILLIGRAM(S): at 08:27

## 2023-05-14 NOTE — BH INPATIENT PSYCHIATRY PROGRESS NOTE - OTHER
Decreased repetitive hand movements compared to prior evaluation with writer, not playing with rubber band throughout interview. at times when anxious has difficulty with word finding Passive SI, thoughts of overdosing or cutting veins.

## 2023-05-14 NOTE — BH INPATIENT PSYCHIATRY PROGRESS NOTE - NSBHMETABOLIC_PSY_ALL_CORE_FT
BMI: BMI (kg/m2): 32.5 (04-18-23 @ 05:33)  HbA1c: A1C with Estimated Average Glucose Result: 5.7 % (05-06-23 @ 08:53)    Glucose:   BP: 113/87 (05-14-23 @ 08:26) (108/69 - 133/93)  Lipid Panel: Date/Time: 05-06-23 @ 08:53  Cholesterol, Serum: 206  Direct LDL: --  HDL Cholesterol, Serum: 47  Total Cholesterol/HDL Ration Measurement: --  Triglycerides, Serum: 131

## 2023-05-14 NOTE — BH INPATIENT PSYCHIATRY PROGRESS NOTE - NSBHASSESSSUMMFT_PSY_ALL_CORE
At this time, patient continues to be considered a danger to herself and continues to need inpatient psychiatric hospitalization.       For depression and anxiety , Gender dysphoria:      - Discontinue Sertraline to 50 mg to complete taper   - Continue Abilify 5 mg  - Continue Effexor .5 mg P.O daily  - Continue Klonopin 0.5 P.O Q bedtime   - Continue Melatonin 3mg P.o Q bedtime  - Continue Klonopin to 0.5 mg P.O daily PRN  Lexii Vaughan is 17 year old transgender man F->M with a history of Gender dysphoria, anxiety and depression, who was admitted to the psychiatric floor for worsening suicidal ideations.   Patient continues to be depressed and although she denies current suicidal ideations , intent or plan today , patient continues to be preoccupied with not wanting to be alive .   At this time, patient continues to be a danger to herself and continues to need inpatient psychiatric hospitalization for medication management and symptom stabilization.     Plan  For depression and anxiety , Gender dysphoria:      - Discontinue Sertraline to 50 mg to complete taper   - Continue Abilify 5 mg  - Continue Effexor .5 mg P.O daily  - Continue Klonopin 0.5 P.O Q bedtime   - Continue Melatonin 3mg P.o Q bedtime  - Continue Klonopin to 0.5 mg P.O daily PRN

## 2023-05-14 NOTE — BH INPATIENT PSYCHIATRY PROGRESS NOTE - NSBHCHARTREVIEWVS_PSY_A_CORE FT
Vital Signs Last 24 Hrs  T(C): 36.2 (05-14-23 @ 08:26), Max: 36.9 (05-13-23 @ 15:25)  T(F): 97.1 (05-14-23 @ 08:26), Max: 98.4 (05-13-23 @ 15:25)  HR: 119 (05-14-23 @ 08:26) (112 - 119)  BP: 113/87 (05-14-23 @ 08:26) (113/87 - 121/84)  BP(mean): --  RR: 18 (05-14-23 @ 08:26) (18 - 20)  SpO2: --
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
## 2023-05-14 NOTE — BH INPATIENT PSYCHIATRY PROGRESS NOTE - NSICDXBHPRIMARYDX_PSY_ALL_CORE
Major depressive disorder, remission status unspecified, unspecified whether recurrent   F32.9   Major depressive disorder   F32.9

## 2023-05-15 PROCEDURE — 99231 SBSQ HOSP IP/OBS SF/LOW 25: CPT

## 2023-05-15 RX ORDER — VENLAFAXINE HCL 75 MG
150 CAPSULE, EXT RELEASE 24 HR ORAL DAILY
Refills: 0 | Status: DISCONTINUED | OUTPATIENT
Start: 2023-05-15 | End: 2023-05-16

## 2023-05-15 RX ADMIN — Medication 3 MILLIGRAM(S): at 21:04

## 2023-05-15 RX ADMIN — Medication 0.5 MILLIGRAM(S): at 17:38

## 2023-05-15 RX ADMIN — SERTRALINE 50 MILLIGRAM(S): 25 TABLET, FILM COATED ORAL at 08:17

## 2023-05-15 RX ADMIN — Medication 112.5 MILLIGRAM(S): at 08:17

## 2023-05-15 RX ADMIN — ARIPIPRAZOLE 5 MILLIGRAM(S): 15 TABLET ORAL at 08:20

## 2023-05-15 NOTE — BH INPATIENT PSYCHIATRY PROGRESS NOTE - NSBHCHARTREVIEWVS_PSY_A_CORE FT
Vital Signs Last 24 Hrs  T(C): 36 (05-15-23 @ 07:52), Max: 36 (05-15-23 @ 07:52)  T(F): 96.8 (05-15-23 @ 07:52), Max: 96.8 (05-15-23 @ 07:52)  HR: 126 (05-15-23 @ 07:52) (105 - 126)  BP: 126/85 (05-15-23 @ 07:52) (126/85 - 131/89)  BP(mean): --  RR: 16 (05-15-23 @ 07:52) (16 - 20)  SpO2: --

## 2023-05-15 NOTE — BH INPATIENT PSYCHIATRY PROGRESS NOTE - NSBHFUPINTERVALHXFT_PSY_A_CORE
Patient seen and interviewed , 1 to 1 at bedside .   Patient reports that she has no fresh complaints. Pt reports feeling more or less the same. Felt panicked yesterday and got klonopin prn. REports is feeling the same as the end of the week yesterday, described as wanting to turn of their brain and not wanting to live. Reports waking in the night "jumping". REports is sad with no motivation, wanting to die.     As per nursing staff, patient continues to report low mood but is otherwise in good behavioral control and is complaint with their medication.

## 2023-05-15 NOTE — BH INPATIENT PSYCHIATRY PROGRESS NOTE - NSBHMETABOLIC_PSY_ALL_CORE_FT
BMI: BMI (kg/m2): 32.5 (04-18-23 @ 05:33)  HbA1c: A1C with Estimated Average Glucose Result: 5.7 % (05-06-23 @ 08:53)    Glucose:   BP: 126/85 (05-15-23 @ 07:52) (108/69 - 131/89)  Lipid Panel: Date/Time: 05-06-23 @ 08:53  Cholesterol, Serum: 206  Direct LDL: --  HDL Cholesterol, Serum: 47  Total Cholesterol/HDL Ration Measurement: --  Triglycerides, Serum: 131

## 2023-05-15 NOTE — BH INPATIENT PSYCHIATRY PROGRESS NOTE - NSBHMSETHTPROC_PSY_A_CORE
This medical record reflects one or more clinical indicators suggestive of malnutrition and/or morbid obesity  Malnutrition Findings:   Malnutrition type: Chronic illness(Severe pro/sara malnutrition r/t disease/condition as evidenced by 12% unintentional wt loss since 11/10/19, consuming < 75% energy intake compared to est energy needs > 1 month, fat/muscle wasting of orbital/temple areas  Treated with diet/supplements)  Degree of Malnutrition: Other severe protein calorie malnutrition  Malnutrition Characteristics: Fat loss, Muscle loss, Inadequate energy, Weight loss    BMI Findings:  BMI Classifications: Underweight < 18 5     Body mass index is 17 97 kg/m²  See Nutrition note dated 2/7/20 for additional details  Completed nutrition assessment is viewable in the nutrition documentation  Linear

## 2023-05-15 NOTE — BH INPATIENT PSYCHIATRY PROGRESS NOTE - NSBHASSESSSUMMFT_PSY_ALL_CORE
Lexii Vaughan is 17 year old transgender man F->M with a history of Gender dysphoria, anxiety and depression, who was admitted to the psychiatric floor for worsening suicidal ideations.   Patient continues to be depressed and although she denies current suicidal ideations , intent or plan today , patient continues to be preoccupied with not wanting to be alive .   At this time, patient continues to be a danger to herself and continues to need inpatient psychiatric hospitalization for medication management and symptom stabilization.     Plan  For depression and anxiety , Gender dysphoria:      - stopped Sertraline  50 mg    - Continue Abilify 5 mg  - increase Effexor XR to 150 mg P.O daily  - Continue Melatonin 3mg P.o Q bedtime  - Continue Klonopin  0.5 mg P.O daily PRN

## 2023-05-15 NOTE — BH INPATIENT PSYCHIATRY PROGRESS NOTE - CURRENT MEDICATION
MEDICATIONS  (STANDING):  ARIPiprazole 5 milliGRAM(s) Oral daily  melatonin. 3 milliGRAM(s) Oral at bedtime  venlafaxine  milliGRAM(s) Oral daily    MEDICATIONS  (PRN):  clonazePAM  Tablet 0.5 milliGRAM(s) Oral daily PRN panic attack/anxiety  haloperidol     Tablet 5 milliGRAM(s) Oral every 6 hours PRN agitation  hydrOXYzine hydrochloride 50 milliGRAM(s) Oral every 6 hours PRN anxiety  LORazepam     Tablet 2 milliGRAM(s) Oral every 6 hours PRN agitation

## 2023-05-16 PROCEDURE — 99231 SBSQ HOSP IP/OBS SF/LOW 25: CPT

## 2023-05-16 RX ORDER — VENLAFAXINE HCL 75 MG
187.5 CAPSULE, EXT RELEASE 24 HR ORAL DAILY
Refills: 0 | Status: DISCONTINUED | OUTPATIENT
Start: 2023-05-17 | End: 2023-05-23

## 2023-05-16 RX ORDER — VENLAFAXINE HCL 75 MG
187.5 CAPSULE, EXT RELEASE 24 HR ORAL DAILY
Refills: 0 | Status: DISCONTINUED | OUTPATIENT
Start: 2023-05-16 | End: 2023-05-16

## 2023-05-16 RX ORDER — CLONAZEPAM 1 MG
0.5 TABLET ORAL AT BEDTIME
Refills: 0 | Status: DISCONTINUED | OUTPATIENT
Start: 2023-05-16 | End: 2023-05-18

## 2023-05-16 RX ADMIN — Medication 0.5 MILLIGRAM(S): at 20:31

## 2023-05-16 RX ADMIN — ARIPIPRAZOLE 5 MILLIGRAM(S): 15 TABLET ORAL at 08:15

## 2023-05-16 RX ADMIN — Medication 150 MILLIGRAM(S): at 08:15

## 2023-05-16 RX ADMIN — Medication 30 MILLILITER(S): at 00:09

## 2023-05-16 RX ADMIN — Medication 50 MILLIGRAM(S): at 12:01

## 2023-05-16 RX ADMIN — Medication 3 MILLIGRAM(S): at 20:31

## 2023-05-16 NOTE — BH INPATIENT PSYCHIATRY PROGRESS NOTE - NSBHASSESSSUMMFT_PSY_ALL_CORE
The patient is a 18 yo transgender F->M, Chilean-speaking, recently immigrated from [place], domiciled with brother, unemployed, with psych hx of gender dysphoria, anxiety and depression, no past psychiatric hospitalizations, previous suicide attempt via choking, who is presenting at recommendation of brother and cousin for increasing suicidal ideation.    Patient reporting mood is "not too bad, not too good." Reports negative thoughts and passive suicidal ideation when mind is not occupied/distracted by watching tv or doing a word search. Protective factor for acting on suicidal ideation is family and not wanting to harm them. In contact with family. Appears brighter and less anxious compared to past evaluation with this writer. No rubber band snapping during evaluation. Continues to require inpt admission for stabilization.    Impression: MDD vs Social Anxiety disorder with Panic attacks vs Gender dysphoria, R/O bipolar    #MDD #Gender dysphoria #Social Anxiety Disorder #Panic attacks  - Continue Abilify 5 mg   - Tapered from Zoloft to Effexor  - On Effexor 150 mg since 05/15  - Klonopin 0.5 qhs for panic attacks  - Melatonin for sleep phase disorder    #Panic attacks  - PRN Atarax  - Continue Klonipin to 0.5 mg PRN daily    #Agitation  - For episodes of acute agitation can offer PO Haldol 5 mg/Ativan 2 mg/Benadryl 50 mg Q6H PRN. If refusing PO and is in acute risk of harm to self/other, can offer IM Haldol 5 mg/Ativan 2 mg/Benadryl 50 mg Q6H PRN. If given, please repeat EKG and hold antipsychotics if QTC>500   The patient is a 16 yo transgender F->M, Citizen of [place]-speaking, recently immigrated from [place], domiciled with brother, unemployed, with psych hx of gender dysphoria, anxiety and depression, no past psychiatric hospitalizations, previous suicide attempt via choking, who is presenting at recommendation of brother and cousin for increasing suicidal ideation.    Patient reporting mood is "the same." Reports negative thoughts and passive suicidal ideation are still present and that television is not longer helping as a distractor. Protective factor for acting on suicidal ideation is family and not wanting to harm them. In contact with family. Was not snapping a rubber band during interview. Continues to require inpt admission for stabilization.      Impression: MDD vs Social Anxiety disorder with Panic attacks vs Gender dysphoria, R/O bipolar    #MDD #Gender dysphoria #Social Anxiety Disorder #Panic attacks  - Stop Abilify 5 mg   - Tapered from Zoloft to Effexor  - On Effexor 150 mg since 05/15, will increase to 187.5 mg on 5/17  - Klonopin 0.5 mg standing at bedtime for panic attacks  - Melatonin for sleep phase disorder    #Panic attacks  - PRN Atarax  - Continue Klonipin to 0.5 mg standing at bedtime for panic attacks    #Agitation  - For episodes of acute agitation can offer PO Haldol 5 mg/Ativan 2 mg/Benadryl 50 mg Q6H PRN. If refusing PO and is in acute risk of harm to self/other, can offer IM Haldol 5 mg/Ativan 2 mg/Benadryl 50 mg Q6H PRN. If given, please repeat EKG and hold antipsychotics if QTC>500

## 2023-05-16 NOTE — BH INPATIENT PSYCHIATRY PROGRESS NOTE - PRN MEDS
MEDICATIONS  (PRN):  aluminum hydroxide/magnesium hydroxide/simethicone Suspension 30 milliLiter(s) Oral every 6 hours PRN Dyspepsia  clonazePAM  Tablet 0.5 milliGRAM(s) Oral daily PRN panic attack/anxiety  haloperidol     Tablet 5 milliGRAM(s) Oral every 6 hours PRN agitation  hydrOXYzine hydrochloride 50 milliGRAM(s) Oral every 6 hours PRN anxiety  LORazepam     Tablet 2 milliGRAM(s) Oral every 6 hours PRN agitation   MEDICATIONS  (PRN):  aluminum hydroxide/magnesium hydroxide/simethicone Suspension 30 milliLiter(s) Oral every 6 hours PRN Dyspepsia  haloperidol     Tablet 5 milliGRAM(s) Oral every 6 hours PRN agitation  hydrOXYzine hydrochloride 50 milliGRAM(s) Oral every 6 hours PRN anxiety  LORazepam     Tablet 2 milliGRAM(s) Oral every 6 hours PRN agitation

## 2023-05-16 NOTE — BH INPATIENT PSYCHIATRY PROGRESS NOTE - NSBHCHARTREVIEWVS_PSY_A_CORE FT
Vital Signs Last 24 Hrs  T(C): 35.7 (05-15-23 @ 17:16), Max: 36 (05-15-23 @ 07:52)  T(F): 96.3 (05-15-23 @ 17:16), Max: 96.8 (05-15-23 @ 07:52)  HR: 114 (05-15-23 @ 17:16) (114 - 126)  BP: 120/87 (05-15-23 @ 17:16) (120/87 - 126/85)  BP(mean): --  RR: 16 (05-15-23 @ 17:16) (16 - 16)  SpO2: --     Vital Signs Last 24 Hrs  T(C): 35.7 (05-16-23 @ 08:06), Max: 35.7 (05-15-23 @ 17:16)  T(F): 96.2 (05-16-23 @ 08:06), Max: 96.3 (05-15-23 @ 17:16)  HR: 98 (05-16-23 @ 08:06) (98 - 114)  BP: 118/83 (05-16-23 @ 08:06) (118/83 - 120/87)  BP(mean): --  RR: 16 (05-16-23 @ 08:06) (16 - 16)  SpO2: --

## 2023-05-16 NOTE — BH INPATIENT PSYCHIATRY PROGRESS NOTE - NSBHMETABOLIC_PSY_ALL_CORE_FT
BMI: BMI (kg/m2): 32.5 (04-18-23 @ 05:33)  HbA1c: A1C with Estimated Average Glucose Result: 5.7 % (05-06-23 @ 08:53)    Glucose:   BP: 120/87 (05-15-23 @ 17:16) (108/69 - 131/89)  Lipid Panel: Date/Time: 05-06-23 @ 08:53  Cholesterol, Serum: 206  Direct LDL: --  HDL Cholesterol, Serum: 47  Total Cholesterol/HDL Ration Measurement: --  Triglycerides, Serum: 131   BMI: BMI (kg/m2): 32.5 (04-18-23 @ 05:33)  HbA1c: A1C with Estimated Average Glucose Result: 5.7 % (05-06-23 @ 08:53)    Glucose:   BP: 118/83 (05-16-23 @ 08:06) (113/87 - 131/89)  Lipid Panel: Date/Time: 05-06-23 @ 08:53  Cholesterol, Serum: 206  Direct LDL: --  HDL Cholesterol, Serum: 47  Total Cholesterol/HDL Ration Measurement: --  Triglycerides, Serum: 131

## 2023-05-16 NOTE — BH INPATIENT PSYCHIATRY PROGRESS NOTE - CURRENT MEDICATION
MEDICATIONS  (STANDING):  ARIPiprazole 5 milliGRAM(s) Oral daily  melatonin. 3 milliGRAM(s) Oral at bedtime  venlafaxine  milliGRAM(s) Oral daily    MEDICATIONS  (PRN):  aluminum hydroxide/magnesium hydroxide/simethicone Suspension 30 milliLiter(s) Oral every 6 hours PRN Dyspepsia  clonazePAM  Tablet 0.5 milliGRAM(s) Oral daily PRN panic attack/anxiety  haloperidol     Tablet 5 milliGRAM(s) Oral every 6 hours PRN agitation  hydrOXYzine hydrochloride 50 milliGRAM(s) Oral every 6 hours PRN anxiety  LORazepam     Tablet 2 milliGRAM(s) Oral every 6 hours PRN agitation   MEDICATIONS  (STANDING):  clonazePAM  Tablet 0.5 milliGRAM(s) Oral at bedtime  melatonin. 3 milliGRAM(s) Oral at bedtime  venlafaxine .5 milliGRAM(s) Oral daily    MEDICATIONS  (PRN):  aluminum hydroxide/magnesium hydroxide/simethicone Suspension 30 milliLiter(s) Oral every 6 hours PRN Dyspepsia  haloperidol     Tablet 5 milliGRAM(s) Oral every 6 hours PRN agitation  hydrOXYzine hydrochloride 50 milliGRAM(s) Oral every 6 hours PRN anxiety  LORazepam     Tablet 2 milliGRAM(s) Oral every 6 hours PRN agitation   MEDICATIONS  (STANDING):  clonazePAM  Tablet 0.5 milliGRAM(s) Oral at bedtime  melatonin. 3 milliGRAM(s) Oral at bedtime    MEDICATIONS  (PRN):  aluminum hydroxide/magnesium hydroxide/simethicone Suspension 30 milliLiter(s) Oral every 6 hours PRN Dyspepsia  haloperidol     Tablet 5 milliGRAM(s) Oral every 6 hours PRN agitation  hydrOXYzine hydrochloride 50 milliGRAM(s) Oral every 6 hours PRN anxiety  LORazepam     Tablet 2 milliGRAM(s) Oral every 6 hours PRN agitation
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
## 2023-05-17 PROCEDURE — 99231 SBSQ HOSP IP/OBS SF/LOW 25: CPT

## 2023-05-17 RX ADMIN — Medication 0.5 MILLIGRAM(S): at 21:54

## 2023-05-17 RX ADMIN — Medication 187.5 MILLIGRAM(S): at 08:36

## 2023-05-17 RX ADMIN — Medication 30 MILLILITER(S): at 16:48

## 2023-05-17 RX ADMIN — Medication 50 MILLIGRAM(S): at 16:33

## 2023-05-17 RX ADMIN — Medication 3 MILLIGRAM(S): at 21:39

## 2023-05-17 NOTE — BH INPATIENT PSYCHIATRY PROGRESS NOTE - CURRENT MEDICATION
MEDICATIONS  (STANDING):  clonazePAM  Tablet 0.5 milliGRAM(s) Oral at bedtime  melatonin. 3 milliGRAM(s) Oral at bedtime  venlafaxine .5 milliGRAM(s) Oral daily    MEDICATIONS  (PRN):  aluminum hydroxide/magnesium hydroxide/simethicone Suspension 30 milliLiter(s) Oral every 6 hours PRN Dyspepsia  haloperidol     Tablet 5 milliGRAM(s) Oral every 6 hours PRN agitation  hydrOXYzine hydrochloride 50 milliGRAM(s) Oral every 6 hours PRN anxiety  LORazepam     Tablet 2 milliGRAM(s) Oral every 6 hours PRN agitation

## 2023-05-17 NOTE — BH INPATIENT PSYCHIATRY PROGRESS NOTE - PRN MEDS
MEDICATIONS  (PRN):  aluminum hydroxide/magnesium hydroxide/simethicone Suspension 30 milliLiter(s) Oral every 6 hours PRN Dyspepsia  haloperidol     Tablet 5 milliGRAM(s) Oral every 6 hours PRN agitation  hydrOXYzine hydrochloride 50 milliGRAM(s) Oral every 6 hours PRN anxiety  LORazepam     Tablet 2 milliGRAM(s) Oral every 6 hours PRN agitation

## 2023-05-17 NOTE — BH INPATIENT PSYCHIATRY PROGRESS NOTE - NSBHCHARTREVIEWVS_PSY_A_CORE FT
Vital Signs Last 24 Hrs  T(C): --  T(F): --  HR: 129 (05-17-23 @ 08:04) (119 - 129)  BP: 119/65 (05-17-23 @ 08:04) (115/79 - 119/65)  BP(mean): --  RR: 16 (05-17-23 @ 08:04) (16 - 16)  SpO2: --

## 2023-05-17 NOTE — BH INPATIENT PSYCHIATRY PROGRESS NOTE - NSBHMETABOLIC_PSY_ALL_CORE_FT
BMI: BMI (kg/m2): 32.5 (04-18-23 @ 05:33)  HbA1c: A1C with Estimated Average Glucose Result: 5.7 % (05-06-23 @ 08:53)    Glucose:   BP: 119/65 (05-17-23 @ 08:04) (115/79 - 131/89)  Lipid Panel: Date/Time: 05-06-23 @ 08:53  Cholesterol, Serum: 206  Direct LDL: --  HDL Cholesterol, Serum: 47  Total Cholesterol/HDL Ration Measurement: --  Triglycerides, Serum: 131

## 2023-05-17 NOTE — BH INPATIENT PSYCHIATRY PROGRESS NOTE - NSBHFUPINTERVALHXFT_PSY_A_CORE
Nursing reports no acute events overnight.      On approach, pt is watching tv in the day room. Pt reports that he has slightly less feelings of sadness but in general feels the same. States he feels more comfortable hanging around men. Pt states that the panic attacks happened twice yesterday, none today. He states that he is "miserable" because he is only staying alive for his mother and family. Pt continues to have suicidal thoughts, described as not wanting to be alive, feeling like they don't belong, and wanting to stop their brain.

## 2023-05-17 NOTE — BH INPATIENT PSYCHIATRY PROGRESS NOTE - NSBHATTESTCOMMENTATTENDFT_PSY_A_CORE
Case discussed with resident. I concur with findings and plan. Discussed case with resident. I concur with findings and plan.

## 2023-05-17 NOTE — BH INPATIENT PSYCHIATRY PROGRESS NOTE - NSBHASSESSSUMMFT_PSY_ALL_CORE
The patient is a 16 yo transgender F->M, Salvadorean-speaking, recently immigrated from [place], domiciled with brother, unemployed, with psych hx of gender dysphoria, anxiety and depression, no past psychiatric hospitalizations, previous suicide attempt via choking, who is presenting at recommendation of brother and cousin for increasing suicidal ideation.    Patient reporting mood is "the same." Reports negative thoughts and passive suicidal ideation are still present and that television is not longer helping as a distractor. Protective factor for acting on suicidal ideation is family and not wanting to harm them. In contact with family. Was not snapping a rubber band during interview. Continues to require inpt admission for stabilization.      Impression: MDD vs Social Anxiety disorder with Panic attacks vs Gender dysphoria, R/O bipolar    #MDD #Gender dysphoria #Social Anxiety Disorder #Panic attacks  - Stopped Abilify 5 mg, 5/16   - Tapered off Zoloft  - On Effexor 150 mg since 05/15, increased to 187.5 mg on 5/17  - Klonopin 0.5 mg standing at bedtime for panic attacks  - Melatonin for sleep phase disorder    #Panic attacks  - PRN Atarax  - Continue Klonipin to 0.5 mg standing at bedtime for panic attacks    #Agitation  - For episodes of acute agitation can offer PO Haldol 5 mg/Ativan 2 mg/Benadryl 50 mg Q6H PRN. If refusing PO and is in acute risk of harm to self/other, can offer IM Haldol 5 mg/Ativan 2 mg/Benadryl 50 mg Q6H PRN. If given, please repeat EKG and hold antipsychotics if QTC>500

## 2023-05-18 PROCEDURE — 99231 SBSQ HOSP IP/OBS SF/LOW 25: CPT

## 2023-05-18 RX ORDER — CLONAZEPAM 1 MG
0.5 TABLET ORAL DAILY
Refills: 0 | Status: DISCONTINUED | OUTPATIENT
Start: 2023-05-18 | End: 2023-05-22

## 2023-05-18 RX ADMIN — Medication 0.5 MILLIGRAM(S): at 15:10

## 2023-05-18 RX ADMIN — Medication 3 MILLIGRAM(S): at 20:40

## 2023-05-18 RX ADMIN — Medication 187.5 MILLIGRAM(S): at 08:00

## 2023-05-18 NOTE — BH INPATIENT PSYCHIATRY PROGRESS NOTE - NSBHASSESSSUMMFT_PSY_ALL_CORE
The patient is a 16 yo transgender F->M, Angolan-speaking, recently immigrated from [place], domiciled with brother, unemployed, with psych hx of gender dysphoria, anxiety and depression, no past psychiatric hospitalizations, previous suicide attempt via choking, who is presenting at recommendation of brother and cousin for increasing suicidal ideation.    Patient reporting mood is "the same." Reports negative thoughts and passive suicidal ideation are still present and that television is not longer helping as a distractor. Protective factor for acting on suicidal ideation is family and not wanting to harm them. In contact with family. Was not snapping a rubber band during interview. Continues to require inpt admission for stabilization.      Impression: MDD vs Social Anxiety disorder with Panic attacks vs Gender dysphoria, R/O bipolar    #MDD #Gender dysphoria #Social Anxiety Disorder #Panic attacks  - Stopped Abilify 5 mg, 5/16   - Tapered off Zoloft  - On Effexor 150 mg since 05/15, increased to 187.5 mg on 5/17  - Klonopin 0.5 mg standing at bedtime for panic attacks  - Melatonin for sleep phase disorder    #Panic attacks  - PRN Atarax  - Continue Klonipin to 0.5 mg standing at bedtime for panic attacks    #Agitation  - For episodes of acute agitation can offer PO Haldol 5 mg/Ativan 2 mg/Benadryl 50 mg Q6H PRN. If refusing PO and is in acute risk of harm to self/other, can offer IM Haldol 5 mg/Ativan 2 mg/Benadryl 50 mg Q6H PRN. If given, please repeat EKG and hold antipsychotics if QTC>500   The patient is a 16 yo transgender F->M, Tajik-speaking, recently immigrated from [place], domiciled with brother, unemployed, with psych hx of gender dysphoria, anxiety and depression, no past psychiatric hospitalizations, previous suicide attempt via choking, who is presenting at recommendation of brother and cousin for increasing suicidal ideation.    Patient reporting mood is "the same." Reports negative thoughts and passive suicidal ideation are still present, and that he still has the desire to choke himself, but not with the intention of ending his life. Protective factor for acting on suicidal ideation is family and not wanting to harm them. In contact with family. Was not snapping a rubber band during interview. Continues to require inpt admission for stabilization.      Impression: MDD vs Social Anxiety disorder with Panic attacks vs Gender dysphoria, R/O bipolar    #MDD #Gender dysphoria #Social Anxiety Disorder #Panic attacks  - Stopped Abilify 5 mg, 5/16   - Tapered off Zoloft  - On Effexor 150 mg since 05/15, increased to 187.5 mg on 5/17  - Klonopin 0.5 mg standing at around 2 pm for panic attacks  - Melatonin for sleep phase disorder    #Panic attacks  - PRN Atarax  - Continue Klonipin to 0.5 mg standing at around 2 pm for panic attacks    #Agitation  - For episodes of acute agitation can offer PO Haldol 5 mg/Ativan 2 mg/Benadryl 50 mg Q6H PRN. If refusing PO and is in acute risk of harm to self/other, can offer IM Haldol 5 mg/Ativan 2 mg/Benadryl 50 mg Q6H PRN. If given, please repeat EKG and hold antipsychotics if QTC>500   The patient is a 16 yo transgender F->M, Maltese-speaking, recently immigrated from [place], domiciled with brother, unemployed, with psych hx of gender dysphoria, anxiety and depression, no past psychiatric hospitalizations, previous suicide attempt via choking, who is presenting at recommendation of brother and cousin for increasing suicidal ideation.    Patient reporting mood is "the same." Reports negative thoughts and passive suicidal ideation are still present, and that he still has the desire to choke himself, but not with the intention of ending his life. Protective factor for acting on suicidal ideation is family and not wanting to harm them. In contact with family. Was not snapping a rubber band during interview. Continues to require inpt admission for stabilization.      Impression: MDD vs Social Anxiety disorder with Panic attacks vs Gender dysphoria, R/O bipolar    #MDD #Gender dysphoria #Social Anxiety Disorder #Panic attacks  - On Effexor 150 mg since 05/15, increased to 187.5 mg on 5/17  - Klonopin 0.5 mg standing at  2 pm for panic attacks  - Stopped Abilify 5 mg, 5/16   - Tapered off Zoloft  - Melatonin for sleep phase disorder    #Panic attacks  - PRN Atarax  - Continue Klonipin to 0.5 mg standing at around 2 pm for panic attacks    #Agitation  - For episodes of acute agitation can offer PO Haldol 5 mg/Ativan 2 mg/Benadryl 50 mg Q6H PRN. If refusing PO and is in acute risk of harm to self/other, can offer IM Haldol 5 mg/Ativan 2 mg/Benadryl 50 mg Q6H PRN. If given, please repeat EKG and hold antipsychotics if QTC>500   The patient is a 18 yo transgender F->M, Icelandic-speaking, recently immigrated from [place], domiciled with brother, unemployed, with psych hx of gender dysphoria, anxiety and depression, no past psychiatric hospitalizations, previous suicide attempt via choking, who is presenting at recommendation of brother and cousin for increasing SI.    Upon admission patient endorses strong feelings of depression and exhibits very dysphoric and minimally reactive affect. He endorses escalating suicidal thoughts related to feelings of worthlessness and unhappiness over his appearance. During the course of his hospitalization, his mood and affect has improved, he has become more talkative. . Of note, the patient's brother recently lost his job and the patient has been feeling like he has a duty to get discharged in order to find a job and help financially. He is therefore at an increased risk of minimizing his symptoms. The patient was admitted involuntarily on 9:39 legals as he has been acutely elevated risk of suicide, now changed to voluntary legals. Chronic risk factors include history of trauma, history of persistent depressive symptoms,   and history of suicidal ideation. Current risk factors include recent self inflicted choking, age, recent immigration, not understanding/speaking well language of current residence, current gender dysphoria, current social anxiety, current panic attacks and financial stressors. Goals of care will be to decrease patient's suicidality as his current risk factors place him at acutely elevated risk. Requires continued hospitalization for stabilization.    04/18/23- Patient acutely dysphoric. Plan to dc mirtazapine as he notes excessive lethargy and hyperphagia, continue sertraline 50 mg  04/19/2023  Patient remains with thoughts of suicide and concerns about his future, however he feels safe while in a structured setting with people around him  04/20/23 Patient had no thoughts of suicide during interview, appeared brighter and occasionally smiled. Agreeable to increase antidepressants  04/24 Patient notes panic attack last night, alleviated talking with cousin. Brighter, future oriented. Endorses social anxiety.  04/25/23 Patient did not have panic attack in last 24 hrs. Still appears brighter on unit and during interview today. Voiced that his social anxiety hinders his ability to advocate for himself while on unit  04/26   Patient is somewhat less anxious,  spending  time outside his room and is cheerful at times.  04/27/23 Patient notes still having intermittent anxiety, however he is now able to leave his room and his speech is much more productive. Affect continues to be more supple than on initial presentation. Plan to increase Zoloft to 150 mg 04/28, with eventual plan to maximize it to treat his social anxiety disorder.  04/28 Yesterday patient had another panic attack when bullied about his gender expression and received Klonopin and Atarax PRN. Though he states his mood is "good" and has a more supple affect than on initial presentation, he continues to endorse feelings of self hatred (which he believes may be alleviated by gender affirmative care) as well as wish to no longer be alive (though no active SI)  05/01 Patient continues to endorse passive SI, appeared anxious though smiling during interview. Reported panic attack yesterday. Ok with plan to increase Zoloft to 200 mg  05/02 Pt received first dose of 200 mg Zoloft. Had panic attack yesterday triggered by existential doubts and feelings of self hatred, which resulted in him hitting himself. Today less anxious but continues to endorse ambivalence about wanting to be alive.  5/3/23   patient had significant panic attack accompanied by dread and thoughts of self destruction.  When asked if suicidal patient replies I cannot kill myself because of my family.  05/04- Acutely anxious, more so than previously (however interviewed in large group), will add 2 mg abilify. Vickie in process of enrolling [facility] as patient in [facility]  5/5 - Pt endorses panic attack the evening before during which he hit himself in the stomach and arms and banged his head on the wall due to self hatred and feeling like he does not have a place in this world. Created signs with patient that he can use to communicate with staff nonverbally; pt stated that he will try his best to use them. Does not report adverse effects from Abilify.  5/8- Pt denies any panic attacks over the weekend. Pt states that he did not need to use the signs that he created together with the team to communicate with staff. Continues to endorse passive SI and states that his reason for living is his family, but other than that, he does not feel like he has a reason to live. Abilify increased to 5 mg  5/9- Pt denies any panic attacks since yesterday, but continues to endorse passive SI and state that his family is his only reason for living. Will continue Abilify 5 mg.  5/10- Pt states that he had a panic attack the evening prior, during which he punched himself in the stomach and banged his head against the wall. Stated again that he does not want to be alive and does not have a place in this world.  5/11- Pt stated that yesterday, he wrapped a blanket around his neck because he "wanted to feel something" and wanted to "turn off my brain." He stated that he did not do this with the intention to end his life. He stated he does not want to be alive, but cannot die because it would hurt his family. Will decrease Zoloft to 100 mg on 5/12 with the intention of cross-titrating to venlafaxine. Will continue Abilify 5 mg.  5/12 - Pt reported no panic attacks or attempts at self-harm since last conversation. Stated that he "feels sad" and like he is "descending into darkness." Explained to pt that we would be switching from Zoloft to Effexor. Gave Zoloft 100 mg today, will also be starting Effexor 75 mg today.   5/18- Pt reports feeling "the same", endorses palpitations on Venlafaxine, will continue to monitor. Panic attack yesterday, will change standing Klonopin to 2 pm dosing.    Impression: MDD vs Social Anxiety disorder with Panic attacks vs Gender dysphoria, R/O bipolar    #MDD #Gender dysphoria #Social Anxiety Disorder #Panic attacks  - 9.39 legals changed to 9.14  - On Effexor 150 mg since 05/15, increased to 187.5 mg on 5/17  - Stopped Abilify 5 mg, 5/16   - Tapered off Zoloft  - Melatonin for sleep phase disorder  - Left voicemail with Dr. Vincent at [facility], [phone]  - Left VM with  Kandice Acosta who work s with LGBTQ community for connection to trans affirming care  - Left  Teagan Escobar [phone], patient's therapist    - TSH WNL    #Gender Dysphoria  - , [facility], [facility], [facility], [facility], [facility], [facility] (reached out to them, no [place] groups), [facility] (voicemail full)  - Referral on discharge to the [facility] (suicide hotline) and [facility] ([facility] has enrolled patient)    #Panic Attacks  - PRN Atarax  - Continue Klonipin 0.5 mg standing, changed to 2pm dosing    #Agitation  - For episodes of acute agitation can offer PO Haldol 5 mg/Ativan 2 mg/Benadryl 50 mg Q6H PRN. If refusing PO and is in acute risk of harm to self/other, can offer IM Haldol 5 mg/Ativan 2 mg/Benadryl 50 mg Q6H PRN. If given, please repeat EKG and hold antipsychotics if QTC>500       The patient is a 18 yo transgender F->M, Georgian-speaking, recently immigrated from [place], domiciled with brother, unemployed, with psych hx of gender dysphoria, anxiety and depression, no past psychiatric hospitalizations, previous suicide attempt via choking, who is presenting at recommendation of brother and cousin for increasing SI.    Upon admission patient endorses strong feelings of depression and exhibits very dysphoric and minimally reactive affect. He endorses escalating suicidal thoughts related to feelings of worthlessness and unhappiness over his appearance. During the course of his hospitalization, his mood and affect has improved, he has become more talkative. . Of note, the patient's brother recently lost his job and the patient has been feeling like he has a duty to get discharged in order to find a job and help financially. He is therefore at an increased risk of minimizing his symptoms. The patient was admitted involuntarily on 9:39 legals as he has been acutely elevated risk of suicide, now changed to voluntary legals. Chronic risk factors include history of trauma, history of persistent depressive symptoms,   and history of suicidal ideation. Current risk factors include recent self inflicted choking, age, recent immigration, not understanding/speaking well language of current residence, current gender dysphoria, current social anxiety, current panic attacks and financial stressors. Goals of care will be to decrease patient's suicidality as his current risk factors place him at acutely elevated risk. Requires continued hospitalization for stabilization.    04/18/23- Patient acutely dysphoric. Plan to dc mirtazapine as he notes excessive lethargy and hyperphagia, continue sertraline 50 mg  04/19/2023  Patient remains with thoughts of suicide and concerns about his future, however he feels safe while in a structured setting with people around him  04/20/23 Patient had no thoughts of suicide during interview, appeared brighter and occasionally smiled. Agreeable to increase antidepressants  04/24 Patient notes panic attack last night, alleviated talking with cousin. Brighter, future oriented. Endorses social anxiety.  04/25/23 Patient did not have panic attack in last 24 hrs. Still appears brighter on unit and during interview today. Voiced that his social anxiety hinders his ability to advocate for himself while on unit  04/26   Patient is somewhat less anxious,  spending  time outside his room and is cheerful at times.  04/27/23 Patient notes still having intermittent anxiety, however he is now able to leave his room and his speech is much more productive. Affect continues to be more supple than on initial presentation. Plan to increase Zoloft to 150 mg 04/28, with eventual plan to maximize it to treat his social anxiety disorder.  04/28 Yesterday patient had another panic attack when bullied about his gender expression and received Klonopin and Atarax PRN. Though he states his mood is "good" and has a more supple affect than on initial presentation, he continues to endorse feelings of self hatred (which he believes may be alleviated by gender affirmative care) as well as wish to no longer be alive (though no active SI)  05/01 Patient continues to endorse passive SI, appeared anxious though smiling during interview. Reported panic attack yesterday. Ok with plan to increase Zoloft to 200 mg  05/02 Pt received first dose of 200 mg Zoloft. Had panic attack yesterday triggered by existential doubts and feelings of self hatred, which resulted in him hitting himself. Today less anxious but continues to endorse ambivalence about wanting to be alive.  5/3/23   patient had significant panic attack accompanied by dread and thoughts of self destruction.  When asked if suicidal patient replies I cannot kill myself because of my family.  05/04- Acutely anxious, more so than previously (however interviewed in large group), will add 2 mg abilify. Vickie in process of enrolling [facility] as patient in [facility]  5/5 - Pt endorses panic attack the evening before during which he hit himself in the stomach and arms and banged his head on the wall due to self hatred and feeling like he does not have a place in this world. Created signs with patient that he can use to communicate with staff nonverbally; pt stated that he will try his best to use them. Does not report adverse effects from Abilify.  5/8- Pt denies any panic attacks over the weekend. Pt states that he did not need to use the signs that he created together with the team to communicate with staff. Continues to endorse passive SI and states that his reason for living is his family, but other than that, he does not feel like he has a reason to live. Abilify increased to 5 mg  5/9- Pt denies any panic attacks since yesterday, but continues to endorse passive SI and state that his family is his only reason for living. Will continue Abilify 5 mg.  5/10- Pt states that he had a panic attack the evening prior, during which he punched himself in the stomach and banged his head against the wall. Stated again that he does not want to be alive and does not have a place in this world.  5/11- Pt stated that yesterday, he wrapped a blanket around his neck because he "wanted to feel something" and wanted to "turn off my brain." He stated that he did not do this with the intention to end his life. He stated he does not want to be alive, but cannot die because it would hurt his family. Will decrease Zoloft to 100 mg on 5/12 with the intention of cross-titrating to venlafaxine. Will continue Abilify 5 mg.  5/12 - Pt reported no panic attacks or attempts at self-harm since last conversation. Stated that he "feels sad" and like he is "descending into darkness." Explained to pt that we would be switching from Zoloft to Effexor. Gave Zoloft 100 mg today, will also be starting Effexor 75 mg today.   5/18- Pt reports feeling "the same", endorses palpitations on Venlafaxine, will continue to monitor. Panic attack yesterday, will change standing Klonopin to 2 pm dosing.    Impression: MDD vs Social Anxiety disorder with Panic attacks vs Gender dysphoria, R/O bipolar    #MDD #Gender dysphoria #Social Anxiety Disorder #Panic attacks  - 9.39 legals changed to 9.14  - On Effexor 150 mg since 05/15, increased to 187.5 mg on 5/17  - Stopped Abilify 5 mg, 5/16   - Tapered off Zoloft  - Melatonin for sleep phase disorder  - Left vo[facility]email [facility] Vincent at [facility], [phone]  - Left VM with  Kandice Acosta who work s with LGBTQ community for connection to trans affirming care  - Left  Teagan Escobar [phone], patient's therapist    - TSH WNL    #Gender Dysphoria  - The following programs contacted: [facility], [facility], [facility], [facility], [facility], [facility] (reached out to them, no [place]ian groups), [facility] (voicemail full), [facility] (no availability to send anyone, have [facility] but referred to [facility])  - Referral on discharge to the [facility] (suicide hotline) and [facility] ([facility] has enrolled patient)    #Panic Attacks  - PRN Atarax  - Continue Klonipin 0.5 mg standing, changed to 2pm dosing    #Agitation  - For episodes of acute agitation can offer PO Haldol 5 mg/Ativan 2 mg/Benadryl 50 mg Q6H PRN. If refusing PO and is in acute risk of harm to self/other, can offer IM Haldol 5 mg/Ativan 2 mg/Benadryl 50 mg Q6H PRN. If given, please repeat EKG and hold antipsychotics if QTC>500

## 2023-05-18 NOTE — BH INPATIENT PSYCHIATRY PROGRESS NOTE - NSTXCOPEGOALOTHER_PSY_ALL_CORE
Identify support available or acquire additional support system

## 2023-05-18 NOTE — BH INPATIENT PSYCHIATRY PROGRESS NOTE - CURRENT MEDICATION
MEDICATIONS  (STANDING):  clonazePAM  Tablet 0.5 milliGRAM(s) Oral at bedtime  melatonin. 3 milliGRAM(s) Oral at bedtime  venlafaxine .5 milliGRAM(s) Oral daily    MEDICATIONS  (PRN):  aluminum hydroxide/magnesium hydroxide/simethicone Suspension 30 milliLiter(s) Oral every 6 hours PRN Dyspepsia  haloperidol     Tablet 5 milliGRAM(s) Oral every 6 hours PRN agitation  hydrOXYzine hydrochloride 50 milliGRAM(s) Oral every 6 hours PRN anxiety  LORazepam     Tablet 2 milliGRAM(s) Oral every 6 hours PRN agitation   MEDICATIONS  (STANDING):  clonazePAM  Tablet 0.5 milliGRAM(s) Oral daily  melatonin. 3 milliGRAM(s) Oral at bedtime  venlafaxine .5 milliGRAM(s) Oral daily    MEDICATIONS  (PRN):  aluminum hydroxide/magnesium hydroxide/simethicone Suspension 30 milliLiter(s) Oral every 6 hours PRN Dyspepsia  haloperidol     Tablet 5 milliGRAM(s) Oral every 6 hours PRN agitation  hydrOXYzine hydrochloride 50 milliGRAM(s) Oral every 6 hours PRN anxiety  LORazepam     Tablet 2 milliGRAM(s) Oral every 6 hours PRN agitation

## 2023-05-18 NOTE — BH TREATMENT PLAN - NSTXCOPEINTERRN_PSY_ALL_CORE
Encourage pt to ask for prn's when feeling overwhelmed
Encourage pt to ask for prn's when feeling overwhelmed

## 2023-05-18 NOTE — BH INPATIENT PSYCHIATRY PROGRESS NOTE - NSBHCHARTREVIEWVS_PSY_A_CORE FT
Vital Signs Last 24 Hrs  T(C): 36.3 (05-18-23 @ 08:05), Max: 36.3 (05-18-23 @ 08:05)  T(F): 97.3 (05-18-23 @ 08:05), Max: 97.3 (05-18-23 @ 08:05)  HR: 129 (05-18-23 @ 08:05) (120 - 129)  BP: 117/74 (05-18-23 @ 08:05) (117/74 - 126/88)  BP(mean): --  RR: 16 (05-18-23 @ 08:05) (16 - 18)  SpO2: --     Vital Signs Last 24 Hrs  T(C): 36.2 (05-18-23 @ 15:32), Max: 36.3 (05-18-23 @ 08:05)  T(F): 97.1 (05-18-23 @ 15:32), Max: 97.3 (05-18-23 @ 08:05)  HR: 103 (05-18-23 @ 15:32) (103 - 129)  BP: 140/93 (05-18-23 @ 15:32) (117/74 - 140/93)  BP(mean): --  RR: 16 (05-18-23 @ 15:32) (16 - 18)  SpO2: --     Vital Signs Last 24 Hrs  T(C): 36.2 (05-18-23 @ 15:32), Max: 36.3 (05-18-23 @ 08:05)  T(F): 97.1 (05-18-23 @ 15:32), Max: 97.3 (05-18-23 @ 08:05)  HR: 103 (05-18-23 @ 15:32) (103 - 129)  BP: 140/93 (05-18-23 @ 15:32) (117/74 - 140/93)  BP(mean): --  RR: 16 (05-18-23 @ 15:32) (16 - 16)  SpO2: --

## 2023-05-18 NOTE — BH INPATIENT PSYCHIATRY PROGRESS NOTE - NSBHMETABOLIC_PSY_ALL_CORE_FT
BMI: BMI (kg/m2): 32.5 (04-18-23 @ 05:33)  HbA1c: A1C with Estimated Average Glucose Result: 5.7 % (05-06-23 @ 08:53)    Glucose:   BP: 117/74 (05-18-23 @ 08:05) (115/79 - 126/88)  Lipid Panel: Date/Time: 05-06-23 @ 08:53  Cholesterol, Serum: 206  Direct LDL: --  HDL Cholesterol, Serum: 47  Total Cholesterol/HDL Ration Measurement: --  Triglycerides, Serum: 131   BMI: BMI (kg/m2): 32.5 (04-18-23 @ 05:33)  HbA1c: A1C with Estimated Average Glucose Result: 5.7 % (05-06-23 @ 08:53)    Glucose:   BP: 140/93 (05-18-23 @ 15:32) (115/79 - 140/93)  Lipid Panel: Date/Time: 05-06-23 @ 08:53  Cholesterol, Serum: 206  Direct LDL: --  HDL Cholesterol, Serum: 47  Total Cholesterol/HDL Ration Measurement: --  Triglycerides, Serum: 131

## 2023-05-18 NOTE — BH INPATIENT PSYCHIATRY PROGRESS NOTE - NSBHFUPINTERVALHXFT_PSY_A_CORE
Nursing reports no acute events overnight. Per chart review the patient received Atarax at 15:00 yesterday.    Chart reviewed, patient seen and evaluated in AM. On approach, .... Patient reports ________________.   Patient endorsed OK sleep and appetite.     No SI/HI/AVH elicited.    Nursing reports no acute events overnight. Per chart review the patient received Atarax at 15:00 yesterday.    Chart reviewed, patient seen and evaluated in AM. On approach, pt greets team with a smile. Patient reports another panic attack yesterday afternoon in the context of feeling like he does not want to exist anymore. He describes feeling "a fire on my neck" and that he wanted to "choke myself." He says that he feels "the same." He reports that since starting venlafaxine, he's been experiencing episodes where he feels like his heart is palpitating and he cannot breathe. The team brought up again the idea of support groups, and although he is still hesitant, he says that he may go if someone brings up and if he can just sit there and not talk. The team discussed administering Klonipin earlier in the day to attempt to prevent panic attacks, and pt agreed.   Patient endorsed OK sleep and appetite.        Nursing reports no acute events overnight. Per chart review the patient received Atarax at 15:00 yesterday.    Chart reviewed, patient seen and evaluated in AM. On approach, pt greets team with a smile. Patient reports another panic attack yesterday afternoon in the context of feeling like he does not want to exist anymore. He describes feeling "a fire on my neck" and that he wanted to "choke myself." He states that he has been applying pressure to his neck during these distressing times, but he has not been hitting himself. He says that he feels "the same." He reports that since starting venlafaxine, he's been experiencing episodes where he feels like his heart is palpitating and he cannot breathe. The team brought up again the idea of support groups, and although he is still hesitant, he says that he may go if someone brings up and if he can just sit there and not talk. The team discussed administering Klonipin earlier in the day to attempt to prevent panic attacks, and pt agreed.   Patient endorsed OK sleep and appetite.        Nursing reports no acute events overnight. Per chart review the patient received Atarax at 15:00 yesterday.    Chart reviewed, patient seen and evaluated in AM. On approach, pt greets team with a smile. Patient reports another panic attack yesterday afternoon in the context of feeling like he does not want to exist anymore. He describes feeling "a fire on my neck" and that he wanted to "choke myself." He states that he has been applying pressure to his neck during these distressing times, but he has not been hitting himself. He says that he feels "the same." He reports that since starting venlafaxine, he's been experiencing episodes where he feels like his heart is palpitating and he cannot breathe. The team brought up again the idea of support groups, and although he is still hesitant, he says that he may go if someone brings up and if he can just sit there and not talk. The team discussed administering Klonopin earlier in the day to attempt to prevent panic attacks, and pt agreed.   Patient endorsed OK sleep and appetite.     Writer called [facility], left message for Laurel Larson at [phone]    Writer called:  [facility]  Support group Tuesdays 6:30-7:45. Topics of interest for [facility] people 18+. [facility] folks only.  [phone]  - Asked if someone may be able to do a virtual or in person visit for patient, they are unable to do a visit of this sort at this time, and referred writer to Jorge Alberto Mejía

## 2023-05-19 PROCEDURE — 99231 SBSQ HOSP IP/OBS SF/LOW 25: CPT

## 2023-05-19 RX ADMIN — Medication 3 MILLIGRAM(S): at 20:07

## 2023-05-19 RX ADMIN — Medication 0.5 MILLIGRAM(S): at 08:11

## 2023-05-19 RX ADMIN — Medication 187.5 MILLIGRAM(S): at 08:11
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
## 2023-05-19 NOTE — BH INPATIENT PSYCHIATRY PROGRESS NOTE - NSBHASSESSSUMMFT_PSY_ALL_CORE
The patient is a 18 yo transgender F->M, Syriac-speaking, recently immigrated from [place], domiciled with brother, unemployed, with psych hx of gender dysphoria, anxiety and depression, no past psychiatric hospitalizations, previous suicide attempt via choking, who is presenting at recommendation of brother and cousin for increasing SI.    Upon admission patient endorses strong feelings of depression and exhibits very dysphoric and minimally reactive affect. He endorses escalating suicidal thoughts related to feelings of worthlessness and unhappiness over his appearance. During the course of his hospitalization, his mood and affect has improved, he has become more talkative. . Of note, the patient's brother recently lost his job and the patient has been feeling like he has a duty to get discharged in order to find a job and help financially. He is therefore at an increased risk of minimizing his symptoms. The patient was admitted involuntarily on 9:39 legals as he has been acutely elevated risk of suicide, now changed to voluntary legals. Chronic risk factors include history of trauma, history of persistent depressive symptoms,   and history of suicidal ideation. Current risk factors include recent self inflicted choking, age, recent immigration, not understanding/speaking well language of current residence, current gender dysphoria, current social anxiety, current panic attacks and financial stressors. Goals of care will be to decrease patient's suicidality as his current risk factors place him at acutely elevated risk. Requires continued hospitalization for stabilization.    04/18/23- Patient acutely dysphoric. Plan to dc mirtazapine as he notes excessive lethargy and hyperphagia, continue sertraline 50 mg  04/19/2023  Patient remains with thoughts of suicide and concerns about his future, however he feels safe while in a structured setting with people around him  04/20/23 Patient had no thoughts of suicide during interview, appeared brighter and occasionally smiled. Agreeable to increase antidepressants  04/24 Patient notes panic attack last night, alleviated talking with cousin. Brighter, future oriented. Endorses social anxiety.  04/25/23 Patient did not have panic attack in last 24 hrs. Still appears brighter on unit and during interview today. Voiced that his social anxiety hinders his ability to advocate for himself while on unit  04/26   Patient is somewhat less anxious,  spending  time outside his room and is cheerful at times.  04/27/23 Patient notes still having intermittent anxiety, however he is now able to leave his room and his speech is much more productive. Affect continues to be more supple than on initial presentation. Plan to increase Zoloft to 150 mg 04/28, with eventual plan to maximize it to treat his social anxiety disorder.  04/28 Yesterday patient had another panic attack when bullied about his gender expression and received Klonopin and Atarax PRN. Though he states his mood is "good" and has a more supple affect than on initial presentation, he continues to endorse feelings of self hatred (which he believes may be alleviated by gender affirmative care) as well as wish to no longer be alive (though no active SI)  05/01 Patient continues to endorse passive SI, appeared anxious though smiling during interview. Reported panic attack yesterday. Ok with plan to increase Zoloft to 200 mg  05/02 Pt received first dose of 200 mg Zoloft. Had panic attack yesterday triggered by existential doubts and feelings of self hatred, which resulted in him hitting himself. Today less anxious but continues to endorse ambivalence about wanting to be alive.  5/3/23   patient had significant panic attack accompanied by dread and thoughts of self destruction.  When asked if suicidal patient replies I cannot kill myself because of my family.  05/04- Acutely anxious, more so than previously (however interviewed in large group), will add 2 mg abilify. Vickie in process of enrolling Rad[facility] as patient in [facility]  5/5 - Pt endorses panic attack the evening before during which he hit himself in the stomach and arms and banged his head on the wall due to self hatred and feeling like he does not have a place in this world. Created signs with patient that he can use to communicate with staff nonverbally; pt stated that he will try his best to use them. Does not report adverse effects from Abilify.  5/8- Pt denies any panic attacks over the weekend. Pt states that he did not need to use the signs that he created together with the team to communicate with staff. Continues to endorse passive SI and states that his reason for living is his family, but other than that, he does not feel like he has a reason to live. Abilify increased to 5 mg  5/9- Pt denies any panic attacks since yesterday, but continues to endorse passive SI and state that his family is his only reason for living. Will continue Abilify 5 mg.  5/10- Pt states that he had a panic attack the evening prior, during which he punched himself in the stomach and banged his head against the wall. Stated again that he does not want to be alive and does not have a place in this world.  5/11- Pt stated that yesterday, he wrapped a blanket around his neck because he "wanted to feel something" and wanted to "turn off my brain." He stated that he did not do this with the intention to end his life. He stated he does not want to be alive, but cannot die because it would hurt his family. Will decrease Zoloft to 100 mg on 5/12 with the intention of cross-titrating to venlafaxine. Will continue Abilify 5 mg.  5/12 - Pt reported no panic attacks or attempts at self-harm since last conversation. Stated that he "feels sad" and like he is "descending into darkness." Explained to pt that we would be switching from Zoloft to Effexor. Gave Zoloft 100 mg today, will also be starting Effexor 75 mg today.   5/18- Pt reports feeling "the same", endorses palpitations on Venlafaxine, will continue to monitor. Panic attack yesterday, will change standing Klonopin to 2 pm dosing.    Impression: MDD vs Social Anxiety disorder with Panic attacks vs Gender dysphoria, R/O bipolar    #MDD #Gender dysphoria #Social Anxiety Disorder #Panic attacks  - 9.39 legals changed to 9.14  - On Effexor 150 mg since 05/15, increased to 187.5 mg on 5/17  - Stopped Abilify 5 mg, 5/16   - Tapered off Zoloft  - Melatonin for sleep phase disorder  - Left voicemail with Dr. Vincent at [facility], [phone]  - Left VM with  Kandice Acosta who work s with LGBTQ community for connection to trans affirming care  - Left  Teagan Escobar [phone], patient's therapist    - TSH WNL    #Gender Dysphoria  - The following programs contacted: [facility] [employer], [facility], [facility], [facility], [facility], [facility] In program (reached out to them, no Syriac groups), [facility] (voicemail full), [facility] (no availability to send anyone, have [facility] but referred to Jorge Alberto Mejía)  - Referral on discharge to the [facility] (suicide hotline) and [facility] (Rhett has enrolled patient)    #Panic Attacks  - PRN Atarax  - Continue Klonipin 0.5 mg standing, changed to 2pm dosing    #Agitation  - For episodes of acute agitation can offer PO Haldol 5 mg/Ativan 2 mg/Benadryl 50 mg Q6H PRN. If refusing PO and is in acute risk of harm to self/other, can offer IM Haldol 5 mg/Ativan 2 mg/Benadryl 50 mg Q6H PRN. If given, please repeat EKG and hold antipsychotics if QTC>500       The patient is a 16 yo transgender F->M, Georgian-speaking, recently immigrated from [place], domiciled with brother, unemployed, with psych hx of gender dysphoria, anxiety and depression, no past psychiatric hospitalizations, previous suicide attempt via choking, who is presenting at recommendation of brother and cousin for increasing SI.    Upon admission patient endorses strong feelings of depression and exhibits very dysphoric and minimally reactive affect. He endorses escalating suicidal thoughts related to feelings of worthlessness and unhappiness over his appearance. During the course of his hospitalization, his mood and affect has improved, he has become more talkative. . Of note, the patient's brother recently lost his job and the patient has been feeling like he has a duty to get discharged in order to find a job and help financially. He is therefore at an increased risk of minimizing his symptoms. The patient was admitted involuntarily on 9:39 legals as he has been acutely elevated risk of suicide, now changed to voluntary legals. Chronic risk factors include history of trauma, history of persistent depressive symptoms,   and history of suicidal ideation. Current risk factors include recent self inflicted choking, age, recent immigration, not understanding/speaking well language of current residence, current gender dysphoria, current social anxiety, current panic attacks and financial stressors. Goals of care will be to decrease patient's suicidality as his current risk factors place him at acutely elevated risk. Requires continued hospitalization for stabilization.    04/18/23- Patient acutely dysphoric. Plan to dc mirtazapine as he notes excessive lethargy and hyperphagia, continue sertraline 50 mg  04/19/2023  Patient remains with thoughts of suicide and concerns about his future, however he feels safe while in a structured setting with people around him  04/20/23 Patient had no thoughts of suicide during interview, appeared brighter and occasionally smiled. Agreeable to increase antidepressants  04/24 Patient notes panic attack last night, alleviated talking with cousin. Brighter, future oriented. Endorses social anxiety.  04/25/23 Patient did not have panic attack in last 24 hrs. Still appears brighter on unit and during interview today. Voiced that his social anxiety hinders his ability to advocate for himself while on unit  04/26   Patient is somewhat less anxious,  spending  time outside his room and is cheerful at times.  04/27/23 Patient notes still having intermittent anxiety, however he is now able to leave his room and his speech is much more productive. Affect continues to be more supple than on initial presentation. Plan to increase Zoloft to 150 mg 04/28, with eventual plan to maximize it to treat his social anxiety disorder.  04/28 Yesterday patient had another panic attack when bullied about his gender expression and received Klonopin and Atarax PRN. Though he states his mood is "good" and has a more supple affect than on initial presentation, he continues to endorse feelings of self hatred (which he believes may be alleviated by gender affirmative care) as well as wish to no longer be alive (though no active SI)  05/01 Patient continues to endorse passive SI, appeared anxious though smiling during interview. Reported panic attack yesterday. Ok with plan to increase Zoloft to 200 mg  05/02 Pt received first dose of 200 mg Zoloft. Had panic attack yesterday triggered by existential doubts and feelings of self hatred, which resulted in him hitting himself. Today less anxious but continues to endorse ambivalence about wanting to be alive.  5/3/23   patient had significant panic attack accompanied by dread and thoughts of self destruction.  When asked if suicidal patient replies I cannot kill myself because of my family.  05/04- Acutely anxious, more so than previously (however interviewed in large group), will add 2 mg abilify. Vickie in process of enrolling Radames as patient in [facility]  5/5 - Pt endorses panic attack the evening before during which he hit himself in the stomach and arms and banged his head on the wall due to self hatred and feeling like he does not have a place in this world. Created signs with patient that he can use to communicate with staff nonverbally; pt stated that he will try his best to use them. Does not report adverse effects from Abilify.  5/8- Pt denies any panic attacks over the weekend. Pt states that he did not need to use the signs that he created together with the team to communicate with staff. Continues to endorse passive SI and states that his reason for living is his family, but other than that, he does not feel like he has a reason to live. Abilify increased to 5 mg  5/9- Pt denies any panic attacks since yesterday, but continues to endorse passive SI and state that his family is his only reason for living. Will continue Abilify 5 mg.  5/10- Pt states that he had a panic attack the evening prior, during which he punched himself in the stomach and banged his head against the wall. Stated again that he does not want to be alive and does not have a place in this world.  5/11- Pt stated that yesterday, he wrapped a blanket around his neck because he "wanted to feel something" and wanted to "turn off my brain." He stated that he did not do this with the intention to end his life. He stated he does not want to be alive, but cannot die because it would hurt his family. Will decrease Zoloft to 100 mg on 5/12 with the intention of cross-titrating to venlafaxine. Will continue Abilify 5 mg.  5/12 - Pt reported no panic attacks or attempts at self-harm since last conversation. Stated that he "feels sad" and like he is "descending into darkness." Explained to pt that we would be switching from Zoloft to Effexor. Gave Zoloft 100 mg today, will also be starting Effexor 75 mg today.   5/18- Pt reports feeling "the same", endorses palpitations on Venlafaxine, will continue to monitor. Panic attack yesterday, will change standing Klonopin to 2 pm dosing.  5/19- Pt reports "feeling a little sad today" and states that he is continuing to have palpitations at night. Did not have panic attack yesterday, will continuing standing Klonipin.    Impression: MDD vs Social Anxiety disorder with Panic attacks vs Gender dysphoria, R/O bipolar    #MDD #Gender dysphoria #Social Anxiety Disorder #Panic attacks  - 9.39 legals changed to 9.14  - On Effexor 150 mg since 05/15, increased to 187.5 mg on 5/17  - Stopped Abilify 5 mg, 5/16   - Tapered off Zoloft  - Melatonin for sleep phase disorder  - Left voicemail with Dr. Vincent at [facility], [phone]  - Left VM with  Kandice Acosta who work s with LGBTQ community for connection to trans affirming care  - Left  Teagan Escobar [phone], patient's therapist    - TSH WNL    #Gender Dysphoria  - The following programs contacted: [facility] [place], [employer], [facility], [facility], [facility], [facility] Youth Drop In program (reached out to them, may be able to send someone to hospital to speak to Radames), [facility] (voicemail full), [facility] (no availability to send anyone, have [facility] but referred to Jorge Alberto Mejía)  - Referral on discharge to the [facility] (suicide hotline) and [facility] ([facility]he[facility] has enrolled patient)    #Panic Attacks  - PRN Atarax  - Continue Klonipin 0.5 mg standing, changed to 2pm dosing    #Agitation  - For episodes of acute agitation can offer PO Haldol 5 mg/Ativan 2 mg/Benadryl 50 mg Q6H PRN. If refusing PO and is in acute risk of harm to self/other, can offer IM Haldol 5 mg/Ativan 2 mg/Benadryl 50 mg Q6H PRN. If given, please repeat EKG and hold antipsychotics if QTC>500       The patient is a 18 yo transgender F->M, Khmer-speaking, recently immigrated from [place], domiciled with brother, unemployed, with psych hx of gender dysphoria, anxiety and depression, no past psychiatric hospitalizations, previous suicide attempt via choking, who is presenting at recommendation of brother and cousin for increasing SI.    Upon admission patient endorses strong feelings of depression and exhibits very dysphoric and minimally reactive affect. He endorses escalating suicidal thoughts related to feelings of worthlessness and unhappiness over his appearance. During the course of his hospitalization, his mood and affect has improved, he has become more talkative. . Of note, the patient's brother recently lost his job and the patient has been feeling like he has a duty to get discharged in order to find a job and help financially. He is therefore at an increased risk of minimizing his symptoms. The patient was admitted involuntarily on 9:39 legals as he has been acutely elevated risk of suicide, now changed to voluntary legals. Chronic risk factors include history of trauma, history of persistent depressive symptoms,   and history of suicidal ideation. Current risk factors include recent self inflicted choking, age, recent immigration, not understanding/speaking well language of current residence, current gender dysphoria, current social anxiety, current panic attacks and financial stressors. Goals of care will be to decrease patient's suicidality as his current risk factors place him at acutely elevated risk. Requires continued hospitalization for stabilization.    04/18/23- Patient acutely dysphoric. Plan to dc mirtazapine as he notes excessive lethargy and hyperphagia, continue sertraline 50 mg  04/19/2023  Patient remains with thoughts of suicide and concerns about his future, however he feels safe while in a structured setting with people around him  04/20/23 Patient had no thoughts of suicide during interview, appeared brighter and occasionally smiled. Agreeable to increase antidepressants  04/24 Patient notes panic attack last night, alleviated talking with cousin. Brighter, future oriented. Endorses social anxiety.  04/25/23 Patient did not have panic attack in last 24 hrs. Still appears brighter on unit and during interview today. Voiced that his social anxiety hinders his ability to advocate for himself while on unit  04/26   Patient is somewhat less anxious,  spending  time outside his room and is cheerful at times.  04/27/23 Patient notes still having intermittent anxiety, however he is now able to leave his room and his speech is much more productive. Affect continues to be more supple than on initial presentation. Plan to increase Zoloft to 150 mg 04/28, with eventual plan to maximize it to treat his social anxiety disorder.  04/28 Yesterday patient had another panic attack when bullied about his gender expression and received Klonopin and Atarax PRN. Though he states his mood is "good" and has a more supple affect than on initial presentation, he continues to endorse feelings of self hatred (which he believes may be alleviated by gender affirmative care) as well as wish to no longer be alive (though no active SI)  05/01 Patient continues to endorse passive SI, appeared anxious though smiling during interview. Reported panic attack yesterday. Ok with plan to increase Zoloft to 200 mg  05/02 Pt received first dose of 200 mg Zoloft. Had panic attack yesterday triggered by existential doubts and feelings of self hatred, which resulted in him hitting himself. Today less anxious but continues to endorse ambivalence about wanting to be alive.  5/3/23   patient had significant panic attack accompanied by dread and thoughts of self destruction.  When asked if suicidal patient replies I cannot kill myself because of my family.  05/04- Acutely anxious, more so than previously (however interviewed in large group), will add 2 mg abilify. Vickie in process of enrolling Radames as patient in [facility]  5/5 - Pt endorses panic attack the evening before during which he hit himself in the stomach and arms and banged his head on the wall due to self hatred and feeling like he does not have a place in this world. Created signs with patient that he can use to communicate with staff nonverbally; pt stated that he will try his best to use them. Does not report adverse effects from Abilify.  5/8- Pt denies any panic attacks over the weekend. Pt states that he did not need to use the signs that he created together with the team to communicate with staff. Continues to endorse passive SI and states that his reason for living is his family, but other than that, he does not feel like he has a reason to live. Abilify increased to 5 mg  5/9- Pt denies any panic attacks since yesterday, but continues to endorse passive SI and state that his family is his only reason for living. Will continue Abilify 5 mg.  5/10- Pt states that he had a panic attack the evening prior, during which he punched himself in the stomach and banged his head against the wall. Stated again that he does not want to be alive and does not have a place in this world.  5/11- Pt stated that yesterday, he wrapped a blanket around his neck because he "wanted to feel something" and wanted to "turn off my brain." He stated that he did not do this with the intention to end his life. He stated he does not want to be alive, but cannot die because it would hurt his family. Will decrease Zoloft to 100 mg on 5/12 with the intention of cross-titrating to venlafaxine. Will continue Abilify 5 mg.  5/12 - Pt reported no panic attacks or attempts at self-harm since last conversation. Stated that he "feels sad" and like he is "descending into darkness." Explained to pt that we would be switching from Zoloft to Effexor. Gave Zoloft 100 mg today, will also be starting Effexor 75 mg today.   5/18- Pt reports feeling "the same", endorses palpitations on Venlafaxine, will continue to monitor. Panic attack yesterday, will change standing Klonopin to 2 pm dosing.  5/19- Pt reports "feeling a little sad today" and states that he is continuing to have palpitations at night. Did not have panic attack yesterday, will continuing standing Klonipin.    Impression: MDD vs Social Anxiety disorder with Panic attacks vs Gender dysphoria, R/O bipolar    #MDD #Gender dysphoria #Social Anxiety Disorder #Panic attacks  - 9.39 legals changed to 9.14  - On Effexor 150 mg since 05/15, increased to 187.5 mg on 5/17  - Stopped Abilify 5 mg, 5/16   - Tapered off Zoloft  - Melatonin for sleep phase disorder  - Left voicemail with Dr. Vincent at [facility], [phone]  - Left VM with  Kandice Acosta who work s with LGBTQ community for connection to trans affirming care  - Left  Teagan Escobar [phone], patient's therapist    - TSH WNL    #Gender Dysphoria  - The following programs contacted: [facility] [place] (does not have anyone to send to the hospital), [facility] (only provides services for homeless individuals), [employer] [employer] [facility] (left voicemail), [employer] (phone number out of service), [facility], [facility] Youth Drop In program (reached out to them, may be able to send someone to hospital to speak to Radames), [facility] (voicemail full), [facility] (no availability to send anyone, have [facility] but referred to Jorge Alberto Mejía)  - Referral on discharge to the [facility] (suicide hotline) and [facility] ([facility]hett has enrolled patient)    #Panic Attacks  - PRN Atarax  - Continue Klonipin 0.5 mg standing, changed to 2pm dosing    #Agitation  - For episodes of acute agitation can offer PO Haldol 5 mg/Ativan 2 mg/Benadryl 50 mg Q6H PRN. If refusing PO and is in acute risk of harm to self/other, can offer IM Haldol 5 mg/Ativan 2 mg/Benadryl 50 mg Q6H PRN. If given, please repeat EKG and hold antipsychotics if QTC>500       The patient is a 18 yo transgender F->M, Kiswahili-speaking, recently immigrated from [place], domiciled with brother, unemployed, with psych hx of gender dysphoria, anxiety and depression, no past psychiatric hospitalizations, previous suicide attempt via choking, who is presenting at recommendation of brother and cousin for increasing SI.    Upon admission patient endorses strong feelings of depression and exhibits very dysphoric and minimally reactive affect. He endorses escalating suicidal thoughts related to feelings of worthlessness and unhappiness over his appearance. During the course of his hospitalization, his mood and affect has improved, he has become more talkative. . Of note, the patient's brother recently lost his job and the patient has been feeling like he has a duty to get discharged in order to find a job and help financially. He is therefore at an increased risk of minimizing his symptoms. The patient was admitted involuntarily on 9:39 legals as he has been acutely elevated risk of suicide, now changed to voluntary legals. Chronic risk factors include history of trauma, history of persistent depressive symptoms,   and history of suicidal ideation. Current risk factors include recent self inflicted choking, age, recent immigration, not understanding/speaking well language of current residence, current gender dysphoria, current social anxiety, current panic attacks and financial stressors. Goals of care will be to decrease patient's suicidality as his current risk factors place him at acutely elevated risk. Requires continued hospitalization for stabilization.    04/18/23- Patient acutely dysphoric. Plan to dc mirtazapine as he notes excessive lethargy and hyperphagia, continue sertraline 50 mg  04/19/2023  Patient remains with thoughts of suicide and concerns about his future, however he feels safe while in a structured setting with people around him  04/20/23 Patient had no thoughts of suicide during interview, appeared brighter and occasionally smiled. Agreeable to increase antidepressants  04/24 Patient notes panic attack last night, alleviated talking with cousin. Brighter, future oriented. Endorses social anxiety.  04/25/23 Patient did not have panic attack in last 24 hrs. Still appears brighter on unit and during interview today. Voiced that his social anxiety hinders his ability to advocate for himself while on unit  04/26   Patient is somewhat less anxious,  spending  time outside his room and is cheerful at times.  04/27/23 Patient notes still having intermittent anxiety, however he is now able to leave his room and his speech is much more productive. Affect continues to be more supple than on initial presentation. Plan to increase Zoloft to 150 mg 04/28, with eventual plan to maximize it to treat his social anxiety disorder.  04/28 Yesterday patient had another panic attack when bullied about his gender expression and received Klonopin and Atarax PRN. Though he states his mood is "good" and has a more supple affect than on initial presentation, he continues to endorse feelings of self hatred (which he believes may be alleviated by gender affirmative care) as well as wish to no longer be alive (though no active SI)  05/01 Patient continues to endorse passive SI, appeared anxious though smiling during interview. Reported panic attack yesterday. Ok with plan to increase Zoloft to 200 mg  05/02 Pt received first dose of 200 mg Zoloft. Had panic attack yesterday triggered by existential doubts and feelings of self hatred, which resulted in him hitting himself. Today less anxious but continues to endorse ambivalence about wanting to be alive.  5/3/23   patient had significant panic attack accompanied by dread and thoughts of self destruction.  When asked if suicidal patient replies I cannot kill myself because of my family.  05/04- Acutely anxious, more so than previously (however interviewed in large group), will add 2 mg abilify. Vickie in process of enrolling Radames as patient in [facility]  5/5 - Pt endorses panic attack the evening before during which he hit himself in the stomach and arms and banged his head on the wall due to self hatred and feeling like he does not have a place in this world. Created signs with patient that he can use to communicate with staff nonverbally; pt stated that he will try his best to use them. Does not report adverse effects from Abilify.  5/8- Pt denies any panic attacks over the weekend. Pt states that he did not need to use the signs that he created together with the team to communicate with staff. Continues to endorse passive SI and states that his reason for living is his family, but other than that, he does not feel like he has a reason to live. Abilify increased to 5 mg  5/9- Pt denies any panic attacks since yesterday, but continues to endorse passive SI and state that his family is his only reason for living. Will continue Abilify 5 mg.  5/10- Pt states that he had a panic attack the evening prior, during which he punched himself in the stomach and banged his head against the wall. Stated again that he does not want to be alive and does not have a place in this world.  5/11- Pt stated that yesterday, he wrapped a blanket around his neck because he "wanted to feel something" and wanted to "turn off my brain." He stated that he did not do this with the intention to end his life. He stated he does not want to be alive, but cannot die because it would hurt his family. Will decrease Zoloft to 100 mg on 5/12 with the intention of cross-titrating to venlafaxine. Will continue Abilify 5 mg.  5/12 - Pt reported no panic attacks or attempts at self-harm since last conversation. Stated that he "feels sad" and like he is "descending into darkness." Explained to pt that we would be switching from Zoloft to Effexor. Gave Zoloft 100 mg today, will also be starting Effexor 75 mg today.   5/18- Pt reports feeling "the same", endorses palpitations on Venlafaxine, will continue to monitor. Panic attack yesterday, will change standing Klonopin to 2 pm dosing.  5/19- Pt reports "feeling a little sad today" and states that he is continuing to have palpitations at night. Did not have panic attack yesterday, will continuing standing Klonopin.     Impression: MDD vs Social Anxiety disorder with Panic attacks vs Gender dysphoria vs cPTSD, R/O bipolar    #MDD #Gender dysphoria #Social Anxiety Disorder #Panic attacks  - 9.39 legals changed to 9.14  - On Effexor 150 mg since 05/15, increased to 187.5 mg on 5/17  - Stopped Abilify 5 mg, 5/16   - Tapered off Zoloft  - Melatonin for sleep phase disorder  - Left voicemail with Dr. Vincent at [facility], [phone]  - Left VM with  Kandice Acosta who work s with LGBTQ community for connection to trans affirming care  - Left VM Teagan Escobar [phone], patient's therapist    - TSH WNL    #Gender Dysphoria  - The following programs contacted: [facility] [place] (does not have anyone to send to the hospital), [facility] (only provides services for homeless individuals), [facility] (left voicemail), [facility] (phone number out of service), [facility], [facility] program (reached out to them, may be able to send someone to hospital to speak to Radames), [facility] (voicemail full), [facility] (no availability to send anyone, have [facility] but referred to Jorge Alberto Mejía)  - Referral on discharge to the [facility] (suicide hotline) and [facility] ([facility]he[facility] has enrolled patient)    #Panic Attacks  - PRN Atarax  - Continue Klonopin 0.5 mg standing, changed to 2pm dosing    #Agitation  - For episodes of acute agitation can offer PO Haldol 5 mg/Ativan 2 mg/Benadryl 50 mg Q6H PRN. If refusing PO and is in acute risk of harm to self/other, can offer IM Haldol 5 mg/Ativan 2 mg/Benadryl 50 mg Q6H PRN. If given, please repeat EKG and hold antipsychotics if QTC>500

## 2023-05-19 NOTE — BH INPATIENT PSYCHIATRY PROGRESS NOTE - NSBHCHARTREVIEWVS_PSY_A_CORE FT
Vital Signs Last 24 Hrs  T(C): 36.2 (05-18-23 @ 15:32), Max: 36.3 (05-18-23 @ 08:05)  T(F): 97.1 (05-18-23 @ 15:32), Max: 97.3 (05-18-23 @ 08:05)  HR: 103 (05-18-23 @ 15:32) (103 - 129)  BP: 140/93 (05-18-23 @ 15:32) (117/74 - 140/93)  BP(mean): --  RR: 16 (05-18-23 @ 15:32) (16 - 16)  SpO2: --     Vital Signs Last 24 Hrs  T(C): 35.9 (05-19-23 @ 08:02), Max: 36.2 (05-18-23 @ 15:32)  T(F): 96.6 (05-19-23 @ 08:02), Max: 97.1 (05-18-23 @ 15:32)  HR: 129 (05-19-23 @ 08:02) (103 - 129)  BP: 123/80 (05-19-23 @ 08:02) (123/80 - 140/93)  BP(mean): --  RR: 20 (05-19-23 @ 08:02) (16 - 20)  SpO2: --

## 2023-05-19 NOTE — BH INPATIENT PSYCHIATRY PROGRESS NOTE - NSBHMETABOLIC_PSY_ALL_CORE_FT
BMI: BMI (kg/m2): 32.5 (04-18-23 @ 05:33)  HbA1c: A1C with Estimated Average Glucose Result: 5.7 % (05-06-23 @ 08:53)    Glucose:   BP: 140/93 (05-18-23 @ 15:32) (115/79 - 140/93)  Lipid Panel: Date/Time: 05-06-23 @ 08:53  Cholesterol, Serum: 206  Direct LDL: --  HDL Cholesterol, Serum: 47  Total Cholesterol/HDL Ration Measurement: --  Triglycerides, Serum: 131   BMI: BMI (kg/m2): 32.5 (04-18-23 @ 05:33)  HbA1c: A1C with Estimated Average Glucose Result: 5.7 % (05-06-23 @ 08:53)    Glucose:   BP: 123/80 (05-19-23 @ 08:02) (115/79 - 140/93)  Lipid Panel: Date/Time: 05-06-23 @ 08:53  Cholesterol, Serum: 206  Direct LDL: --  HDL Cholesterol, Serum: 47  Total Cholesterol/HDL Ration Measurement: --  Triglycerides, Serum: 131

## 2023-05-19 NOTE — BH INPATIENT PSYCHIATRY PROGRESS NOTE - OTHER
Pulling at hair in distress when broaching uncomfortable subjects Hair pulling decreased compared to last interview; no longer playing with rubber band

## 2023-05-19 NOTE — BH INPATIENT PSYCHIATRY PROGRESS NOTE - CURRENT MEDICATION
MEDICATIONS  (STANDING):  clonazePAM  Tablet 0.5 milliGRAM(s) Oral daily  melatonin. 3 milliGRAM(s) Oral at bedtime  venlafaxine .5 milliGRAM(s) Oral daily    MEDICATIONS  (PRN):  aluminum hydroxide/magnesium hydroxide/simethicone Suspension 30 milliLiter(s) Oral every 6 hours PRN Dyspepsia  haloperidol     Tablet 5 milliGRAM(s) Oral every 6 hours PRN agitation  hydrOXYzine hydrochloride 50 milliGRAM(s) Oral every 6 hours PRN anxiety  LORazepam     Tablet 2 milliGRAM(s) Oral every 6 hours PRN agitation

## 2023-05-20 PROCEDURE — 93010 ELECTROCARDIOGRAM REPORT: CPT

## 2023-05-20 PROCEDURE — 99231 SBSQ HOSP IP/OBS SF/LOW 25: CPT

## 2023-05-20 RX ADMIN — Medication 0.5 MILLIGRAM(S): at 08:03

## 2023-05-20 RX ADMIN — HALOPERIDOL DECANOATE 5 MILLIGRAM(S): 100 INJECTION INTRAMUSCULAR at 20:15

## 2023-05-20 RX ADMIN — Medication 2 MILLIGRAM(S): at 20:15

## 2023-05-20 RX ADMIN — Medication 187.5 MILLIGRAM(S): at 08:03

## 2023-05-20 RX ADMIN — Medication 50 MILLIGRAM(S): at 19:29

## 2023-05-20 RX ADMIN — Medication 3 MILLIGRAM(S): at 20:02

## 2023-05-20 NOTE — BH INPATIENT PSYCHIATRY PROGRESS NOTE - NSBHMETABOLIC_PSY_ALL_CORE_FT
BMI: BMI (kg/m2): 32.5 (04-18-23 @ 05:33)  HbA1c: A1C with Estimated Average Glucose Result: 5.7 % (05-06-23 @ 08:53)    Glucose:   BP: 141/86 (05-19-23 @ 15:40) (117/74 - 141/86)  Lipid Panel: Date/Time: 05-06-23 @ 08:53  Cholesterol, Serum: 206  Direct LDL: --  HDL Cholesterol, Serum: 47  Total Cholesterol/HDL Ration Measurement: --  Triglycerides, Serum: 131

## 2023-05-20 NOTE — PROGRESS NOTE ADULT - ASSESSMENT
18 yo transgender F->M, Swiss-speaking, recently immigrated from [place], domiciled with brother, unemployed, with psych hx of gender dysphoria, anxiety and depression, no past psychiatric hospitalizations, previous suicide attempt via choking, who is presenting at recommendation of brother and cousin for increasing SI.     Medicine consulted for tachycardia.     Pt was seen and examined, pt denied any chest pain, SOB, difficulty breathing, cough, fever or chills. Does not seem in any distress. Denied taking any medication for any medical issue before admission.   EKG sinus tachycardia.   TSH 1.37 on 05/03/23.   will obtain CBC, CMP, Mg and morning cortisol level.   will follow up on the result.

## 2023-05-20 NOTE — BH INPATIENT PSYCHIATRY PROGRESS NOTE - NSBHFUPINTERVALHXFT_PSY_A_CORE
As per nursing reports no acute events overnight. Patient has been calm and cooperative, sweet and polite, listening to music, smiling appropriately. Patient was interviewed in his room with the help of  Kate (#[id number]). Radames was observed in his room watching a movie with 1:1 . He reports feeling "a little sad today, missing his mom and feeling like he does not fit in on the floor". He feels safe on  the unit, states he "does not like being in this world" but denies suicidal plan or intent. At times he "feels like choking self" due to severe anxiety, the last time he felt like that was on 5/17. As per 1:1 , patient talked to his mother on  the phone and was happy about it.

## 2023-05-20 NOTE — BH INPATIENT PSYCHIATRY PROGRESS NOTE - NSBHASSESSSUMMFT_PSY_ALL_CORE
The patient is a 16 yo transgender F->M, Yi-speaking, recently immigrated from [place], domiciled with brother, unemployed, with psych hx of gender dysphoria, anxiety and depression, no past psychiatric hospitalizations, previous suicide attempt via choking, who is presenting at recommendation of brother and cousin for increasing SI.    Upon admission patient endorses strong feelings of depression and exhibits very dysphoric and minimally reactive affect. He endorses escalating suicidal thoughts related to feelings of worthlessness and unhappiness over his appearance. During the course of his hospitalization, his mood and affect has improved, he has become more talkative. . Of note, the patient's brother recently lost his job and the patient has been feeling like he has a duty to get discharged in order to find a job and help financially. He is therefore at an increased risk of minimizing his symptoms. The patient was admitted involuntarily on 9:39 legals as he has been acutely elevated risk of suicide, now changed to voluntary legals. Chronic risk factors include history of trauma, history of persistent depressive symptoms,   and history of suicidal ideation. Current risk factors include recent self inflicted choking, age, recent immigration, not understanding/speaking well language of current residence, current gender dysphoria, current social anxiety, current panic attacks and financial stressors. Goals of care will be to decrease patient's suicidality as his current risk factors place him at acutely elevated risk. Requires continued hospitalization for stabilization.    04/18/23- Patient acutely dysphoric. Plan to dc mirtazapine as he notes excessive lethargy and hyperphagia, continue sertraline 50 mg  04/19/2023  Patient remains with thoughts of suicide and concerns about his future, however he feels safe while in a structured setting with people around him  04/20/23 Patient had no thoughts of suicide during interview, appeared brighter and occasionally smiled. Agreeable to increase antidepressants  04/24 Patient notes panic attack last night, alleviated talking with cousin. Brighter, future oriented. Endorses social anxiety.  04/25/23 Patient did not have panic attack in last 24 hrs. Still appears brighter on unit and during interview today. Voiced that his social anxiety hinders his ability to advocate for himself while on unit  04/26   Patient is somewhat less anxious,  spending  time outside his room and is cheerful at times.  04/27/23 Patient notes still having intermittent anxiety, however he is now able to leave his room and his speech is much more productive. Affect continues to be more supple than on initial presentation. Plan to increase Zoloft to 150 mg 04/28, with eventual plan to maximize it to treat his social anxiety disorder.  04/28 Yesterday patient had another panic attack when bullied about his gender expression and received Klonopin and Atarax PRN. Though he states his mood is "good" and has a more supple affect than on initial presentation, he continues to endorse feelings of self hatred (which he believes may be alleviated by gender affirmative care) as well as wish to no longer be alive (though no active SI)  05/01 Patient continues to endorse passive SI, appeared anxious though smiling during interview. Reported panic attack yesterday. Ok with plan to increase Zoloft to 200 mg  05/02 Pt received first dose of 200 mg Zoloft. Had panic attack yesterday triggered by existential doubts and feelings of self hatred, which resulted in him hitting himself. Today less anxious but continues to endorse ambivalence about wanting to be alive.  5/3/23   patient had significant panic attack accompanied by dread and thoughts of self destruction.  When asked if suicidal patient replies I cannot kill myself because of my family.  05/04- Acutely anxious, more so than previously (however interviewed in large group), will add 2 mg abilify. Vickie in process of enrolling Radames as patient in [facility]  5/5 - Pt endorses panic attack the evening before during which he hit himself in the stomach and arms and banged his head on the wall due to self hatred and feeling like he does not have a place in this world. Created signs with patient that he can use to communicate with staff nonverbally; pt stated that he will try his best to use them. Does not report adverse effects from Abilify.  5/8- Pt denies any panic attacks over the weekend. Pt states that he did not need to use the signs that he created together with the team to communicate with staff. Continues to endorse passive SI and states that his reason for living is his family, but other than that, he does not feel like he has a reason to live. Abilify increased to 5 mg  5/9- Pt denies any panic attacks since yesterday, but continues to endorse passive SI and state that his family is his only reason for living. Will continue Abilify 5 mg.  5/10- Pt states that he had a panic attack the evening prior, during which he punched himself in the stomach and banged his head against the wall. Stated again that he does not want to be alive and does not have a place in this world.  5/11- Pt stated that yesterday, he wrapped a blanket around his neck because he "wanted to feel something" and wanted to "turn off my brain." He stated that he did not do this with the intention to end his life. He stated he does not want to be alive, but cannot die because it would hurt his family. Will decrease Zoloft to 100 mg on 5/12 with the intention of cross-titrating to venlafaxine. Will continue Abilify 5 mg.  5/12 - Pt reported no panic attacks or attempts at self-harm since last conversation. Stated that he "feels sad" and like he is "descending into darkness." Explained to pt that we would be switching from Zoloft to Effexor. Gave Zoloft 100 mg today, will also be starting Effexor 75 mg today.   5/18- Pt reports feeling "the same", endorses palpitations on Venlafaxine, will continue to monitor. Panic attack yesterday, will change standing Klonopin to 2 pm dosing.  5/19- Pt reports "feeling a little sad today" and states that he is continuing to have palpitations at night. Did not have panic attack yesterday, will continuing standing Klonopin.     Impression: MDD vs Social Anxiety disorder with Panic attacks vs Gender dysphoria vs cPTSD, R/O bipolar    #MDD #Gender dysphoria #Social Anxiety Disorder #Panic attacks  - 9.39 legals changed to 9.14  - On Effexor 150 mg since 05/15, increased to 187.5 mg on 5/17  - Stopped Abilify 5 mg, 5/16   - Tapered off Zoloft  - Melatonin for sleep phase disorder  - Left voicemail with Dr. Vincent at [facility], [phone]  - Left VM with  Kandice Acosta who work s with LGBTQ community for connection to trans affirming care  - Left VM Teagan Escobar [phone], patient's therapist    - TSH WNL    #Gender Dysphoria  - The following programs contacted: [facility] (does not have anyone to send to the hospital), [facility] (only provides services for homeless individuals), [facility] (left voicemail), [facility] (phone number out of service), [facility], [facility] program (reached out to them, may be able to send someone to hospital to speak to Radames), [facility] (voicemail full), [facility] (no availability to send anyone, have [facility] but referred to Jorge Alberto Mejía)  - Referral on discharge to the [facility] (suicide hotline) and [facility] ([facility]hett has enrolled patient)    #Panic Attacks  - PRN Atarax  - Continue Klonopin 0.5 mg standing, changed to 2pm dosing    #Agitation  - For episodes of acute agitation can offer PO Haldol 5 mg/Ativan 2 mg/Benadryl 50 mg Q6H PRN. If refusing PO and is in acute risk of harm to self/other, can offer IM Haldol 5 mg/Ativan 2 mg/Benadryl 50 mg Q6H PRN. If given, please repeat EKG and hold antipsychotics if QTC>500

## 2023-05-20 NOTE — BH INPATIENT PSYCHIATRY PROGRESS NOTE - NSBHCHARTREVIEWVS_PSY_A_CORE FT
Vital Signs Last 24 Hrs  T(C): 36.4 (05-20-23 @ 07:36), Max: 36.4 (05-20-23 @ 07:36)  T(F): 97.6 (05-20-23 @ 07:36), Max: 97.6 (05-20-23 @ 07:36)  HR: 131 (05-20-23 @ 07:36) (129 - 131)  BP: 141/86 (05-19-23 @ 15:40) (141/86 - 141/86)  BP(mean): --  RR: 16 (05-20-23 @ 07:36) (16 - 16)  SpO2: --

## 2023-05-21 LAB
ALBUMIN SERPL ELPH-MCNC: 4.2 G/DL — SIGNIFICANT CHANGE UP (ref 3.5–5.2)
ALP SERPL-CCNC: 83 U/L — SIGNIFICANT CHANGE UP (ref 30–115)
ALT FLD-CCNC: 28 U/L — SIGNIFICANT CHANGE UP (ref 14–37)
ANION GAP SERPL CALC-SCNC: 8 MMOL/L — SIGNIFICANT CHANGE UP (ref 7–14)
AST SERPL-CCNC: 25 U/L — SIGNIFICANT CHANGE UP (ref 14–37)
BILIRUB SERPL-MCNC: 0.3 MG/DL — SIGNIFICANT CHANGE UP (ref 0.2–1.2)
BUN SERPL-MCNC: 13 MG/DL — SIGNIFICANT CHANGE UP (ref 10–20)
CALCIUM SERPL-MCNC: 9.3 MG/DL — SIGNIFICANT CHANGE UP (ref 8.4–10.5)
CHLORIDE SERPL-SCNC: 106 MMOL/L — SIGNIFICANT CHANGE UP (ref 98–110)
CO2 SERPL-SCNC: 29 MMOL/L — SIGNIFICANT CHANGE UP (ref 17–32)
CREAT SERPL-MCNC: 0.8 MG/DL — SIGNIFICANT CHANGE UP (ref 0.3–1)
EGFR: 109 ML/MIN/1.73M2 — SIGNIFICANT CHANGE UP
GLUCOSE SERPL-MCNC: 117 MG/DL — HIGH (ref 70–99)
HCT VFR BLD CALC: 39.9 % — SIGNIFICANT CHANGE UP (ref 37–47)
HGB BLD-MCNC: 13.2 G/DL — SIGNIFICANT CHANGE UP (ref 12–16)
MAGNESIUM SERPL-MCNC: 2 MG/DL — SIGNIFICANT CHANGE UP (ref 1.8–2.4)
MCHC RBC-ENTMCNC: 28.7 PG — SIGNIFICANT CHANGE UP (ref 27–31)
MCHC RBC-ENTMCNC: 33.1 G/DL — SIGNIFICANT CHANGE UP (ref 32–37)
MCV RBC AUTO: 86.7 FL — SIGNIFICANT CHANGE UP (ref 81–99)
NRBC # BLD: 0 /100 WBCS — SIGNIFICANT CHANGE UP (ref 0–0)
PLATELET # BLD AUTO: 307 K/UL — SIGNIFICANT CHANGE UP (ref 130–400)
PMV BLD: 9 FL — SIGNIFICANT CHANGE UP (ref 7.4–10.4)
POTASSIUM SERPL-MCNC: 4.4 MMOL/L — SIGNIFICANT CHANGE UP (ref 3.5–5)
POTASSIUM SERPL-SCNC: 4.4 MMOL/L — SIGNIFICANT CHANGE UP (ref 3.5–5)
PROT SERPL-MCNC: 6.6 G/DL — SIGNIFICANT CHANGE UP (ref 6.1–8)
RBC # BLD: 4.6 M/UL — SIGNIFICANT CHANGE UP (ref 4.2–5.4)
RBC # FLD: 13 % — SIGNIFICANT CHANGE UP (ref 11.5–14.5)
SODIUM SERPL-SCNC: 143 MMOL/L — SIGNIFICANT CHANGE UP (ref 135–146)
WBC # BLD: 10.23 K/UL — SIGNIFICANT CHANGE UP (ref 4.8–10.8)
WBC # FLD AUTO: 10.23 K/UL — SIGNIFICANT CHANGE UP (ref 4.8–10.8)

## 2023-05-21 PROCEDURE — 99252 IP/OBS CONSLTJ NEW/EST SF 35: CPT

## 2023-05-21 PROCEDURE — 99231 SBSQ HOSP IP/OBS SF/LOW 25: CPT | Mod: GC

## 2023-05-21 RX ADMIN — Medication 0.5 MILLIGRAM(S): at 07:53

## 2023-05-21 RX ADMIN — Medication 187.5 MILLIGRAM(S): at 07:54

## 2023-05-21 RX ADMIN — Medication 3 MILLIGRAM(S): at 20:29

## 2023-05-21 NOTE — BH INPATIENT PSYCHIATRY PROGRESS NOTE - NSBHFUPINTERVALHXFT_PSY_A_CORE
Nursing reports that the patient was given PRN atarax at 19:00 for anxiety, followed by PRN haldol at 20:00 when staff noticed that the patient had started head banging. Despite this decompensation in the evening, he had been seen to be enjoying his time earlier in the day with his PCA.    Chart reviewed, patient seen and evaluated in AM. Patient reports that yesterday morning he felt ok, and that he enjoyed playing sports with the 1:1. However, in the evening he started having feelings of despair (similar to those that he often reports). He tried to write his feelings down on a piece of paper, but he still felt distressed. At that point he started head banging, at which point he received PRN PO Haldol. He stated that he felt calmer after receiving the Haldol, and it made him quite sleepy. He still feels sleepy now from it.  Patient endorsed OK sleep and appetite.     No SI/HI/AVH elicited.

## 2023-05-21 NOTE — CHART NOTE - NSCHARTNOTEFT_GEN_A_CORE
As per RN pt is tachycardic with irregular radial pulse  pt is asymptomatic at this time, hx of anxiety but denies cardiac hx  Vital Signs Last 24 Hrs  T(C): 36.9 (21 May 2023 16:02), Max: 36.9 (21 May 2023 16:02)  T(F): 98.4 (21 May 2023 16:02), Max: 98.4 (21 May 2023 16:02)  HR: 131 (21 May 2023 16:02) (129 - 131)  BP: 127/77 (21 May 2023 16:02) (113/61 - 127/77)  RR: 18 (21 May 2023 16:02) (18 - 18)  EXAM: alert, comfortable, NAD  HEART-tachycardic  LUNGS: CTA b/l  ECG - sinus tachycardia  A/P:   LABS  cardiology consult  tele monitor  monitor vss  monitor pt  case d/w attending

## 2023-05-21 NOTE — BH INPATIENT PSYCHIATRY PROGRESS NOTE - NSBHMETABOLIC_PSY_ALL_CORE_FT
BMI: BMI (kg/m2): 32.5 (04-18-23 @ 05:33)  HbA1c: A1C with Estimated Average Glucose Result: 5.7 % (05-06-23 @ 08:53)    Glucose:   BP: 113/61 (05-21-23 @ 08:18) (113/61 - 141/86)  Lipid Panel: Date/Time: 05-06-23 @ 08:53  Cholesterol, Serum: 206  Direct LDL: --  HDL Cholesterol, Serum: 47  Total Cholesterol/HDL Ration Measurement: --  Triglycerides, Serum: 131   BMI: BMI (kg/m2): 32.5 (04-18-23 @ 05:33)  HbA1c: A1C with Estimated Average Glucose Result: 5.7 % (05-06-23 @ 08:53)    Glucose:   BP: 127/77 (05-21-23 @ 16:02) (113/61 - 141/86)  Lipid Panel: Date/Time: 05-06-23 @ 08:53  Cholesterol, Serum: 206  Direct LDL: --  HDL Cholesterol, Serum: 47  Total Cholesterol/HDL Ration Measurement: --  Triglycerides, Serum: 131

## 2023-05-21 NOTE — BH INPATIENT PSYCHIATRY PROGRESS NOTE - NSBHASSESSSUMMFT_PSY_ALL_CORE
The patient is a 18 yo transgender F->M, Wolof-speaking, recently immigrated from [place], domiciled with brother, unemployed, with psych hx of gender dysphoria, anxiety and depression, no past psychiatric hospitalizations, previous suicide attempt via choking, who is presenting at recommendation of brother and cousin for increasing SI.    Upon admission patient endorses strong feelings of depression and exhibits very dysphoric and minimally reactive affect. He endorses escalating suicidal thoughts related to feelings of worthlessness and unhappiness over his appearance. During the course of his hospitalization, his mood and affect has improved, he has become more talkative. . Of note, the patient's brother recently lost his job and the patient has been feeling like he has a duty to get discharged in order to find a job and help financially. He is therefore at an increased risk of minimizing his symptoms. The patient was admitted involuntarily on 9:39 legals as he has been acutely elevated risk of suicide, now changed to voluntary legals. Chronic risk factors include history of trauma, history of persistent depressive symptoms,   and history of suicidal ideation. Current risk factors include recent self inflicted choking, age, recent immigration, not understanding/speaking well language of current residence, current gender dysphoria, current social anxiety, current panic attacks and financial stressors. Goals of care will be to decrease patient's suicidality as his current risk factors place him at acutely elevated risk. Requires continued hospitalization for stabilization.    04/18/23- Patient acutely dysphoric. Plan to dc mirtazapine as he notes excessive lethargy and hyperphagia, continue sertraline 50 mg  04/19/2023  Patient remains with thoughts of suicide and concerns about his future, however he feels safe while in a structured setting with people around him  04/20/23 Patient had no thoughts of suicide during interview, appeared brighter and occasionally smiled. Agreeable to increase antidepressants  04/24 Patient notes panic attack last night, alleviated talking with cousin. Brighter, future oriented. Endorses social anxiety.  04/25/23 Patient did not have panic attack in last 24 hrs. Still appears brighter on unit and during interview today. Voiced that his social anxiety hinders his ability to advocate for himself while on unit  04/26   Patient is somewhat less anxious,  spending  time outside his room and is cheerful at times.  04/27/23 Patient notes still having intermittent anxiety, however he is now able to leave his room and his speech is much more productive. Affect continues to be more supple than on initial presentation. Plan to increase Zoloft to 150 mg 04/28, with eventual plan to maximize it to treat his social anxiety disorder.  04/28 Yesterday patient had another panic attack when bullied about his gender expression and received Klonopin and Atarax PRN. Though he states his mood is "good" and has a more supple affect than on initial presentation, he continues to endorse feelings of self hatred (which he believes may be alleviated by gender affirmative care) as well as wish to no longer be alive (though no active SI)  05/01 Patient continues to endorse passive SI, appeared anxious though smiling during interview. Reported panic attack yesterday. Ok with plan to increase Zoloft to 200 mg  05/02 Pt received first dose of 200 mg Zoloft. Had panic attack yesterday triggered by existential doubts and feelings of self hatred, which resulted in him hitting himself. Today less anxious but continues to endorse ambivalence about wanting to be alive.  5/3/23   patient had significant panic attack accompanied by dread and thoughts of self destruction.  When asked if suicidal patient replies I cannot kill myself because of my family.  05/04- Acutely anxious, more so than previously (however interviewed in large group), will add 2 mg abilify. Vickie in process of enrolling Radames as patient in Pending sale to [facility]  5/5 - Pt endorses panic attack the evening before during which he hit himself in the stomach and arms and banged his head on the wall due to self hatred and feeling like he does not have a place in this world. Created signs with patient that he can use to communicate with staff nonverbally; pt stated that he will try his best to use them. Does not report adverse effects from Abilify.  5/8- Pt denies any panic attacks over the weekend. Pt states that he did not need to use the signs that he created together with the team to communicate with staff. Continues to endorse passive SI and states that his reason for living is his family, but other than that, he does not feel like he has a reason to live. Abilify increased to 5 mg  5/9- Pt denies any panic attacks since yesterday, but continues to endorse passive SI and state that his family is his only reason for living. Will continue Abilify 5 mg.  5/10- Pt states that he had a panic attack the evening prior, during which he punched himself in the stomach and banged his head against the wall. Stated again that he does not want to be alive and does not have a place in this world.  5/11- Pt stated that yesterday, he wrapped a blanket around his neck because he "wanted to feel something" and wanted to "turn off my brain." He stated that he did not do this with the intention to end his life. He stated he does not want to be alive, but cannot die because it would hurt his family. Will decrease Zoloft to 100 mg on 5/12 with the intention of cross-titrating to venlafaxine. Will continue Abilify 5 mg.  5/12 - Pt reported no panic attacks or attempts at self-harm since last conversation. Stated that he "feels sad" and like he is "descending into darkness." Explained to pt that we would be switching from Zoloft to Effexor. Gave Zoloft 100 mg today, will also be starting Effexor 75 mg today.   5/18- Pt reports feeling "the same", endorses palpitations on Venlafaxine, will continue to monitor. Panic attack yesterday, will change standing Klonopin to 2 pm dosing.  5/19- Pt reports "feeling a little sad today" and states that he is continuing to have palpitations at night. Did not have panic attack yesterday, will continuing standing Klonopin.   5/21- Pt received PRN PO Haldol and Atarax after he started head banging in the context of distressing feelings. Today appeared more dysphoric yet less anxious than usual. Appeared tired which he attributed to the Haldol.    Impression: MDD vs Social Anxiety disorder with Panic attacks vs Gender dysphoria vs cPTSD, R/O bipolar    #MDD #Gender dysphoria #Social Anxiety Disorder #Panic attacks  - 9.39 legals changed to 9.14  - On Effexor 150 mg since 05/15, increased to 187.5 mg on 5/17  - Stopped Abilify 5 mg, 5/16   - Tapered off Zoloft  - Melatonin for sleep phase disorder  - Left voicemail with [facility] Vincent at [facility], [phone]  - Left VM with  Kandice Acosta who work s with LGBTQ community for connection to trans affirming care  - Left VM Teagan Escobar [phone], patient's therapist    - TSH WNL    #Gender Dysphoria  - The following programs contacted: [employer] (does not have anyone to send to the hospital), [facility] (only provides services for homeless individuals), [facility] (left voicemail), [facility] (phone number out of service), [facility], [facility] (reached out to them, may be able to send someone to hospital to speak to Radames), [facility] (voicemail full), [facility] (no availability to send anyone, have [facility] but referred to Jorge Alberto Mejía)  - Referral on discharge to [facility] (suicide hotline) and [facility] ([facility] has enrolled patient)    #Panic Attacks  - PRN Atarax  - Continue Klonopin 0.5 mg standing, changed to 2pm dosing    #Agitation  - For episodes of acute agitation can offer PO Haldol 5 mg/Ativan 2 mg/Benadryl 50 mg Q6H PRN. If refusing PO and is in acute risk of harm to self/other, can offer IM Haldol 5 mg/Ativan 2 mg/Benadryl 50 mg Q6H PRN. If given, please repeat EKG and hold antipsychotics if QTC>500

## 2023-05-21 NOTE — BH INPATIENT PSYCHIATRY PROGRESS NOTE - NSBHCHARTREVIEWVS_PSY_A_CORE FT
Vital Signs Last 24 Hrs  T(C): 36.6 (05-21-23 @ 08:18), Max: 36.6 (05-20-23 @ 15:53)  T(F): 97.9 (05-21-23 @ 08:18), Max: 97.9 (05-20-23 @ 15:53)  HR: 129 (05-21-23 @ 08:18) (90 - 153)  BP: 113/61 (05-21-23 @ 08:18) (113/61 - 136/90)  BP(mean): --  RR: 18 (05-21-23 @ 08:18) (16 - 18)  SpO2: --     Vital Signs Last 24 Hrs  T(C): 36.9 (05-21-23 @ 16:02), Max: 36.9 (05-21-23 @ 16:02)  T(F): 98.4 (05-21-23 @ 16:02), Max: 98.4 (05-21-23 @ 16:02)  HR: 131 (05-21-23 @ 16:02) (126 - 153)  BP: 127/77 (05-21-23 @ 16:02) (113/61 - 136/90)  BP(mean): --  RR: 18 (05-21-23 @ 16:02) (18 - 18)  SpO2: --

## 2023-05-21 NOTE — PROGRESS NOTE ADULT - ASSESSMENT
18 yo transgender F->M, Nigerian-speaking, recently immigrated from [place], domiciled with brother, unemployed, with psych hx of gender dysphoria, anxiety and depression, no past psychiatric hospitalizations, previous suicide attempt via choking, who is presenting at recommendation of brother and cousin for increasing SI.     Medicine consulted for tachycardia.     Pt was seen and examined, pt denied any chest pain, SOB, difficulty breathing, cough, fever or chills. Does not seem in any distress. Denied taking any medication for any medical issue before admission.   EKG sinus tachycardia.   TSH 1.37 on 05/03/23.   CBC/CMP/ Mg are WNL.   Consider cardiology consult.

## 2023-05-21 NOTE — BH INPATIENT PSYCHIATRY PROGRESS NOTE - OTHER
Hair pulling decreased compared to last interview; no longer playing with rubber band Appeared noticeably tired today

## 2023-05-22 PROCEDURE — 99231 SBSQ HOSP IP/OBS SF/LOW 25: CPT

## 2023-05-22 RX ORDER — CLONAZEPAM 1 MG
1 TABLET ORAL DAILY
Refills: 0 | Status: DISCONTINUED | OUTPATIENT
Start: 2023-05-22 | End: 2023-05-26

## 2023-05-22 RX ADMIN — Medication 3 MILLIGRAM(S): at 20:17

## 2023-05-22 RX ADMIN — Medication 30 MILLILITER(S): at 20:55

## 2023-05-22 RX ADMIN — Medication 0.5 MILLIGRAM(S): at 08:17

## 2023-05-22 RX ADMIN — Medication 50 MILLIGRAM(S): at 12:34

## 2023-05-22 RX ADMIN — Medication 187.5 MILLIGRAM(S): at 08:17

## 2023-05-22 NOTE — BH INPATIENT PSYCHIATRY PROGRESS NOTE - NSBHCHARTREVIEWVS_PSY_A_CORE FT
Vital Signs Last 24 Hrs  T(C): 36.7 (05-22-23 @ 08:01), Max: 36.9 (05-21-23 @ 16:02)  T(F): 98.1 (05-22-23 @ 08:01), Max: 98.4 (05-21-23 @ 16:02)  HR: 105 (05-22-23 @ 08:01) (105 - 131)  BP: 97/63 (05-22-23 @ 08:01) (97/63 - 127/77)  BP(mean): --  RR: 17 (05-22-23 @ 08:01) (17 - 18)  SpO2: --

## 2023-05-22 NOTE — BH INPATIENT PSYCHIATRY PROGRESS NOTE - OTHER
Hair pulling decreased compared to last interview; no longer playing with rubber band Appeared noticeably tired today Playing with stress ball Playing with stress ball, trembling tearful, trembling

## 2023-05-22 NOTE — BH INPATIENT PSYCHIATRY PROGRESS NOTE - NSBHASSESSSUMMFT_PSY_ALL_CORE
The patient is a 18 yo transgender F->M, Divehi-speaking, recently immigrated from [place], domiciled with brother, unemployed, with psych hx of gender dysphoria, anxiety and depression, no past psychiatric hospitalizations, previous suicide attempt via choking, who is presenting at recommendation of brother and cousin for increasing SI.    Upon admission patient endorses strong feelings of depression and exhibits very dysphoric and minimally reactive affect. He endorses escalating suicidal thoughts related to feelings of worthlessness and unhappiness over his appearance. During the course of his hospitalization, his mood and affect has improved, he has become more talkative. . Of note, the patient's brother recently lost his job and the patient has been feeling like he has a duty to get discharged in order to find a job and help financially. He is therefore at an increased risk of minimizing his symptoms. The patient was admitted involuntarily on 9:39 legals as he has been acutely elevated risk of suicide, now changed to voluntary legals. Chronic risk factors include history of trauma, history of persistent depressive symptoms,   and history of suicidal ideation. Current risk factors include recent self inflicted choking, age, recent immigration, not understanding/speaking well language of current residence, current gender dysphoria, current social anxiety, current panic attacks and financial stressors. Goals of care will be to decrease patient's suicidality as his current risk factors place him at acutely elevated risk. Requires continued hospitalization for stabilization.    04/18/23- Patient acutely dysphoric. Plan to dc mirtazapine as he notes excessive lethargy and hyperphagia, continue sertraline 50 mg  04/19/2023  Patient remains with thoughts of suicide and concerns about his future, however he feels safe while in a structured setting with people around him  04/20/23 Patient had no thoughts of suicide during interview, appeared brighter and occasionally smiled. Agreeable to increase antidepressants  04/24 Patient notes panic attack last night, alleviated talking with cousin. Brighter, future oriented. Endorses social anxiety.  04/25/23 Patient did not have panic attack in last 24 hrs. Still appears brighter on unit and during interview today. Voiced that his social anxiety hinders his ability to advocate for himself while on unit  04/26   Patient is somewhat less anxious,  spending  time outside his room and is cheerful at times.  04/27/23 Patient notes still having intermittent anxiety, however he is now able to leave his room and his speech is much more productive. Affect continues to be more supple than on initial presentation. Plan to increase Zoloft to 150 mg 04/28, with eventual plan to maximize it to treat his social anxiety disorder.  04/28 Yesterday patient had another panic attack when bullied about his gender expression and received Klonopin and Atarax PRN. Though he states his mood is "good" and has a more supple affect than on initial presentation, he continues to endorse feelings of self hatred (which he believes may be alleviated by gender affirmative care) as well as wish to no longer be alive (though no active SI)  05/01 Patient continues to endorse passive SI, appeared anxious though smiling during interview. Reported panic attack yesterday. Ok with plan to increase Zoloft to 200 mg  05/02 Pt received first dose of 200 mg Zoloft. Had panic attack yesterday triggered by existential doubts and feelings of self hatred, which resulted in him hitting himself. Today less anxious but continues to endorse ambivalence about wanting to be alive.  5/3/23   patient had significant panic attack accompanied by dread and thoughts of self destruction.  When asked if suicidal patient replies I cannot kill myself because of my family.  05/04- Acutely anxious, more so than previously (however interviewed in large group), will add 2 mg abilify. Vickie in process of enrolling Radames as patient in [facility]  5/5 - Pt endorses panic attack the evening before during which he hit himself in the stomach and arms and banged his head on the wall due to self hatred and feeling like he does not have a place in this world. Created signs with patient that he can use to communicate with staff nonverbally; pt stated that he will try his best to use them. Does not report adverse effects from Abilify.  5/8- Pt denies any panic attacks over the weekend. Pt states that he did not need to use the signs that he created together with the team to communicate with staff. Continues to endorse passive SI and states that his reason for living is his family, but other than that, he does not feel like he has a reason to live. Abilify increased to 5 mg  5/9- Pt denies any panic attacks since yesterday, but continues to endorse passive SI and state that his family is his only reason for living. Will continue Abilify 5 mg.  5/10- Pt states that he had a panic attack the evening prior, during which he punched himself in the stomach and banged his head against the wall. Stated again that he does not want to be alive and does not have a place in this world.  5/11- Pt stated that yesterday, he wrapped a blanket around his neck because he "wanted to feel something" and wanted to "turn off my brain." He stated that he did not do this with the intention to end his life. He stated he does not want to be alive, but cannot die because it would hurt his family. Will decrease Zoloft to 100 mg on 5/12 with the intention of cross-titrating to venlafaxine. Will continue Abilify 5 mg.  5/12 - Pt reported no panic attacks or attempts at self-harm since last conversation. Stated that he "feels sad" and like he is "descending into darkness." Explained to pt that we would be switching from Zoloft to Effexor. Gave Zoloft 100 mg today, will also be starting Effexor 75 mg today.   5/18- Pt reports feeling "the same", endorses palpitations on Venlafaxine, will continue to monitor. Panic attack yesterday, will change standing Klonopin to 2 pm dosing.  5/19- Pt reports "feeling a little sad today" and states that he is continuing to have palpitations at night. Did not have panic attack yesterday, will continuing standing Klonopin.   5/21- Pt received PRN PO Haldol and Atarax after he started head banging in the context of distressing feelings. Today appeared more dysphoric yet less anxious than usual. Appeared tired which he attributed to the Haldol.    Impression: MDD vs Social Anxiety disorder with Panic attacks vs Gender dysphoria vs cPTSD, R/O bipolar    #MDD #Gender dysphoria #Social Anxiety Disorder #Panic attacks  - 9.39 legals changed to 9.14  - On Effexor 150 mg since 05/15, increased to 187.5 mg on 5/17  - Stopped Abilify 5 mg, 5/16   - Tapered off Zoloft  - Melatonin for sleep phase disorder  - Left voicemail with Dr. Vincent at [facility], [phone]  - Left VM with  Kadnice Acosta who work s with LGBTQ community for connection to trans affirming care  - Left VM Teagan Escobar [phone], patient's therapist    - TSH WNL    #Gender Dysphoria  - The following programs contacted: [employer] (does not have anyone to send to the hospital), [employer] (only provides services for homeless individuals), [employer] (left voicemail), [employer] (phone number out of service), [facility], [facility] (reached out to them, may be able to send someone to hospital to speak to Radames), [employer] [facility] (voicemail full), [facility] (no availability to send anyone, have [facility] but referred to Jorge Alberto Mejía)  - Referral on discharge to the [employer] [facility] (suicide hotline) and [facility] (Rhett has enrolled patient)    #Panic Attacks  - PRN Atarax  - Continue Klonopin 0.5 mg standing, changed to 2pm dosing    #Agitation  - For episodes of acute agitation can offer PO Haldol 5 mg/Ativan 2 mg/Benadryl 50 mg Q6H PRN. If refusing PO and is in acute risk of harm to self/other, can offer IM Haldol 5 mg/Ativan 2 mg/Benadryl 50 mg Q6H PRN. If given, please repeat EKG and hold antipsychotics if QTC>500       The patient is a 16 yo transgender F->M, Romanian-speaking, recently immigrated from [place], domiciled with brother, unemployed, with psych hx of gender dysphoria, anxiety and depression, no past psychiatric hospitalizations, previous suicide attempt via choking, who is presenting at recommendation of brother and cousin for increasing SI.    Upon admission patient endorses strong feelings of depression and exhibits very dysphoric and minimally reactive affect. He endorses escalating suicidal thoughts related to feelings of worthlessness and unhappiness over his appearance. During the course of his hospitalization, his mood and affect has improved, he has become more talkative. . Of note, the patient's brother recently lost his job and the patient has been feeling like he has a duty to get discharged in order to find a job and help financially. He is therefore at an increased risk of minimizing his symptoms. The patient was admitted involuntarily on 9:39 legals as he has been acutely elevated risk of suicide, now changed to voluntary legals. Chronic risk factors include history of trauma, history of persistent depressive symptoms,   and history of suicidal ideation. Current risk factors include recent self inflicted choking, age, recent immigration, not understanding/speaking well language of current residence, current gender dysphoria, current social anxiety, current panic attacks and financial stressors. Goals of care will be to decrease patient's suicidality as his current risk factors place him at acutely elevated risk. Requires continued hospitalization for stabilization.    04/18/23- Patient acutely dysphoric. Plan to dc mirtazapine as he notes excessive lethargy and hyperphagia, continue sertraline 50 mg  04/19/2023  Patient remains with thoughts of suicide and concerns about his future, however he feels safe while in a structured setting with people around him  04/20/23 Patient had no thoughts of suicide during interview, appeared brighter and occasionally smiled. Agreeable to increase antidepressants  04/24 Patient notes panic attack last night, alleviated talking with cousin. Brighter, future oriented. Endorses social anxiety.  04/25/23 Patient did not have panic attack in last 24 hrs. Still appears brighter on unit and during interview today. Voiced that his social anxiety hinders his ability to advocate for himself while on unit  04/26   Patient is somewhat less anxious,  spending  time outside his room and is cheerful at times.  04/27/23 Patient notes still having intermittent anxiety, however he is now able to leave his room and his speech is much more productive. Affect continues to be more supple than on initial presentation. Plan to increase Zoloft to 150 mg 04/28, with eventual plan to maximize it to treat his social anxiety disorder.  04/28 Yesterday patient had another panic attack when bullied about his gender expression and received Klonopin and Atarax PRN. Though he states his mood is "good" and has a more supple affect than on initial presentation, he continues to endorse feelings of self hatred (which he believes may be alleviated by gender affirmative care) as well as wish to no longer be alive (though no active SI)  05/01 Patient continues to endorse passive SI, appeared anxious though smiling during interview. Reported panic attack yesterday. Ok with plan to increase Zoloft to 200 mg  05/02 Pt received first dose of 200 mg Zoloft. Had panic attack yesterday triggered by existential doubts and feelings of self hatred, which resulted in him hitting himself. Today less anxious but continues to endorse ambivalence about wanting to be alive.  5/3/23   patient had significant panic attack accompanied by dread and thoughts of self destruction.  When asked if suicidal patient replies I cannot kill myself because of my family.  05/04- Acutely anxious, more so than previously (however interviewed in large group), will add 2 mg abilify. Vickie in process of enrolling Radames as patient in [facility]  5/5 - Pt endorses panic attack the evening before during which he hit himself in the stomach and arms and banged his head on the wall due to self hatred and feeling like he does not have a place in this world. Created signs with patient that he can use to communicate with staff nonverbally; pt stated that he will try his best to use them. Does not report adverse effects from Abilify.  5/8- Pt denies any panic attacks over the weekend. Pt states that he did not need to use the signs that he created together with the team to communicate with staff. Continues to endorse passive SI and states that his reason for living is his family, but other than that, he does not feel like he has a reason to live. Abilify increased to 5 mg  5/9- Pt denies any panic attacks since yesterday, but continues to endorse passive SI and state that his family is his only reason for living. Will continue Abilify 5 mg.  5/10- Pt states that he had a panic attack the evening prior, during which he punched himself in the stomach and banged his head against the wall. Stated again that he does not want to be alive and does not have a place in this world.  5/11- Pt stated that yesterday, he wrapped a blanket around his neck because he "wanted to feel something" and wanted to "turn off my brain." He stated that he did not do this with the intention to end his life. He stated he does not want to be alive, but cannot die because it would hurt his family. Will decrease Zoloft to 100 mg on 5/12 with the intention of cross-titrating to venlafaxine. Will continue Abilify 5 mg.  5/12 - Pt reported no panic attacks or attempts at self-harm since last conversation. Stated that he "feels sad" and like he is "descending into darkness." Explained to pt that we would be switching from Zoloft to Effexor. Gave Zoloft 100 mg today, will also be starting Effexor 75 mg today.   5/18- Pt reports feeling "the same", endorses palpitations on Venlafaxine, will continue to monitor. Panic attack yesterday, will change standing Klonopin to 2 pm dosing.  5/19- Pt reports "feeling a little sad today" and states that he is continuing to have palpitations at night. Did not have panic attack yesterday, will continuing standing Klonopin.   5/21- Pt received PRN PO Haldol and Atarax after he started head banging in the context of distressing feelings. Today appeared more dysphoric yet less anxious than usual. Appeared tired which he attributed to the Haldol.  5/22 - Pt had episode of tachycardia and was evaluated by medicine attending who did not find anything on assessment. Appeared anxious and tired today.    Impression: MDD vs Social Anxiety disorder with Panic attacks vs Gender dysphoria vs cPTSD, R/O bipolar    #MDD #Gender dysphoria #Social Anxiety Disorder #Panic attacks  - 9.39 legals changed to 9.14  - On Effexor 150 mg since 05/15, increased to 187.5 mg on 5/17  - Begin lithium 5/23 for suicidality and stabilization of affect  - Stopped Abilify 5 mg, 5/16   - Tapered off Zoloft  - Melatonin for sleep phase disorder  - Left voicemail with Dr. Vincent at [facility], [phone]  - Left VM with  Kandice Acosta who work s with LGBTQ community for connection to trans affirming care  - Left VM Teagan Escobar [phone], patient's therapist    - TSH WNL    #Gender Dysphoria  - The following programs contacted: [employer] (does not have anyone to send to the hospital), [employer] (only provides services for homeless individuals), [employer] (left voicemail), [employer] (phone number out of service), [facility], [facility] (reached out to them, may be able to send someone to hospital to speak to Radames), [employer] [facility] (voicemail full), [facility] (no availability to send anyone, have [facility] but referred to Jorge Alberto Mejía)  - Referral on discharge to the [employer] [facility] (suicide hotline) and [facility] (Rhett has enrolled patient)    #Panic Attacks  - PRN Atarax  - Increase Klonopin 0.5 mg standing to 1 mg standing, changed to 2pm dosing    #Agitation  - For episodes of acute agitation can offer PO Haldol 5 mg/Ativan 2 mg/Benadryl 50 mg Q6H PRN. If refusing PO and is in acute risk of harm to self/other, can offer IM Haldol 5 mg/Ativan 2 mg/Benadryl 50 mg Q6H PRN. If given, please repeat EKG and hold antipsychotics if QTC>500       The patient is a 18 yo transgender F->M, Frisian-speaking, recently immigrated from [place], domiciled with brother, unemployed, with psych hx of gender dysphoria, anxiety and depression, no past psychiatric hospitalizations, previous suicide attempt via choking, who is presenting at recommendation of brother and cousin for increasing SI.    Upon admission patient endorses strong feelings of depression and exhibits very dysphoric and minimally reactive affect. He endorses escalating suicidal thoughts related to feelings of worthlessness and unhappiness over his appearance. During the course of his hospitalization, his mood and affect has improved, he has become more talkative. . Of note, the patient's brother recently lost his job and the patient has been feeling like he has a duty to get discharged in order to find a job and help financially. He is therefore at an increased risk of minimizing his symptoms. The patient was admitted involuntarily on 9:39 legals as he has been acutely elevated risk of suicide, now changed to voluntary legals. Chronic risk factors include history of trauma, history of persistent depressive symptoms,   and history of suicidal ideation. Current risk factors include recent self inflicted choking, age, recent immigration, not understanding/speaking well language of current residence, current gender dysphoria, current social anxiety, current panic attacks and financial stressors. Goals of care will be to decrease patient's suicidality as his current risk factors place him at acutely elevated risk. Requires continued hospitalization for stabilization.    04/18/23- Patient acutely dysphoric. Plan to dc mirtazapine as he notes excessive lethargy and hyperphagia, continue sertraline 50 mg  04/19/2023  Patient remains with thoughts of suicide and concerns about his future, however he feels safe while in a structured setting with people around him  04/20/23 Patient had no thoughts of suicide during interview, appeared brighter and occasionally smiled. Agreeable to increase antidepressants  04/24 Patient notes panic attack last night, alleviated talking with cousin. Brighter, future oriented. Endorses social anxiety.  04/25/23 Patient did not have panic attack in last 24 hrs. Still appears brighter on unit and during interview today. Voiced that his social anxiety hinders his ability to advocate for himself while on unit  04/26   Patient is somewhat less anxious,  spending  time outside his room and is cheerful at times.  04/27/23 Patient notes still having intermittent anxiety, however he is now able to leave his room and his speech is much more productive. Affect continues to be more supple than on initial presentation. Plan to increase Zoloft to 150 mg 04/28, with eventual plan to maximize it to treat his social anxiety disorder.  04/28 Yesterday patient had another panic attack when bullied about his gender expression and received Klonopin and Atarax PRN. Though he states his mood is "good" and has a more supple affect than on initial presentation, he continues to endorse feelings of self hatred (which he believes may be alleviated by gender affirmative care) as well as wish to no longer be alive (though no active SI)  05/01 Patient continues to endorse passive SI, appeared anxious though smiling during interview. Reported panic attack yesterday. Ok with plan to increase Zoloft to 200 mg  05/02 Pt received first dose of 200 mg Zoloft. Had panic attack yesterday triggered by existential doubts and feelings of self hatred, which resulted in him hitting himself. Today less anxious but continues to endorse ambivalence about wanting to be alive.  5/3/23   patient had significant panic attack accompanied by dread and thoughts of self destruction.  When asked if suicidal patient replies I cannot kill myself because of my family.  05/04- Acutely anxious, more so than previously (however interviewed in large group), will add 2 mg abilify. Vickie in process of enrolling Radames as patient in [facility]  5/5 - Pt endorses panic attack the evening before during which he hit himself in the stomach and arms and banged his head on the wall due to self hatred and feeling like he does not have a place in this world. Created signs with patient that he can use to communicate with staff nonverbally; pt stated that he will try his best to use them. Does not report adverse effects from Abilify.  5/8- Pt denies any panic attacks over the weekend. Pt states that he did not need to use the signs that he created together with the team to communicate with staff. Continues to endorse passive SI and states that his reason for living is his family, but other than that, he does not feel like he has a reason to live. Abilify increased to 5 mg  5/9- Pt denies any panic attacks since yesterday, but continues to endorse passive SI and state that his family is his only reason for living. Will continue Abilify 5 mg.  5/10- Pt states that he had a panic attack the evening prior, during which he punched himself in the stomach and banged his head against the wall. Stated again that he does not want to be alive and does not have a place in this world.  5/11- Pt stated that yesterday, he wrapped a blanket around his neck because he "wanted to feel something" and wanted to "turn off my brain." He stated that he did not do this with the intention to end his life. He stated he does not want to be alive, but cannot die because it would hurt his family. Will decrease Zoloft to 100 mg on 5/12 with the intention of cross-titrating to venlafaxine. Will continue Abilify 5 mg.  5/12 - Pt reported no panic attacks or attempts at self-harm since last conversation. Stated that he "feels sad" and like he is "descending into darkness." Explained to pt that we would be switching from Zoloft to Effexor. Gave Zoloft 100 mg today, will also be starting Effexor 75 mg today.   5/18- Pt reports feeling "the same", endorses palpitations on Venlafaxine, will continue to monitor. Panic attack yesterday, will change standing Klonopin to 2 pm dosing.  5/19- Pt reports "feeling a little sad today" and states that he is continuing to have palpitations at night. Did not have panic attack yesterday, will continuing standing Klonopin.   5/21- Pt received PRN PO Haldol and Atarax after he started head banging in the context of distressing feelings. Today appeared more dysphoric yet less anxious than usual. Appeared tired which he attributed to the Haldol.  5/22 - Pt had episode of tachycardia and was evaluated by medicine attending who deemed non pathological. Appeared anxious and tired today, when asked about suicide after discharge he was unable to contract for safety. Had an anxious and panicked episode relieved by atarax, icing forearms, and talking. Increased Klonopin to 1 mg standing, will consider adding lithium and changing to ssri tmrw    Impression: MDD vs Social Anxiety disorder with Panic attacks vs Gender dysphoria vs cPTSD, R/O bipolar    #MDD #Gender dysphoria #Social Anxiety Disorder #Panic attacks  - 9.39 legals changed to 9.14  - On Effexor 150 mg since 05/15, increased to 187.5 mg on 5/17  - Begin lithium 5/23 for suicidality and stabilization of affect  - Stopped Abilify 5 mg, 5/16   - Tapered off Zoloft  - Melatonin for sleep phase disorder  - Left voicemail with Dr. Vincent at [facility], [phone]  - Left VM with  Kandice Acosta who work s with [facility] for connection to trans affirming care  - Left VM Teagan Braunaza [phone], patient's therapist    - TSH WNL    #Gender Dysphoria  - The following programs contacted: [facility] (does not have anyone to send to the hospital), [facility] (only provides services for homeless individuals), [employer] (left voicemail), [employer] (phone number out of service), [facility], [facility] (reached out to them, may be able to send someone to hospital to speak to [facility]), [facility] (voicemail full), [facility] (no availability to send anyone, have [facility] but referred to Jorge Alberto Mejía)  - Referral on discharge to [facility] (suicide hotline) and [facility] ([facility] has enrolled patient)    #Panic Attacks  - PRN Atarax  - Increase Klonopin 0.5 mg standing to 1 mg standing, changed to 2pm dosing    #Agitation  - For episodes of acute agitation can offer PO Haldol 5 mg/Ativan 2 mg/Benadryl 50 mg Q6H PRN. If refusing PO and is in acute risk of harm to self/other, can offer IM Haldol 5 mg/Ativan 2 mg/Benadryl 50 mg Q6H PRN. If given, please repeat EKG and hold antipsychotics if QTC>500

## 2023-05-22 NOTE — BH INPATIENT PSYCHIATRY PROGRESS NOTE - NSBHMETABOLIC_PSY_ALL_CORE_FT
BMI: BMI (kg/m2): 32.5 (04-18-23 @ 05:33)  HbA1c: A1C with Estimated Average Glucose Result: 5.7 % (05-06-23 @ 08:53)    Glucose:   BP: 97/63 (05-22-23 @ 08:01) (97/63 - 141/86)  Lipid Panel: Date/Time: 05-06-23 @ 08:53  Cholesterol, Serum: 206  Direct LDL: --  HDL Cholesterol, Serum: 47  Total Cholesterol/HDL Ration Measurement: --  Triglycerides, Serum: 131

## 2023-05-22 NOTE — BH INPATIENT PSYCHIATRY PROGRESS NOTE - NSBHFUPINTERVALHXFT_PSY_A_CORE
Nursing reports no acute events overnight. Per chart review patient tachycardic yesterday, EKG shows sinus tachycardia. Medical hospitalist assessed patient, pt had no SOB, chest pain. Per chart review the patient has not required any behavioral PRNS since the last assessment.    Chart reviewed, patient seen and evaluated in AM. On approach, .... Patient reports ________________.   Patient endorsed OK sleep and appetite.     No SI/HI/AVH elicited.  Nursing reports no acute events overnight. Per chart review patient tachycardic yesterday, EKG shows sinus tachycardia. Medical hospitalist assessed patient, pt had no SOB, chest pain. Per chart review the patient has not required any behavioral PRNS since the last assessment.    Chart reviewed, patient seen and evaluated in AM. On approach, pt is sleeping on bed but is arousable to touch and begins to play with a stress ball. Patient reports feeling "empty, neither good nor bad." He states that his mother and sister are like "bars that I'm trying to hold onto, but I'm not sure how much longer I can hold on." He indicates a suicide note that he wrote two days ago next to him, but states that he feels better after a visit from his brother yesterday. When asked by the team if he would like to go home, pt said that he would be okay with going home so that he could be with his brother, but also stated that he feels like being in the psychiatric unit is helpful to him. Stated that at around 2 AM this morning, he experienced an episode during which he felt "palpitations" and like he "couldn't breathe," which lasted about 5 minutes and went away on its own.    No SI/HI/AVH elicited.  Nursing reports no acute events overnight. Per chart review patient tachycardic yesterday, EKG shows sinus tachycardia. Medical hospitalist assessed patient, pt had no SOB, chest pain. Per chart review the patient has not required any behavioral PRNS since the last assessment.    Chart reviewed, patient seen and evaluated in AM. On approach, pt is sleeping on bed but is arousable to touch and begins to play with a stress ball. Patient reports feeling "empty, neither good nor bad." He states that his mother and sister are like "bars that I'm trying to hold onto, but I'm not sure how much longer I can hold on." He indicates a suicide note (where he specifies that song he would like played at his , and stated that "the end is near") that he wrote two days ago next to him, but states that he felt better after a visit from his brother yesterday. When asked by the team if he would like to go home, pt said that he would be ok to go home because he misses his brother, but also stated that he feels like being in the psychiatric unit is helpful to him, and he was unable to contract for safety when he was asked about how he would cope with suicidal feelings after discharge.     In addition, he states that at around 2 AM this morning, he experienced an episode during which he felt "palpitations" and like he "couldn't breathe," which lasted about 5 minutes and went away on its own. He states that he has had increased palpitations and anxieties on venlafaxine compared to when he was on an ssri, however he states that when he was on fluoxetine he developed a rash ("bumps") on his neck.        Writer called when patient appeared acutely distressed around 13:00. Went over and assessed patient who was curled up in the corner of the room, crying. He states that he was triggered when he was speaking with his 1:1 about his mother, and he started thinking about how much it would hurt her if he were to end his life, however how he sometimes thinks that his family and his mother would be able to get over that pain. The patient received Atarax 50 mg as well as a bag of ice to use on his forearms. He was able to calm down, and he spoke with the writer about his mood (he believes he's less depressed than he was at home, yet his emotions fluctuate wildly throughout the day which has been going on since adolescence), being triggered by bullies (this happens about 3x per week, tends to start thinking about revenge fantasies that he does not actually plan on acting on, but do not trigger panic attacks), and his hospital course. He states that while on the SSRI he had less anxiety and less palpitation, though on the venlafaxine he feels less depressed. Overall he feels like his mood has improved compared to prior to the hospitalization.

## 2023-05-22 NOTE — BH INPATIENT PSYCHIATRY PROGRESS NOTE - CURRENT MEDICATION
MEDICATIONS  (STANDING):  clonazePAM  Tablet 0.5 milliGRAM(s) Oral daily  melatonin. 3 milliGRAM(s) Oral at bedtime  venlafaxine .5 milliGRAM(s) Oral daily    MEDICATIONS  (PRN):  aluminum hydroxide/magnesium hydroxide/simethicone Suspension 30 milliLiter(s) Oral every 6 hours PRN Dyspepsia  haloperidol     Tablet 5 milliGRAM(s) Oral every 6 hours PRN agitation  hydrOXYzine hydrochloride 50 milliGRAM(s) Oral every 6 hours PRN anxiety  LORazepam     Tablet 2 milliGRAM(s) Oral every 6 hours PRN agitation   MEDICATIONS  (STANDING):  clonazePAM  Tablet 1 milliGRAM(s) Oral daily  melatonin. 3 milliGRAM(s) Oral at bedtime  venlafaxine .5 milliGRAM(s) Oral daily    MEDICATIONS  (PRN):  aluminum hydroxide/magnesium hydroxide/simethicone Suspension 30 milliLiter(s) Oral every 6 hours PRN Dyspepsia  haloperidol     Tablet 5 milliGRAM(s) Oral every 6 hours PRN agitation  hydrOXYzine hydrochloride 50 milliGRAM(s) Oral every 6 hours PRN anxiety  LORazepam     Tablet 2 milliGRAM(s) Oral every 6 hours PRN agitation

## 2023-05-23 PROCEDURE — 99231 SBSQ HOSP IP/OBS SF/LOW 25: CPT

## 2023-05-23 RX ORDER — LITHIUM CARBONATE 300 MG/1
300 TABLET, EXTENDED RELEASE ORAL
Refills: 0 | Status: DISCONTINUED | OUTPATIENT
Start: 2023-05-23 | End: 2023-05-30

## 2023-05-23 RX ORDER — ESCITALOPRAM OXALATE 10 MG/1
10 TABLET, FILM COATED ORAL DAILY
Refills: 0 | Status: DISCONTINUED | OUTPATIENT
Start: 2023-05-24 | End: 2023-06-09

## 2023-05-23 RX ADMIN — Medication 1 MILLIGRAM(S): at 08:07

## 2023-05-23 RX ADMIN — Medication 50 MILLIGRAM(S): at 09:46

## 2023-05-23 RX ADMIN — Medication 3 MILLIGRAM(S): at 20:08

## 2023-05-23 RX ADMIN — Medication 187.5 MILLIGRAM(S): at 08:07

## 2023-05-23 RX ADMIN — Medication 50 MILLIGRAM(S): at 16:44

## 2023-05-23 RX ADMIN — LITHIUM CARBONATE 300 MILLIGRAM(S): 300 TABLET, EXTENDED RELEASE ORAL at 20:08

## 2023-05-23 NOTE — BH INPATIENT PSYCHIATRY PROGRESS NOTE - NSBHFUPINTERVALHXFT_PSY_A_CORE
Nursing reports no acute events overnight. Per chart review the patient has not required any behavioral PRNS since the last assessment.    Chart reviewed, patient seen and evaluated in AM. On approach, .... Patient reports ________________.   Patient endorsed OK sleep and appetite.     No SI/HI/AVH elicited.  Nursing reports no acute events overnight. Per chart review the patient has not required any behavioral PRNS since the last assessment.    Chart reviewed, patient seen and evaluated in AM. On approach, pt is tearful and dysphoric. Patient reports feeling "very sad" because he is thinking about his mom and misses her, and being away from her is very difficult for the patient. He says he "would like a hug from my mom." He says he wants to feel pain and he wants to hurt himself, that he doesn't deserve to be in this world, that he wishes he could sleep and never wake up, and that he is only holding onto life for the sake of his mom and sister but doesn't think that he can do it anymore. He requests ice in a bag because he said that it helps him a lot. Discussed starting Lexapro and lithium with patient, pt agreed.    Patient endorsed poor sleep last night.      Nursing reports no acute events overnight. Per chart review the patient required Atarax 50 mg at 7am.    Chart reviewed, patient seen and evaluated in AM. On approach, pt is tearful and dysphoric. Patient reports feeling "very sad" because he is thinking about his mom and misses her, and being away from her is very difficult for the patient. He says he "would like a hug from my mom." He says he wants to feel pain and he wants to hurt himself, that he doesn't deserve to be in this world, that he wishes he could sleep and never wake up, and that he is only holding onto life for the sake of his mom and sister but doesn't think that he can do it anymore. He requests ice in a bag because he said that it helps him a lot. Discussed starting Lexapro and lithium, and discontinuing the Effexor with patient, pt agreed.    Patient endorsed poor sleep last night.

## 2023-05-23 NOTE — BH INPATIENT PSYCHIATRY PROGRESS NOTE - NSBHCHARTREVIEWVS_PSY_A_CORE FT
Vital Signs Last 24 Hrs  T(C): 35.7 (05-22-23 @ 15:53), Max: 35.7 (05-22-23 @ 15:53)  T(F): 96.3 (05-22-23 @ 15:53), Max: 96.3 (05-22-23 @ 15:53)  HR: 106 (05-22-23 @ 15:53) (106 - 106)  BP: 128/87 (05-22-23 @ 15:53) (128/87 - 128/87)  BP(mean): --  RR: 16 (05-22-23 @ 15:53) (16 - 16)  SpO2: --     Vital Signs Last 24 Hrs  T(C): 35.8 (05-23-23 @ 08:15), Max: 35.8 (05-23-23 @ 08:15)  T(F): 96.5 (05-23-23 @ 08:15), Max: 96.5 (05-23-23 @ 08:15)  HR: 115 (05-23-23 @ 08:15) (106 - 115)  BP: 110/75 (05-23-23 @ 08:15) (110/75 - 128/87)  BP(mean): --  RR: 16 (05-23-23 @ 08:15) (16 - 16)  SpO2: --

## 2023-05-23 NOTE — BH INPATIENT PSYCHIATRY PROGRESS NOTE - NSBHATTESTCOMMENTATTENDFT_PSY_A_CORE
Assessment and recommendations as documented above .  I have discussed the clinical aspects of this case with the resident.  I concur with findings and plan.

## 2023-05-23 NOTE — BH INPATIENT PSYCHIATRY PROGRESS NOTE - CURRENT MEDICATION
MEDICATIONS  (STANDING):  clonazePAM  Tablet 1 milliGRAM(s) Oral daily  melatonin. 3 milliGRAM(s) Oral at bedtime  venlafaxine .5 milliGRAM(s) Oral daily    MEDICATIONS  (PRN):  aluminum hydroxide/magnesium hydroxide/simethicone Suspension 30 milliLiter(s) Oral every 6 hours PRN Dyspepsia  haloperidol     Tablet 5 milliGRAM(s) Oral every 6 hours PRN agitation  hydrOXYzine hydrochloride 50 milliGRAM(s) Oral every 6 hours PRN anxiety  LORazepam     Tablet 2 milliGRAM(s) Oral every 6 hours PRN agitation   MEDICATIONS  (STANDING):  clonazePAM  Tablet 1 milliGRAM(s) Oral daily  lithium 300 milliGRAM(s) Oral two times a day  melatonin. 3 milliGRAM(s) Oral at bedtime    MEDICATIONS  (PRN):  aluminum hydroxide/magnesium hydroxide/simethicone Suspension 30 milliLiter(s) Oral every 6 hours PRN Dyspepsia  haloperidol     Tablet 5 milliGRAM(s) Oral every 6 hours PRN agitation  hydrOXYzine hydrochloride 50 milliGRAM(s) Oral every 6 hours PRN anxiety  LORazepam     Tablet 2 milliGRAM(s) Oral every 6 hours PRN agitation

## 2023-05-23 NOTE — BH INPATIENT PSYCHIATRY PROGRESS NOTE - NSBHASSESSSUMMFT_PSY_ALL_CORE
The patient is a 18 yo transgender F->M, Bengali-speaking, recently immigrated from [place], domiciled with brother, unemployed, with psych hx of gender dysphoria, anxiety and depression, no past psychiatric hospitalizations, previous suicide attempt via choking, who is presenting at recommendation of brother and cousin for increasing SI.    Upon admission patient endorses strong feelings of depression and exhibits very dysphoric and minimally reactive affect. He endorses escalating suicidal thoughts related to feelings of worthlessness and unhappiness over his appearance. During the course of his hospitalization, his mood and affect has improved, he has become more talkative. . Of note, the patient's brother recently lost his job and the patient has been feeling like he has a duty to get discharged in order to find a job and help financially. He is therefore at an increased risk of minimizing his symptoms. The patient was admitted involuntarily on 9:39 legals as he has been acutely elevated risk of suicide, now changed to voluntary legals. Chronic risk factors include history of trauma, history of persistent depressive symptoms,   and history of suicidal ideation. Current risk factors include recent self inflicted choking, age, recent immigration, not understanding/speaking well language of current residence, current gender dysphoria, current social anxiety, current panic attacks and financial stressors. Goals of care will be to decrease patient's suicidality as his current risk factors place him at acutely elevated risk. Requires continued hospitalization for stabilization.    04/18/23- Patient acutely dysphoric. Plan to dc mirtazapine as he notes excessive lethargy and hyperphagia, continue sertraline 50 mg  04/19/2023  Patient remains with thoughts of suicide and concerns about his future, however he feels safe while in a structured setting with people around him  04/20/23 Patient had no thoughts of suicide during interview, appeared brighter and occasionally smiled. Agreeable to increase antidepressants  04/24 Patient notes panic attack last night, alleviated talking with cousin. Brighter, future oriented. Endorses social anxiety.  04/25/23 Patient did not have panic attack in last 24 hrs. Still appears brighter on unit and during interview today. Voiced that his social anxiety hinders his ability to advocate for himself while on unit  04/26   Patient is somewhat less anxious,  spending  time outside his room and is cheerful at times.  04/27/23 Patient notes still having intermittent anxiety, however he is now able to leave his room and his speech is much more productive. Affect continues to be more supple than on initial presentation. Plan to increase Zoloft to 150 mg 04/28, with eventual plan to maximize it to treat his social anxiety disorder.  04/28 Yesterday patient had another panic attack when bullied about his gender expression and received Klonopin and Atarax PRN. Though he states his mood is "good" and has a more supple affect than on initial presentation, he continues to endorse feelings of self hatred (which he believes may be alleviated by gender affirmative care) as well as wish to no longer be alive (though no active SI)  05/01 Patient continues to endorse passive SI, appeared anxious though smiling during interview. Reported panic attack yesterday. Ok with plan to increase Zoloft to 200 mg  05/02 Pt received first dose of 200 mg Zoloft. Had panic attack yesterday triggered by existential doubts and feelings of self hatred, which resulted in him hitting himself. Today less anxious but continues to endorse ambivalence about wanting to be alive.  5/3/23   patient had significant panic attack accompanied by dread and thoughts of self destruction.  When asked if suicidal patient replies I cannot kill myself because of my family.  05/04- Acutely anxious, more so than previously (however interviewed in large group), will add 2 mg abilify. Vickie in process of enrolling [facility] as patient in [facility]  5/5 - Pt endorses panic attack the evening before during which he hit himself in the stomach and arms and banged his head on the wall due to self hatred and feeling like he does not have a place in this world. Created signs with patient that he can use to communicate with staff nonverbally; pt stated that he will try his best to use them. Does not report adverse effects from Abilify.  5/8- Pt denies any panic attacks over the weekend. Pt states that he did not need to use the signs that he created together with the team to communicate with staff. Continues to endorse passive SI and states that his reason for living is his family, but other than that, he does not feel like he has a reason to live. Abilify increased to 5 mg  5/9- Pt denies any panic attacks since yesterday, but continues to endorse passive SI and state that his family is his only reason for living. Will continue Abilify 5 mg.  5/10- Pt states that he had a panic attack the evening prior, during which he punched himself in the stomach and banged his head against the wall. Stated again that he does not want to be alive and does not have a place in this world.  5/11- Pt stated that yesterday, he wrapped a blanket around his neck because he "wanted to feel something" and wanted to "turn off my brain." He stated that he did not do this with the intention to end his life. He stated he does not want to be alive, but cannot die because it would hurt his family. Will decrease Zoloft to 100 mg on 5/12 with the intention of cross-titrating to venlafaxine. Will continue Abilify 5 mg.  5/12 - Pt reported no panic attacks or attempts at self-harm since last conversation. Stated that he "feels sad" and like he is "descending into darkness." Explained to pt that we would be switching from Zoloft to Effexor. Gave Zoloft 100 mg today, will also be starting Effexor 75 mg today.   5/18- Pt reports feeling "the same", endorses palpitations on Venlafaxine, will continue to monitor. Panic attack yesterday, will change standing Klonopin to 2 pm dosing.  5/19- Pt reports "feeling a little sad today" and states that he is continuing to have palpitations at night. Did not have panic attack yesterday, will continuing standing Klonopin.   5/21- Pt received PRN PO Haldol and Atarax after he started head banging in the context of distressing feelings. Today appeared more dysphoric yet less anxious than usual. Appeared tired which he attributed to the Haldol.  5/22 - Pt had episode of tachycardia and was evaluated by medicine attending who deemed non pathological. Appeared anxious and tired today, when asked about suicide after discharge he was unable to contract for safety. Had an anxious and panicked episode relieved by atarax, icing forearms, and talking. Increased Klonopin to 1 mg standing, will consider adding lithium and changing to ssri tmrw    Impression: MDD vs Social Anxiety disorder with Panic attacks vs Gender dysphoria vs cPTSD, R/O bipolar    #MDD #Gender dysphoria #Social Anxiety Disorder #Panic attacks  - 9.39 legals changed to 9.14  - On Effexor 150 mg since 05/15, increased to 187.5 mg on 5/17  - Begin lithium 5/23 for suicidality and stabilization of affect  - Stopped Abilify 5 mg, 5/16   - Tapered off Zoloft  - Melatonin for sleep phase disorder  - Left voicemail with [facility] Vincent at [facility], [phone]  - Left VM with  Kandice Acosta who work s with [employer] [facility] for connection to trans affirming care  - Left VM Teagan Braunaza [phone], patient's therapist    - TSH WNL    #Gender Dysphoria  - The following programs contacted: [email or web address] (does not have anyone to send to the hospital), [employer] (only provides services for homeless individuals), [employer] (left voicemail), [employer] (phone number out of service), [facility], [facility] (reached out to them, may be able to send someone to hospital to speak to [facility]), [facility] (voicemail full), [facility] (no availability to send anyone, have [facility] but referred to [facility])  - Referral on discharge to the [facility] (suicide hotline) and [facility] ([facility] has enrolled patient)    #Panic Attacks  - PRN Atarax  - Increase Klonopin 0.5 mg standing to 1 mg standing, changed to 2pm dosing    #Agitation  - For episodes of acute agitation can offer PO Haldol 5 mg/Ativan 2 mg/Benadryl 50 mg Q6H PRN. If refusing PO and is in acute risk of harm to self/other, can offer IM Haldol 5 mg/Ativan 2 mg/Benadryl 50 mg Q6H PRN. If given, please repeat EKG and hold antipsychotics if QTC>500       The patient is a 16 yo transgender F->M, Occitan-speaking, recently immigrated from [place], domiciled with brother, unemployed, with psych hx of gender dysphoria, anxiety and depression, no past psychiatric hospitalizations, previous suicide attempt via choking, who is presenting at recommendation of brother and cousin for increasing SI.    Upon admission patient endorses strong feelings of depression and exhibits very dysphoric and minimally reactive affect. He endorses escalating suicidal thoughts related to feelings of worthlessness and unhappiness over his appearance. During the course of his hospitalization, his mood and affect has improved, he has become more talkative. . Of note, the patient's brother recently lost his job and the patient has been feeling like he has a duty to get discharged in order to find a job and help financially. He is therefore at an increased risk of minimizing his symptoms. The patient was admitted involuntarily on 9:39 legals as he has been acutely elevated risk of suicide, now changed to voluntary legals. Chronic risk factors include history of trauma, history of persistent depressive symptoms,   and history of suicidal ideation. Current risk factors include recent self inflicted choking, age, recent immigration, not understanding/speaking well language of current residence, current gender dysphoria, current social anxiety, current panic attacks and financial stressors. Goals of care will be to decrease patient's suicidality as his current risk factors place him at acutely elevated risk. Requires continued hospitalization for stabilization.    04/18/23- Patient acutely dysphoric. Plan to dc mirtazapine as he notes excessive lethargy and hyperphagia, continue sertraline 50 mg  04/19/2023  Patient remains with thoughts of suicide and concerns about his future, however he feels safe while in a structured setting with people around him  04/20/23 Patient had no thoughts of suicide during interview, appeared brighter and occasionally smiled. Agreeable to increase antidepressants  04/24 Patient notes panic attack last night, alleviated talking with cousin. Brighter, future oriented. Endorses social anxiety.  04/25/23 Patient did not have panic attack in last 24 hrs. Still appears brighter on unit and during interview today. Voiced that his social anxiety hinders his ability to advocate for himself while on unit  04/26   Patient is somewhat less anxious,  spending  time outside his room and is cheerful at times.  04/27/23 Patient notes still having intermittent anxiety, however he is now able to leave his room and his speech is much more productive. Affect continues to be more supple than on initial presentation. Plan to increase Zoloft to 150 mg 04/28, with eventual plan to maximize it to treat his social anxiety disorder.  04/28 Yesterday patient had another panic attack when bullied about his gender expression and received Klonopin and Atarax PRN. Though he states his mood is "good" and has a more supple affect than on initial presentation, he continues to endorse feelings of self hatred (which he believes may be alleviated by gender affirmative care) as well as wish to no longer be alive (though no active SI)  05/01 Patient continues to endorse passive SI, appeared anxious though smiling during interview. Reported panic attack yesterday. Ok with plan to increase Zoloft to 200 mg  05/02 Pt received first dose of 200 mg Zoloft. Had panic attack yesterday triggered by existential doubts and feelings of self hatred, which resulted in him hitting himself. Today less anxious but continues to endorse ambivalence about wanting to be alive.  5/3/23   patient had significant panic attack accompanied by dread and thoughts of self destruction.  When asked if suicidal patient replies I cannot kill myself because of my family.  05/04- Acutely anxious, more so than previously (however interviewed in large group), will add 2 mg abilify. Vickie in process of enrolling [facility] as patient in [facility]  5/5 - Pt endorses panic attack the evening before during which he hit himself in the stomach and arms and banged his head on the wall due to self hatred and feeling like he does not have a place in this world. Created signs with patient that he can use to communicate with staff nonverbally; pt stated that he will try his best to use them. Does not report adverse effects from Abilify.  5/8- Pt denies any panic attacks over the weekend. Pt states that he did not need to use the signs that he created together with the team to communicate with staff. Continues to endorse passive SI and states that his reason for living is his family, but other than that, he does not feel like he has a reason to live. Abilify increased to 5 mg  5/9- Pt denies any panic attacks since yesterday, but continues to endorse passive SI and state that his family is his only reason for living. Will continue Abilify 5 mg.  5/10- Pt states that he had a panic attack the evening prior, during which he punched himself in the stomach and banged his head against the wall. Stated again that he does not want to be alive and does not have a place in this world.  5/11- Pt stated that yesterday, he wrapped a blanket around his neck because he "wanted to feel something" and wanted to "turn off my brain." He stated that he did not do this with the intention to end his life. He stated he does not want to be alive, but cannot die because it would hurt his family. Will decrease Zoloft to 100 mg on 5/12 with the intention of cross-titrating to venlafaxine. Will continue Abilify 5 mg.  5/12 - Pt reported no panic attacks or attempts at self-harm since last conversation. Stated that he "feels sad" and like he is "descending into darkness." Explained to pt that we would be switching from Zoloft to Effexor. Gave Zoloft 100 mg today, will also be starting Effexor 75 mg today.   5/18- Pt reports feeling "the same", endorses palpitations on Venlafaxine, will continue to monitor. Panic attack yesterday, will change standing Klonopin to 2 pm dosing.  5/19- Pt reports "feeling a little sad today" and states that he is continuing to have palpitations at night. Did not have panic attack yesterday, will continuing standing Klonopin.   5/21- Pt received PRN PO Haldol and Atarax after he started head banging in the context of distressing feelings. Today appeared more dysphoric yet less anxious than usual. Appeared tired which he attributed to the Haldol.  5/22 - Pt had episode of tachycardia and was evaluated by medicine attending who deemed non pathological. Appeared anxious and tired today, when asked about suicide after discharge he was unable to contract for safety. Had an anxious and panicked episode relieved by atarax, icing forearms, and talking. Increased Klonopin to 1 mg standing, will consider adding lithium and changing to ssri tmrw  5/23 - Pt had another anxious episode relieved by icing head and forearms, as well as talking. On approach, pt is tearful and dysphoric and states that he wants to die and that he does not belong in this world. Begin lithium 300 mg BID, plan to start Lexapro 10 mg tmrw.     Impression: MDD vs Social Anxiety disorder with Panic attacks vs Gender dysphoria vs cPTSD, R/O bipolar    #MDD #Gender dysphoria #Social Anxiety Disorder #Panic attacks  - 9.39 legals changed to 9.14  - On Effexor 150 mg since 05/15, increased to 187.5 mg on 5/17, will discontinue on 5/23  - Begin lithium 5/23 for suicidality and stabilization of affect  - Begin Lexapro 10 mg starting 5/24  - Stopped Abilify 5 mg, 5/16   - Tapered off Zoloft  - Melatonin for sleep phase disorder  - Left voicemail with [facility]. Vincent at [facility], [phone]  - Left VM with  Kandice Acosta who work s with [facility] community for connection to trans affirming care  - Left VM Teagan Escobar [phone], patient's therapist    - TSH WNL    #Gender Dysphoria  - The following programs contacted: [employer] (does not have anyone to send to the hospital), [facility] (only provides services for homeless individuals), [employer] (left voicemail), [employer] (phone number out of service), [facility], [facility] In program (reached out to them, may be able to send someone to hospital to speak to Radames), [facility] (voicemail full), [facility] (no availability to send anyone, have [facility] but referred to [facility])  - Referral on discharge to the [facility] (suicide hotline) and [facility] ([facility] has enrolled patient)    #Panic Attacks  - PRN Atarax  - Increase Klonopin 0.5 mg standing to 1 mg standing, changed to 2pm dosing    #Agitation  - For episodes of acute agitation can offer PO Haldol 5 mg/Ativan 2 mg/Benadryl 50 mg Q6H PRN. If refusing PO and is in acute risk of harm to self/other, can offer IM Haldol 5 mg/Ativan 2 mg/Benadryl 50 mg Q6H PRN. If given, please repeat EKG and hold antipsychotics if QTC>500       The patient is a 16 yo transgender F->M, Macedonian-speaking, recently immigrated from [place], domiciled with brother, unemployed, with psych hx of gender dysphoria, anxiety and depression, no past psychiatric hospitalizations, previous suicide attempt via choking, who is presenting at recommendation of brother and cousin for increasing SI.    Upon admission patient endorses strong feelings of depression and exhibits very dysphoric and minimally reactive affect. He endorses escalating suicidal thoughts related to feelings of worthlessness and unhappiness over his appearance. During the course of his hospitalization, his mood and affect initially improved, he became become more talkative. However, throughout the course of the hospitalization he started having near-daily anxiety attacks which he has been coping with by head banging or punching/scratching his limbs. His depression seems to have alleviated overall, however he suffers from  frequent mood shifts which he has difficulty coping with. These shifts are often triggered by misgendering by others or missing his mother, and are often prevented when he is in the company of Macedonian-speaking staff and is socially engaged. The patient was admitted involuntarily on 9:39 legals as he has been acutely elevated risk of suicide, now changed to voluntary legals. Chronic risk factors include history of trauma, history of persistent depressive symptoms,   and history of suicidal ideation. Current risk factors include recent self inflicted choking, age, recent immigration, not understanding/speaking well language of current residence, current gender dysphoria, current social anxiety, current panic attacks and financial stressors. Goals of care will be to decrease patient's suicidality as his current risk factors place him at acutely elevated risk. Requires continued hospitalization for stabilization.    04/18/23- Patient acutely dysphoric. Plan to dc mirtazapine as he notes excessive lethargy and hyperphagia, continue sertraline 50 mg  04/19/2023  Patient remains with thoughts of suicide and concerns about his future, however he feels safe while in a structured setting with people around him  04/20/23 Patient had no thoughts of suicide during interview, appeared brighter and occasionally smiled. Agreeable to increase antidepressants  04/24 Patient notes panic attack last night, alleviated talking with cousin. Brighter, future oriented. Endorses social anxiety.  04/25/23 Patient did not have panic attack in last 24 hrs. Still appears brighter on unit and during interview today. Voiced that his social anxiety hinders his ability to advocate for himself while on unit  04/26   Patient is somewhat less anxious,  spending  time outside his room and is cheerful at times.  04/27/23 Patient notes still having intermittent anxiety, however he is now able to leave his room and his speech is much more productive. Affect continues to be more supple than on initial presentation. Plan to increase Zoloft to 150 mg 04/28, with eventual plan to maximize it to treat his social anxiety disorder.  04/28 Yesterday patient had another panic attack when bullied about his gender expression and received Klonopin and Atarax PRN. Though he states his mood is "good" and has a more supple affect than on initial presentation, he continues to endorse feelings of self hatred (which he believes may be alleviated by gender affirmative care) as well as wish to no longer be alive (though no active SI)  05/01 Patient continues to endorse passive SI, appeared anxious though smiling during interview. Reported panic attack yesterday. Ok with plan to increase Zoloft to 200 mg  05/02 Pt received first dose of 200 mg Zoloft. Had panic attack yesterday triggered by existential doubts and feelings of self hatred, which resulted in him hitting himself. Today less anxious but continues to endorse ambivalence about wanting to be alive.  5/3/23   patient had significant panic attack accompanied by dread and thoughts of self destruction.  When asked if suicidal patient replies I cannot kill myself because of my family.  05/04- Acutely anxious, more so than previously (however interviewed in large group), will add 2 mg abilify. Vickie in process of enrolling [facility] as patient in [facility]  5/5 - Pt endorses panic attack the evening before during which he hit himself in the stomach and arms and banged his head on the wall due to self hatred and feeling like he does not have a place in this world. Created signs with patient that he can use to communicate with staff nonverbally; pt stated that he will try his best to use them. Does not report adverse effects from Abilify.  5/8- Pt denies any panic attacks over the weekend. Pt states that he did not need to use the signs that he created together with the team to communicate with staff. Continues to endorse passive SI and states that his reason for living is his family, but other than that, he does not feel like he has a reason to live. Abilify increased to 5 mg  5/9- Pt denies any panic attacks since yesterday, but continues to endorse passive SI and state that his family is his only reason for living. Will continue Abilify 5 mg.  5/10- Pt states that he had a panic attack the evening prior, during which he punched himself in the stomach and banged his head against the wall. Stated again that he does not want to be alive and does not have a place in this world.  5/11- Pt stated that yesterday, he wrapped a blanket around his neck because he "wanted to feel something" and wanted to "turn off my brain." He stated that he did not do this with the intention to end his life. He stated he does not want to be alive, but cannot die because it would hurt his family. Will decrease Zoloft to 100 mg on 5/12 with the intention of cross-titrating to venlafaxine. Will continue Abilify 5 mg.  5/12 - Pt reported no panic attacks or attempts at self-harm since last conversation. Stated that he "feels sad" and like he is "descending into darkness." Explained to pt that we would be switching from Zoloft to Effexor. Gave Zoloft 100 mg today, will also be starting Effexor 75 mg today.   5/18- Pt reports feeling "the same", endorses palpitations on Venlafaxine, will continue to monitor. Panic attack yesterday, will change standing Klonopin to 2 pm dosing.  5/19- Pt reports "feeling a little sad today" and states that he is continuing to have palpitations at night. Did not have panic attack yesterday, will continuing standing Klonopin.   5/21- Pt received PRN PO Haldol and Atarax after he started head banging in the context of distressing feelings. Today appeared more dysphoric yet less anxious than usual. Appeared tired which he attributed to the Haldol.  5/22 - Pt had episode of tachycardia and was evaluated by medicine attending who deemed non pathological. Appeared anxious and tired today, when asked about suicide after discharge he was unable to contract for safety. Had an anxious and panicked episode relieved by atarax, icing forearms, and talking. Increased Klonopin to 1 mg standing, will consider adding lithium and changing to ssri tmrw  5/23 - Pt had another anxious episode relieved by icing head and forearms, as well as talking. On approach, pt is tearful and dysphoric and states that he wants to die and that he does not belong in this world. Begin lithium 300 mg BID, plan to start Lexapro 10 mg tmrw.     Impression: MDD vs Social Anxiety disorder with Panic attacks vs Gender dysphoria vs cPTSD, R/O bipolar    #MDD #Gender dysphoria #Social Anxiety Disorder #Panic attacks  - 9.39 legals changed to 9.14  - Begin Lithium 300 mg BID 5/23 for suicidality and stabilization of affect  - Begin Lexapro 10 mg starting 5/24  - On Effexor 150 mg since 05/15, increased to 187.5 mg on 5/17, will discontinue on 5/23  - Stopped Abilify 5 mg, 5/16   - Tapered off Zoloft  - Melatonin for sleep phase disorder  - Left voicemail with Dr. Vincent at [facility], [phone]  - Left VM with  Kandice Acosta who work s with LGBTQ community for connection to trans affirming care  - Left  Teagan Escobar [phone], patient's therapist  - TSH WNL    #Gender Dysphoria  - The following programs contacted: [facility] (does not have anyone to send to the hospital), [facility] (only provides services for homeless individuals), [facility] (left voicemail), [employer] (phone number out of service), [facility], [facility] (reached out to them, may be able to send someone to hospital to speak to Radames), [facility] (voicemail full), [facility] (no availability to send anyone, have [facility] but referred to [facility])  - Referral on discharge to the [employer] [facility] (suicide hotline) and [facility] (Rhett has enrolled patient)    #Panic Attacks  - PRN Atarax  - Cont. Klonopin 1 mg at 14:00    #Agitation  - For episodes of acute agitation can offer PO Haldol 5 mg/Ativan 2 mg/Benadryl 50 mg Q6H PRN. If refusing PO and is in acute risk of harm to self/other, can offer IM Haldol 5 mg/Ativan 2 mg/Benadryl 50 mg Q6H PRN. If given, please repeat EKG and hold antipsychotics if QTC>500

## 2023-05-23 NOTE — BH INPATIENT PSYCHIATRY PROGRESS NOTE - NSBHMETABOLIC_PSY_ALL_CORE_FT
BMI: BMI (kg/m2): 32.5 (04-18-23 @ 05:33)  HbA1c: A1C with Estimated Average Glucose Result: 5.7 % (05-06-23 @ 08:53)    Glucose:   BP: 128/87 (05-22-23 @ 15:53) (97/63 - 136/90)  Lipid Panel: Date/Time: 05-06-23 @ 08:53  Cholesterol, Serum: 206  Direct LDL: --  HDL Cholesterol, Serum: 47  Total Cholesterol/HDL Ration Measurement: --  Triglycerides, Serum: 131   BMI: BMI (kg/m2): 32.5 (04-18-23 @ 05:33)  HbA1c: A1C with Estimated Average Glucose Result: 5.7 % (05-06-23 @ 08:53)    Glucose:   BP: 110/75 (05-23-23 @ 08:15) (97/63 - 136/90)  Lipid Panel: Date/Time: 05-06-23 @ 08:53  Cholesterol, Serum: 206  Direct LDL: --  HDL Cholesterol, Serum: 47  Total Cholesterol/HDL Ration Measurement: --  Triglycerides, Serum: 131

## 2023-05-24 PROCEDURE — 99231 SBSQ HOSP IP/OBS SF/LOW 25: CPT

## 2023-05-24 RX ORDER — HYDROXYZINE HCL 10 MG
50 TABLET ORAL EVERY 6 HOURS
Refills: 0 | Status: DISCONTINUED | OUTPATIENT
Start: 2023-05-24 | End: 2023-06-16

## 2023-05-24 RX ADMIN — LITHIUM CARBONATE 300 MILLIGRAM(S): 300 TABLET, EXTENDED RELEASE ORAL at 20:29

## 2023-05-24 RX ADMIN — ESCITALOPRAM OXALATE 10 MILLIGRAM(S): 10 TABLET, FILM COATED ORAL at 08:00

## 2023-05-24 RX ADMIN — Medication 50 MILLIGRAM(S): at 11:08

## 2023-05-24 RX ADMIN — HALOPERIDOL DECANOATE 5 MILLIGRAM(S): 100 INJECTION INTRAMUSCULAR at 19:00

## 2023-05-24 RX ADMIN — Medication 50 MILLIGRAM(S): at 18:48

## 2023-05-24 RX ADMIN — LITHIUM CARBONATE 300 MILLIGRAM(S): 300 TABLET, EXTENDED RELEASE ORAL at 08:00

## 2023-05-24 RX ADMIN — Medication 2 MILLIGRAM(S): at 19:00

## 2023-05-24 RX ADMIN — Medication 1 MILLIGRAM(S): at 08:00

## 2023-05-24 RX ADMIN — Medication 3 MILLIGRAM(S): at 20:29

## 2023-05-24 NOTE — BH INPATIENT PSYCHIATRY PROGRESS NOTE - NSBHASSESSSUMMFT_PSY_ALL_CORE
The patient is a 16 yo transgender F->M, Russian-speaking, recently immigrated from [place], domiciled with brother, unemployed, with psych hx of gender dysphoria, anxiety and depression, no past psychiatric hospitalizations, previous suicide attempt via choking, who is presenting at recommendation of brother and cousin for increasing SI.    Upon admission patient endorses strong feelings of depression and exhibits very dysphoric and minimally reactive affect. He endorses escalating suicidal thoughts related to feelings of worthlessness and unhappiness over his appearance. During the course of his hospitalization, his mood and affect initially improved, he became become more talkative. However, throughout the course of the hospitalization he started having near-daily anxiety attacks which he has been coping with by head banging or punching/scratching his limbs. His depression seems to have alleviated overall, however he suffers from  frequent mood shifts which he has difficulty coping with. These shifts are often triggered by misgendering by others or missing his mother, and are often prevented when he is in the company of Russian-speaking staff and is socially engaged. The patient was admitted involuntarily on 9:39 legals as he has been acutely elevated risk of suicide, now changed to voluntary legals. Chronic risk factors include history of trauma, history of persistent depressive symptoms,   and history of suicidal ideation. Current risk factors include recent self inflicted choking, age, recent immigration, not understanding/speaking well language of current residence, current gender dysphoria, current social anxiety, current panic attacks and financial stressors. Goals of care will be to decrease patient's suicidality as his current risk factors place him at acutely elevated risk. Requires continued hospitalization for stabilization.    04/18/23- Patient acutely dysphoric. Plan to dc mirtazapine as he notes excessive lethargy and hyperphagia, continue sertraline 50 mg  04/19/2023  Patient remains with thoughts of suicide and concerns about his future, however he feels safe while in a structured setting with people around him  04/20/23 Patient had no thoughts of suicide during interview, appeared brighter and occasionally smiled. Agreeable to increase antidepressants  04/24 Patient notes panic attack last night, alleviated talking with cousin. Brighter, future oriented. Endorses social anxiety.  04/25/23 Patient did not have panic attack in last 24 hrs. Still appears brighter on unit and during interview today. Voiced that his social anxiety hinders his ability to advocate for himself while on unit  04/26   Patient is somewhat less anxious,  spending  time outside his room and is cheerful at times.  04/27/23 Patient notes still having intermittent anxiety, however he is now able to leave his room and his speech is much more productive. Affect continues to be more supple than on initial presentation. Plan to increase Zoloft to 150 mg 04/28, with eventual plan to maximize it to treat his social anxiety disorder.  04/28 Yesterday patient had another panic attack when bullied about his gender expression and received Klonopin and Atarax PRN. Though he states his mood is "good" and has a more supple affect than on initial presentation, he continues to endorse feelings of self hatred (which he believes may be alleviated by gender affirmative care) as well as wish to no longer be alive (though no active SI)  05/01 Patient continues to endorse passive SI, appeared anxious though smiling during interview. Reported panic attack yesterday. Ok with plan to increase Zoloft to 200 mg  05/02 Pt received first dose of 200 mg Zoloft. Had panic attack yesterday triggered by existential doubts and feelings of self hatred, which resulted in him hitting himself. Today less anxious but continues to endorse ambivalence about wanting to be alive.  5/3/23   patient had significant panic attack accompanied by dread and thoughts of self destruction.  When asked if suicidal patient replies I cannot kill myself because of my family.  05/04- Acutely anxious, more so than previously (however interviewed in large group), will add 2 mg abilify. Vickie in process of enrolling Radames as patient in [facility]  5/5 - Pt endorses panic attack the evening before during which he hit himself in the stomach and arms and banged his head on the wall due to self hatred and feeling like he does not have a place in this world. Created signs with patient that he can use to communicate with staff nonverbally; pt stated that he will try his best to use them. Does not report adverse effects from Abilify.  5/8- Pt denies any panic attacks over the weekend. Pt states that he did not need to use the signs that he created together with the team to communicate with staff. Continues to endorse passive SI and states that his reason for living is his family, but other than that, he does not feel like he has a reason to live. Abilify increased to 5 mg  5/9- Pt denies any panic attacks since yesterday, but continues to endorse passive SI and state that his family is his only reason for living. Will continue Abilify 5 mg.  5/10- Pt states that he had a panic attack the evening prior, during which he punched himself in the stomach and banged his head against the wall. Stated again that he does not want to be alive and does not have a place in this world.  5/11- Pt stated that yesterday, he wrapped a blanket around his neck because he "wanted to feel something" and wanted to "turn off my brain." He stated that he did not do this with the intention to end his life. He stated he does not want to be alive, but cannot die because it would hurt his family. Will decrease Zoloft to 100 mg on 5/12 with the intention of cross-titrating to venlafaxine. Will continue Abilify 5 mg.  5/12 - Pt reported no panic attacks or attempts at self-harm since last conversation. Stated that he "feels sad" and like he is "descending into darkness." Explained to pt that we would be switching from Zoloft to Effexor. Gave Zoloft 100 mg today, will also be starting Effexor 75 mg today.   5/18- Pt reports feeling "the same", endorses palpitations on Venlafaxine, will continue to monitor. Panic attack yesterday, will change standing Klonopin to 2 pm dosing.  5/19- Pt reports "feeling a little sad today" and states that he is continuing to have palpitations at night. Did not have panic attack yesterday, will continuing standing Klonopin.   5/21- Pt received PRN PO Haldol and Atarax after he started head banging in the context of distressing feelings. Today appeared more dysphoric yet less anxious than usual. Appeared tired which he attributed to the Haldol.  5/22 - Pt had episode of tachycardia and was evaluated by medicine attending who deemed non pathological. Appeared anxious and tired today, when asked about suicide after discharge he was unable to contract for safety. Had an anxious and panicked episode relieved by atarax, icing forearms, and talking. Increased Klonopin to 1 mg standing, will consider adding lithium and changing to ssri tmrw  5/23 - Pt had another anxious episode relieved by icing head and forearms, as well as talking. On approach, pt is tearful and dysphoric and states that he wants to die and that he does not belong in this world. Begin lithium 300 mg BID, plan to start Lexapro 10 mg tmrw.     Impression: MDD vs Social Anxiety disorder with Panic attacks vs Gender dysphoria vs cPTSD, R/O bipolar    #MDD #Gender dysphoria #Social Anxiety Disorder #Panic attacks  - 9.39 legals changed to 9.14  - Begin Lithium 300 mg BID 5/23 for suicidality and stabilization of affect  - Begin Lexapro 10 mg starting 5/24  - On Effexor 150 mg since 05/15, increased to 187.5 mg on 5/17, will discontinue on 5/23  - Stopped Abilify 5 mg, 5/16   - Tapered off Zoloft  - Melatonin for sleep phase disorder  - Left voicemail with Dr. Vincent at [facility], [phone]  - Left VM with  Kandice Acosta who work s with LGBTQ community for connection to trans affirming care  - Left  Teagan Escobar [phone], patient's therapist  - TSH WNL    #Gender Dysphoria  - The following programs contacted: [facility] (does not have anyone to send to the hospital), [facility] (only provides services for homeless individuals), [employer] (left voicemail), [employer] (phone number out of service), [facility], [facility] (reached out to them, may be able to send someone to hospital to speak to Radames), [facility] (voicemail full), [facility] (no availability to send anyone, have [facility] but referred to [facility])  - Referral on discharge to the [employer] (suicide hotline) and [facility] (Rhett has enrolled patient)    #Panic Attacks  - PRN Atarax  - Cont. Klonopin 1 mg at 14:00    #Agitation  - For episodes of acute agitation can offer PO Haldol 5 mg/Ativan 2 mg/Benadryl 50 mg Q6H PRN. If refusing PO and is in acute risk of harm to self/other, can offer IM Haldol 5 mg/Ativan 2 mg/Benadryl 50 mg Q6H PRN. If given, please repeat EKG and hold antipsychotics if QTC>500       The patient is a 16 yo transgender F->M, [place]-speaking, recently immigrated from [place], domiciled with brother, unemployed, with psych hx of gender dysphoria, anxiety and depression, no past psychiatric hospitalizations, previous suicide attempt via choking, who is presenting at recommendation of brother and cousin for increasing SI.    Upon admission patient endorses strong feelings of depression and exhibits very dysphoric and minimally reactive affect. He endorses escalating suicidal thoughts related to feelings of worthlessness and unhappiness over his appearance. During the course of his hospitalization, his mood and affect initially improved, he became become more talkative. However, throughout the course of the hospitalization he started having near-daily anxiety attacks which he has been coping with by head banging or punching/scratching his limbs. His depression seems to have alleviated overall, however he suffers from  frequent mood shifts which he has difficulty coping with. These shifts are often triggered by misgendering by others or missing his mother, and are often prevented when he is in the company of [place]olan-speaking staff and is socially engaged. The patient was admitted involuntarily on 9:39 legals as he has been acutely elevated risk of suicide, now changed to voluntary legals. Chronic risk factors include history of trauma, history of persistent depressive symptoms,   and history of suicidal ideation. Current risk factors include recent self inflicted choking, age, recent immigration, not understanding/speaking well language of current residence, current gender dysphoria, current social anxiety, current panic attacks and financial stressors. Goals of care will be to decrease patient's suicidality as his current risk factors place him at acutely elevated risk. Requires continued hospitalization for stabilization.    04/18/23- Patient acutely dysphoric. Plan to dc mirtazapine as he notes excessive lethargy and hyperphagia, continue sertraline 50 mg  04/19/2023  Patient remains with thoughts of suicide and concerns about his future, however he feels safe while in a structured setting with people around him  04/20/23 Patient had no thoughts of suicide during interview, appeared brighter and occasionally smiled. Agreeable to increase antidepressants  04/24 Patient notes panic attack last night, alleviated talking with cousin. Brighter, future oriented. Endorses social anxiety.  04/25/23 Patient did not have panic attack in last 24 hrs. Still appears brighter on unit and during interview today. Voiced that his social anxiety hinders his ability to advocate for himself while on unit  04/26   Patient is somewhat less anxious,  spending  time outside his room and is cheerful at times.  04/27/23 Patient notes still having intermittent anxiety, however he is now able to leave his room and his speech is much more productive. Affect continues to be more supple than on initial presentation. Plan to increase Zoloft to 150 mg 04/28, with eventual plan to maximize it to treat his social anxiety disorder.  04/28 Yesterday patient had another panic attack when bullied about his gender expression and received Klonopin and Atarax PRN. Though he states his mood is "good" and has a more supple affect than on initial presentation, he continues to endorse feelings of self hatred (which he believes may be alleviated by gender affirmative care) as well as wish to no longer be alive (though no active SI)  05/01 Patient continues to endorse passive SI, appeared anxious though smiling during interview. Reported panic attack yesterday. Ok with plan to increase Zoloft to 200 mg  05/02 Pt received first dose of 200 mg Zoloft. Had panic attack yesterday triggered by existential doubts and feelings of self hatred, which resulted in him hitting himself. Today less anxious but continues to endorse ambivalence about wanting to be alive.  5/3/23   patient had significant panic attack accompanied by dread and thoughts of self destruction.  When asked if suicidal patient replies I cannot kill myself because of my family.  05/04- Acutely anxious, more so than previously (however interviewed in large group), will add 2 mg abilify. Vickie in process of enrolling [facility] as patient in [facility]  5/5 - Pt endorses panic attack the evening before during which he hit himself in the stomach and arms and banged his head on the wall due to self hatred and feeling like he does not have a place in this world. Created signs with patient that he can use to communicate with staff nonverbally; pt stated that he will try his best to use them. Does not report adverse effects from Abilify.  5/8- Pt denies any panic attacks over the weekend. Pt states that he did not need to use the signs that he created together with the team to communicate with staff. Continues to endorse passive SI and states that his reason for living is his family, but other than that, he does not feel like he has a reason to live. Abilify increased to 5 mg  5/9- Pt denies any panic attacks since yesterday, but continues to endorse passive SI and state that his family is his only reason for living. Will continue Abilify 5 mg.  5/10- Pt states that he had a panic attack the evening prior, during which he punched himself in the stomach and banged his head against the wall. Stated again that he does not want to be alive and does not have a place in this world.  5/11- Pt stated that yesterday, he wrapped a blanket around his neck because he "wanted to feel something" and wanted to "turn off my brain." He stated that he did not do this with the intention to end his life. He stated he does not want to be alive, but cannot die because it would hurt his family. Will decrease Zoloft to 100 mg on 5/12 with the intention of cross-titrating to venlafaxine. Will continue Abilify 5 mg.  5/12 - Pt reported no panic attacks or attempts at self-harm since last conversation. Stated that he "feels sad" and like he is "descending into darkness." Explained to pt that we would be switching from Zoloft to Effexor. Gave Zoloft 100 mg today, will also be starting Effexor 75 mg today.   5/18- Pt reports feeling "the same", endorses palpitations on Venlafaxine, will continue to monitor. Panic attack yesterday, will change standing Klonopin to 2 pm dosing.  5/19- Pt reports "feeling a little sad today" and states that he is continuing to have palpitations at night. Did not have panic attack yesterday, will continuing standing Klonopin.   5/21- Pt received PRN PO Haldol and Atarax after he started head banging in the context of distressing feelings. Today appeared more dysphoric yet less anxious than usual. Appeared tired which he attributed to the Haldol.  5/22 - Pt had episode of tachycardia and was evaluated by medicine attending who deemed non pathological. Appeared anxious and tired today, when asked about suicide after discharge he was unable to contract for safety. Had an anxious and panicked episode relieved by atarax, icing forearms, and talking. Increased Klonopin to 1 mg standing, will consider adding lithium and changing to ssri tmrw  5/23 - Pt had another anxious episode relieved by icing head and forearms, as well as talking. On approach, pt is tearful and dysphoric and states that he wants to die and that he does not belong in this world. Begin lithium 300 mg BID, plan to start Lexapro 10 mg tmrw.   5/24 - Pt had two anxious episodes the previous day; music and ice helped for the first one, but no longer helped for the second one. Pt states these episodes came about in the context of thoughts of self-hatred. On approach, pt has improved affect compared to prior assessment. Start Lexapro 10 mg today.    Impression: MDD vs Social Anxiety disorder with Panic attacks vs Gender dysphoria vs cPTSD, R/O bipolar    #MDD #Gender dysphoria #Social Anxiety Disorder #Panic attacks  - 9.39 legals changed to 9.14  - Begin Lithium 300 mg BID 5/23 for suicidality and stabilization of affect  - Begin Lexapro 10 mg starting 5/24  - On Effexor 150 mg since 05/15, increased to 187.5 mg on 5/17, will discontinue on 5/23  - Stopped Abilify 5 mg, 5/16   - Tapered off Zoloft  - Melatonin for sleep phase disorder  - Left voicemail with Dr. Vincent at [facility], [phone]  - Left VM with  Kandice Acosta who work s with LGBTQ community for connection to trans affirming care  - Left  Teagan Escobar [phone], patient's therapist  - TSH WNL    #Gender Dysphoria  - The following programs contacted: [facility] [employer] (does not have anyone to send to the hospital), [facility] (only provides services for homeless individuals), [employer] (left voicemail), [employer] (phone number out of service), [facility], [facility] (reached out to them, may be able to send someone to hospital to speak to Radames), [facility] (voicemail full), [facility] (no availability to send anyone, have [facility] but referred to [facility])  - Referral on discharge to the [employer] (suicide hotline) and [facility] (Rhett has enrolled patient)    #Panic Attacks  - PRN Atarax  - Cont. Klonopin 1 mg at 14:00    #Agitation  - For episodes of acute agitation can offer PO Haldol 5 mg/Ativan 2 mg/Benadryl 50 mg Q6H PRN. If refusing PO and is in acute risk of harm to self/other, can offer IM Haldol 5 mg/Ativan 2 mg/Benadryl 50 mg Q6H PRN. If given, please repeat EKG and hold antipsychotics if QTC>500

## 2023-05-24 NOTE — BH INPATIENT PSYCHIATRY PROGRESS NOTE - CURRENT MEDICATION
MEDICATIONS  (STANDING):  clonazePAM  Tablet 1 milliGRAM(s) Oral daily  escitalopram 10 milliGRAM(s) Oral daily  lithium 300 milliGRAM(s) Oral two times a day  melatonin. 3 milliGRAM(s) Oral at bedtime    MEDICATIONS  (PRN):  aluminum hydroxide/magnesium hydroxide/simethicone Suspension 30 milliLiter(s) Oral every 6 hours PRN Dyspepsia  haloperidol     Tablet 5 milliGRAM(s) Oral every 6 hours PRN agitation  hydrOXYzine hydrochloride 50 milliGRAM(s) Oral every 6 hours PRN anxiety  LORazepam     Tablet 2 milliGRAM(s) Oral every 6 hours PRN agitation   MEDICATIONS  (STANDING):  clonazePAM  Tablet 1 milliGRAM(s) Oral daily  escitalopram 10 milliGRAM(s) Oral daily  lithium 300 milliGRAM(s) Oral two times a day  melatonin. 3 milliGRAM(s) Oral at bedtime    MEDICATIONS  (PRN):  aluminum hydroxide/magnesium hydroxide/simethicone Suspension 30 milliLiter(s) Oral every 6 hours PRN Dyspepsia  haloperidol     Tablet 5 milliGRAM(s) Oral every 6 hours PRN agitation  hydrOXYzine hydrochloride 50 milliGRAM(s) Oral every 6 hours PRN anxiety and insomnia  LORazepam     Tablet 2 milliGRAM(s) Oral every 6 hours PRN agitation

## 2023-05-24 NOTE — BH INPATIENT PSYCHIATRY PROGRESS NOTE - NSBHCHARTREVIEWVS_PSY_A_CORE FT
Vital Signs Last 24 Hrs  T(C): 36.1 (05-23-23 @ 15:49), Max: 36.1 (05-23-23 @ 15:49)  T(F): 97 (05-23-23 @ 15:49), Max: 97 (05-23-23 @ 15:49)  HR: 137 (05-23-23 @ 15:49) (115 - 137)  BP: 135/77 (05-23-23 @ 15:49) (110/75 - 135/77)  BP(mean): --  RR: 18 (05-23-23 @ 15:49) (16 - 18)  SpO2: --     Vital Signs Last 24 Hrs  T(C): 35.9 (05-24-23 @ 08:08), Max: 36.1 (05-23-23 @ 15:49)  T(F): 96.6 (05-24-23 @ 08:08), Max: 97 (05-23-23 @ 15:49)  HR: 130 (05-24-23 @ 08:08) (130 - 137)  BP: 125/79 (05-24-23 @ 08:08) (125/79 - 135/77)  BP(mean): --  RR: 16 (05-24-23 @ 08:08) (16 - 18)  SpO2: --

## 2023-05-24 NOTE — BH INPATIENT PSYCHIATRY PROGRESS NOTE - NSBHMETABOLIC_PSY_ALL_CORE_FT
BMI: BMI (kg/m2): 32.5 (04-18-23 @ 05:33)  HbA1c: A1C with Estimated Average Glucose Result: 5.7 % (05-06-23 @ 08:53)    Glucose:   BP: 135/77 (05-23-23 @ 15:49) (97/63 - 135/77)  Lipid Panel: Date/Time: 05-06-23 @ 08:53  Cholesterol, Serum: 206  Direct LDL: --  HDL Cholesterol, Serum: 47  Total Cholesterol/HDL Ration Measurement: --  Triglycerides, Serum: 131   BMI: BMI (kg/m2): 32.5 (04-18-23 @ 05:33)  HbA1c: A1C with Estimated Average Glucose Result: 5.7 % (05-06-23 @ 08:53)    Glucose:   BP: 125/79 (05-24-23 @ 08:08) (97/63 - 135/77)  Lipid Panel: Date/Time: 05-06-23 @ 08:53  Cholesterol, Serum: 206  Direct LDL: --  HDL Cholesterol, Serum: 47  Total Cholesterol/HDL Ration Measurement: --  Triglycerides, Serum: 131

## 2023-05-24 NOTE — BH INPATIENT PSYCHIATRY PROGRESS NOTE - PRN MEDS
MEDICATIONS  (PRN):  aluminum hydroxide/magnesium hydroxide/simethicone Suspension 30 milliLiter(s) Oral every 6 hours PRN Dyspepsia  haloperidol     Tablet 5 milliGRAM(s) Oral every 6 hours PRN agitation  hydrOXYzine hydrochloride 50 milliGRAM(s) Oral every 6 hours PRN anxiety  LORazepam     Tablet 2 milliGRAM(s) Oral every 6 hours PRN agitation   MEDICATIONS  (PRN):  aluminum hydroxide/magnesium hydroxide/simethicone Suspension 30 milliLiter(s) Oral every 6 hours PRN Dyspepsia  haloperidol     Tablet 5 milliGRAM(s) Oral every 6 hours PRN agitation  hydrOXYzine hydrochloride 50 milliGRAM(s) Oral every 6 hours PRN anxiety and insomnia  LORazepam     Tablet 2 milliGRAM(s) Oral every 6 hours PRN agitation

## 2023-05-24 NOTE — BH INPATIENT PSYCHIATRY PROGRESS NOTE - NSBHATTESTCOMMENTATTENDFT_PSY_A_CORE
I have discussed the clinical aspects of this case with the resident.  I concur with findings and plan.
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
## 2023-05-25 RX ADMIN — Medication 30 MILLILITER(S): at 12:15

## 2023-05-25 RX ADMIN — Medication 3 MILLIGRAM(S): at 21:13

## 2023-05-25 RX ADMIN — ESCITALOPRAM OXALATE 10 MILLIGRAM(S): 10 TABLET, FILM COATED ORAL at 08:29

## 2023-05-25 RX ADMIN — Medication 1 MILLIGRAM(S): at 08:29

## 2023-05-25 RX ADMIN — LITHIUM CARBONATE 300 MILLIGRAM(S): 300 TABLET, EXTENDED RELEASE ORAL at 08:29

## 2023-05-25 RX ADMIN — LITHIUM CARBONATE 300 MILLIGRAM(S): 300 TABLET, EXTENDED RELEASE ORAL at 21:13

## 2023-05-25 NOTE — BH INPATIENT PSYCHIATRY PROGRESS NOTE - CURRENT MEDICATION
MEDICATIONS  (STANDING):  clonazePAM  Tablet 1 milliGRAM(s) Oral daily  escitalopram 10 milliGRAM(s) Oral daily  lithium 300 milliGRAM(s) Oral two times a day  melatonin. 3 milliGRAM(s) Oral at bedtime    MEDICATIONS  (PRN):  aluminum hydroxide/magnesium hydroxide/simethicone Suspension 30 milliLiter(s) Oral every 6 hours PRN Dyspepsia  haloperidol     Tablet 5 milliGRAM(s) Oral every 6 hours PRN agitation  hydrOXYzine hydrochloride 50 milliGRAM(s) Oral every 6 hours PRN anxiety and insomnia  LORazepam     Tablet 2 milliGRAM(s) Oral every 6 hours PRN agitation

## 2023-05-25 NOTE — BH INPATIENT PSYCHIATRY PROGRESS NOTE - PRN MEDS
MEDICATIONS  (PRN):  aluminum hydroxide/magnesium hydroxide/simethicone Suspension 30 milliLiter(s) Oral every 6 hours PRN Dyspepsia  haloperidol     Tablet 5 milliGRAM(s) Oral every 6 hours PRN agitation  hydrOXYzine hydrochloride 50 milliGRAM(s) Oral every 6 hours PRN anxiety and insomnia  LORazepam     Tablet 2 milliGRAM(s) Oral every 6 hours PRN agitation

## 2023-05-25 NOTE — BH INPATIENT PSYCHIATRY PROGRESS NOTE - NSBHFUPINTERVALHXFT_PSY_A_CORE
Nursing reports no acute events overnight. Per chart review the patient received PO Haldol, Ativan, Hydroxyzine yesterday evening.    Chart reviewed, patient seen and evaluated in AM. On approach, .... Patient reports ________________.   Patient endorsed OK sleep and appetite.     No SI/HI/AVH elicited.  Nursing reports no acute events overnight. Per chart review the patient received PO Haldol, Ativan, Hydroxyzine yesterday evening.    Chart reviewed, patient seen and evaluated in AM. On approach, pt is laying in bed and looks tired. Patient reports feeling sad and states that his anxiety attack yesterday was precipitated by thoughts that he "doesn't want a life" and that he "wants to die". He said that he wants to strangle himself with the blanket and that the only reason he cannot do so is because of the 1:1. When asked what pt would like to do after discharge, he said he would like to attend English classes. Team asked again if he would be interested in groups with other trans people and he said no. Pt was informed that representatives from the [facility] would be coming to speak with him tomorrow morning, and pt agreed to meet with them.   Patient endorsed OK sleep and appetite.

## 2023-05-25 NOTE — BH INPATIENT PSYCHIATRY PROGRESS NOTE - NSBHMETABOLIC_PSY_ALL_CORE_FT
BMI: BMI (kg/m2): 32.5 (04-18-23 @ 05:33)  HbA1c: A1C with Estimated Average Glucose Result: 5.7 % (05-06-23 @ 08:53)    Glucose:   BP: 116/83 (05-25-23 @ 08:04) (110/75 - 136/92)  Lipid Panel: Date/Time: 05-06-23 @ 08:53  Cholesterol, Serum: 206  Direct LDL: --  HDL Cholesterol, Serum: 47  Total Cholesterol/HDL Ration Measurement: --  Triglycerides, Serum: 131

## 2023-05-25 NOTE — BH TREATMENT PLAN - NSTXCAREGIVERPARTICIPATE_PSY_P_CORE
Family/Caregiver participated in identification of needs/problems/goals for treatment/Family/Caregiver participated in defining interventions/Family/Caregiver participated in development of after care plan
Family/Caregiver participated in identification of needs/problems/goals for treatment/Family/Caregiver participated in defining interventions
Family/Caregiver participated in identification of needs/problems/goals for treatment
Family/Caregiver participated in identification of needs/problems/goals for treatment/Family/Caregiver participated in defining interventions/Family/Caregiver participated in development of after care plan
Family/Caregiver participated in identification of needs/problems/goals for treatment/Family/Caregiver participated in defining interventions/Family/Caregiver participated in development of after care plan

## 2023-05-25 NOTE — BH INPATIENT PSYCHIATRY PROGRESS NOTE - NSBHATTESTCOMMENTATTENDFT_PSY_A_CORE
I have discussed the clinical aspects of this case with the resident.  I concur with findings and plan. I have discussed the clinical aspects of this case with the resident.  I concur with findings and plans.

## 2023-05-25 NOTE — BH INPATIENT PSYCHIATRY PROGRESS NOTE - NSBHCHARTREVIEWVS_PSY_A_CORE FT
Vital Signs Last 24 Hrs  T(C): 35.8 (05-25-23 @ 08:04), Max: 35.9 (05-24-23 @ 15:52)  T(F): 96.4 (05-25-23 @ 08:04), Max: 96.7 (05-24-23 @ 15:52)  HR: 118 (05-25-23 @ 08:04) (117 - 118)  BP: 116/83 (05-25-23 @ 08:04) (116/83 - 136/92)  BP(mean): --  RR: 16 (05-25-23 @ 08:04) (16 - 18)  SpO2: --

## 2023-05-25 NOTE — BH INPATIENT PSYCHIATRY PROGRESS NOTE - NSBHASSESSSUMMFT_PSY_ALL_CORE
The patient is a 18 yo transgender F->M, [place]-speaking, recently immigrated from [place], domiciled with brother, unemployed, with psych hx of gender dysphoria, anxiety and depression, no past psychiatric hospitalizations, previous suicide attempt via choking, who is presenting at recommendation of brother and cousin for increasing SI.    Upon admission patient endorses strong feelings of depression and exhibits very dysphoric and minimally reactive affect. He endorses escalating suicidal thoughts related to feelings of worthlessness and unhappiness over his appearance. During the course of his hospitalization, his mood and affect initially improved, he became become more talkative. However, throughout the course of the hospitalization he started having near-daily anxiety attacks which he has been coping with by head banging or punching/scratching his limbs. His depression seems to have alleviated overall, however he suffers from  frequent mood shifts which he has difficulty coping with. These shifts are often triggered by misgendering by others or missing his mother, and are often prevented when he is in the company of [place]ican-speaking staff and is socially engaged. The patient was admitted involuntarily on 9:39 legals as he has been acutely elevated risk of suicide, now changed to voluntary legals. Chronic risk factors include history of trauma, history of persistent depressive symptoms,   and history of suicidal ideation. Current risk factors include recent self inflicted choking, age, recent immigration, not understanding/speaking well language of current residence, current gender dysphoria, current social anxiety, current panic attacks and financial stressors. Goals of care will be to decrease patient's suicidality as his current risk factors place him at acutely elevated risk. Requires continued hospitalization for stabilization.    04/18/23- Patient acutely dysphoric. Plan to dc mirtazapine as he notes excessive lethargy and hyperphagia, continue sertraline 50 mg  04/19/2023  Patient remains with thoughts of suicide and concerns about his future, however he feels safe while in a structured setting with people around him  04/20/23 Patient had no thoughts of suicide during interview, appeared brighter and occasionally smiled. Agreeable to increase antidepressants  04/24 Patient notes panic attack last night, alleviated talking with cousin. Brighter, future oriented. Endorses social anxiety.  04/25/23 Patient did not have panic attack in last 24 hrs. Still appears brighter on unit and during interview today. Voiced that his social anxiety hinders his ability to advocate for himself while on unit  04/26   Patient is somewhat less anxious,  spending  time outside his room and is cheerful at times.  04/27/23 Patient notes still having intermittent anxiety, however he is now able to leave his room and his speech is much more productive. Affect continues to be more supple than on initial presentation. Plan to increase Zoloft to 150 mg 04/28, with eventual plan to maximize it to treat his social anxiety disorder.  04/28 Yesterday patient had another panic attack when bullied about his gender expression and received Klonopin and Atarax PRN. Though he states his mood is "good" and has a more supple affect than on initial presentation, he continues to endorse feelings of self hatred (which he believes may be alleviated by gender affirmative care) as well as wish to no longer be alive (though no active SI)  05/01 Patient continues to endorse passive SI, appeared anxious though smiling during interview. Reported panic attack yesterday. Ok with plan to increase Zoloft to 200 mg  05/02 Pt received first dose of 200 mg Zoloft. Had panic attack yesterday triggered by existential doubts and feelings of self hatred, which resulted in him hitting himself. Today less anxious but continues to endorse ambivalence about wanting to be alive.  5/3/23   patient had significant panic attack accompanied by dread and thoughts of self destruction.  When asked if suicidal patient replies I cannot kill myself because of my family.  05/04- Acutely anxious, more so than previously (however interviewed in large group), will add 2 mg abilify. Vickie in process of enrolling [facility] as patient in [facility]  5/5 - Pt endorses panic attack the evening before during which he hit himself in the stomach and arms and banged his head on the wall due to self hatred and feeling like he does not have a place in this world. Created signs with patient that he can use to communicate with staff nonverbally; pt stated that he will try his best to use them. Does not report adverse effects from Abilify.  5/8- Pt denies any panic attacks over the weekend. Pt states that he did not need to use the signs that he created together with the team to communicate with staff. Continues to endorse passive SI and states that his reason for living is his family, but other than that, he does not feel like he has a reason to live. Abilify increased to 5 mg  5/9- Pt denies any panic attacks since yesterday, but continues to endorse passive SI and state that his family is his only reason for living. Will continue Abilify 5 mg.  5/10- Pt states that he had a panic attack the evening prior, during which he punched himself in the stomach and banged his head against the wall. Stated again that he does not want to be alive and does not have a place in this world.  5/11- Pt stated that yesterday, he wrapped a blanket around his neck because he "wanted to feel something" and wanted to "turn off my brain." He stated that he did not do this with the intention to end his life. He stated he does not want to be alive, but cannot die because it would hurt his family. Will decrease Zoloft to 100 mg on 5/12 with the intention of cross-titrating to venlafaxine. Will continue Abilify 5 mg.  5/12 - Pt reported no panic attacks or attempts at self-harm since last conversation. Stated that he "feels sad" and like he is "descending into darkness." Explained to pt that we would be switching from Zoloft to Effexor. Gave Zoloft 100 mg today, will also be starting Effexor 75 mg today.   5/18- Pt reports feeling "the same", endorses palpitations on Venlafaxine, will continue to monitor. Panic attack yesterday, will change standing Klonopin to 2 pm dosing.  5/19- Pt reports "feeling a little sad today" and states that he is continuing to have palpitations at night. Did not have panic attack yesterday, will continuing standing Klonopin.   5/21- Pt received PRN PO Haldol and Atarax after he started head banging in the context of distressing feelings. Today appeared more dysphoric yet less anxious than usual. Appeared tired which he attributed to the Haldol.  5/22 - Pt had episode of tachycardia and was evaluated by medicine attending who deemed non pathological. Appeared anxious and tired today, when asked about suicide after discharge he was unable to contract for safety. Had an anxious and panicked episode relieved by atarax, icing forearms, and talking. Increased Klonopin to 1 mg standing, will consider adding lithium and changing to ssri tmrw  5/23 - Pt had another anxious episode relieved by icing head and forearms, as well as talking. On approach, pt is tearful and dysphoric and states that he wants to die and that he does not belong in this world. Begin lithium 300 mg BID, plan to start Lexapro 10 mg tmrw.   5/24 - Pt had two anxious episodes the previous day; music and ice helped for the first one, but no longer helped for the second one. Pt states these episodes came about in the context of thoughts of self-hatred. On approach, pt has improved affect compared to prior assessment. Start Lexapro 10 mg today.    Impression: MDD vs Social Anxiety disorder with Panic attacks vs Gender dysphoria vs cPTSD, R/O bipolar    #MDD #Gender dysphoria #Social Anxiety Disorder #Panic attacks  - 9.39 legals changed to 9.14  - Begin Lithium 300 mg BID 5/23 for suicidality and stabilization of affect  - Begin Lexapro 10 mg starting 5/24  - On Effexor 150 mg since 05/15, increased to 187.5 mg on 5/17, will discontinue on 5/23  - Stopped Abilify 5 mg, 5/16   - Tapered off Zoloft  - Melatonin for sleep phase disorder  - Left voicemail with Dr. Vincent at [facility], [phone]  - Left VM with  Kandice Acosta who work s with LGBTQ community for connection to trans affirming care  - Left  Teagan Escobar [phone], patient's therapist  - TSH WNL    #Gender Dysphoria  - The following programs contacted: [employer] [facility] (does not have anyone to send to the hospital), [facility] (only provides services for homeless individuals), [employer] (left voicemail), [facility] (phone number out of service), [facility], [facility] Youth [facility] In program (reached out to them, may be able to send someone to hospital to speak to [facility]), [facility] (voicemail full), [facility] (no availability to send anyone, have [facility] but referred to [facility])  - Referral on discharge to the [facility] (suicide hotline) and [facility] ([facility] has enrolled patient)    #Panic Attacks  - PRN Atarax  - Cont. Klonopin 1 mg at 14:00    #Agitation  - For episodes of acute agitation can offer PO Haldol 5 mg/Ativan 2 mg/Benadryl 50 mg Q6H PRN. If refusing PO and is in acute risk of harm to self/other, can offer IM Haldol 5 mg/Ativan 2 mg/Benadryl 50 mg Q6H PRN. If given, please repeat EKG and hold antipsychotics if QTC>500       The patient is a 18 yo transgender F->M, Maldivian-speaking, recently immigrated from [place], domiciled with brother, unemployed, with psych hx of gender dysphoria, anxiety and depression, no past psychiatric hospitalizations, previous suicide attempt via choking, who is presenting at recommendation of brother and cousin for increasing SI.    Upon admission patient endorses strong feelings of depression and exhibits very dysphoric and minimally reactive affect. He endorses escalating suicidal thoughts related to feelings of worthlessness and unhappiness over his appearance. During the course of his hospitalization, his mood and affect initially improved, he became become more talkative. However, throughout the course of the hospitalization he started having near-daily anxiety attacks which he has been coping with by head banging or punching/scratching his limbs. His depression seems to have alleviated overall, however he suffers from  frequent mood shifts which he has difficulty coping with. These shifts are often triggered by misgendering by others or missing his mother, and are often prevented when he is in the company of Maldivian-speaking staff and is socially engaged. The patient was admitted involuntarily on 9:39 legals as he has been acutely elevated risk of suicide, now changed to voluntary legals. Chronic risk factors include history of trauma, history of persistent depressive symptoms,   and history of suicidal ideation. Current risk factors include recent self inflicted choking, age, recent immigration, not understanding/speaking well language of current residence, current gender dysphoria, current social anxiety, current panic attacks and financial stressors. Goals of care will be to decrease patient's suicidality as his current risk factors place him at acutely elevated risk. Requires continued hospitalization for stabilization.    04/18/23- Patient acutely dysphoric. Plan to dc mirtazapine as he notes excessive lethargy and hyperphagia, continue sertraline 50 mg  04/19/2023  Patient remains with thoughts of suicide and concerns about his future, however he feels safe while in a structured setting with people around him  04/20/23 Patient had no thoughts of suicide during interview, appeared brighter and occasionally smiled. Agreeable to increase antidepressants  04/24 Patient notes panic attack last night, alleviated talking with cousin. Brighter, future oriented. Endorses social anxiety.  04/25/23 Patient did not have panic attack in last 24 hrs. Still appears brighter on unit and during interview today. Voiced that his social anxiety hinders his ability to advocate for himself while on unit  04/26   Patient is somewhat less anxious,  spending  time outside his room and is cheerful at times.  04/27/23 Patient notes still having intermittent anxiety, however he is now able to leave his room and his speech is much more productive. Affect continues to be more supple than on initial presentation. Plan to increase Zoloft to 150 mg 04/28, with eventual plan to maximize it to treat his social anxiety disorder.  04/28 Yesterday patient had another panic attack when bullied about his gender expression and received Klonopin and Atarax PRN. Though he states his mood is "good" and has a more supple affect than on initial presentation, he continues to endorse feelings of self hatred (which he believes may be alleviated by gender affirmative care) as well as wish to no longer be alive (though no active SI)  05/01 Patient continues to endorse passive SI, appeared anxious though smiling during interview. Reported panic attack yesterday. Ok with plan to increase Zoloft to 200 mg  05/02 Pt received first dose of 200 mg Zoloft. Had panic attack yesterday triggered by existential doubts and feelings of self hatred, which resulted in him hitting himself. Today less anxious but continues to endorse ambivalence about wanting to be alive.  5/3/23   patient had significant panic attack accompanied by dread and thoughts of self destruction.  When asked if suicidal patient replies I cannot kill myself because of my family.  05/04- Acutely anxious, more so than previously (however interviewed in large group), will add 2 mg abilify. Vickie in process of enrolling [facility] as patient in [facility]  5/5 - Pt endorses panic attack the evening before during which he hit himself in the stomach and arms and banged his head on the wall due to self hatred and feeling like he does not have a place in this world. Created signs with patient that he can use to communicate with staff nonverbally; pt stated that he will try his best to use them. Does not report adverse effects from Abilify.  5/8- Pt denies any panic attacks over the weekend. Pt states that he did not need to use the signs that he created together with the team to communicate with staff. Continues to endorse passive SI and states that his reason for living is his family, but other than that, he does not feel like he has a reason to live. Abilify increased to 5 mg  5/9- Pt denies any panic attacks since yesterday, but continues to endorse passive SI and state that his family is his only reason for living. Will continue Abilify 5 mg.  5/10- Pt states that he had a panic attack the evening prior, during which he punched himself in the stomach and banged his head against the wall. Stated again that he does not want to be alive and does not have a place in this world.  5/11- Pt stated that yesterday, he wrapped a blanket around his neck because he "wanted to feel something" and wanted to "turn off my brain." He stated that he did not do this with the intention to end his life. He stated he does not want to be alive, but cannot die because it would hurt his family. Will decrease Zoloft to 100 mg on 5/12 with the intention of cross-titrating to venlafaxine. Will continue Abilify 5 mg.  5/12 - Pt reported no panic attacks or attempts at self-harm since last conversation. Stated that he "feels sad" and like he is "descending into darkness." Explained to pt that we would be switching from Zoloft to Effexor. Gave Zoloft 100 mg today, will also be starting Effexor 75 mg today.   5/18- Pt reports feeling "the same", endorses palpitations on Venlafaxine, will continue to monitor. Panic attack yesterday, will change standing Klonopin to 2 pm dosing.  5/19- Pt reports "feeling a little sad today" and states that he is continuing to have palpitations at night. Did not have panic attack yesterday, will continuing standing Klonopin.   5/21- Pt received PRN PO Haldol and Atarax after he started head banging in the context of distressing feelings. Today appeared more dysphoric yet less anxious than usual. Appeared tired which he attributed to the Haldol.  5/22 - Pt had episode of tachycardia and was evaluated by medicine attending who deemed non pathological. Appeared anxious and tired today, when asked about suicide after discharge he was unable to contract for safety. Had an anxious and panicked episode relieved by atarax, icing forearms, and talking. Increased Klonopin to 1 mg standing, will consider adding lithium and changing to ssri tmrw  5/23 - Pt had another anxious episode relieved by icing head and forearms, as well as talking. On approach, pt is tearful and dysphoric and states that he wants to die and that he does not belong in this world. Begin lithium 300 mg BID, plan to start Lexapro 10 mg tmrw.   5/24 - Pt had two anxious episodes the previous day; music and ice helped for the first one, but no longer helped for the second one. Pt states these episodes came about in the context of thoughts of self-hatred. On approach, pt has improved affect compared to prior assessment. Start Lexapro 10 mg today.  5/25 -  Pt had an anxious episode the previous evening precipitated by thoughts of "wanting to die" requiring PO Haldol, Ativan, and Hydroxyzine. Pt attended crisis management group the previous day and reviewed DBT skills with team. On approach, pt has depressed affect. Continue Lexapro 10 mg and lithium 300 BID.     Impression: MDD vs Social Anxiety disorder with Panic attacks vs Gender dysphoria vs cPTSD, R/O bipolar    #MDD #Gender dysphoria #Social Anxiety Disorder #Panic attacks  - 9.39 legals changed to 9.14  - Begin Lithium 300 mg BID 5/23 for suicidality and stabilization of affect; obtain lithium levels on 5/27  - Begin Lexapro 10 mg starting 5/24  - On Effexor 150 mg since 05/15, increased to 187.5 mg on 5/17, will discontinue on 5/23  - Stopped Abilify 5 mg, 5/16   - Tapered off Zoloft  - Melatonin for sleep phase disorder  - Left voicemail with [facility]. Vincent at [facility], [phone]  - Left VM with  Kandice Acosta who work s with LGBTQ community for connection to trans affirming care  - Left  Teagan Escobar [phone], patient's therapist  - TSH WNL    #Gender Dysphoria  - The following programs contacted: [facility] (does not have anyone to send to the hospital), [employer] (only provides services for homeless individuals), [facility] (left voicemail), [facility] (phone number out of service), [facility], [facility] In program (reached out to them, may be able to send someone to hospital to speak to [facility]), [facility] (voicemail full), [facility] (no availability to send anyone, have [facility] but referred to [facility])  - Referral on discharge to the [facility] (suicide hotline) and [facility] ([facility] has enrolled patient)    #Panic Attacks  - PRN Atarax  - Cont. Klonopin 1 mg at 14:00    #Agitation  - For episodes of acute agitation can offer PO Haldol 5 mg/Ativan 2 mg/Benadryl 50 mg Q6H PRN. If refusing PO and is in acute risk of harm to self/other, can offer IM Haldol 5 mg/Ativan 2 mg/Benadryl 50 mg Q6H PRN. If given, please repeat EKG and hold antipsychotics if QTC>500       The patient is a 16 yo transgender F->M, [place]onian-speaking, recently immigrated from [place], domiciled with brother, unemployed, with psych hx of gender dysphoria, anxiety and depression, no past psychiatric hospitalizations, previous suicide attempt via choking, who is presenting at recommendation of brother and cousin for increasing SI.    Upon admission patient endorses strong feelings of depression and exhibits very dysphoric and minimally reactive affect. He endorses escalating suicidal thoughts related to feelings of worthlessness and unhappiness over his appearance. During the course of his hospitalization, his mood and affect initially improved, he became become more talkative. However, throughout the course of the hospitalization he started having near-daily anxiety attacks which he has been coping with by head banging or punching/scratching his limbs. His depression seems to have alleviated overall, however he suffers from  frequent mood shifts which he has difficulty coping with. These shifts are often triggered by misgendering by others or missing his mother, and are often prevented when he is in the company of Macedonian-speaking staff and is socially engaged. The patient was admitted involuntarily on 9:39 legals as he has been acutely elevated risk of suicide, now changed to voluntary legals. Chronic risk factors include history of trauma, history of persistent depressive symptoms,   and history of suicidal ideation. Current risk factors include recent self inflicted choking, age, recent immigration, not understanding/speaking well language of current residence, current gender dysphoria, current social anxiety, current panic attacks and financial stressors. Goals of care will be to decrease patient's suicidality as his current risk factors place him at acutely elevated risk. Requires continued hospitalization for stabilization.    04/18/23- Patient acutely dysphoric. Plan to dc mirtazapine as he notes excessive lethargy and hyperphagia, continue sertraline 50 mg  04/19/2023  Patient remains with thoughts of suicide and concerns about his future, however he feels safe while in a structured setting with people around him  04/20/23 Patient had no thoughts of suicide during interview, appeared brighter and occasionally smiled. Agreeable to increase antidepressants  04/24 Patient notes panic attack last night, alleviated talking with cousin. Brighter, future oriented. Endorses social anxiety.  04/25/23 Patient did not have panic attack in last 24 hrs. Still appears brighter on unit and during interview today. Voiced that his social anxiety hinders his ability to advocate for himself while on unit  04/26   Patient is somewhat less anxious,  spending  time outside his room and is cheerful at times.  04/27/23 Patient notes still having intermittent anxiety, however he is now able to leave his room and his speech is much more productive. Affect continues to be more supple than on initial presentation. Plan to increase Zoloft to 150 mg 04/28, with eventual plan to maximize it to treat his social anxiety disorder.  04/28 Yesterday patient had another panic attack when bullied about his gender expression and received Klonopin and Atarax PRN. Though he states his mood is "good" and has a more supple affect than on initial presentation, he continues to endorse feelings of self hatred (which he believes may be alleviated by gender affirmative care) as well as wish to no longer be alive (though no active SI)  05/01 Patient continues to endorse passive SI, appeared anxious though smiling during interview. Reported panic attack yesterday. Ok with plan to increase Zoloft to 200 mg  05/02 Pt received first dose of 200 mg Zoloft. Had panic attack yesterday triggered by existential doubts and feelings of self hatred, which resulted in him hitting himself. Today less anxious but continues to endorse ambivalence about wanting to be alive.  5/3/23   patient had significant panic attack accompanied by dread and thoughts of self destruction.  When asked if suicidal patient replies I cannot kill myself because of my family.  05/04- Acutely anxious, more so than previously (however interviewed in large group), will add 2 mg abilify. Vickie in process of enrolling [facility] as patient in [facility]  5/5 - Pt endorses panic attack the evening before during which he hit himself in the stomach and arms and banged his head on the wall due to self hatred and feeling like he does not have a place in this world. Created signs with patient that he can use to communicate with staff nonverbally; pt stated that he will try his best to use them. Does not report adverse effects from Abilify.  5/8- Pt denies any panic attacks over the weekend. Pt states that he did not need to use the signs that he created together with the team to communicate with staff. Continues to endorse passive SI and states that his reason for living is his family, but other than that, he does not feel like he has a reason to live. Abilify increased to 5 mg  5/9- Pt denies any panic attacks since yesterday, but continues to endorse passive SI and state that his family is his only reason for living. Will continue Abilify 5 mg.  5/10- Pt states that he had a panic attack the evening prior, during which he punched himself in the stomach and banged his head against the wall. Stated again that he does not want to be alive and does not have a place in this world.  5/11- Pt stated that yesterday, he wrapped a blanket around his neck because he "wanted to feel something" and wanted to "turn off my brain." He stated that he did not do this with the intention to end his life. He stated he does not want to be alive, but cannot die because it would hurt his family. Will decrease Zoloft to 100 mg on 5/12 with the intention of cross-titrating to venlafaxine. Will continue Abilify 5 mg.  5/12 - Pt reported no panic attacks or attempts at self-harm since last conversation. Stated that he "feels sad" and like he is "descending into darkness." Explained to pt that we would be switching from Zoloft to Effexor. Gave Zoloft 100 mg today, will also be starting Effexor 75 mg today.   5/18- Pt reports feeling "the same", endorses palpitations on Venlafaxine, will continue to monitor. Panic attack yesterday, will change standing Klonopin to 2 pm dosing.  5/19- Pt reports "feeling a little sad today" and states that he is continuing to have palpitations at night. Did not have panic attack yesterday, will continuing standing Klonopin.   5/21- Pt received PRN PO Haldol and Atarax after he started head banging in the context of distressing feelings. Today appeared more dysphoric yet less anxious than usual. Appeared tired which he attributed to the Haldol.  5/22 - Pt had episode of tachycardia and was evaluated by medicine attending who deemed non pathological. Appeared anxious and tired today, when asked about suicide after discharge he was unable to contract for safety. Had an anxious and panicked episode relieved by atarax, icing forearms, and talking. Increased Klonopin to 1 mg standing, will consider adding lithium and changing to ssri tmrw  5/23 - Pt had another anxious episode relieved by icing head and forearms, as well as talking. On approach, pt is tearful and dysphoric and states that he wants to die and that he does not belong in this world. Begin lithium 300 mg BID, plan to start Lexapro 10 mg tmrw.   5/24 - Pt had two anxious episodes the previous day; music and ice helped for the first one, but no longer helped for the second one. Pt states these episodes came about in the context of thoughts of self-hatred. On approach, pt has improved affect compared to prior assessment. Start Lexapro 10 mg today.  5/25 -  Pt had an anxious episode the previous evening precipitated by thoughts of "wanting to die" requiring PO Haldol, Ativan, and Hydroxyzine. Pt attended crisis management group the previous day and reviewed DBT skills with team. On approach, pt has depressed affect. Continue Lexapro 10 mg and lithium 300 BID.     Impression: MDD vs Social Anxiety disorder with Panic attacks vs Gender dysphoria vs cPTSD, R/O bipolar    #MDD #Gender dysphoria #Social Anxiety Disorder #Panic attacks  - 9.39 legals changed to 9.14  - Cont Lithium 300 mg BID 5/23 for suicidality and stabilization of affect; obtain lithium levels on 5/27  - Cont. Lexapro 10 mg starting 5/24  - On Effexor 150 mg trialed and discontinued 05/23  - Stopped Abilify 5 mg, 5/16   - Tapered off Zoloft  - Melatonin for sleep phase disorder  - Left voicemail with Dr. Vincent at [facility], [phone]  - Left VM with  Kandice Acosta who work s with LGBTQ community for connection to trans affirming care  - Left  Teagan Escobar [phone], patient's therapist  - TSH WNL    #Gender Dysphoria  - The following programs contacted: [facility] (does not have anyone to send to the hospital), [facility] (only provides services for homeless individuals), [facility] (left voicemail), [facility] (phone number out of service), [facility], [facility] [place] [facility]s Youth [facility] program (reached out to them, may be able to send someone to hospital to speak to [facility]), [facility] (voicemail full), [facility] (no availability to send anyone, have [facility] but referred to Jorge Alberto Mej[facility])  - Referral on discharge to the [facility] (suicide hotline) and [facility] ([facility] has enrolled patient)    #Panic Attacks  - PRN Atarax  - Cont. Klonopin 1 mg at 14:00    #Agitation  - For episodes of acute agitation can offer PO Haldol 5 mg/Ativan 2 mg/Benadryl 50 mg Q6H PRN. If refusing PO and is in acute risk of harm to self/other, can offer IM Haldol 5 mg/Ativan 2 mg/Benadryl 50 mg Q6H PRN. If given, please repeat EKG and hold antipsychotics if QTC>500

## 2023-05-26 RX ORDER — CLONAZEPAM 1 MG
1 TABLET ORAL DAILY
Refills: 0 | Status: DISCONTINUED | OUTPATIENT
Start: 2023-05-26 | End: 2023-06-02

## 2023-05-26 RX ADMIN — Medication 1 MILLIGRAM(S): at 08:01

## 2023-05-26 RX ADMIN — LITHIUM CARBONATE 300 MILLIGRAM(S): 300 TABLET, EXTENDED RELEASE ORAL at 08:00

## 2023-05-26 RX ADMIN — LITHIUM CARBONATE 300 MILLIGRAM(S): 300 TABLET, EXTENDED RELEASE ORAL at 20:13

## 2023-05-26 RX ADMIN — Medication 50 MILLIGRAM(S): at 07:46

## 2023-05-26 RX ADMIN — ESCITALOPRAM OXALATE 10 MILLIGRAM(S): 10 TABLET, FILM COATED ORAL at 08:00

## 2023-05-26 RX ADMIN — Medication 3 MILLIGRAM(S): at 20:14

## 2023-05-26 RX ADMIN — HALOPERIDOL DECANOATE 5 MILLIGRAM(S): 100 INJECTION INTRAMUSCULAR at 15:24

## 2023-05-26 RX ADMIN — Medication 2 MILLIGRAM(S): at 15:25

## 2023-05-26 NOTE — BH INPATIENT PSYCHIATRY PROGRESS NOTE - NSBHCHARTREVIEWVS_PSY_A_CORE FT
Vital Signs Last 24 Hrs  T(C): 35.8 (05-26-23 @ 07:41), Max: 36.2 (05-25-23 @ 15:31)  T(F): 96.4 (05-26-23 @ 07:41), Max: 97.1 (05-25-23 @ 15:31)  HR: 100 (05-26-23 @ 07:41) (100 - 118)  BP: 102/69 (05-26-23 @ 07:41) (102/69 - 127/83)  BP(mean): --  RR: 18 (05-26-23 @ 07:41) (16 - 20)  SpO2: --     Vital Signs Last 24 Hrs  T(C): 35.8 (05-26-23 @ 07:41), Max: 36.2 (05-25-23 @ 15:31)  T(F): 96.4 (05-26-23 @ 07:41), Max: 97.1 (05-25-23 @ 15:31)  HR: 100 (05-26-23 @ 07:41) (100 - 109)  BP: 102/69 (05-26-23 @ 07:41) (102/69 - 127/83)  BP(mean): --  RR: 18 (05-26-23 @ 07:41) (18 - 20)  SpO2: --     Vital Signs Last 24 Hrs  T(C): 35.8 (05-26-23 @ 07:41), Max: 35.8 (05-26-23 @ 07:41)  T(F): 96.4 (05-26-23 @ 07:41), Max: 96.4 (05-26-23 @ 07:41)  HR: 110 (05-26-23 @ 15:44) (100 - 110)  BP: 151/90 (05-26-23 @ 15:44) (102/69 - 151/90)  BP(mean): --  RR: 20 (05-26-23 @ 15:44) (18 - 20)  SpO2: --

## 2023-05-26 NOTE — BH INPATIENT PSYCHIATRY PROGRESS NOTE - NSBHMETABOLIC_PSY_ALL_CORE_FT
BMI: BMI (kg/m2): 32.5 (04-18-23 @ 05:33)  HbA1c: A1C with Estimated Average Glucose Result: 5.7 % (05-06-23 @ 08:53)    Glucose:   BP: 102/69 (05-26-23 @ 07:41) (102/69 - 136/92)  Lipid Panel: Date/Time: 05-06-23 @ 08:53  Cholesterol, Serum: 206  Direct LDL: --  HDL Cholesterol, Serum: 47  Total Cholesterol/HDL Ration Measurement: --  Triglycerides, Serum: 131   BMI: BMI (kg/m2): 32.5 (04-18-23 @ 05:33)  HbA1c: A1C with Estimated Average Glucose Result: 5.7 % (05-06-23 @ 08:53)    Glucose:   BP: 151/90 (05-26-23 @ 15:44) (102/69 - 151/90)  Lipid Panel: Date/Time: 05-06-23 @ 08:53  Cholesterol, Serum: 206  Direct LDL: --  HDL Cholesterol, Serum: 47  Total Cholesterol/HDL Ration Measurement: --  Triglycerides, Serum: 131

## 2023-05-26 NOTE — BH INPATIENT PSYCHIATRY PROGRESS NOTE - NSBHFUPINTERVALHXFT_PSY_A_CORE
Nursing reports no acute events overnight. Per chart review the patient has not required any behavioral PRNS since the last assessment.    Chart reviewed, patient seen and evaluated in AM. On approach, .... Patient reports ________________.   Patient endorsed OK sleep and appetite.     No SI/HI/AVH elicited.  Nursing reports no acute events overnight. Per chart review the patient required Atarax 50 at 7am.    Chart reviewed, patient seen and evaluated in AM. On approach, .... Patient reports ________________.   Patient endorsed OK sleep and appetite.     No SI/HI/AVH elicited.  Nursing reports no acute events overnight. Per chart review the patient required Atarax 50 at 7am.    Chart reviewed, patient seen and evaluated in AM. On approach, pt is watching TV in dayroom and guides team to his room. Patient reports having another panic attack this morning, but said that ice and music helped him calm down. Pt was reminded [facility] this morning and said that although he is nervous, he is okay with meeting with them.       Nursing reports no acute events overnight. Per chart review the patient required Atarax 50 at 7am.    Chart reviewed, patient seen and evaluated in AM. On approach, pt is watching TV in dayroom and guides team to his room. Patient reports having another panic attack this morning, but said that ice and music helped him calm down. Pt was reminded of his meeting with [facility] this morning and said that although he is nervous, he is okay with meeting with them.      Update: [facility] of the [facility] accidentally went to wrong hospital location, will call back with date/time for new meeting next week.     Nursing reports no acute events overnight. Per chart review the patient required Atarax 50 at 7am.    Chart reviewed, patient seen and evaluated in AM. On approach, pt is watching TV in dayroom and guides team to his room. Patient reports having another panic attack this morning, but said that ice and music helped him calm down. Pt was reminded of his meeting with the [facility] this morning and said that although he is nervous, he is okay with meeting with them.      Update: staff members of [facility] accidentally went to wrong hospital location. Will be able to reschedule for next week Thursday at 1 pm.

## 2023-05-26 NOTE — BH INPATIENT PSYCHIATRY PROGRESS NOTE - NSBHASSESSSUMMFT_PSY_ALL_CORE
The patient is a 16 yo transgender F->M, [place]er-speaking, recently immigrated from [place], domiciled with brother, unemployed, with psych hx of gender dysphoria, anxiety and depression, no past psychiatric hospitalizations, previous suicide attempt via choking, who is presenting at recommendation of brother and cousin for increasing SI.    Upon admission patient endorses strong feelings of depression and exhibits very dysphoric and minimally reactive affect. He endorses escalating suicidal thoughts related to feelings of worthlessness and unhappiness over his appearance. During the course of his hospitalization, his mood and affect initially improved, he became become more talkative. However, throughout the course of the hospitalization he started having near-daily anxiety attacks which he has been coping with by head banging or punching/scratching his limbs. His depression seems to have alleviated overall, however he suffers from  frequent mood shifts which he has difficulty coping with. These shifts are often triggered by misgendering by others or missing his mother, and are often prevented when he is in the company of [place]er-speaking staff and is socially engaged. The patient was admitted involuntarily on 9:39 legals as he has been acutely elevated risk of suicide, now changed to voluntary legals. Chronic risk factors include history of trauma, history of persistent depressive symptoms,   and history of suicidal ideation. Current risk factors include recent self inflicted choking, age, recent immigration, not understanding/speaking well language of current residence, current gender dysphoria, current social anxiety, current panic attacks and financial stressors. Goals of care will be to decrease patient's suicidality as his current risk factors place him at acutely elevated risk. Requires continued hospitalization for stabilization.    04/18/23- Patient acutely dysphoric. Plan to dc mirtazapine as he notes excessive lethargy and hyperphagia, continue sertraline 50 mg  04/19/2023  Patient remains with thoughts of suicide and concerns about his future, however he feels safe while in a structured setting with people around him  04/20/23 Patient had no thoughts of suicide during interview, appeared brighter and occasionally smiled. Agreeable to increase antidepressants  04/24 Patient notes panic attack last night, alleviated talking with cousin. Brighter, future oriented. Endorses social anxiety.  04/25/23 Patient did not have panic attack in last 24 hrs. Still appears brighter on unit and during interview today. Voiced that his social anxiety hinders his ability to advocate for himself while on unit  04/26   Patient is somewhat less anxious,  spending  time outside his room and is cheerful at times.  04/27/23 Patient notes still having intermittent anxiety, however he is now able to leave his room and his speech is much more productive. Affect continues to be more supple than on initial presentation. Plan to increase Zoloft to 150 mg 04/28, with eventual plan to maximize it to treat his social anxiety disorder.  04/28 Yesterday patient had another panic attack when bullied about his gender expression and received Klonopin and Atarax PRN. Though he states his mood is "good" and has a more supple affect than on initial presentation, he continues to endorse feelings of self hatred (which he believes may be alleviated by gender affirmative care) as well as wish to no longer be alive (though no active SI)  05/01 Patient continues to endorse passive SI, appeared anxious though smiling during interview. Reported panic attack yesterday. Ok with plan to increase Zoloft to 200 mg  05/02 Pt received first dose of 200 mg Zoloft. Had panic attack yesterday triggered by existential doubts and feelings of self hatred, which resulted in him hitting himself. Today less anxious but continues to endorse ambivalence about wanting to be alive.  5/3/23   patient had significant panic attack accompanied by dread and thoughts of self destruction.  When asked if suicidal patient replies I cannot kill myself because of my family.  05/04- Acutely anxious, more so than previously (however interviewed in large group), will add 2 mg abilify. Vickie in process of enrolling [facility] as patient in [facility]  5/5 - Pt endorses panic attack the evening before during which he hit himself in the stomach and arms and banged his head on the wall due to self hatred and feeling like he does not have a place in this world. Created signs with patient that he can use to communicate with staff nonverbally; pt stated that he will try his best to use them. Does not report adverse effects from Abilify.  5/8- Pt denies any panic attacks over the weekend. Pt states that he did not need to use the signs that he created together with the team to communicate with staff. Continues to endorse passive SI and states that his reason for living is his family, but other than that, he does not feel like he has a reason to live. Abilify increased to 5 mg  5/9- Pt denies any panic attacks since yesterday, but continues to endorse passive SI and state that his family is his only reason for living. Will continue Abilify 5 mg.  5/10- Pt states that he had a panic attack the evening prior, during which he punched himself in the stomach and banged his head against the wall. Stated again that he does not want to be alive and does not have a place in this world.  5/11- Pt stated that yesterday, he wrapped a blanket around his neck because he "wanted to feel something" and wanted to "turn off my brain." He stated that he did not do this with the intention to end his life. He stated he does not want to be alive, but cannot die because it would hurt his family. Will decrease Zoloft to 100 mg on 5/12 with the intention of cross-titrating to venlafaxine. Will continue Abilify 5 mg.  5/12 - Pt reported no panic attacks or attempts at self-harm since last conversation. Stated that he "feels sad" and like he is "descending into darkness." Explained to pt that we would be switching from Zoloft to Effexor. Gave Zoloft 100 mg today, will also be starting Effexor 75 mg today.   5/18- Pt reports feeling "the same", endorses palpitations on Venlafaxine, will continue to monitor. Panic attack yesterday, will change standing Klonopin to 2 pm dosing.  5/19- Pt reports "feeling a little sad today" and states that he is continuing to have palpitations at night. Did not have panic attack yesterday, will continuing standing Klonopin.   5/21- Pt received PRN PO Haldol and Atarax after he started head banging in the context of distressing feelings. Today appeared more dysphoric yet less anxious than usual. Appeared tired which he attributed to the Haldol.  5/22 - Pt had episode of tachycardia and was evaluated by medicine attending who deemed non pathological. Appeared anxious and tired today, when asked about suicide after discharge he was unable to contract for safety. Had an anxious and panicked episode relieved by atarax, icing forearms, and talking. Increased Klonopin to 1 mg standing, will consider adding lithium and changing to ssri tmrw  5/23 - Pt had another anxious episode relieved by icing head and forearms, as well as talking. On approach, pt is tearful and dysphoric and states that he wants to die and that he does not belong in this world. Begin lithium 300 mg BID, plan to start Lexapro 10 mg tmrw.   5/24 - Pt had two anxious episodes the previous day; music and ice helped for the first one, but no longer helped for the second one. Pt states these episodes came about in the context of thoughts of self-hatred. On approach, pt has improved affect compared to prior assessment. Start Lexapro 10 mg today.  5/25 -  Pt had an anxious episode the previous evening precipitated by thoughts of "wanting to die" requiring PO Haldol, Ativan, and Hydroxyzine. Pt attended crisis management group the previous day and reviewed DBT skills with team. On approach, pt has depressed affect. Continue Lexapro 10 mg and lithium 300 BID.     Impression: MDD vs Social Anxiety disorder with Panic attacks vs Gender dysphoria vs cPTSD, R/O bipolar    #MDD #Gender dysphoria #Social Anxiety Disorder #Panic attacks  - 9.39 legals changed to 9.14  - Cont Lithium 300 mg BID 5/23 for suicidality and stabilization of affect; obtain lithium levels on 5/27  - Cont. Lexapro 10 mg starting 5/24  - On Effexor 150 mg trialed and discontinued 05/23  - Stopped Abilify 5 mg, 5/16   - Tapered off Zoloft  - Melatonin for sleep phase disorder  - Left voicemail with Dr. Vincent at [facility], [phone]  - Left VM with  Kandice Acosta who work s with LGBTQ community for connection to trans affirming care  - Left  Teagan Escobar [phone], patient's therapist  - TSH WNL    #Gender Dysphoria  - The following programs contacted: [facility] (does not have anyone to send to the hospital), [facility] (only provides services for homeless individuals), [facility] (left voicemail), [facility] (phone number out of service), [facility], [facility] (reached out to them, may be able to send someone to hospital to speak to [facility]), [facility] (voicemail full), [facility] (no availability to send anyone, have [facility] but referred to [facility])  - Referral on discharge to the [facility] (suicide hotline) and [facility] (Rhett has enrolled patient)    #Panic Attacks  - PRN Atarax  - Cont. Klonopin 1 mg at 14:00    #Agitation  - For episodes of acute agitation can offer PO Haldol 5 mg/Ativan 2 mg/Benadryl 50 mg Q6H PRN. If refusing PO and is in acute risk of harm to self/other, can offer IM Haldol 5 mg/Ativan 2 mg/Benadryl 50 mg Q6H PRN. If given, please repeat EKG and hold antipsychotics if QTC>500       The patient is a 16 yo transgender F->M, [place]-speaking, recently immigrated from [place], domiciled with brother, unemployed, with psych hx of gender dysphoria, anxiety and depression, no past psychiatric hospitalizations, previous suicide attempt via choking, who is presenting at recommendation of brother and cousin for increasing SI.    Upon admission patient endorses strong feelings of depression and exhibits very dysphoric and minimally reactive affect. He endorses escalating suicidal thoughts related to feelings of worthlessness and unhappiness over his appearance. During the course of his hospitalization, his mood and affect initially improved, he became become more talkative. However, throughout the course of the hospitalization he started having near-daily anxiety attacks which he has been coping with by head banging or punching/scratching his limbs. His depression seems to have alleviated overall, however he suffers from  frequent mood shifts which he has difficulty coping with. These shifts are often triggered by misgendering by others or missing his mother, and are often prevented when he is in the company of [place]-speaking staff and is socially engaged. The patient was admitted involuntarily on 9:39 legals as he has been acutely elevated risk of suicide, now changed to voluntary legals. Chronic risk factors include history of trauma, history of persistent depressive symptoms,   and history of suicidal ideation. Current risk factors include recent self inflicted choking, age, recent immigration, not understanding/speaking well language of current residence, current gender dysphoria, current social anxiety, current panic attacks and financial stressors. Goals of care will be to decrease patient's suicidality as his current risk factors place him at acutely elevated risk. Requires continued hospitalization for stabilization.    04/18/23- Patient acutely dysphoric. Plan to dc mirtazapine as he notes excessive lethargy and hyperphagia, continue sertraline 50 mg  04/19/2023  Patient remains with thoughts of suicide and concerns about his future, however he feels safe while in a structured setting with people around him  04/20/23 Patient had no thoughts of suicide during interview, appeared brighter and occasionally smiled. Agreeable to increase antidepressants  04/24 Patient notes panic attack last night, alleviated talking with cousin. Brighter, future oriented. Endorses social anxiety.  04/25/23 Patient did not have panic attack in last 24 hrs. Still appears brighter on unit and during interview today. Voiced that his social anxiety hinders his ability to advocate for himself while on unit  04/26   Patient is somewhat less anxious,  spending  time outside his room and is cheerful at times.  04/27/23 Patient notes still having intermittent anxiety, however he is now able to leave his room and his speech is much more productive. Affect continues to be more supple than on initial presentation. Plan to increase Zoloft to 150 mg 04/28, with eventual plan to maximize it to treat his social anxiety disorder.  04/28 Yesterday patient had another panic attack when bullied about his gender expression and received Klonopin and Atarax PRN. Though he states his mood is "good" and has a more supple affect than on initial presentation, he continues to endorse feelings of self hatred (which he believes may be alleviated by gender affirmative care) as well as wish to no longer be alive (though no active SI)  05/01 Patient continues to endorse passive SI, appeared anxious though smiling during interview. Reported panic attack yesterday. Ok with plan to increase Zoloft to 200 mg  05/02 Pt received first dose of 200 mg Zoloft. Had panic attack yesterday triggered by existential doubts and feelings of self hatred, which resulted in him hitting himself. Today less anxious but continues to endorse ambivalence about wanting to be alive.  5/3/23   patient had significant panic attack accompanied by dread and thoughts of self destruction.  When asked if suicidal patient replies I cannot kill myself because of my family.  05/04- Acutely anxious, more so than previously (however interviewed in large group), will add 2 mg abilify. Vickie in process of enrolling [facility] as patient in [facility]  5/5 - Pt endorses panic attack the evening before during which he hit himself in the stomach and arms and banged his head on the wall due to self hatred and feeling like he does not have a place in this world. Created signs with patient that he can use to communicate with staff nonverbally; pt stated that he will try his best to use them. Does not report adverse effects from Abilify.  5/8- Pt denies any panic attacks over the weekend. Pt states that he did not need to use the signs that he created together with the team to communicate with staff. Continues to endorse passive SI and states that his reason for living is his family, but other than that, he does not feel like he has a reason to live. Abilify increased to 5 mg  5/9- Pt denies any panic attacks since yesterday, but continues to endorse passive SI and state that his family is his only reason for living. Will continue Abilify 5 mg.  5/10- Pt states that he had a panic attack the evening prior, during which he punched himself in the stomach and banged his head against the wall. Stated again that he does not want to be alive and does not have a place in this world.  5/11- Pt stated that yesterday, he wrapped a blanket around his neck because he "wanted to feel something" and wanted to "turn off my brain." He stated that he did not do this with the intention to end his life. He stated he does not want to be alive, but cannot die because it would hurt his family. Will decrease Zoloft to 100 mg on 5/12 with the intention of cross-titrating to venlafaxine. Will continue Abilify 5 mg.  5/12 - Pt reported no panic attacks or attempts at self-harm since last conversation. Stated that he "feels sad" and like he is "descending into darkness." Explained to pt that we would be switching from Zoloft to Effexor. Gave Zoloft 100 mg today, will also be starting Effexor 75 mg today.   5/18- Pt reports feeling "the same", endorses palpitations on Venlafaxine, will continue to monitor. Panic attack yesterday, will change standing Klonopin to 2 pm dosing.  5/19- Pt reports "feeling a little sad today" and states that he is continuing to have palpitations at night. Did not have panic attack yesterday, will continuing standing Klonopin.   5/21- Pt received PRN PO Haldol and Atarax after he started head banging in the context of distressing feelings. Today appeared more dysphoric yet less anxious than usual. Appeared tired which he attributed to the Haldol.  5/22 - Pt had episode of tachycardia and was evaluated by medicine attending who deemed non pathological. Appeared anxious and tired today, when asked about suicide after discharge he was unable to contract for safety. Had an anxious and panicked episode relieved by atarax, icing forearms, and talking. Increased Klonopin to 1 mg standing, will consider adding lithium and changing to ssri tmrw  5/23 - Pt had another anxious episode relieved by icing head and forearms, as well as talking. On approach, pt is tearful and dysphoric and states that he wants to die and that he does not belong in this world. Begin lithium 300 mg BID, plan to start Lexapro 10 mg tmrw.   5/24 - Pt had two anxious episodes the previous day; music and ice helped for the first one, but no longer helped for the second one. Pt states these episodes came about in the context of thoughts of self-hatred. On approach, pt has improved affect compared to prior assessment. Start Lexapro 10 mg today.  5/25 -  Pt had an anxious episode the previous evening precipitated by thoughts of "wanting to die" requiring PO Haldol, Ativan, and Hydroxyzine. Pt attended crisis management group the previous day and reviewed DBT skills with team. On approach, pt has depressed affect. Continue Lexapro 10 mg and lithium 300 BID.   5/26 - Pt had an anxious episode at 7 AM this morning requiring Atarax 50. Pt has meeting with [facility] today at 10:30. On approach, pt has notably improved affect compared to prior assessment. Continue Lexapro 10 mg and lithium 300 BID.     Impression: MDD vs Social Anxiety disorder with Panic attacks vs Gender dysphoria vs cPTSD, R/O bipolar    #MDD #Gender dysphoria #Social Anxiety Disorder #Panic attacks  - 9.39 legals changed to 9.14  - Cont Lithium 300 mg BID 5/23 for suicidality and stabilization of affect; obtain lithium levels on 5/27  - Cont. Lexapro 10 mg starting 5/24  - On Effexor 150 mg trialed and discontinued 05/23  - Stopped Abilify 5 mg, 5/16   - Tapered off Zoloft  - Melatonin for sleep phase disorder  - Left vo[facility]email with [facility] Vincent at [facility], [phone]  - Left VM with  Kandice Acosta who work s with LGBTQ community for connection to trans affirming care  - Left STEVO Escobar [phone], patient's therapist  - TSH WNL    #Gender Dysphoria  - The following programs contacted: [facility] (does not have anyone to send to the hospital), [facility] (only provides services for homeless individuals), [facility] (left voicemail), [employer] (phone number out of service), [facility], [facility] In program (reached out to them, meeting with Radames on 5/26), [facility] (voicemail full), [facility] (no availability to send anyone, have [facility] but referred to [facility])  - Referral on discharge to the [facility] (suicide hotline) and [facility] (Rhett has enrolled patient)    #Panic Attacks  - PRN Atarax  - Cont. Klonopin 1 mg at 14:00    #Agitation  - For episodes of acute agitation can offer PO Haldol 5 mg/Ativan 2 mg/Benadryl 50 mg Q6H PRN. If refusing PO and is in acute risk of harm to self/other, can offer IM Haldol 5 mg/Ativan 2 mg/Benadryl 50 mg Q6H PRN. If given, please repeat EKG and hold antipsychotics if QTC>500       The patient is a 18 yo transgender F->M, [place] citizen-speaking, recently immigrated from [place], domiciled with brother, unemployed, with psych hx of gender dysphoria, anxiety and depression, no past psychiatric hospitalizations, previous suicide attempt via choking, who is presenting at recommendation of brother and cousin for increasing SI.    Upon admission patient endorses strong feelings of depression and exhibits very dysphoric and minimally reactive affect. He endorses escalating suicidal thoughts related to feelings of worthlessness and unhappiness over his appearance. During the course of his hospitalization, his mood and affect initially improved, he became become more talkative. However, throughout the course of the hospitalization he started having near-daily anxiety attacks which he has been coping with by head banging or punching/scratching his limbs. His depression seems to have alleviated overall, however he suffers from  frequent mood shifts which he has difficulty coping with. These shifts are often triggered by misgendering by others or missing his mother, and are often prevented when he is in the company of [place] citizen-speaking staff and is socially engaged. The patient was admitted involuntarily on 9:39 legals as he has been acutely elevated risk of suicide, now changed to voluntary legals. Chronic risk factors include history of trauma, history of persistent depressive symptoms,   and history of suicidal ideation. Current risk factors include recent self inflicted choking, age, recent immigration, not understanding/speaking well language of current residence, current gender dysphoria, current social anxiety, current panic attacks and financial stressors. Goals of care will be to decrease patient's suicidality as his current risk factors place him at acutely elevated risk. Requires continued hospitalization for stabilization.    04/18/23- Patient acutely dysphoric. Plan to dc mirtazapine as he notes excessive lethargy and hyperphagia, continue sertraline 50 mg  04/19/2023  Patient remains with thoughts of suicide and concerns about his future, however he feels safe while in a structured setting with people around him  04/20/23 Patient had no thoughts of suicide during interview, appeared brighter and occasionally smiled. Agreeable to increase antidepressants  04/24 Patient notes panic attack last night, alleviated talking with cousin. Brighter, future oriented. Endorses social anxiety.  04/25/23 Patient did not have panic attack in last 24 hrs. Still appears brighter on unit and during interview today. Voiced that his social anxiety hinders his ability to advocate for himself while on unit  04/26   Patient is somewhat less anxious,  spending  time outside his room and is cheerful at times.  04/27/23 Patient notes still having intermittent anxiety, however he is now able to leave his room and his speech is much more productive. Affect continues to be more supple than on initial presentation. Plan to increase Zoloft to 150 mg 04/28, with eventual plan to maximize it to treat his social anxiety disorder.  04/28 Yesterday patient had another panic attack when bullied about his gender expression and received Klonopin and Atarax PRN. Though he states his mood is "good" and has a more supple affect than on initial presentation, he continues to endorse feelings of self hatred (which he believes may be alleviated by gender affirmative care) as well as wish to no longer be alive (though no active SI)  05/01 Patient continues to endorse passive SI, appeared anxious though smiling during interview. Reported panic attack yesterday. Ok with plan to increase Zoloft to 200 mg  05/02 Pt received first dose of 200 mg Zoloft. Had panic attack yesterday triggered by existential doubts and feelings of self hatred, which resulted in him hitting himself. Today less anxious but continues to endorse ambivalence about wanting to be alive.  5/3/23   patient had significant panic attack accompanied by dread and thoughts of self destruction.  When asked if suicidal patient replies I cannot kill myself because of my family.  05/04- Acutely anxious, more so than previously (however interviewed in large group), will add 2 mg abilify. Vickie in process of enrolling Rad[facility] as patient in [facility]  5/5 - Pt endorses panic attack the evening before during which he hit himself in the stomach and arms and banged his head on the wall due to self hatred and feeling like he does not have a place in this world. Created signs with patient that he can use to communicate with staff nonverbally; pt stated that he will try his best to use them. Does not report adverse effects from Abilify.  5/8- Pt denies any panic attacks over the weekend. Pt states that he did not need to use the signs that he created together with the team to communicate with staff. Continues to endorse passive SI and states that his reason for living is his family, but other than that, he does not feel like he has a reason to live. Abilify increased to 5 mg  5/9- Pt denies any panic attacks since yesterday, but continues to endorse passive SI and state that his family is his only reason for living. Will continue Abilify 5 mg.  5/10- Pt states that he had a panic attack the evening prior, during which he punched himself in the stomach and banged his head against the wall. Stated again that he does not want to be alive and does not have a place in this world.  5/11- Pt stated that yesterday, he wrapped a blanket around his neck because he "wanted to feel something" and wanted to "turn off my brain." He stated that he did not do this with the intention to end his life. He stated he does not want to be alive, but cannot die because it would hurt his family. Will decrease Zoloft to 100 mg on 5/12 with the intention of cross-titrating to venlafaxine. Will continue Abilify 5 mg.  5/12 - Pt reported no panic attacks or attempts at self-harm since last conversation. Stated that he "feels sad" and like he is "descending into darkness." Explained to pt that we would be switching from Zoloft to Effexor. Gave Zoloft 100 mg today, will also be starting Effexor 75 mg today.   5/18- Pt reports feeling "the same", endorses palpitations on Venlafaxine, will continue to monitor. Panic attack yesterday, will change standing Klonopin to 2 pm dosing.  5/19- Pt reports "feeling a little sad today" and states that he is continuing to have palpitations at night. Did not have panic attack yesterday, will continuing standing Klonopin.   5/21- Pt received PRN PO Haldol and Atarax after he started head banging in the context of distressing feelings. Today appeared more dysphoric yet less anxious than usual. Appeared tired which he attributed to the Haldol.  5/22 - Pt had episode of tachycardia and was evaluated by medicine attending who deemed non pathological. Appeared anxious and tired today, when asked about suicide after discharge he was unable to contract for safety. Had an anxious and panicked episode relieved by atarax, icing forearms, and talking. Increased Klonopin to 1 mg standing, will consider adding lithium and changing to ssri tmrw  5/23 - Pt had another anxious episode relieved by icing head and forearms, as well as talking. On approach, pt is tearful and dysphoric and states that he wants to die and that he does not belong in this world. Begin lithium 300 mg BID, plan to start Lexapro 10 mg tmrw.   5/24 - Pt had two anxious episodes the previous day; music and ice helped for the first one, but no longer helped for the second one. Pt states these episodes came about in the context of thoughts of self-hatred. On approach, pt has improved affect compared to prior assessment. Start Lexapro 10 mg today.  5/25 -  Pt had an anxious episode the previous evening precipitated by thoughts of "wanting to die" requiring PO Haldol, Ativan, and Hydroxyzine. Pt attended crisis management group the previous day and reviewed DBT skills with team. On approach, pt has depressed affect. Continue Lexapro 10 mg and lithium 300 BID.   5/26 - Pt had an anxious episode at 7 AM this morning requiring Atarax 50. Pt has meeting with [facility] today at 10:30. On approach, pt has notably improved affect compared to prior assessment. Continue Lexapro 10 mg and lithium 300 BID.     Impression: MDD vs Social Anxiety disorder with Panic attacks vs Gender dysphoria vs cPTSD, R/O bipolar    #MDD #Gender dysphoria #Social Anxiety Disorder #Panic attacks  - 9.39 legals changed to 9.14  - Cont Lithium 300 mg BID 5/23 for suicidality and stabilization of affect; obtain lithium levels on 5/27  - Cont. Lexapro 10 mg starting 5/24  - On Effexor 150 mg trialed and discontinued 05/23  - Stopped Abilify 5 mg, 5/16   - Tapered off Zoloft  - Melatonin for sleep phase disorder  - Left voicemail with Dr. Vincent at [facility], [phone]  - Left VM with  Kandice Acosta who work s with LGBTQ community for connection to trans affirming care  - Left STEVO Escobar [phone], patient's therapist  - TSH WNL    #Gender Dysphoria  - The following programs contacted: [facility] (does not have anyone to send to the hospital), [facility] (only provides services for homeless individuals), [facility] [facility] (left voicemail), [facility] (phone number out of service), [facility], [facility] In program (reached out to them, meeting with Radames on 5/26), [facility] (voicemail full), [facility] (no availability to send anyone, have [facility] but referred to [facility])  - Referral on discharge to the [email or web address] [facility] (suicide hotline) and [facility] (Rhett has enrolled patient)  - [facility] to call to arrange meeting on the unit week of 05/29    #Panic Attacks  - PRN Atarax  - Cont. Klonopin 1 mg at 14:00    #Agitation  - For episodes of acute agitation can offer PO Haldol 5 mg/Ativan 2 mg/Benadryl 50 mg Q6H PRN. If refusing PO and is in acute risk of harm to self/other, can offer IM Haldol 5 mg/Ativan 2 mg/Benadryl 50 mg Q6H PRN. If given, please repeat EKG and hold antipsychotics if QTC>500       The patient is a 18 yo transgender F->M, [place]-speaking, recently immigrated from [place], domiciled with brother, unemployed, with psych hx of gender dysphoria, anxiety and depression, no past psychiatric hospitalizations, previous suicide attempt via choking, who is presenting at recommendation of brother and cousin for increasing SI.    Upon admission patient endorses strong feelings of depression and exhibits very dysphoric and minimally reactive affect. He endorses escalating suicidal thoughts related to feelings of worthlessness and unhappiness over his appearance. During the course of his hospitalization, his mood and affect initially improved, he became become more talkative. However, throughout the course of the hospitalization he started having near-daily anxiety attacks which he has been coping with by head banging or punching/scratching his limbs. His depression seems to have alleviated overall, however he suffers from  frequent mood shifts which he has difficulty coping with. These shifts are often triggered by misgendering by others or missing his mother, and are often prevented when he is in the company of [place]-speaking staff and is socially engaged. The patient was admitted involuntarily on 9:39 legals as he has been acutely elevated risk of suicide, now changed to voluntary legals. Chronic risk factors include history of trauma, history of persistent depressive symptoms,   and history of suicidal ideation. Current risk factors include recent self inflicted choking, age, recent immigration, not understanding/speaking well language of current residence, current gender dysphoria, current social anxiety, current panic attacks and financial stressors. Goals of care will be to decrease patient's suicidality as his current risk factors place him at acutely elevated risk. Requires continued hospitalization for stabilization.    04/18/23- Patient acutely dysphoric. Plan to dc mirtazapine as he notes excessive lethargy and hyperphagia, continue sertraline 50 mg  04/19/2023  Patient remains with thoughts of suicide and concerns about his future, however he feels safe while in a structured setting with people around him  04/20/23 Patient had no thoughts of suicide during interview, appeared brighter and occasionally smiled. Agreeable to increase antidepressants  04/24 Patient notes panic attack last night, alleviated talking with cousin. Brighter, future oriented. Endorses social anxiety.  04/25/23 Patient did not have panic attack in last 24 hrs. Still appears brighter on unit and during interview today. Voiced that his social anxiety hinders his ability to advocate for himself while on unit  04/26   Patient is somewhat less anxious,  spending  time outside his room and is cheerful at times.  04/27/23 Patient notes still having intermittent anxiety, however he is now able to leave his room and his speech is much more productive. Affect continues to be more supple than on initial presentation. Plan to increase Zoloft to 150 mg 04/28, with eventual plan to maximize it to treat his social anxiety disorder.  04/28 Yesterday patient had another panic attack when bullied about his gender expression and received Klonopin and Atarax PRN. Though he states his mood is "good" and has a more supple affect than on initial presentation, he continues to endorse feelings of self hatred (which he believes may be alleviated by gender affirmative care) as well as wish to no longer be alive (though no active SI)  05/01 Patient continues to endorse passive SI, appeared anxious though smiling during interview. Reported panic attack yesterday. Ok with plan to increase Zoloft to 200 mg  05/02 Pt received first dose of 200 mg Zoloft. Had panic attack yesterday triggered by existential doubts and feelings of self hatred, which resulted in him hitting himself. Today less anxious but continues to endorse ambivalence about wanting to be alive.  5/3/23   patient had significant panic attack accompanied by dread and thoughts of self destruction.  When asked if suicidal patient replies I cannot kill myself because of my family.  05/04- Acutely anxious, more so than previously (however interviewed in large group), will add 2 mg abilify. Vickie in process of enrolling Rad[facility] as patient in [facility]  5/5 - Pt endorses panic attack the evening before during which he hit himself in the stomach and arms and banged his head on the wall due to self hatred and feeling like he does not have a place in this world. Created signs with patient that he can use to communicate with staff nonverbally; pt stated that he will try his best to use them. Does not report adverse effects from Abilify.  5/8- Pt denies any panic attacks over the weekend. Pt states that he did not need to use the signs that he created together with the team to communicate with staff. Continues to endorse passive SI and states that his reason for living is his family, but other than that, he does not feel like he has a reason to live. Abilify increased to 5 mg  5/9- Pt denies any panic attacks since yesterday, but continues to endorse passive SI and state that his family is his only reason for living. Will continue Abilify 5 mg.  5/10- Pt states that he had a panic attack the evening prior, during which he punched himself in the stomach and banged his head against the wall. Stated again that he does not want to be alive and does not have a place in this world.  5/11- Pt stated that yesterday, he wrapped a blanket around his neck because he "wanted to feel something" and wanted to "turn off my brain." He stated that he did not do this with the intention to end his life. He stated he does not want to be alive, but cannot die because it would hurt his family. Will decrease Zoloft to 100 mg on 5/12 with the intention of cross-titrating to venlafaxine. Will continue Abilify 5 mg.  5/12 - Pt reported no panic attacks or attempts at self-harm since last conversation. Stated that he "feels sad" and like he is "descending into darkness." Explained to pt that we would be switching from Zoloft to Effexor. Gave Zoloft 100 mg today, will also be starting Effexor 75 mg today.   5/18- Pt reports feeling "the same", endorses palpitations on Venlafaxine, will continue to monitor. Panic attack yesterday, will change standing Klonopin to 2 pm dosing.  5/19- Pt reports "feeling a little sad today" and states that he is continuing to have palpitations at night. Did not have panic attack yesterday, will continuing standing Klonopin.   5/21- Pt received PRN PO Haldol and Atarax after he started head banging in the context of distressing feelings. Today appeared more dysphoric yet less anxious than usual. Appeared tired which he attributed to the Haldol.  5/22 - Pt had episode of tachycardia and was evaluated by medicine attending who deemed non pathological. Appeared anxious and tired today, when asked about suicide after discharge he was unable to contract for safety. Had an anxious and panicked episode relieved by atarax, icing forearms, and talking. Increased Klonopin to 1 mg standing, will consider adding lithium and changing to ssri tmrw  5/23 - Pt had another anxious episode relieved by icing head and forearms, as well as talking. On approach, pt is tearful and dysphoric and states that he wants to die and that he does not belong in this world. Begin lithium 300 mg BID, plan to start Lexapro 10 mg tmrw.   5/24 - Pt had two anxious episodes the previous day; music and ice helped for the first one, but no longer helped for the second one. Pt states these episodes came about in the context of thoughts of self-hatred. On approach, pt has improved affect compared to prior assessment. Start Lexapro 10 mg today.  5/25 -  Pt had an anxious episode the previous evening precipitated by thoughts of "wanting to die" requiring PO Haldol, Ativan, and Hydroxyzine. Pt attended crisis management group the previous day and reviewed DBT skills with team. On approach, pt has depressed affect. Continue Lexapro 10 mg and lithium 300 BID.   5/26 - Pt had an anxious episode at 7 AM this morning requiring Atarax 50. Pt has meeting with [facility] today at 10:30. On approach, pt has notably improved affect compared to prior assessment. Continue Lexapro 10 mg and lithium 300 BID.     Impression: MDD vs Social Anxiety disorder with Panic attacks vs Gender dysphoria vs cPTSD, R/O bipolar    #MDD #Gender dysphoria #Social Anxiety Disorder #Panic attacks  - 9.39 legals changed to 9.14  - Cont Lithium 300 mg BID 5/23 for suicidality and stabilization of affect; obtain lithium levels on 5/27  - Cont. Lexapro 10 mg starting 5/24  - On Effexor 150 mg trialed and discontinued 05/23  - Stopped Abilify 5 mg, 5/16   - Tapered off Zoloft  - Melatonin for sleep phase disorder  - Left vo[facility]. Vincent at [facility], [phone]  - Left VM with  Kandice Acosta who work s with LGBTQ community for connection to trans affirming care  - Left STEVO Escobar [phone], patient's therapist  - TSH WNL    #Gender Dysphoria  - The following programs contacted: [facility] (does not have anyone to send to the hospital), [facility] (only provides services for homeless individuals), [facility] (left voicemail), [facility] (phone number out of service), [facility], [facility] (reached out to them, meeting with Radames on 5/26), [facility] (voicemail full), [facility] (no availability to send anyone, have [facility] but referred to [facility])  - Referral on discharge to the [employer] (suicide hotline) and [facility] (Rhett has enrolled patient)  - [facility] plans to come on June 1st at 1pm to meet Radames, will need an , please call them back at [phone] to confirm that Radames and resident or Dr. Rubin can be there for the meeting    #Panic Attacks  - PRN Atarax  - Cont. Klonopin 1 mg at 14:00    #Agitation  - For episodes of acute agitation can offer PO Haldol 5 mg/Ativan 2 mg/Benadryl 50 mg Q6H PRN. If refusing PO and is in acute risk of harm to self/other, can offer IM Haldol 5 mg/Ativan 2 mg/Benadryl 50 mg Q6H PRN. If given, please repeat EKG and hold antipsychotics if QTC>500

## 2023-05-27 RX ADMIN — ESCITALOPRAM OXALATE 10 MILLIGRAM(S): 10 TABLET, FILM COATED ORAL at 08:01

## 2023-05-27 RX ADMIN — Medication 1 MILLIGRAM(S): at 08:01

## 2023-05-27 RX ADMIN — Medication 3 MILLIGRAM(S): at 20:09

## 2023-05-27 RX ADMIN — LITHIUM CARBONATE 300 MILLIGRAM(S): 300 TABLET, EXTENDED RELEASE ORAL at 08:00

## 2023-05-27 RX ADMIN — Medication 50 MILLIGRAM(S): at 17:42

## 2023-05-27 RX ADMIN — LITHIUM CARBONATE 300 MILLIGRAM(S): 300 TABLET, EXTENDED RELEASE ORAL at 20:09

## 2023-05-28 RX ADMIN — Medication 3 MILLIGRAM(S): at 20:20

## 2023-05-28 RX ADMIN — LITHIUM CARBONATE 300 MILLIGRAM(S): 300 TABLET, EXTENDED RELEASE ORAL at 20:21

## 2023-05-28 RX ADMIN — ESCITALOPRAM OXALATE 10 MILLIGRAM(S): 10 TABLET, FILM COATED ORAL at 08:23

## 2023-05-28 RX ADMIN — Medication 50 MILLIGRAM(S): at 03:20

## 2023-05-28 RX ADMIN — Medication 1 MILLIGRAM(S): at 08:24

## 2023-05-28 RX ADMIN — LITHIUM CARBONATE 300 MILLIGRAM(S): 300 TABLET, EXTENDED RELEASE ORAL at 08:23

## 2023-05-29 LAB — LITHIUM SERPL-MCNC: 0.7 MMOL/L — SIGNIFICANT CHANGE UP (ref 0.6–1.2)

## 2023-05-29 PROCEDURE — 99231 SBSQ HOSP IP/OBS SF/LOW 25: CPT

## 2023-05-29 RX ADMIN — Medication 3 MILLIGRAM(S): at 20:58

## 2023-05-29 RX ADMIN — Medication 50 MILLIGRAM(S): at 01:00

## 2023-05-29 RX ADMIN — LITHIUM CARBONATE 300 MILLIGRAM(S): 300 TABLET, EXTENDED RELEASE ORAL at 20:58

## 2023-05-29 RX ADMIN — LITHIUM CARBONATE 300 MILLIGRAM(S): 300 TABLET, EXTENDED RELEASE ORAL at 08:04

## 2023-05-29 RX ADMIN — Medication 1 MILLIGRAM(S): at 08:04

## 2023-05-29 RX ADMIN — Medication 2 MILLIGRAM(S): at 17:10

## 2023-05-29 RX ADMIN — ESCITALOPRAM OXALATE 10 MILLIGRAM(S): 10 TABLET, FILM COATED ORAL at 08:04

## 2023-05-29 NOTE — BH INPATIENT PSYCHIATRY PROGRESS NOTE - NSBHMETABOLIC_PSY_ALL_CORE_FT
BMI: BMI (kg/m2): 32.5 (04-18-23 @ 05:33)  HbA1c: A1C with Estimated Average Glucose Result: 5.7 % (05-06-23 @ 08:53)    Glucose:   BP: 124/66 (05-29-23 @ 08:46) (103/68 - 151/90)  Lipid Panel: Date/Time: 05-06-23 @ 08:53  Cholesterol, Serum: 206  Direct LDL: --  HDL Cholesterol, Serum: 47  Total Cholesterol/HDL Ration Measurement: --  Triglycerides, Serum: 131

## 2023-05-29 NOTE — BH INPATIENT PSYCHIATRY PROGRESS NOTE - PRN MEDS
Spine appears normal, range of motion is not limited, no muscle or joint tenderness MEDICATIONS  (PRN):  aluminum hydroxide/magnesium hydroxide/simethicone Suspension 30 milliLiter(s) Oral every 6 hours PRN Dyspepsia  haloperidol     Tablet 5 milliGRAM(s) Oral every 6 hours PRN agitation  hydrOXYzine hydrochloride 50 milliGRAM(s) Oral every 6 hours PRN anxiety and insomnia  LORazepam     Tablet 2 milliGRAM(s) Oral every 6 hours PRN agitation

## 2023-05-29 NOTE — BH INPATIENT PSYCHIATRY PROGRESS NOTE - NSBHFUPINTERVALHXFT_PSY_A_CORE
Patient seen and interviewed. 1 to 1 at  bedside .   Upon approach of the patient , she is observed to be smiling , looks bright and happy. When asked how she is feeling , she reports that she is feeling well today , because she was able to sleep well. She denies having current suicidal thought and has no fresh complaints.   According to nursing staff, patient is in good behavioral control , is complaint with medications and had no issues overnight .

## 2023-05-29 NOTE — BH INPATIENT PSYCHIATRY PROGRESS NOTE - NSBHCHARTREVIEWVS_PSY_A_CORE FT
Vital Signs Last 24 Hrs  T(C): 35.8 (05-29-23 @ 08:46), Max: 36.6 (05-28-23 @ 16:30)  T(F): 96.4 (05-29-23 @ 08:46), Max: 97.9 (05-28-23 @ 16:30)  HR: 101 (05-29-23 @ 08:46) (101 - 112)  BP: 124/66 (05-29-23 @ 08:46) (103/68 - 124/66)  BP(mean): --  RR: 18 (05-29-23 @ 08:46) (18 - 18)  SpO2: --

## 2023-05-29 NOTE — BH INPATIENT PSYCHIATRY PROGRESS NOTE - NSBHASSESSSUMMFT_PSY_ALL_CORE
Lexii Vaughan is 17 year old transgender man F->M with a history of Gender dysphoria, anxiety and depression, who was admitted to the psychiatric floor for worsening suicidal ideations.   Patient appears to be less depressed , less anxious, denies current suicidal ideations, intent or plan and has not demonstrated any self injurious behaviors  recently. Her impulse control is however considered unpredictable as she seems to have poor coping skills and poor frustration tolerance.   At this time, patient continues to be a danger to herself and continues to need inpatient psychiatric hospitalization for medication management and symptom stabilization. We recommend increasing her Zoloft [id number] p.o daily .     Plan  Mood disorders  - Continue Lithium 300mg P.o BID   - Lexapro 10mg P.O daily   - Klonopin 1mg p.o bedtime      Lexii Vaughan is 17 year old transgender man F->M with a history of Gender dysphoria, anxiety and depression, who was admitted to the psychiatric floor for worsening suicidal ideations.   Patient appears to be less depressed , less anxious, denies current suicidal ideations, intent or plan and has not demonstrated any self injurious behaviors  recently. Her impulse control is however considered unpredictable as she seems to have poor coping skills and poor frustration tolerance.   At this time, patient continues to be a danger to herself and continues to need inpatient psychiatric hospitalization for medication management and symptom stabilization. We recommend increasing her Zoloft [id number] p.o daily .     Plan  Mood disorders  - Continue Lithium 300mg P.o BID   - Continue Lexapro 10mg P.O daily   - Continue Klonopin 1mg p.o bedtime   - Continue Melatonin 3mg p.o bedtime

## 2023-05-30 LAB — LITHIUM SERPL-MCNC: 0.3 MMOL/L — LOW (ref 0.6–1.2)

## 2023-05-30 RX ORDER — LITHIUM CARBONATE 300 MG/1
450 TABLET, EXTENDED RELEASE ORAL
Refills: 0 | Status: DISCONTINUED | OUTPATIENT
Start: 2023-05-30 | End: 2023-06-16

## 2023-05-30 RX ADMIN — ESCITALOPRAM OXALATE 10 MILLIGRAM(S): 10 TABLET, FILM COATED ORAL at 08:29

## 2023-05-30 RX ADMIN — LITHIUM CARBONATE 450 MILLIGRAM(S): 300 TABLET, EXTENDED RELEASE ORAL at 21:02

## 2023-05-30 RX ADMIN — Medication 3 MILLIGRAM(S): at 21:02

## 2023-05-30 RX ADMIN — Medication 1 MILLIGRAM(S): at 08:28

## 2023-05-30 RX ADMIN — LITHIUM CARBONATE 300 MILLIGRAM(S): 300 TABLET, EXTENDED RELEASE ORAL at 08:28

## 2023-05-30 NOTE — BH INPATIENT PSYCHIATRY PROGRESS NOTE - NSBHCHARTREVIEWVS_PSY_A_CORE FT
Vital Signs Last 24 Hrs  T(C): 36.2 (05-30-23 @ 08:09), Max: 36.2 (05-30-23 @ 08:09)  T(F): 97.1 (05-30-23 @ 08:09), Max: 97.1 (05-30-23 @ 08:09)  HR: 116 (05-30-23 @ 08:09) (104 - 116)  BP: 110/76 (05-30-23 @ 08:09) (110/76 - 120/78)  BP(mean): --  RR: 18 (05-30-23 @ 08:09) (16 - 18)  SpO2: --     Vital Signs Last 24 Hrs  T(C): 36.2 (05-30-23 @ 08:09), Max: 36.2 (05-30-23 @ 08:09)  T(F): 97.1 (05-30-23 @ 08:09), Max: 97.1 (05-30-23 @ 08:09)  HR: 116 (05-30-23 @ 08:09) (116 - 116)  BP: 110/76 (05-30-23 @ 08:09) (110/76 - 110/76)  BP(mean): --  RR: 18 (05-30-23 @ 08:09) (18 - 18)  SpO2: --

## 2023-05-30 NOTE — BH INPATIENT PSYCHIATRY PROGRESS NOTE - NSBHMSEGAIT_PSY_A_CORE
CENTRAL PRIOR AUTHORIZATION  [phone]    PA Initiation    Medication: Hydrocodone-acetamin - INITIATED 01/09/2019  Insurance Company: [employer] - Phone [phone] Fax [phone]  Pharmacy Filling the Rx: [id number] - [place], [place] - 36444  Unable to assess

## 2023-05-30 NOTE — BH INPATIENT PSYCHIATRY PROGRESS NOTE - NSBHMETABOLIC_PSY_ALL_CORE_FT
BMI: BMI (kg/m2): 32.5 (04-18-23 @ 05:33)  HbA1c: A1C with Estimated Average Glucose Result: 5.7 % (05-06-23 @ 08:53)    Glucose:   BP: 110/76 (05-30-23 @ 08:09) (103/68 - 137/64)  Lipid Panel: Date/Time: 05-06-23 @ 08:53  Cholesterol, Serum: 206  Direct LDL: --  HDL Cholesterol, Serum: 47  Total Cholesterol/HDL Ration Measurement: --  Triglycerides, Serum: 131   BMI: BMI (kg/m2): 32.5 (04-18-23 @ 05:33)  HbA1c: A1C with Estimated Average Glucose Result: 5.7 % (05-06-23 @ 08:53)    Glucose:   BP: 110/76 (05-30-23 @ 08:09) (103/68 - 124/66)  Lipid Panel: Date/Time: 05-06-23 @ 08:53  Cholesterol, Serum: 206  Direct LDL: --  HDL Cholesterol, Serum: 47  Total Cholesterol/HDL Ration Measurement: --  Triglycerides, Serum: 131

## 2023-05-30 NOTE — BH INPATIENT PSYCHIATRY PROGRESS NOTE - NSBHATTESTCOMMENTATTENDFT_PSY_A_CORE
Discussed case with resident. I agree with findings and plan. Discussed case with student and resident. I agree with findings and plan.

## 2023-05-30 NOTE — BH INPATIENT PSYCHIATRY PROGRESS NOTE - NSBHASSESSSUMMFT_PSY_ALL_CORE
The patient is a 16 yo transgender F->M, [place]ian-speaking, recently immigrated from [place], domiciled with brother, unemployed, with psych hx of gender dysphoria, anxiety and depression, no past psychiatric hospitalizations, previous suicide attempt via choking, who is presenting at recommendation of brother and cousin for increasing SI.    Upon admission patient endorses strong feelings of depression and exhibits very dysphoric and minimally reactive affect. He endorses escalating suicidal thoughts related to feelings of worthlessness and unhappiness over his appearance. During the course of his hospitalization, his mood and affect initially improved, he became become more talkative. However, throughout the course of the hospitalization he started having near-daily anxiety attacks which he has been coping with by head banging or punching/scratching his limbs. His depression seems to have alleviated overall, however he suffers from  frequent mood shifts which he has difficulty coping with. These shifts are often triggered by misgendering by others or missing his mother, and are often prevented when he is in the company of Tun[place]ian-speaking staff and is socially engaged. The patient was admitted involuntarily on 9:39 legals as he has been acutely elevated risk of suicide, now changed to voluntary legals. Chronic risk factors include history of trauma, history of persistent depressive symptoms,   and history of suicidal ideation. Current risk factors include recent self inflicted choking, age, recent immigration, not understanding/speaking well language of current residence, current gender dysphoria, current social anxiety, current panic attacks and financial stressors. Goals of care will be to decrease patient's suicidality as his current risk factors place him at acutely elevated risk. Requires continued hospitalization for stabilization.    04/18/23- Patient acutely dysphoric. Plan to dc mirtazapine as he notes excessive lethargy and hyperphagia, continue sertraline 50 mg  04/19/2023  Patient remains with thoughts of suicide and concerns about his future, however he feels safe while in a structured setting with people around him  04/20/23 Patient had no thoughts of suicide during interview, appeared brighter and occasionally smiled. Agreeable to increase antidepressants  04/24 Patient notes panic attack last night, alleviated talking with cousin. Brighter, future oriented. Endorses social anxiety.  04/25/23 Patient did not have panic attack in last 24 hrs. Still appears brighter on unit and during interview today. Voiced that his social anxiety hinders his ability to advocate for himself while on unit  04/26   Patient is somewhat less anxious,  spending  time outside his room and is cheerful at times.  04/27/23 Patient notes still having intermittent anxiety, however he is now able to leave his room and his speech is much more productive. Affect continues to be more supple than on initial presentation. Plan to increase Zoloft to 150 mg 04/28, with eventual plan to maximize it to treat his social anxiety disorder.  04/28 Yesterday patient had another panic attack when bullied about his gender expression and received Klonopin and Atarax PRN. Though he states his mood is "good" and has a more supple affect than on initial presentation, he continues to endorse feelings of self hatred (which he believes may be alleviated by gender affirmative care) as well as wish to no longer be alive (though no active SI)  05/01 Patient continues to endorse passive SI, appeared anxious though smiling during interview. Reported panic attack yesterday. Ok with plan to increase Zoloft to 200 mg  05/02 Pt received first dose of 200 mg Zoloft. Had panic attack yesterday triggered by existential doubts and feelings of self hatred, which resulted in him hitting himself. Today less anxious but continues to endorse ambivalence about wanting to be alive.  5/3/23   patient had significant panic attack accompanied by dread and thoughts of self destruction.  When asked if suicidal patient replies I cannot kill myself because of my family.  05/04- Acutely anxious, more so than previously (however interviewed in large group), will add 2 mg abilify. Vickie in process of enrolling Radames as patient in [facility]  5/5 - Pt endorses panic attack the evening before during which he hit himself in the stomach and arms and banged his head on the wall due to self hatred and feeling like he does not have a place in this world. Created signs with patient that he can use to communicate with staff nonverbally; pt stated that he will try his best to use them. Does not report adverse effects from Abilify.  5/8- Pt denies any panic attacks over the weekend. Pt states that he did not need to use the signs that he created together with the team to communicate with staff. Continues to endorse passive SI and states that his reason for living is his family, but other than that, he does not feel like he has a reason to live. Abilify increased to 5 mg  5/9- Pt denies any panic attacks since yesterday, but continues to endorse passive SI and state that his family is his only reason for living. Will continue Abilify 5 mg.  5/10- Pt states that he had a panic attack the evening prior, during which he punched himself in the stomach and banged his head against the wall. Stated again that he does not want to be alive and does not have a place in this world.  5/11- Pt stated that yesterday, he wrapped a blanket around his neck because he "wanted to feel something" and wanted to "turn off my brain." He stated that he did not do this with the intention to end his life. He stated he does not want to be alive, but cannot die because it would hurt his family. Will decrease Zoloft to 100 mg on 5/12 with the intention of cross-titrating to venlafaxine. Will continue Abilify 5 mg.  5/12 - Pt reported no panic attacks or attempts at self-harm since last conversation. Stated that he "feels sad" and like he is "descending into darkness." Explained to pt that we would be switching from Zoloft to Effexor. Gave Zoloft 100 mg today, will also be starting Effexor 75 mg today.   5/18- Pt reports feeling "the same", endorses palpitations on Venlafaxine, will continue to monitor. Panic attack yesterday, will change standing Klonopin to 2 pm dosing.  5/19- Pt reports "feeling a little sad today" and states that he is continuing to have palpitations at night. Did not have panic attack yesterday, will continuing standing Klonopin.   5/21- Pt received PRN PO Haldol and Atarax after he started head banging in the context of distressing feelings. Today appeared more dysphoric yet less anxious than usual. Appeared tired which he attributed to the Haldol.  5/22 - Pt had episode of tachycardia and was evaluated by medicine attending who deemed non pathological. Appeared anxious and tired today, when asked about suicide after discharge he was unable to contract for safety. Had an anxious and panicked episode relieved by atarax, icing forearms, and talking. Increased Klonopin to 1 mg standing, will consider adding lithium and changing to ssri tmrw  5/23 - Pt had another anxious episode relieved by icing head and forearms, as well as talking. On approach, pt is tearful and dysphoric and states that he wants to die and that he does not belong in this world. Begin lithium 300 mg BID, plan to start Lexapro 10 mg tmrw.   5/24 - Pt had two anxious episodes the previous day; music and ice helped for the first one, but no longer helped for the second one. Pt states these episodes came about in the context of thoughts of self-hatred. On approach, pt has improved affect compared to prior assessment. Start Lexapro 10 mg today.  5/25 -  Pt had an anxious episode the previous evening precipitated by thoughts of "wanting to die" requiring PO Haldol, Ativan, and Hydroxyzine. Pt attended crisis management group the previous day and reviewed DBT skills with team. On approach, pt has depressed affect. Continue Lexapro 10 mg and lithium 300 BID.   5/26 - Pt had an anxious episode at 7 AM this morning requiring Atarax 50. Pt has meeting with [facility] today at 10:30. On approach, pt has notably improved affect compared to prior assessment. Continue Lexapro 10 mg and lithium 300 BID.     5/30- Pt had an anxious episode on Sunday, Monday receiving Ativan 2 mg PO PRN for Agitation on 5/29 at 17:10. Continue lexapro 10 mg qd, lithium 300 mg BID, Klonopin 1 mg PO qd.    Impression: MDD vs Social Anxiety disorder with Panic attacks vs Gender dysphoria vs cPTSD, R/O bipolar    #MDD #Gender dysphoria #Social Anxiety Disorder #Panic attacks  - 9.39 legals changed to 9.14  - Cont Lithium 300 mg BID 5/23 for suicidality and stabilization of affect; obtain lithium levels on 5/27  - Cont. Lexapro 10 mg starting 5/24  - On Effexor 150 mg trialed and discontinued 05/23  - Stopped Abilify 5 mg, 5/16   - Tapered off Zoloft  - Melatonin for sleep phase disorder  - Left voicemail with [facility]. Vincent at [facility], [phone]  - Left VM with  Kandice Acosta who work s with LGBTQ community for connection to trans affirming care  - Left VM Teagan Escobar [phone], patient's therapist  - TSH WNL    #Panic Attacks  - PRN Atarax  - Cont. Klonopin 1 mg at 14:00    #Gender Dysphoria  - The following programs contacted: [facility] (does not have anyone to send to the hospital), [facility] (only provides services for homeless individuals), [facility] (left voicemail), [facility] (phone number out of service), [facility], [facility] [place] [facility] [place] (reached out to them, meeting with Radames on 5/26), [facility] (voicemail full), [facility] (no availability to send anyone, have [facility] but referred to [facility])  - Referral on discharge to the [facility] [facility] (suicide hotline) and [facility] (Rhett has enrolled patient)  - [facility] plans to come on June 1st at 1pm to meet Radames, will need an , please call them back at [phone] to confirm that Radames and resident or Dr. Rubin can be there for the meeting      #Agitation  - For episodes of acute agitation can offer PO Haldol 5 mg/Ativan 2 mg/Benadryl 50 mg Q6H PRN. If refusing PO and is in acute risk of harm to self/other, can offer IM Haldol 5 mg/Ativan 2 mg/Benadryl 50 mg Q6H PRN. If given, please repeat EKG and hold antipsychotics if QTC>500       The patient is a 18 yo transgender F->M, Citizen of the [place]-speaking, recently immigrated from [place], domiciled with brother, unemployed, with psych hx of gender dysphoria, anxiety and depression, no past psychiatric hospitalizations, previous suicide attempt via choking, who is presenting at recommendation of brother and cousin for increasing SI.    Upon admission patient endorses strong feelings of depression and exhibits very dysphoric and minimally reactive affect. He endorses escalating suicidal thoughts related to feelings of worthlessness and unhappiness over his appearance. During the course of his hospitalization, his mood and affect initially improved, he became become more talkative. However, throughout the course of the hospitalization he started having near-daily anxiety attacks which he has been coping with by head banging or punching/scratching his limbs. His depression seems to have alleviated overall, however he suffers from  frequent mood shifts which he has difficulty coping with. These shifts are often triggered by misgendering by others or missing his mother, and are often prevented when he is in the company of Citizen of the [place]-speaking staff and is socially engaged. The patient was admitted involuntarily on 9:39 legals as he has been acutely elevated risk of suicide, now changed to voluntary legals. Chronic risk factors include history of trauma, history of persistent depressive symptoms, and history of suicidal ideation. Current risk factors include recent self inflicted choking, age, recent immigration, not understanding/speaking well language of current residence, current gender dysphoria, current social anxiety, current panic attacks and financial stressors. Goals of care will be to decrease patient's suicidality as his current risk factors place him at acutely elevated risk. Requires continued hospitalization for stabilization.    04/18/23- Patient acutely dysphoric. Plan to dc mirtazapine as he notes excessive lethargy and hyperphagia, continue sertraline 50 mg  04/19/2023- Patient remains with thoughts of suicide and concerns about his future, however he feels safe while in a structured setting with people around him  04/20/23 Patient had no thoughts of suicide during interview, appeared brighter and occasionally smiled. Agreeable to increase antidepressants  04/24 Patient notes panic attack last night, alleviated talking with cousin. Brighter, future oriented. Endorses social anxiety.  04/25/23 Patient did not have panic attack in last 24 hrs. Still appears brighter on unit and during interview today. Voiced that his social anxiety hinders his ability to advocate for himself while on unit  04/26   Patient is somewhat less anxious,  spending  time outside his room and is cheerful at times.  04/27/23 Patient notes still having intermittent anxiety, however he is now able to leave his room and his speech is much more productive. Affect continues to be more supple than on initial presentation. Plan to increase Zoloft to 150 mg 04/28, with eventual plan to maximize it to treat his social anxiety disorder.  04/28 Yesterday patient had another panic attack when bullied about his gender expression and received Klonopin and Atarax PRN. Though he states his mood is "good" and has a more supple affect than on initial presentation, he continues to endorse feelings of self hatred (which he believes may be alleviated by gender affirmative care) as well as wish to no longer be alive (though no active SI)  05/01 Patient continues to endorse passive SI, appeared anxious though smiling during interview. Reported panic attack yesterday. Ok with plan to increase Zoloft to 200 mg  05/02 Pt received first dose of 200 mg Zoloft. Had panic attack yesterday triggered by existential doubts and feelings of self hatred, which resulted in him hitting himself. Today less anxious but continues to endorse ambivalence about wanting to be alive.  5/3/23   patient had significant panic attack accompanied by dread and thoughts of self destruction.  When asked if suicidal patient replies I cannot kill myself because of my family.  05/04- Acutely anxious, more so than previously (however interviewed in large group), will add 2 mg abilify. Vickie in process of enrolling [facility] as patient in [facility]  5/5 - Pt endorses panic attack the evening before during which he hit himself in the stomach and arms and banged his head on the wall due to self hatred and feeling like he does not have a place in this world. Created signs with patient that he can use to communicate with staff nonverbally; pt stated that he will try his best to use them. Does not report adverse effects from Abilify.  5/8- Pt denies any panic attacks over the weekend. Pt states that he did not need to use the signs that he created together with the team to communicate with staff. Continues to endorse passive SI and states that his reason for living is his family, but other than that, he does not feel like he has a reason to live. Abilify increased to 5 mg  5/9- Pt denies any panic attacks since yesterday, but continues to endorse passive SI and state that his family is his only reason for living. Will continue Abilify 5 mg.  5/10- Pt states that he had a panic attack the evening prior, during which he punched himself in the stomach and banged his head against the wall. Stated again that he does not want to be alive and does not have a place in this world.  5/11- Pt stated that yesterday, he wrapped a blanket around his neck because he "wanted to feel something" and wanted to "turn off my brain." He stated that he did not do this with the intention to end his life. He stated he does not want to be alive, but cannot die because it would hurt his family. Will decrease Zoloft to 100 mg on 5/12 with the intention of cross-titrating to venlafaxine. Will continue Abilify 5 mg.  5/12 - Pt reported no panic attacks or attempts at self-harm since last conversation. Stated that he "feels sad" and like he is "descending into darkness." Explained to pt that we would be switching from Zoloft to Effexor. Gave Zoloft 100 mg today, will also be starting Effexor 75 mg today.   5/18- Pt reports feeling "the same", endorses palpitations on Venlafaxine, will continue to monitor. Panic attack yesterday, will change standing Klonopin to 2 pm dosing.  5/19- Pt reports "feeling a little sad today" and states that he is continuing to have palpitations at night. Did not have panic attack yesterday, will continuing standing Klonopin.   5/21- Pt received PRN PO Haldol and Atarax after he started head banging in the context of distressing feelings. Today appeared more dysphoric yet less anxious than usual. Appeared tired which he attributed to the Haldol.  5/22 - Pt had episode of tachycardia and was evaluated by medicine attending who deemed non pathological. Appeared anxious and tired today, when asked about suicide after discharge he was unable to contract for safety. Had an anxious and panicked episode relieved by atarax, icing forearms, and talking. Increased Klonopin to 1 mg standing, will consider adding lithium and changing to ssri tmrw  5/23 - Pt had another anxious episode relieved by icing head and forearms, as well as talking. On approach, pt is tearful and dysphoric and states that he wants to die and that he does not belong in this world. Begin lithium 300 mg BID, plan to start Lexapro 10 mg tmrw.   5/24 - Pt had two anxious episodes the previous day; music and ice helped for the first one, but no longer helped for the second one. Pt states these episodes came about in the context of thoughts of self-hatred. On approach, pt has improved affect compared to prior assessment. Start Lexapro 10 mg today.  5/25 -  Pt had an anxious episode the previous evening precipitated by thoughts of "wanting to die" requiring PO Haldol, Ativan, and Hydroxyzine. Pt attended crisis management group the previous day and reviewed DBT skills with team. On approach, pt has depressed affect. Continue Lexapro 10 mg and lithium 300 BID.   5/26 - Pt had an anxious episode at 7 AM this morning requiring Atarax 50. Pt has meeting with [facility] today at 10:30. On approach, pt has notably improved affect compared to prior assessment. Continue Lexapro 10 mg and lithium 300 BID.   5/30- Pt had an anxious episode on Sunday, Monday receiving Ativan 2 mg PO PRN for Agitation on 5/29 at 17:10. Continue lexapro 10 mg qd, lithium 300 mg BID, Klonopin 1 mg PO qd     Impression: MDD vs Social Anxiety disorder with Panic attacks vs Gender dysphoria vs cPTSD, R/O bipolar    #MDD   #Gender dysphoria   #Social Anxiety Disorder   - 9.39 legals changed to 9.14  - c/w Lithium 300 mg BID 5/23 for suicidality and stabilization of affect; obtain lithium levels on 5/27  - c/w Lexapro 10 mg starting 5/24  - On Effexor 150 mg trialed and discontinued 05/23  - Discontinued Abilify 5 mg, 5/16   - Tapered off Zoloft  - Melatonin for sleep phase disorder  - Left voicemail with [facility]. Vincent at [facility], [phone]  - Left VM with  Kandice Acosta who work s with LGBTQ community for connection to trans affirming care  - Left VM Teagan Escobar [phone], patient's therapist  - TSH WNL    #Panic attacks  - PRN Atarax  - c/w Klonopin 1 mg at 14:00    #Gender Dysphoria  - The following programs contacted: [facility] (does not have anyone to send to the hospital), [facility] (only provides services for homeless individuals), [facility] (left voicemail), [facility] (phone number out of service), [facility], [facility] [place] [facility] [place] (reached out to them, meeting with Radames on 5/26), [facility] (voicemail full), [facility] (no availability to send anyone, have [facility] but referred to [facility])  - Referral on discharge to the [employer] [facility] (suicide hotline) and [facility] (Rhett has enrolled patient)  - [facility] plans to come on June 1st at 1pm to meet Radames, will need an , please call them back at [phone] to confirm that Radames and resident or Dr. Rubin can be there for the meeting    #Agitation  - For episodes of acute agitation can offer PO Haldol 5 mg/Ativan 2 mg/Benadryl 50 mg Q6H PRN. If refusing PO and is in acute risk of harm to self/other, can offer IM Haldol 5 mg/Ativan 2 mg/Benadryl 50 mg Q6H PRN. If given, please repeat EKG and hold antipsychotics if QTC>500     The patient is a 16 yo transgender F->M, [place]-speaking, recently immigrated from [place], domiciled with brother, unemployed, with psych hx of gender dysphoria, anxiety and depression, no past psychiatric hospitalizations, previous suicide attempt via choking, who is presenting at recommendation of brother and cousin for increasing SI.    Upon admission patient endorses strong feelings of depression and exhibits very dysphoric and minimally reactive affect. He endorses escalating suicidal thoughts related to feelings of worthlessness and unhappiness over his appearance. During the course of his hospitalization, his mood and affect initially improved, he became become more talkative. However, throughout the course of the hospitalization he started having near-daily anxiety attacks which he has been coping with by head banging or punching/scratching his limbs. His depression seems to have alleviated overall, however he suffers from  frequent mood shifts which he has difficulty coping with. These shifts are often triggered by misgendering by others or missing his mother, and are often prevented when he is in the company of [place]ican-speaking staff and is socially engaged. The patient was admitted involuntarily on 9:39 legals as he has been acutely elevated risk of suicide, now changed to voluntary legals. Chronic risk factors include history of trauma, history of persistent depressive symptoms, and history of suicidal ideation. Current risk factors include recent self inflicted choking, age, recent immigration, not understanding/speaking well language of current residence, current gender dysphoria, current social anxiety, current panic attacks and financial stressors. Goals of care will be to decrease patient's suicidality as his current risk factors place him at acutely elevated risk. Requires continued hospitalization for stabilization.    04/18/23- Patient acutely dysphoric. Plan to dc mirtazapine as he notes excessive lethargy and hyperphagia, continue sertraline 50 mg  04/19/2023- Patient remains with thoughts of suicide and concerns about his future, however he feels safe while in a structured setting with people around him  04/20/23 Patient had no thoughts of suicide during interview, appeared brighter and occasionally smiled. Agreeable to increase antidepressants  04/24 Patient notes panic attack last night, alleviated talking with cousin. Brighter, future oriented. Endorses social anxiety.  04/25/23 Patient did not have panic attack in last 24 hrs. Still appears brighter on unit and during interview today. Voiced that his social anxiety hinders his ability to advocate for himself while on unit  04/26   Patient is somewhat less anxious,  spending  time outside his room and is cheerful at times.  04/27/23 Patient notes still having intermittent anxiety, however he is now able to leave his room and his speech is much more productive. Affect continues to be more supple than on initial presentation. Plan to increase Zoloft to 150 mg 04/28, with eventual plan to maximize it to treat his social anxiety disorder.  04/28 Yesterday patient had another panic attack when bullied about his gender expression and received Klonopin and Atarax PRN. Though he states his mood is "good" and has a more supple affect than on initial presentation, he continues to endorse feelings of self hatred (which he believes may be alleviated by gender affirmative care) as well as wish to no longer be alive (though no active SI)  05/01 Patient continues to endorse passive SI, appeared anxious though smiling during interview. Reported panic attack yesterday. Ok with plan to increase Zoloft to 200 mg  05/02 Pt received first dose of 200 mg Zoloft. Had panic attack yesterday triggered by existential doubts and feelings of self hatred, which resulted in him hitting himself. Today less anxious but continues to endorse ambivalence about wanting to be alive.  5/3/23   patient had significant panic attack accompanied by dread and thoughts of self destruction.  When asked if suicidal patient replies I cannot kill myself because of my family.  05/04- Acutely anxious, more so than previously (however interviewed in large group), will add 2 mg abilify. Vickie in process of enrolling Radames as patient in [facility]  5/5 - Pt endorses panic attack the evening before during which he hit himself in the stomach and arms and banged his head on the wall due to self hatred and feeling like he does not have a place in this world. Created signs with patient that he can use to communicate with staff nonverbally; pt stated that he will try his best to use them. Does not report adverse effects from Abilify.  5/8- Pt denies any panic attacks over the weekend. Pt states that he did not need to use the signs that he created together with the team to communicate with staff. Continues to endorse passive SI and states that his reason for living is his family, but other than that, he does not feel like he has a reason to live. Abilify increased to 5 mg  5/9- Pt denies any panic attacks since yesterday, but continues to endorse passive SI and state that his family is his only reason for living. Will continue Abilify 5 mg.  5/10- Pt states that he had a panic attack the evening prior, during which he punched himself in the stomach and banged his head against the wall. Stated again that he does not want to be alive and does not have a place in this world.  5/11- Pt stated that yesterday, he wrapped a blanket around his neck because he "wanted to feel something" and wanted to "turn off my brain." He stated that he did not do this with the intention to end his life. He stated he does not want to be alive, but cannot die because it would hurt his family. Will decrease Zoloft to 100 mg on 5/12 with the intention of cross-titrating to venlafaxine. Will continue Abilify 5 mg.  5/12 - Pt reported no panic attacks or attempts at self-harm since last conversation. Stated that he "feels sad" and like he is "descending into darkness." Explained to pt that we would be switching from Zoloft to Effexor. Gave Zoloft 100 mg today, will also be starting Effexor 75 mg today.   5/18- Pt reports feeling "the same", endorses palpitations on Venlafaxine, will continue to monitor. Panic attack yesterday, will change standing Klonopin to 2 pm dosing.  5/19- Pt reports "feeling a little sad today" and states that he is continuing to have palpitations at night. Did not have panic attack yesterday, will continuing standing Klonopin.   5/21- Pt received PRN PO Haldol and Atarax after he started head banging in the context of distressing feelings. Today appeared more dysphoric yet less anxious than usual. Appeared tired which he attributed to the Haldol.  5/22 - Pt had episode of tachycardia and was evaluated by medicine attending who deemed non pathological. Appeared anxious and tired today, when asked about suicide after discharge he was unable to contract for safety. Had an anxious and panicked episode relieved by atarax, icing forearms, and talking. Increased Klonopin to 1 mg standing, will consider adding lithium and changing to ssri tmrw  5/23 - Pt had another anxious episode relieved by icing head and forearms, as well as talking. On approach, pt is tearful and dysphoric and states that he wants to die and that he does not belong in this world. Begin lithium 300 mg BID, plan to start Lexapro 10 mg tmrw.   5/24 - Pt had two anxious episodes the previous day; music and ice helped for the first one, but no longer helped for the second one. Pt states these episodes came about in the context of thoughts of self-hatred. On approach, pt has improved affect compared to prior assessment. Start Lexapro 10 mg today.  5/25 -  Pt had an anxious episode the previous evening precipitated by thoughts of "wanting to die" requiring PO Haldol, Ativan, and Hydroxyzine. Pt attended crisis management group the previous day and reviewed DBT skills with team. On approach, pt has depressed affect. Continue Lexapro 10 mg and lithium 300 BID.   5/26 - Pt had an anxious episode at 7 AM this morning requiring Atarax 50. Pt has meeting with [facility] today at 10:30. On approach, pt has notably improved affect compared to prior assessment. Continue Lexapro 10 mg and lithium 300 BID.   5/30- Pt had an anxious episode on Sunday, Monday receiving Ativan 2 mg PO PRN for Agitation on 5/29 at 17:10. Continue lexapro 10 mg qd, Klonopin 1 mg PO qd, increase lithium 300mg BID to 450mg BID    Impression: MDD vs Social Anxiety disorder with Panic attacks vs Gender dysphoria vs cPTSD, R/O bipolar    #MDD   #Gender dysphoria   #Social Anxiety Disorder   - 9.39 legals changed to 9.14  - c/w Lithium 450 mg BID 5/23 for suicidality and stabilization of affect; lithium level 5/30- 0.30  - c/w Lexapro 10 mg starting 5/24  - Discontinued Effexor 150 mg, 05/23  - Discontinued Abilify 5 mg, 5/16   - Tapered off Zoloft  - Melatonin for sleep phase disorder  - Left voicemail with Dr. Vincent at [facility], [phone]  - Left VM with  Kandice Acosta who work s with LGBTQ community for connection to trans affirming care  - Left VM Teagan Escobar [phone], patient's therapist  - TSH [facility]    #Panic attacks  - PRN Atarax  - c/w Klonopin 1 mg at 14:00    #Gender Dysphoria  - The following programs contacted: [employer] (does not have anyone to send to the hospital), [employer] (only provides services for homeless individuals), [employer] (left voicemail), [employer] (phone number out of service), [facility], [facility] [place] (reached out to them, meeting with Radames on 5/26), [facility] (voicemail full), [facility] (no availability to send anyone, have [facility] but referred to [facility])  - Referral on discharge to the [employer] (suicide hotline) and [facility] ([facility] has enrolled patient)  - [facility] plans to come on June 1st at 1pm to meet Radames, will need an , please call them back at [phone] to confirm that Radames and resident or Dr. Rubin can be there for the meeting    #Agitation  - For episodes of acute agitation can offer PO Haldol 5 mg/Ativan 2 mg/Benadryl 50 mg Q6H PRN. If refusing PO and is in acute risk of harm to self/other, can offer IM Haldol 5 mg/Ativan 2 mg/Benadryl 50 mg Q6H PRN. If given, please repeat EKG and hold antipsychotics if QTC>500

## 2023-05-30 NOTE — BH INPATIENT PSYCHIATRY PROGRESS NOTE - NSBHFUPINTERVALHXFT_PSY_A_CORE
Patient was seen and evaluated this morning. Chart was reviewed and no acute events were noted. No PRN medications were administered overnight. Upon approach, patient is lying in bed comfortably. Patient is alert and oriented and engages with interviewer. Patient is cooperative and answers all questions appropriately. Patient reports symptoms of panic attack on Sunday and Monday, stating that she has these episodes when she starts "overthinking", with no specific activity linked to the onset of these symptoms; these episodes have increased in frequency since hospital admission. The patient states that she also had suicidal ideation and thoughts of self harm yesterday during her panic attack, and had desire to bang her head against her wall. The patient elaborates that she would like to be in a coma, as it would allow her to "leave this world" while technically staying alive, as to not cause as much distress to her family. The patient also reports that she feels that a 1-to-1 is necessary in order to prevent her from harming herself during her panic episodes; she states that during her panic episodes, her symptoms are too distressing to allow her to seek help from unit staff. The patient reports occasional restlessness, where she only receives one hour of sleep on some days, with melatonin providing mild relief. Patient was seen and evaluated this morning. Chart was reviewed and no acute events were noted. PRN atarax administered overnight. Upon approach, patient is lying sideways prone in bed comfortably. Patient is alert and oriented and engages with interviewer. Patient is cooperative and answers all questions appropriately. Patient reports symptoms of panic attack on Sunday and Monday, stating that he has these episodes when he starts "overthinking", with no specific activity linked to the onset of these symptoms; these episodes have increased in frequency since hospital admission. Patient reports that he only has thoughts of harming himself during these panic attacks because of feeling overwhelmed. Patient reports that at these times he wants to bang his head in order to "feel pain" because normally he "doesn't feel anything." The patient elaborates that he would like to be in a coma, as it would allow him to "leave this world" but not cause as much distress to his family. Patient reports that he feels that a 1-to-1 is necessary because during these episodes his symptoms are too distressing to allow him to seek help from unit staff.

## 2023-05-31 RX ADMIN — LITHIUM CARBONATE 450 MILLIGRAM(S): 300 TABLET, EXTENDED RELEASE ORAL at 20:21

## 2023-05-31 RX ADMIN — Medication 1 MILLIGRAM(S): at 08:04

## 2023-05-31 RX ADMIN — LITHIUM CARBONATE 450 MILLIGRAM(S): 300 TABLET, EXTENDED RELEASE ORAL at 08:03

## 2023-05-31 RX ADMIN — Medication 3 MILLIGRAM(S): at 20:21

## 2023-05-31 RX ADMIN — ESCITALOPRAM OXALATE 10 MILLIGRAM(S): 10 TABLET, FILM COATED ORAL at 08:03

## 2023-05-31 NOTE — BH INPATIENT PSYCHIATRY PROGRESS NOTE - NSBHASSESSSUMMFT_PSY_ALL_CORE
The patient is a 18 yo transgender F->M, Togolese-speaking, recently immigrated from [place], domiciled with brother, unemployed, with psych hx of gender dysphoria, anxiety and depression, no past psychiatric hospitalizations, previous suicide attempt via choking, who is presenting at recommendation of brother and cousin for increasing SI.    Upon admission patient endorses strong feelings of depression and exhibits very dysphoric and minimally reactive affect. He endorses escalating suicidal thoughts related to feelings of worthlessness and unhappiness over his appearance. During the course of his hospitalization, his mood and affect initially improved, he became become more talkative. However, throughout the course of the hospitalization he started having near-daily anxiety attacks which he has been coping with by head banging or punching/scratching his limbs. His depression seems to have alleviated overall, however he suffers from  frequent mood shifts which he has difficulty coping with. These shifts are often triggered by misgendering by others or missing his mother, and are often prevented when he is in the company of Togolese-speaking staff and is socially engaged. The patient was admitted involuntarily on 9:39 legals as he has been acutely elevated risk of suicide, now changed to voluntary legals. Chronic risk factors include history of trauma, history of persistent depressive symptoms, and history of suicidal ideation. Current risk factors include recent self inflicted choking, age, recent immigration, not understanding/speaking well language of current residence, current gender dysphoria, current social anxiety, current panic attacks and financial stressors. Goals of care will be to decrease patient's suicidality as his current risk factors place him at acutely elevated risk. Requires continued hospitalization for stabilization.    04/18/23- Patient acutely dysphoric. Plan to dc mirtazapine as he notes excessive lethargy and hyperphagia, continue sertraline 50 mg  04/19/2023- Patient remains with thoughts of suicide and concerns about his future, however he feels safe while in a structured setting with people around him  04/20/23 Patient had no thoughts of suicide during interview, appeared brighter and occasionally smiled. Agreeable to increase antidepressants  04/24 Patient notes panic attack last night, alleviated talking with cousin. Brighter, future oriented. Endorses social anxiety.  04/25/23 Patient did not have panic attack in last 24 hrs. Still appears brighter on unit and during interview today. Voiced that his social anxiety hinders his ability to advocate for himself while on unit  04/26   Patient is somewhat less anxious,  spending  time outside his room and is cheerful at times.  04/27/23 Patient notes still having intermittent anxiety, however he is now able to leave his room and his speech is much more productive. Affect continues to be more supple than on initial presentation. Plan to increase Zoloft to 150 mg 04/28, with eventual plan to maximize it to treat his social anxiety disorder.  04/28 Yesterday patient had another panic attack when bullied about his gender expression and received Klonopin and Atarax PRN. Though he states his mood is "good" and has a more supple affect than on initial presentation, he continues to endorse feelings of self hatred (which he believes may be alleviated by gender affirmative care) as well as wish to no longer be alive (though no active SI)  05/01 Patient continues to endorse passive SI, appeared anxious though smiling during interview. Reported panic attack yesterday. Ok with plan to increase Zoloft to 200 mg  05/02 Pt received first dose of 200 mg Zoloft. Had panic attack yesterday triggered by existential doubts and feelings of self hatred, which resulted in him hitting himself. Today less anxious but continues to endorse ambivalence about wanting to be alive.  5/3/23   patient had significant panic attack accompanied by dread and thoughts of self destruction.  When asked if suicidal patient replies I cannot kill myself because of my family.  05/04- Acutely anxious, more so than previously (however interviewed in large group), will add 2 mg abilify. Vickie in process of enrolling Radames as patient in [facility]  5/5 - Pt endorses panic attack the evening before during which he hit himself in the stomach and arms and banged his head on the wall due to self hatred and feeling like he does not have a place in this world. Created signs with patient that he can use to communicate with staff nonverbally; pt stated that he will try his best to use them. Does not report adverse effects from Abilify.  5/8- Pt denies any panic attacks over the weekend. Pt states that he did not need to use the signs that he created together with the team to communicate with staff. Continues to endorse passive SI and states that his reason for living is his family, but other than that, he does not feel like he has a reason to live. Abilify increased to 5 mg  5/9- Pt denies any panic attacks since yesterday, but continues to endorse passive SI and state that his family is his only reason for living. Will continue Abilify 5 mg.  5/10- Pt states that he had a panic attack the evening prior, during which he punched himself in the stomach and banged his head against the wall. Stated again that he does not want to be alive and does not have a place in this world.  5/11- Pt stated that yesterday, he wrapped a blanket around his neck because he "wanted to feel something" and wanted to "turn off my brain." He stated that he did not do this with the intention to end his life. He stated he does not want to be alive, but cannot die because it would hurt his family. Will decrease Zoloft to 100 mg on 5/12 with the intention of cross-titrating to venlafaxine. Will continue Abilify 5 mg.  5/12 - Pt reported no panic attacks or attempts at self-harm since last conversation. Stated that he "feels sad" and like he is "descending into darkness." Explained to pt that we would be switching from Zoloft to Effexor. Gave Zoloft 100 mg today, will also be starting Effexor 75 mg today.   5/18- Pt reports feeling "the same", endorses palpitations on Venlafaxine, will continue to monitor. Panic attack yesterday, will change standing Klonopin to 2 pm dosing.  5/19- Pt reports "feeling a little sad today" and states that he is continuing to have palpitations at night. Did not have panic attack yesterday, will continuing standing Klonopin.   5/21- Pt received PRN PO Haldol and Atarax after he started head banging in the context of distressing feelings. Today appeared more dysphoric yet less anxious than usual. Appeared tired which he attributed to the Haldol.  5/22 - Pt had episode of tachycardia and was evaluated by medicine attending who deemed non pathological. Appeared anxious and tired today, when asked about suicide after discharge he was unable to contract for safety. Had an anxious and panicked episode relieved by atarax, icing forearms, and talking. Increased Klonopin to 1 mg standing, will consider adding lithium and changing to ssri tmrw  5/23 - Pt had another anxious episode relieved by icing head and forearms, as well as talking. On approach, pt is tearful and dysphoric and states that he wants to die and that he does not belong in this world. Begin lithium 300 mg BID, plan to start Lexapro 10 mg tmrw.   5/24 - Pt had two anxious episodes the previous day; music and ice helped for the first one, but no longer helped for the second one. Pt states these episodes came about in the context of thoughts of self-hatred. On approach, pt has improved affect compared to prior assessment. Start Lexapro 10 mg today.  5/25 -  Pt had an anxious episode the previous evening precipitated by thoughts of "wanting to die" requiring PO Haldol, Ativan, and Hydroxyzine. Pt attended crisis management group the previous day and reviewed DBT skills with team. On approach, pt has depressed affect. Continue Lexapro 10 mg and lithium 300 BID.   5/26 - Pt had an anxious episode at 7 AM this morning requiring Atarax 50. Pt has meeting with [facility] today at 10:30. On approach, pt has notably improved affect compared to prior assessment. Continue Lexapro 10 mg and lithium 300 BID.   5/30- Pt had an anxious episode on Sunday, Monday receiving Ativan 2 mg PO PRN for Agitation on 5/29 at 17:10. Continue lexapro 10 mg qd, Klonopin 1 mg PO qd, increase lithium 300mg BID to 450mg BID  5/31- Pt denies symptoms of suicidal ideation, or anxious episodes over the past day, but states that he does have homicidal ideations. On approach, patient has improved affect as compared to previous day. Continue lexapro 10 mg PO qd, Klonopin 1 mg PO qd, lithium 450 mg PO BID.     Impression: MDD vs Social Anxiety disorder with Panic attacks vs Gender dysphoria vs cPTSD, R/O bipolar    #MDD   #Gender dysphoria   #Social Anxiety Disorder   - 9.39 legals changed to 9.14  - c/w Lithium 450 mg BID 5/23 for suicidality and stabilization of affect; lithium level 5/30- 0.30  - c/w Lexapro 10 mg starting 5/24  - Discontinued Effexor 150 mg, 05/23  - Discontinued Abilify 5 mg, 5/16   - Tapered off Zoloft  - Melatonin for sleep phase disorder  - Left vo[facility]. Vincent at [facility], [phone]  - Left VM with  Kandice Acosta who work s with [employer] community for connection to trans affirming care  - Left VM Teagan Escobar [phone], patient's therapist  - [facility]    #Panic attacks  - PRN Atarax  - c/w Klonopin 1 mg at 14:00    #Gender Dysphoria  - The following programs contacted: [employer] (does not have anyone to send to the hospital), [employer] (only provides services for homeless individuals), [employer] (left voicemail), [employer] (phone number out of service), [facility], [facility] [place] In program (reached out to them, meeting with Radames on 5/26), [email or web address] [facility] (voicemail full), [facility] (no availability to send anyone, have [facility] but referred to [facility])  - Referral on discharge to the [employer] (suicide hotline) and [facility] ([facility] has enrolled patient)  - [facility] plans to come on June 1st at 1pm to meet Radames, will need an , please call them back at [phone] to confirm that Radames and resident or Dr. Rubin can be there for the meeting    #Agitation  - For episodes of acute agitation can offer PO Haldol 5 mg/Ativan 2 mg/Benadryl 50 mg Q6H PRN. If refusing PO and is in acute risk of harm to self/other, can offer IM Haldol 5 mg/Ativan 2 mg/Benadryl 50 mg Q6H PRN. If given, please repeat EKG and hold antipsychotics if QTC>500

## 2023-05-31 NOTE — BH INPATIENT PSYCHIATRY PROGRESS NOTE - NSBHFUPINTERVALHXFT_PSY_A_CORE
Patient was seen and evaluated this morning; 1 to 1 was present in the room. Chart was reviewed and no acute events were noted. No PRN medications were administered overnight. Upon approach, patient is standing in hallway, talking with staff and other patients, in good mood; the patient was subsequently brought to his bed. Patient is alert and oriented and engages with interviewer. Patient is cooperative and answers all questions appropriately. Patient denies any current passive or active suicidal ideation, intent or plan but reports thoughts of doing harm to individuals who bullied him in school. The patient says he has dreams of doing harm to these individuals, but denies any plan or intent for completing this action. The patient claims he slept from 11 PM to 5 AM overnight, but was woken up to take medications and was not able to fall back asleep thereafter. The patient denies loss of appetite. The patient denies having any panic attacks over the past day.  was used for this interview, Melissa ([id number]). Patient was seen and evaluated this morning; 1 to 1 was present in the room. Chart was reviewed and no acute events were noted. No PRN medications were administered overnight. Upon approach, patient is standing in hallway, talking with staff and other patients, in good mood; the patient was subsequently brought to his bed. Patient is alert and oriented and engages with interviewer. Patient is cooperative and answers all questions appropriately. Patient denies any current passive or active suicidal ideation, intent or plan but reports thoughts of doing harm to individuals who bullied him in school. The patient says he has dreams of doing harm to these individuals, and is "full of hate" during anxiety attacks and wants to torture those who have wronged him. This patient also has similar thoughts at times outside of his anxiety attacks, but less frequently. The patient states he would like to carry out these plans by buying a warehouse where he could torture these individuals, but that he does not have enough money to carry out this plan. The patient claims he slept from 11 PM to 5 AM overnight, but was woken up to take medications and was not able to fall back asleep thereafter. The patient denies loss of appetite. The patient denies having any panic attacks over the past day.

## 2023-05-31 NOTE — BH INPATIENT PSYCHIATRY PROGRESS NOTE - NSBHMETABOLIC_PSY_ALL_CORE_FT
BMI: BMI (kg/m2): 32.5 (04-18-23 @ 05:33)  HbA1c: A1C with Estimated Average Glucose Result: 5.7 % (05-06-23 @ 08:53)    Glucose:   BP: 113/67 (05-31-23 @ 08:09) (103/68 - 127/86)  Lipid Panel: Date/Time: 05-06-23 @ 08:53  Cholesterol, Serum: 206  Direct LDL: --  HDL Cholesterol, Serum: 47  Total Cholesterol/HDL Ration Measurement: --  Triglycerides, Serum: 131   BMI: BMI (kg/m2): 32.5 (04-18-23 @ 05:33)  HbA1c: A1C with Estimated Average Glucose Result: 5.7 % (05-06-23 @ 08:53)    Glucose:   BP: 115/63 (05-31-23 @ 15:25) (103/68 - 127/86)  Lipid Panel: Date/Time: 05-06-23 @ 08:53  Cholesterol, Serum: 206  Direct LDL: --  HDL Cholesterol, Serum: 47  Total Cholesterol/HDL Ration Measurement: --  Triglycerides, Serum: 131

## 2023-05-31 NOTE — BH INPATIENT PSYCHIATRY PROGRESS NOTE - NSBHCHARTREVIEWVS_PSY_A_CORE FT
Vital Signs Last 24 Hrs  T(C): 35.8 (05-31-23 @ 08:09), Max: 35.8 (05-31-23 @ 08:09)  T(F): 96.5 (05-31-23 @ 08:09), Max: 96.5 (05-31-23 @ 08:09)  HR: 94 (05-31-23 @ 08:09) (94 - 101)  BP: 113/67 (05-31-23 @ 08:09) (113/67 - 127/86)  BP(mean): --  RR: 17 (05-31-23 @ 08:09) (17 - 18)  SpO2: --     Vital Signs Last 24 Hrs  T(C): 35.8 (05-31-23 @ 15:25), Max: 35.8 (05-31-23 @ 08:09)  T(F): 96.4 (05-31-23 @ 15:25), Max: 96.5 (05-31-23 @ 08:09)  HR: 99 (05-31-23 @ 15:25) (94 - 101)  BP: 115/63 (05-31-23 @ 15:25) (113/67 - 127/86)  BP(mean): --  RR: 16 (05-31-23 @ 15:25) (16 - 18)  SpO2: --

## 2023-05-31 NOTE — BH INPATIENT PSYCHIATRY PROGRESS NOTE - CURRENT MEDICATION
MEDICATIONS  (STANDING):  clonazePAM  Tablet 1 milliGRAM(s) Oral daily  escitalopram 10 milliGRAM(s) Oral daily  lithium 450 milliGRAM(s) Oral two times a day  melatonin. 3 milliGRAM(s) Oral at bedtime    MEDICATIONS  (PRN):  aluminum hydroxide/magnesium hydroxide/simethicone Suspension 30 milliLiter(s) Oral every 6 hours PRN Dyspepsia  haloperidol     Tablet 5 milliGRAM(s) Oral every 6 hours PRN agitation  hydrOXYzine hydrochloride 50 milliGRAM(s) Oral every 6 hours PRN anxiety and insomnia  LORazepam     Tablet 2 milliGRAM(s) Oral every 6 hours PRN agitation

## 2023-06-01 RX ADMIN — Medication 30 MILLILITER(S): at 12:28

## 2023-06-01 RX ADMIN — ESCITALOPRAM OXALATE 10 MILLIGRAM(S): 10 TABLET, FILM COATED ORAL at 08:07

## 2023-06-01 RX ADMIN — LITHIUM CARBONATE 450 MILLIGRAM(S): 300 TABLET, EXTENDED RELEASE ORAL at 20:14

## 2023-06-01 RX ADMIN — LITHIUM CARBONATE 450 MILLIGRAM(S): 300 TABLET, EXTENDED RELEASE ORAL at 08:08

## 2023-06-01 RX ADMIN — Medication 3 MILLIGRAM(S): at 20:14

## 2023-06-01 RX ADMIN — Medication 1 MILLIGRAM(S): at 08:08

## 2023-06-01 RX ADMIN — Medication 50 MILLIGRAM(S): at 16:58

## 2023-06-01 NOTE — BH TREATMENT PLAN - NSPTSTATEDGOAL_PSY_ALL_CORE
To have fewer panic attacks
"I don't know" "to find my place in the world"
"I don't know" "to find my place in the world"
To feel less nervous, to get a job
To no longer feel suicidal
"I don't know" "to find my place in the world"
to have less anxiety

## 2023-06-01 NOTE — BH INPATIENT PSYCHIATRY PROGRESS NOTE - NSBHCHARTREVIEWVS_PSY_A_CORE FT
Vital Signs Last 24 Hrs  T(C): 35.8 (06-01-23 @ 08:09), Max: 35.8 (05-31-23 @ 15:25)  T(F): 96.5 (06-01-23 @ 08:09), Max: 96.5 (06-01-23 @ 08:09)  HR: 107 (06-01-23 @ 08:09) (99 - 107)  BP: 126/77 (06-01-23 @ 08:09) (115/63 - 126/77)  BP(mean): --  RR: 18 (06-01-23 @ 08:09) (16 - 18)  SpO2: --     Vital Signs Last 24 Hrs  T(C): 36.2 (06-01-23 @ 15:28), Max: 36.2 (06-01-23 @ 15:28)  T(F): 97.2 (06-01-23 @ 15:28), Max: 97.2 (06-01-23 @ 15:28)  HR: 127 (06-01-23 @ 15:28) (107 - 127)  BP: 147/76 (06-01-23 @ 15:28) (126/77 - 147/76)  BP(mean): --  RR: 20 (06-01-23 @ 15:28) (18 - 20)  SpO2: --

## 2023-06-01 NOTE — BH TREATMENT PLAN - NSTXSUICIDINTERRN_PSY_ALL_CORE
RN to assess patients behavior and be alert for increased signs of impulsivity/agitation.  RN will monitor patient and provide support and comfort and provedie safety on unit.    RN to encourage medication compliance and provide support and education as needed on Dx, coping skills, medication, and safety planning.  RN to Encourage daily ADL's  RN to Encourage group attendance  RN will assess and intervene for any suicidal ideations
RN to assess patients behavior and be alert for increased signs of impulsivity/agitation.  RN will monitor patient and provide support and comfort and provedie safety on unit.    RN to encourage medication compliance and provide support and education as needed on Dx, coping skills, medication, and safety planning.  RN to Encourage daily ADL's  RN to Encourage group attendance  RN will assess and intervene for any suicidal ideations
RN to assess patients behavior and be alert for increased signs of worsening mood, impulsivity and agitation. RN will monitor patient and provide support and comfort and provedie safety on unit. RN to encourage medication compliance. Provide support and education as needed on Dx, coping skills, medication, and safety planning. RN to Encourage daily ADL's. RN to Encourage group attendance  RN will assess and intervene for any suicidal ideations. Safety planning
Encourage pt to share thoughts
pt will express feelings and concerns to RN

## 2023-06-01 NOTE — BH INPATIENT PSYCHIATRY PROGRESS NOTE - NSBHMETABOLIC_PSY_ALL_CORE_FT
BMI: BMI (kg/m2): 32.5 (04-18-23 @ 05:33)  HbA1c: A1C with Estimated Average Glucose Result: 5.7 % (05-06-23 @ 08:53)    Glucose:   BP: 126/77 (06-01-23 @ 08:09) (110/76 - 127/86)  Lipid Panel: Date/Time: 05-06-23 @ 08:53  Cholesterol, Serum: 206  Direct LDL: --  HDL Cholesterol, Serum: 47  Total Cholesterol/HDL Ration Measurement: --  Triglycerides, Serum: 131   BMI: BMI (kg/m2): 32.5 (04-18-23 @ 05:33)  HbA1c: A1C with Estimated Average Glucose Result: 5.7 % (05-06-23 @ 08:53)    Glucose:   BP: 147/76 (06-01-23 @ 15:28) (110/76 - 147/76)  Lipid Panel: Date/Time: 05-06-23 @ 08:53  Cholesterol, Serum: 206  Direct LDL: --  HDL Cholesterol, Serum: 47  Total Cholesterol/HDL Ration Measurement: --  Triglycerides, Serum: 131
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
## 2023-06-01 NOTE — BH INPATIENT PSYCHIATRY PROGRESS NOTE - NSBHASSESSSUMMFT_PSY_ALL_CORE
The patient is a 18 yo transgender F->M, Macedonian-speaking, recently immigrated from [place], domiciled with brother, unemployed, with psych hx of gender dysphoria, anxiety and depression, no past psychiatric hospitalizations, previous suicide attempt via choking, who is presenting at recommendation of brother and cousin for increasing SI.    Upon admission patient endorses strong feelings of depression and exhibits very dysphoric and minimally reactive affect. He endorses escalating suicidal thoughts related to feelings of worthlessness and unhappiness over his appearance. During the course of his hospitalization, his mood and affect initially improved, he became become more talkative. However, throughout the course of the hospitalization he started having near-daily anxiety attacks which he has been coping with by head banging or punching/scratching his limbs. His depression seems to have alleviated overall, however he suffers from  frequent mood shifts and anxiety/panic attacks which he has difficulty coping with. These shifts are often triggered by misgendering by others or missing his mother, and are often prevented when he is in the company of Macedonian-speaking staff and is socially engaged. The patient was admitted involuntarily on 9:39 legals as he has been acutely elevated risk of suicide, now changed to voluntary legals. Chronic risk factors include history of trauma, history of persistent depressive symptoms, and history of suicidal ideation. Current risk factors include recent self inflicted choking, age, recent immigration, not understanding/speaking well language of current residence, current gender dysphoria, current social anxiety, current panic attacks and financial stressors. On evaluation today, patient endorses an episode of anxious symptoms overnight, during which he had shortness of breath and thoughts of self-harm. Goals of care will be to improve patient's symptoms of anxiety while continuing to manage and monitor his depressive symptoms. Requires continued hospitalization for monitoring and stabilization.    04/18/23- Patient acutely dysphoric. Plan to dc mirtazapine as he notes excessive lethargy and hyperphagia, continue sertraline 50 mg  04/19/2023- Patient remains with thoughts of suicide and concerns about his future, however he feels safe while in a structured setting with people around him  04/20/23 Patient had no thoughts of suicide during interview, appeared brighter and occasionally smiled. Agreeable to increase antidepressants  04/24 Patient notes panic attack last night, alleviated talking with cousin. Brighter, future oriented. Endorses social anxiety.  04/25/23 Patient did not have panic attack in last 24 hrs. Still appears brighter on unit and during interview today. Voiced that his social anxiety hinders his ability to advocate for himself while on unit  04/26   Patient is somewhat less anxious,  spending  time outside his room and is cheerful at times.  04/27/23 Patient notes still having intermittent anxiety, however he is now able to leave his room and his speech is much more productive. Affect continues to be more supple than on initial presentation. Plan to increase Zoloft to 150 mg 04/28, with eventual plan to maximize it to treat his social anxiety disorder.  04/28 Yesterday patient had another panic attack when bullied about his gender expression and received Klonopin and Atarax PRN. Though he states his mood is "good" and has a more supple affect than on initial presentation, he continues to endorse feelings of self hatred (which he believes may be alleviated by gender affirmative care) as well as wish to no longer be alive (though no active SI)  05/01 Patient continues to endorse passive SI, appeared anxious though smiling during interview. Reported panic attack yesterday. Ok with plan to increase Zoloft to 200 mg  05/02 Pt received first dose of 200 mg Zoloft. Had panic attack yesterday triggered by existential doubts and feelings of self hatred, which resulted in him hitting himself. Today less anxious but continues to endorse ambivalence about wanting to be alive.  5/3/23   patient had significant panic attack accompanied by dread and thoughts of self destruction.  When asked if suicidal patient replies I cannot kill myself because of my family.  05/04- Acutely anxious, more so than previously (however interviewed in large group), will add 2 mg abilify. Vickie in process of enrolling [facility] as patient in [facility]  5/5 - Pt endorses panic attack the evening before during which he hit himself in the stomach and arms and banged his head on the wall due to self hatred and feeling like he does not have a place in this world. Created signs with patient that he can use to communicate with staff nonverbally; pt stated that he will try his best to use them. Does not report adverse effects from Abilify.  5/8- Pt denies any panic attacks over the weekend. Pt states that he did not need to use the signs that he created together with the team to communicate with staff. Continues to endorse passive SI and states that his reason for living is his family, but other than that, he does not feel like he has a reason to live. Abilify increased to 5 mg  5/9- Pt denies any panic attacks since yesterday, but continues to endorse passive SI and state that his family is his only reason for living. Will continue Abilify 5 mg.  5/10- Pt states that he had a panic attack the evening prior, during which he punched himself in the stomach and banged his head against the wall. Stated again that he does not want to be alive and does not have a place in this world.  5/11- Pt stated that yesterday, he wrapped a blanket around his neck because he "wanted to feel something" and wanted to "turn off my brain." He stated that he did not do this with the intention to end his life. He stated he does not want to be alive, but cannot die because it would hurt his family. Will decrease Zoloft to 100 mg on 5/12 with the intention of cross-titrating to venlafaxine. Will continue Abilify 5 mg.  5/12 - Pt reported no panic attacks or attempts at self-harm since last conversation. Stated that he "feels sad" and like he is "descending into darkness." Explained to pt that we would be switching from Zoloft to Effexor. Gave Zoloft 100 mg today, will also be starting Effexor 75 mg today.   5/18- Pt reports feeling "the same", endorses palpitations on Venlafaxine, will continue to monitor. Panic attack yesterday, will change standing Klonopin to 2 pm dosing.  5/19- Pt reports "feeling a little sad today" and states that he is continuing to have palpitations at night. Did not have panic attack yesterday, will continuing standing Klonopin.   5/21- Pt received PRN PO Haldol and Atarax after he started head banging in the context of distressing feelings. Today appeared more dysphoric yet less anxious than usual. Appeared tired which he attributed to the Haldol.  5/22 - Pt had episode of tachycardia and was evaluated by medicine attending who deemed non pathological. Appeared anxious and tired today, when asked about suicide after discharge he was unable to contract for safety. Had an anxious and panicked episode relieved by atarax, icing forearms, and talking. Increased Klonopin to 1 mg standing, will consider adding lithium and changing to ssri tmrw  5/23 - Pt had another anxious episode relieved by icing head and forearms, as well as talking. On approach, pt is tearful and dysphoric and states that he wants to die and that he does not belong in this world. Begin lithium 300 mg BID, plan to start Lexapro 10 mg tmrw.   5/24 - Pt had two anxious episodes the previous day; music and ice helped for the first one, but no longer helped for the second one. Pt states these episodes came about in the context of thoughts of self-hatred. On approach, pt has improved affect compared to prior assessment. Start Lexapro 10 mg today.  5/25 -  Pt had an anxious episode the previous evening precipitated by thoughts of "wanting to die" requiring PO Haldol, Ativan, and Hydroxyzine. Pt attended crisis management group the previous day and reviewed DBT skills with team. On approach, pt has depressed affect. Continue Lexapro 10 mg and lithium 300 BID.   5/26 - Pt had an anxious episode at 7 AM this morning requiring Atarax 50. Pt has meeting with [facility] today at 10:30. On approach, pt has notably improved affect compared to prior assessment. Continue Lexapro 10 mg and lithium 300 BID.   5/30- Pt had an anxious episode on Sunday, Monday receiving Ativan 2 mg PO PRN for Agitation on 5/29 at 17:10. Continue lexapro 10 mg qd, Klonopin 1 mg PO qd, increase lithium 300mg BID to 450mg BID  5/31- Pt denies symptoms of suicidal ideation, or anxious episodes over the past day, but states that he does have homicidal ideations. On approach, patient has improved affect as compared to previous day. Continue lexapro 10 mg PO qd, Klonopin 1 mg PO qd, lithium 450 mg PO BID.   6/1- Pt denies symptoms of suicidal or homicidal ideation. The patient states that he had feelings of anxiety overnight, during which he endorsed thoughts of self-harm. Continue lexapro 10 mg PO qd, Klonopin 1 mg PO qd, lithium 450 mg PO BID.  Impression: MDD vs Social Anxiety disorder with Panic attacks vs Gender dysphoria vs cPTSD, R/O bipolar    #MDD   #Gender dysphoria   #Social Anxiety Disorder   - 9.39 legals changed to 9.14  - c/w Lithium 450 mg BID 5/23 for suicidality and stabilization of affect; lithium level 5/30- 0.30  - c/w Lexapro 10 mg starting 5/24  - Discontinued Effexor 150 mg, 05/23  - Discontinued Abilify 5 mg, 5/16   - Tapered off Zoloft  - Melatonin for sleep phase disorder  - Left voicemail with Dr. Vincent at [facility], [phone]  - Left VM with  Kandice Acosta who work s with [employer] for connection to trans affirming care  - Left  Teagan Shawn [phone], patient's therapist  - TSH WNL    #Panic attacks  - PRN Atarax  - c/w Klonopin 1 mg at 14:00    #Gender Dysphoria  - The following programs contacted: [facility] (does not have anyone to send to the hospital), [facility] (only provides services for homeless individuals), [employer] (left voicemail), [facility] (phone number out of service), [facility], [facility] Youth Drop In program (reached out to them, meeting with Radames on 5/26), [facility] (voicemail full), [facility] (no availability to send anyone, have [facility] but referred to [facility])  - Referral on discharge to the [facility] (suicide hotline) and [facility] ([facility] has enrolled patient)  - [facility] plans to come on June 1st at 1pm to meet Radames, will need an , please call them back at [phone] to confirm that Radames and resident or Dr. Rubin can be there for the meeting    #Agitation  - For episodes of acute agitation can offer PO Haldol 5 mg/Ativan 2 mg/Benadryl 50 mg Q6H PRN. If refusing PO and is in acute risk of harm to self/other, can offer IM Haldol 5 mg/Ativan 2 mg/Benadryl 50 mg Q6H PRN. If given, please repeat EKG and hold antipsychotics if QTC>500     The patient is a 18 yo transgender F->M, Bengali-speaking, recently immigrated from [place], domiciled with brother, unemployed, with psych hx of gender dysphoria, anxiety and depression, no past psychiatric hospitalizations, previous suicide attempt via choking, who is presenting at recommendation of brother and cousin for increasing SI.    Upon admission patient endorses strong feelings of depression and exhibits very dysphoric and minimally reactive affect. He endorses escalating suicidal thoughts related to feelings of worthlessness and unhappiness over his appearance. During the course of his hospitalization, his mood and affect initially improved, he became become more talkative. However, throughout the course of the hospitalization he started having near-daily anxiety attacks which he has been coping with by head banging or punching/scratching his limbs. His depression seems to have alleviated overall, however he suffers from  frequent mood shifts and anxiety/panic attacks which he has difficulty coping with. These shifts are often triggered by misgendering by others or missing his mother, and are often prevented when he is in the company of Bengali-speaking staff and is socially engaged. The patient was admitted involuntarily on 9:39 legals as he has been acutely elevated risk of suicide, now changed to voluntary legals. Chronic risk factors include history of trauma, history of persistent depressive symptoms, and history of suicidal ideation. Current risk factors include recent self inflicted choking, age, recent immigration, not understanding/speaking well language of current residence, current gender dysphoria, current social anxiety, current panic attacks and financial stressors.     On evaluation today, patient endorses episode of anxiety overnight, during which he had shortness of breath and thoughts of self-harm by hitting his head, however this was alleviated by speaking with 1:1 who was present. Goals of care will be to improve patient's symptoms of depression and anxiety which requires continued hospitalization for symptom monitoring and medication adjustment.    04/18/23- Patient acutely dysphoric. Plan to dc mirtazapine as he notes excessive lethargy and hyperphagia, continue sertraline 50 mg  04/19/2023- Patient remains with thoughts of suicide and concerns about his future, however he feels safe while in a structured setting with people around him  04/20/23 Patient had no thoughts of suicide during interview, appeared brighter and occasionally smiled. Agreeable to increase antidepressants  04/24 Patient notes panic attack last night, alleviated talking with cousin. Brighter, future oriented. Endorses social anxiety.  04/25/23 Patient did not have panic attack in last 24 hrs. Still appears brighter on unit and during interview today. Voiced that his social anxiety hinders his ability to advocate for himself while on unit  04/26   Patient is somewhat less anxious,  spending  time outside his room and is cheerful at times.  04/27/23 Patient notes still having intermittent anxiety, however he is now able to leave his room and his speech is much more productive. Affect continues to be more supple than on initial presentation. Plan to increase Zoloft to 150 mg 04/28, with eventual plan to maximize it to treat his social anxiety disorder.  04/28 Yesterday patient had another panic attack when bullied about his gender expression and received Klonopin and Atarax PRN. Though he states his mood is "good" and has a more supple affect than on initial presentation, he continues to endorse feelings of self hatred (which he believes may be alleviated by gender affirmative care) as well as wish to no longer be alive (though no active SI)  05/01 Patient continues to endorse passive SI, appeared anxious though smiling during interview. Reported panic attack yesterday. Ok with plan to increase Zoloft to 200 mg  05/02 Pt received first dose of 200 mg Zoloft. Had panic attack yesterday triggered by existential doubts and feelings of self hatred, which resulted in him hitting himself. Today less anxious but continues to endorse ambivalence about wanting to be alive.  5/3/23   patient had significant panic attack accompanied by dread and thoughts of self destruction.  When asked if suicidal patient replies I cannot kill myself because of my family.  05/04- Acutely anxious, more so than previously (however interviewed in large group), will add 2 mg abilify. Vickie in process of enrolling [facility] as patient in [facility]  5/5 - Pt endorses panic attack the evening before during which he hit himself in the stomach and arms and banged his head on the wall due to self hatred and feeling like he does not have a place in this world. Created signs with patient that he can use to communicate with staff nonverbally; pt stated that he will try his best to use them. Does not report adverse effects from Abilify.  5/8- Pt denies any panic attacks over the weekend. Pt states that he did not need to use the signs that he created together with the team to communicate with staff. Continues to endorse passive SI and states that his reason for living is his family, but other than that, he does not feel like he has a reason to live. Abilify increased to 5 mg  5/9- Pt denies any panic attacks since yesterday, but continues to endorse passive SI and state that his family is his only reason for living. Will continue Abilify 5 mg.  5/10- Pt states that he had a panic attack the evening prior, during which he punched himself in the stomach and banged his head against the wall. Stated again that he does not want to be alive and does not have a place in this world.  5/11- Pt stated that yesterday, he wrapped a blanket around his neck because he "wanted to feel something" and wanted to "turn off my brain." He stated that he did not do this with the intention to end his life. He stated he does not want to be alive, but cannot die because it would hurt his family. Will decrease Zoloft to 100 mg on 5/12 with the intention of cross-titrating to venlafaxine. Will continue Abilify 5 mg.  5/12 - Pt reported no panic attacks or attempts at self-harm since last conversation. Stated that he "feels sad" and like he is "descending into darkness." Explained to pt that we would be switching from Zoloft to Effexor. Gave Zoloft 100 mg today, will also be starting Effexor 75 mg today.   5/18- Pt reports feeling "the same", endorses palpitations on Venlafaxine, will continue to monitor. Panic attack yesterday, will change standing Klonopin to 2 pm dosing.  5/19- Pt reports "feeling a little sad today" and states that he is continuing to have palpitations at night. Did not have panic attack yesterday, will continuing standing Klonopin.   5/21- Pt received PRN PO Haldol and Atarax after he started head banging in the context of distressing feelings. Today appeared more dysphoric yet less anxious than usual. Appeared tired which he attributed to the Haldol.  5/22 - Pt had episode of tachycardia and was evaluated by medicine attending who deemed non pathological. Appeared anxious and tired today, when asked about suicide after discharge he was unable to contract for safety. Had an anxious and panicked episode relieved by atarax, icing forearms, and talking. Increased Klonopin to 1 mg standing, will consider adding lithium and changing to ssri tmrw  5/23 - Pt had another anxious episode relieved by icing head and forearms, as well as talking. On approach, pt is tearful and dysphoric and states that he wants to die and that he does not belong in this world. Begin lithium 300 mg BID, plan to start Lexapro 10 mg tmrw.   5/24 - Pt had two anxious episodes the previous day; music and ice helped for the first one, but no longer helped for the second one. Pt states these episodes came about in the context of thoughts of self-hatred. On approach, pt has improved affect compared to prior assessment. Start Lexapro 10 mg today.  5/25 -  Pt had an anxious episode the previous evening precipitated by thoughts of "wanting to die" requiring PO Haldol, Ativan, and Hydroxyzine. Pt attended crisis management group the previous day and reviewed DBT skills with team. On approach, pt has depressed affect. Continue Lexapro 10 mg and lithium 300 BID.   5/26 - Pt had an anxious episode at 7 AM this morning requiring Atarax 50. Pt has meeting with [facility] today at 10:30. On approach, pt has notably improved affect compared to prior assessment. Continue Lexapro 10 mg and lithium 300 BID.   5/30- Pt had an anxious episode on Sunday, Monday receiving Ativan 2 mg PO PRN for Agitation on 5/29 at 17:10. Continue lexapro 10 mg qd, Klonopin 1 mg PO qd, increase lithium 300mg BID to 450mg BID  5/31- Pt denies symptoms of suicidal ideation, or anxious episodes over the past day, but states that he does have homicidal ideations. On approach, patient has improved affect as compared to previous day. Continue lexapro 10 mg PO qd, Klonopin 1 mg PO qd, lithium 450 mg PO BID.   6/1- denies SI/HI. anxiety overnight, thoughts of self-harm during this; alleviated by speaking to 1:1. c/w lexapro 10 qd, Klonopin 1 qd, lithium 450 BID.    Impression: MDD vs Social Anxiety disorder with Panic attacks vs Gender dysphoria vs cPTSD, R/O bipolar    #MDD   #Gender dysphoria   #Social Anxiety Disorder   - 9.39 legals changed to 9.14  - c/w Lithium 450 mg BID 5/23 for suicidality and stabilization of affect; lithium level 5/30- 0.30  - c/w Lexapro 10 mg starting 5/24  - Discontinued Effexor 150 mg, 05/23  - Discontinued Abilify 5 mg, 5/16   - Tapered off Zoloft  - Melatonin for sleep phase disorder  - Left voicemail with Dr. Vincent at [facility], [phone]  - Left VM with  Kandice Acosta who work s with [employer] for connection to trans affirming care  - Left  Teagan Shawn [phone], patient's therapist  - TSH WNL    #Panic attacks  - PRN Atarax  - c/w Klonopin 1 mg at 14:00    #Gender Dysphoria  - The following programs contacted: [facility] (does not have anyone to send to the hospital), [facility] (only provides services for homeless individuals), [facility] (left voicemail), [facility] (phone number out of service), [facility], [facility] Youth Drop In program (reached out to them, meeting with Radames on 5/26), [facility] (voicemail full), [facility] (no availability to send anyone, have [facility] but referred to [facility])  - Referral on discharge to the [facility] (suicide hotline) and [facility] ([facility] has enrolled patient)  - [facility] plans to come on June 1st at 1pm to meet Radames, will need an , please call them back at [phone] to confirm that Radames and resident or Dr. Rubin can be there for the meeting    #Agitation  - For episodes of acute agitation can offer PO Haldol 5 mg/Ativan 2 mg/Benadryl 50 mg Q6H PRN. If refusing PO and is in acute risk of harm to self/other, can offer IM Haldol 5 mg/Ativan 2 mg/Benadryl 50 mg Q6H PRN. If given, please repeat EKG and hold antipsychotics if QTC>500

## 2023-06-02 RX ORDER — CLONAZEPAM 1 MG
1 TABLET ORAL DAILY
Refills: 0 | Status: DISCONTINUED | OUTPATIENT
Start: 2023-06-02 | End: 2023-06-03

## 2023-06-02 RX ADMIN — ESCITALOPRAM OXALATE 10 MILLIGRAM(S): 10 TABLET, FILM COATED ORAL at 08:11

## 2023-06-02 RX ADMIN — Medication 3 MILLIGRAM(S): at 20:26

## 2023-06-02 RX ADMIN — Medication 1 MILLIGRAM(S): at 08:11

## 2023-06-02 RX ADMIN — Medication 50 MILLIGRAM(S): at 17:55

## 2023-06-02 RX ADMIN — LITHIUM CARBONATE 450 MILLIGRAM(S): 300 TABLET, EXTENDED RELEASE ORAL at 08:12

## 2023-06-02 RX ADMIN — LITHIUM CARBONATE 450 MILLIGRAM(S): 300 TABLET, EXTENDED RELEASE ORAL at 20:25

## 2023-06-02 NOTE — BH INPATIENT PSYCHIATRY PROGRESS NOTE - NSBHMETABOLIC_PSY_ALL_CORE_FT
BMI: BMI (kg/m2): 32.5 (04-18-23 @ 05:33)  HbA1c: A1C with Estimated Average Glucose Result: 5.7 % (05-06-23 @ 08:53)    Glucose:   BP: 113/68 (06-02-23 @ 07:48) (113/67 - 147/76)  Lipid Panel: Date/Time: 05-06-23 @ 08:53  Cholesterol, Serum: 206  Direct LDL: --  HDL Cholesterol, Serum: 47  Total Cholesterol/HDL Ration Measurement: --  Triglycerides, Serum: 131

## 2023-06-02 NOTE — BH INPATIENT PSYCHIATRY PROGRESS NOTE - NSBHFUPINTERVALHXFT_PSY_A_CORE
was used for this interview, Susana #[id number]. Patient was seen and evaluated this morning. Chart was reviewed and no acute events were noted. Last afternoon, PRN atarax was given for agitation. Upon approach, patient is sitting in bed comfortably. Patient is alert and oriented and engages with interviewer. Patient is cooperative and answers all questions appropriately. Patient states that he has passive suicidal ideations, stating that he would not like to continue living life but keeps going to not upset his mother and brother. The patient states that last afternoon, he has an episode of nervousness/anxious behavior during which he had the urge to hit himself in the head; he went to the Nurse's station, received medication and felt better thereafter. The patient was informed about the plan to remove 1-to-1 supervision next week and was given the option to change rooms to be closer to the nursing station to help with anxiety. The patient is amenable to plan but would like to remain in current room. The patient was also told about tentative plans for discharge at some time next week, and states that he would not like to go home because he would "have to live his life" when he does not want to. He states that he lives at home with his brother, and feels safe at home. He states that he has goals to study astronomy but his goals have been disrupted by his depressive symptoms. Patient states that his recently increased dosage of lithium has helped improve his symptoms.  was used for this interview, Susana #[id number]. Patient was seen and evaluated this morning. Chart was reviewed and no acute events were noted. Last afternoon, PRN atarax was given for anxiety. Upon approach, patient is sitting in day room comfortably talking with other Frisian speaking pt. Patient is alert and oriented and engages with interviewer. Patient is cooperative and answers all questions appropriately. The patient states that last afternoon, he has an episode of nervousness/anxious behavior during which he had the urge to hit himself in the head; he went to the nurse's station, received medication and felt better thereafter. The patient was informed about the plan to remove 1-to-1 supervision next week and was given the option to change rooms to be closer to the nursing station to help with anxiety. The patient is amenable to discontinuing the 1:1 next week, but would like to remain in current room. The patient was also told about tentative plans for discharge at some time next week, and states that he would not like to go home because he would "have to live his life" when he does not want to. Patient states that he has passive suicidal ideations, stating that he would not like to continue living life but keeps going to not upset his mother and sister. He states that he lives at home with his brother, and feels safe at home. He states that he has goals to study astronomy but his goals have been disrupted by his depressive symptoms. Patient states that his recently increased dosage of lithium has helped improve his symptoms.

## 2023-06-02 NOTE — BH INPATIENT PSYCHIATRY PROGRESS NOTE - NSBHASSESSSUMMFT_PSY_ALL_CORE
The patient is a 18 yo transgender F->M, Yoruba-speaking, recently immigrated from [place], domiciled with brother, unemployed, with psych hx of gender dysphoria, anxiety and depression, no past psychiatric hospitalizations, previous suicide attempt via choking, who is presenting at recommendation of brother and cousin for increasing SI.    Upon admission patient endorses strong feelings of depression and exhibits very dysphoric and minimally reactive affect. He endorses escalating suicidal thoughts related to feelings of worthlessness and unhappiness over his appearance. During the course of his hospitalization, his mood and affect initially improved, he became become more talkative. However, throughout the course of the hospitalization he started having near-daily anxiety attacks which he has been coping with by head banging or punching/scratching his limbs. His depression seems to have alleviated overall, however he suffers from  frequent mood shifts and anxiety/panic attacks which he has difficulty coping with. These shifts are often triggered by misgendering by others or missing his mother, and are often prevented when he is in the company of Yoruba-speaking staff and is socially engaged. The patient was admitted involuntarily on 9:39 legals as he has been acutely elevated risk of suicide, now changed to voluntary legals. Chronic risk factors include history of trauma, history of persistent depressive symptoms, and history of suicidal ideation. Current risk factors include recent self inflicted choking, age, recent immigration, not understanding/speaking well language of current residence, current gender dysphoria, current social anxiety, current panic attacks and financial stressors.     On evaluation today, patient endorses episode of anxiety last afternoon during which he had thoughts of hitting himself in the head, which was alleviated after receiving PRN Atarax medication. The patient also endorses passive suicidal ideation. Goals of care will be to improve patient's symptoms of depression and anxiety which requires continued hospitalization for symptom monitoring and medication adjustment.    04/18/23- Patient acutely dysphoric. Plan to dc mirtazapine as he notes excessive lethargy and hyperphagia, continue sertraline 50 mg  04/19/2023- Patient remains with thoughts of suicide and concerns about his future, however he feels safe while in a structured setting with people around him  04/20/23 Patient had no thoughts of suicide during interview, appeared brighter and occasionally smiled. Agreeable to increase antidepressants  04/24 Patient notes panic attack last night, alleviated talking with cousin. Brighter, future oriented. Endorses social anxiety.  04/25/23 Patient did not have panic attack in last 24 hrs. Still appears brighter on unit and during interview today. Voiced that his social anxiety hinders his ability to advocate for himself while on unit  04/26   Patient is somewhat less anxious,  spending  time outside his room and is cheerful at times.  04/27/23 Patient notes still having intermittent anxiety, however he is now able to leave his room and his speech is much more productive. Affect continues to be more supple than on initial presentation. Plan to increase Zoloft to 150 mg 04/28, with eventual plan to maximize it to treat his social anxiety disorder.  04/28 Yesterday patient had another panic attack when bullied about his gender expression and received Klonopin and Atarax PRN. Though he states his mood is "good" and has a more supple affect than on initial presentation, he continues to endorse feelings of self hatred (which he believes may be alleviated by gender affirmative care) as well as wish to no longer be alive (though no active SI)  05/01 Patient continues to endorse passive SI, appeared anxious though smiling during interview. Reported panic attack yesterday. Ok with plan to increase Zoloft to 200 mg  05/02 Pt received first dose of 200 mg Zoloft. Had panic attack yesterday triggered by existential doubts and feelings of self hatred, which resulted in him hitting himself. Today less anxious but continues to endorse ambivalence about wanting to be alive.  5/3/23   patient had significant panic attack accompanied by dread and thoughts of self destruction.  When asked if suicidal patient replies I cannot kill myself because of my family.  05/04- Acutely anxious, more so than previously (however interviewed in large group), will add 2 mg abilify. Vickie in process of enrolling [facility] as patient in [facility]  5/5 - Pt endorses panic attack the evening before during which he hit himself in the stomach and arms and banged his head on the wall due to self hatred and feeling like he does not have a place in this world. Created signs with patient that he can use to communicate with staff nonverbally; pt stated that he will try his best to use them. Does not report adverse effects from Abilify.  5/8- Pt denies any panic attacks over the weekend. Pt states that he did not need to use the signs that he created together with the team to communicate with staff. Continues to endorse passive SI and states that his reason for living is his family, but other than that, he does not feel like he has a reason to live. Abilify increased to 5 mg  5/9- Pt denies any panic attacks since yesterday, but continues to endorse passive SI and state that his family is his only reason for living. Will continue Abilify 5 mg.  5/10- Pt states that he had a panic attack the evening prior, during which he punched himself in the stomach and banged his head against the wall. Stated again that he does not want to be alive and does not have a place in this world.  5/11- Pt stated that yesterday, he wrapped a blanket around his neck because he "wanted to feel something" and wanted to "turn off my brain." He stated that he did not do this with the intention to end his life. He stated he does not want to be alive, but cannot die because it would hurt his family. Will decrease Zoloft to 100 mg on 5/12 with the intention of cross-titrating to venlafaxine. Will continue Abilify 5 mg.  5/12 - Pt reported no panic attacks or attempts at self-harm since last conversation. Stated that he "feels sad" and like he is "descending into darkness." Explained to pt that we would be switching from Zoloft to Effexor. Gave Zoloft 100 mg today, will also be starting Effexor 75 mg today.   5/18- Pt reports feeling "the same", endorses palpitations on Venlafaxine, will continue to monitor. Panic attack yesterday, will change standing Klonopin to 2 pm dosing.  5/19- Pt reports "feeling a little sad today" and states that he is continuing to have palpitations at night. Did not have panic attack yesterday, will continuing standing Klonopin.   5/21- Pt received PRN PO Haldol and Atarax after he started head banging in the context of distressing feelings. Today appeared more dysphoric yet less anxious than usual. Appeared tired which he attributed to the Haldol.  5/22 - Pt had episode of tachycardia and was evaluated by medicine attending who deemed non pathological. Appeared anxious and tired today, when asked about suicide after discharge he was unable to contract for safety. Had an anxious and panicked episode relieved by atarax, icing forearms, and talking. Increased Klonopin to 1 mg standing, will consider adding lithium and changing to ssri tmrw  5/23 - Pt had another anxious episode relieved by icing head and forearms, as well as talking. On approach, pt is tearful and dysphoric and states that he wants to die and that he does not belong in this world. Begin lithium 300 mg BID, plan to start Lexapro 10 mg tmrw.   5/24 - Pt had two anxious episodes the previous day; music and ice helped for the first one, but no longer helped for the second one. Pt states these episodes came about in the context of thoughts of self-hatred. On approach, pt has improved affect compared to prior assessment. Start Lexapro 10 mg today.  5/25 -  Pt had an anxious episode the previous evening precipitated by thoughts of "wanting to die" requiring PO Haldol, Ativan, and Hydroxyzine. Pt attended crisis management group the previous day and reviewed DBT skills with team. On approach, pt has depressed affect. Continue Lexapro 10 mg and lithium 300 BID.   5/26 - Pt had an anxious episode at 7 AM this morning requiring Atarax 50. Pt has meeting with [facility] today at 10:30. On approach, pt has notably improved affect compared to prior assessment. Continue Lexapro 10 mg and lithium 300 BID.   5/30- Pt had an anxious episode on Sunday, Monday receiving Ativan 2 mg PO PRN for Agitation on 5/29 at 17:10. Continue lexapro 10 mg qd, Klonopin 1 mg PO qd, increase lithium 300mg BID to 450mg BID  5/31- Pt denies symptoms of suicidal ideation, or anxious episodes over the past day, but states that he does have homicidal ideations. On approach, patient has improved affect as compared to previous day. Continue lexapro 10 mg PO qd, Klonopin 1 mg PO qd, lithium 450 mg PO BID.   6/1- denies SI/HI. anxiety overnight, thoughts of self-harm during this; alleviated by speaking to 1:1. c/w lexapro 10 qd, Klonopin 1 qd, lithium 450 BID.  6/2- endorses passive SI. anxiety last afternoon with thoughts of self-harm; alleviated by PRN Atarax 50 mg. c/w lexapro 10 qd, Klonopin 1 qd, lithium 450 BID.  Impression: MDD vs Social Anxiety disorder with Panic attacks vs Gender dysphoria vs cPTSD, R/O bipolar    #MDD   #Gender dysphoria   #Social Anxiety Disorder   - 9.39 legals changed to 9.14  - c/w Lithium 450 mg BID 5/23 for suicidality and stabilization of affect; lithium level 5/30- 0.30  - c/w Lexapro 10 mg starting 5/24  - Discontinued Effexor 150 mg, 05/23  - Discontinued Abilify 5 mg, 5/16   - Tapered off Zoloft  - Melatonin for sleep phase disorder  - Left vo[facility]email [facility] Vincent at [facility], [phone]  - Left VM with  Kandice Acosta who work s with [employer] for connection to trans affirming care  - Left  Teagan Braunaza [phone], patient's therapist  - TSH WNL    #Panic attacks  - PRN Atarax  - c/w Klonopin 1 mg at 14:00    #Gender Dysphoria  - The following programs contacted: [facility] (does not have anyone to send to the hospital), [facility] (only provides services for homeless individuals), [facility] (left voicemail), [facility] [employer] [facility] (phone number out of service), [facility], [facility]'s Youth Drop In program (reached out to them, meeting with Radames on 5/26), [facility] (voicemail full), [facility] (no availability to send anyone, have [facility] but referred to [facility])  - Referral on discharge to the [facility] (suicide hotline) and [facility] ([facility] has enrolled patient)  - [facility] plans to come on June 1st at 1pm to meet Radames, will need an , please call them back at [phone] to confirm that Radames and resident or Dr. Rubin can be there for the meeting    #Agitation  - For episodes of acute agitation can offer PO Haldol 5 mg/Ativan 2 mg/Benadryl 50 mg Q6H PRN. If refusing PO and is in acute risk of harm to self/other, can offer IM Haldol 5 mg/Ativan 2 mg/Benadryl 50 mg Q6H PRN. If given, please repeat EKG and hold antipsychotics if QTC>500   The patient is a 16 yo transgender F->M, Syriac-speaking, recently immigrated from [place], domiciled with brother, unemployed, with psych hx of gender dysphoria, anxiety and depression, no past psychiatric hospitalizations, previous suicide attempt via choking, who is presenting at recommendation of brother and cousin for increasing SI.    Upon admission patient endorses strong feelings of depression and exhibits very dysphoric and minimally reactive affect. He endorses escalating suicidal thoughts related to feelings of worthlessness and unhappiness over his appearance. During the course of his hospitalization, his mood and affect initially improved, he became become more talkative. However, throughout the course of the hospitalization he started having near-daily anxiety attacks which he has been coping with by head banging or punching/scratching his limbs. His depression seems to have alleviated overall, however he suffers from  frequent mood shifts and anxiety/panic attacks which he has difficulty coping with. These shifts are often triggered by misgendering by others or missing his mother, and are often prevented when he is in the company of Syriac-speaking staff and is socially engaged. The patient was admitted involuntarily on 9:39 legals as he has been acutely elevated risk of suicide, now changed to voluntary legals. Chronic risk factors include history of trauma, history of persistent depressive symptoms, and history of suicidal ideation. Current risk factors include recent self inflicted choking, age, recent immigration, not understanding/speaking well language of current residence, current gender dysphoria, current social anxiety, current panic attacks and financial stressors.     On evaluation today, patient endorses episode of anxiety last afternoon during which he had thoughts of hitting himself in the head, which was alleviated after receiving PRN Atarax medication. The patient also endorses passive suicidal ideation, but no active suicidal ideation or plan. Goals of care will be to improve patient's symptoms of depression and anxiety which requires continued hospitalization for symptom monitoring and medication adjustment.    04/18/23- Patient acutely dysphoric. Plan to dc mirtazapine as he notes excessive lethargy and hyperphagia, continue sertraline 50 mg  04/19/2023- Patient remains with thoughts of suicide and concerns about his future, however he feels safe while in a structured setting with people around him  04/20/23 Patient had no thoughts of suicide during interview, appeared brighter and occasionally smiled. Agreeable to increase antidepressants  04/24 Patient notes panic attack last night, alleviated talking with cousin. Brighter, future oriented. Endorses social anxiety.  04/25/23 Patient did not have panic attack in last 24 hrs. Still appears brighter on unit and during interview today. Voiced that his social anxiety hinders his ability to advocate for himself while on unit  04/26   Patient is somewhat less anxious,  spending  time outside his room and is cheerful at times.  04/27/23 Patient notes still having intermittent anxiety, however he is now able to leave his room and his speech is much more productive. Affect continues to be more supple than on initial presentation. Plan to increase Zoloft to 150 mg 04/28, with eventual plan to maximize it to treat his social anxiety disorder.  04/28 Yesterday patient had another panic attack when bullied about his gender expression and received Klonopin and Atarax PRN. Though he states his mood is "good" and has a more supple affect than on initial presentation, he continues to endorse feelings of self hatred (which he believes may be alleviated by gender affirmative care) as well as wish to no longer be alive (though no active SI)  05/01 Patient continues to endorse passive SI, appeared anxious though smiling during interview. Reported panic attack yesterday. Ok with plan to increase Zoloft to 200 mg  05/02 Pt received first dose of 200 mg Zoloft. Had panic attack yesterday triggered by existential doubts and feelings of self hatred, which resulted in him hitting himself. Today less anxious but continues to endorse ambivalence about wanting to be alive.  5/3/23   patient had significant panic attack accompanied by dread and thoughts of self destruction.  When asked if suicidal patient replies I cannot kill myself because of my family.  05/04- Acutely anxious, more so than previously (however interviewed in large group), will add 2 mg abilify. Vickie in process of enrolling [facility] as patient in [facility]  5/5 - Pt endorses panic attack the evening before during which he hit himself in the stomach and arms and banged his head on the wall due to self hatred and feeling like he does not have a place in this world. Created signs with patient that he can use to communicate with staff nonverbally; pt stated that he will try his best to use them. Does not report adverse effects from Abilify.  5/8- Pt denies any panic attacks over the weekend. Pt states that he did not need to use the signs that he created together with the team to communicate with staff. Continues to endorse passive SI and states that his reason for living is his family, but other than that, he does not feel like he has a reason to live. Abilify increased to 5 mg  5/9- Pt denies any panic attacks since yesterday, but continues to endorse passive SI and state that his family is his only reason for living. Will continue Abilify 5 mg.  5/10- Pt states that he had a panic attack the evening prior, during which he punched himself in the stomach and banged his head against the wall. Stated again that he does not want to be alive and does not have a place in this world.  5/11- Pt stated that yesterday, he wrapped a blanket around his neck because he "wanted to feel something" and wanted to "turn off my brain." He stated that he did not do this with the intention to end his life. He stated he does not want to be alive, but cannot die because it would hurt his family. Will decrease Zoloft to 100 mg on 5/12 with the intention of cross-titrating to venlafaxine. Will continue Abilify 5 mg.  5/12 - Pt reported no panic attacks or attempts at self-harm since last conversation. Stated that he "feels sad" and like he is "descending into darkness." Explained to pt that we would be switching from Zoloft to Effexor. Gave Zoloft 100 mg today, will also be starting Effexor 75 mg today.   5/18- Pt reports feeling "the same", endorses palpitations on Venlafaxine, will continue to monitor. Panic attack yesterday, will change standing Klonopin to 2 pm dosing.  5/19- Pt reports "feeling a little sad today" and states that he is continuing to have palpitations at night. Did not have panic attack yesterday, will continuing standing Klonopin.   5/21- Pt received PRN PO Haldol and Atarax after he started head banging in the context of distressing feelings. Today appeared more dysphoric yet less anxious than usual. Appeared tired which he attributed to the Haldol.  5/22 - Pt had episode of tachycardia and was evaluated by medicine attending who deemed non pathological. Appeared anxious and tired today, when asked about suicide after discharge he was unable to contract for safety. Had an anxious and panicked episode relieved by atarax, icing forearms, and talking. Increased Klonopin to 1 mg standing, will consider adding lithium and changing to ssri tmrw  5/23 - Pt had another anxious episode relieved by icing head and forearms, as well as talking. On approach, pt is tearful and dysphoric and states that he wants to die and that he does not belong in this world. Begin lithium 300 mg BID, plan to start Lexapro 10 mg tmrw.   5/24 - Pt had two anxious episodes the previous day; music and ice helped for the first one, but no longer helped for the second one. Pt states these episodes came about in the context of thoughts of self-hatred. On approach, pt has improved affect compared to prior assessment. Start Lexapro 10 mg today.  5/25 -  Pt had an anxious episode the previous evening precipitated by thoughts of "wanting to die" requiring PO Haldol, Ativan, and Hydroxyzine. Pt attended crisis management group the previous day and reviewed DBT skills with team. On approach, pt has depressed affect. Continue Lexapro 10 mg and lithium 300 BID.   5/26 - Pt had an anxious episode at 7 AM this morning requiring Atarax 50. Pt has meeting with [facility] today at 10:30. On approach, pt has notably improved affect compared to prior assessment. Continue Lexapro 10 mg and lithium 300 BID.   5/30- Pt had an anxious episode on Sunday, Monday receiving Ativan 2 mg PO PRN for Agitation on 5/29 at 17:10. Continue lexapro 10 mg qd, Klonopin 1 mg PO qd, increase lithium 300mg BID to 450mg BID  5/31- Pt denies symptoms of suicidal ideation, or anxious episodes over the past day, but states that he does have homicidal ideations. On approach, patient has improved affect as compared to previous day. Continue lexapro 10 mg PO qd, Klonopin 1 mg PO qd, lithium 450 mg PO BID.   6/1- denies SI/HI. anxiety overnight, thoughts of self-harm during this; alleviated by speaking to 1:1. c/w lexapro 10 qd, Klonopin 1 qd, lithium 450 BID.  6/2- endorses passive SI. anxiety last afternoon with thoughts of self-harm; alleviated by PRN Atarax 50 mg. c/w lexapro 10 qd, Klonopin 1 qd, lithium 450 BID.    Impression: MDD vs Social Anxiety disorder with Panic attacks vs Gender dysphoria vs cPTSD, R/O bipolar    #MDD   #Gender dysphoria   #Social Anxiety Disorder   - 9.39 legals changed to 9.14  - c/w Lithium 450 mg BID 5/23 for suicidality and stabilization of affect; lithium level 5/30- 0.30  - c/w Lexapro 10 mg starting 5/24  - Discontinued Effexor 150 mg, 05/23  - Discontinued Abilify 5 mg, 5/16   - Tapered off Zoloft  - Melatonin for sleep phase disorder  - Left voicemail with Dr. Vincent at [facility], [phone]  - Left VM with  Kandice Acosta who work s with [email or web address] [facility] for connection to trans affirming care  - Left VM Teagan Braunaza [phone], patient's therapist  - TSH WNL    #Panic attacks  - PRN Atarax  - c/w Klonopin 1 mg at 14:00    #Gender Dysphoria  - The following programs contacted: [facility] (does not have anyone to send to the hospital), [employer] (only provides services for homeless individuals), [facility] (left voicemail), [facility] (phone number out of service), [facility], [facility] Youth Drop In program (reached out to them, meeting with Radames on 5/26), [facility] (voicemail full), [facility] (no availability to send anyone, have [facility] but referred to [facility])  - Referral on discharge to the [facility] (suicide hotline) and [facility] ([facility] has enrolled patient)  - [facility] plans to come on June 1st at 1pm to meet Radames, will need an , please call them back at [phone] to confirm that Radames and  or Dr. Rubin can be there for the meeting    #Agitation  - For episodes of acute agitation can offer PO Haldol 5 mg/Ativan 2 mg/Benadryl 50 mg Q6H PRN. If refusing PO and is in acute risk of harm to self/other, can offer IM Haldol 5 mg/Ativan 2 mg/Benadryl 50 mg Q6H PRN. If given, please repeat EKG and hold antipsychotics if QTC>500

## 2023-06-02 NOTE — BH INPATIENT PSYCHIATRY PROGRESS NOTE - CURRENT MEDICATION
MEDICATIONS  (STANDING):  escitalopram 10 milliGRAM(s) Oral daily  lithium 450 milliGRAM(s) Oral two times a day  melatonin. 3 milliGRAM(s) Oral at bedtime    MEDICATIONS  (PRN):  aluminum hydroxide/magnesium hydroxide/simethicone Suspension 30 milliLiter(s) Oral every 6 hours PRN Dyspepsia  haloperidol     Tablet 5 milliGRAM(s) Oral every 6 hours PRN agitation  hydrOXYzine hydrochloride 50 milliGRAM(s) Oral every 6 hours PRN anxiety and insomnia  LORazepam     Tablet 2 milliGRAM(s) Oral every 6 hours PRN agitation

## 2023-06-03 PROCEDURE — 99231 SBSQ HOSP IP/OBS SF/LOW 25: CPT

## 2023-06-03 RX ORDER — CLONAZEPAM 1 MG
1 TABLET ORAL AT BEDTIME
Refills: 0 | Status: DISCONTINUED | OUTPATIENT
Start: 2023-06-03 | End: 2023-06-09

## 2023-06-03 RX ADMIN — ESCITALOPRAM OXALATE 10 MILLIGRAM(S): 10 TABLET, FILM COATED ORAL at 09:18

## 2023-06-03 RX ADMIN — LITHIUM CARBONATE 450 MILLIGRAM(S): 300 TABLET, EXTENDED RELEASE ORAL at 20:44

## 2023-06-03 RX ADMIN — Medication 50 MILLIGRAM(S): at 18:20

## 2023-06-03 RX ADMIN — Medication 1 MILLIGRAM(S): at 09:18

## 2023-06-03 RX ADMIN — Medication 1 MILLIGRAM(S): at 20:45

## 2023-06-03 RX ADMIN — LITHIUM CARBONATE 450 MILLIGRAM(S): 300 TABLET, EXTENDED RELEASE ORAL at 09:18

## 2023-06-03 RX ADMIN — Medication 3 MILLIGRAM(S): at 20:44

## 2023-06-03 RX ADMIN — HALOPERIDOL DECANOATE 5 MILLIGRAM(S): 100 INJECTION INTRAMUSCULAR at 18:39

## 2023-06-03 NOTE — BH INPATIENT PSYCHIATRY PROGRESS NOTE - PRN MEDS
MEDICATIONS  (PRN):  aluminum hydroxide/magnesium hydroxide/simethicone Suspension 30 milliLiter(s) Oral every 6 hours PRN Dyspepsia  haloperidol     Tablet 5 milliGRAM(s) Oral every 6 hours PRN agitation  hydrOXYzine hydrochloride 50 milliGRAM(s) Oral every 6 hours PRN anxiety and insomnia

## 2023-06-03 NOTE — BH INPATIENT PSYCHIATRY PROGRESS NOTE - NSBHFUPINTERVALHXFT_PSY_A_CORE
Patient was seen and evaluated this morning. Chart was reviewed and no acute events were noted. As per nurses, patient has been cooperative and social with staff. Reportedly, he does not want to be discharge, voicing SI each time the topic of discharge comes up. Patient was seen in his room, pleasant and polite, fidgeting with rubber band, listening to music and making origami. He denies SI this morning, but states that he had "bad thoughts" yesterday. He calmed down after talking to staff. He reports fair sleep with frequent awakenings.

## 2023-06-03 NOTE — BH INPATIENT PSYCHIATRY PROGRESS NOTE - NSBHMETABOLIC_PSY_ALL_CORE_FT
BMI: BMI (kg/m2): 32.5 (04-18-23 @ 05:33)  HbA1c: A1C with Estimated Average Glucose Result: 5.7 % (05-06-23 @ 08:53)    Glucose:   BP: 113/69 (06-03-23 @ 07:35) (113/68 - 147/76)  Lipid Panel: Date/Time: 05-06-23 @ 08:53  Cholesterol, Serum: 206  Direct LDL: --  HDL Cholesterol, Serum: 47  Total Cholesterol/HDL Ration Measurement: --  Triglycerides, Serum: 131

## 2023-06-03 NOTE — BH INPATIENT PSYCHIATRY PROGRESS NOTE - NSBHCHARTREVIEWVS_PSY_A_CORE FT
Vital Signs Last 24 Hrs  T(C): 35.7 (06-02-23 @ 16:40), Max: 35.7 (06-02-23 @ 16:40)  T(F): 96.3 (06-02-23 @ 16:40), Max: 96.3 (06-02-23 @ 16:40)  HR: 104 (06-03-23 @ 07:35) (104 - 115)  BP: 113/69 (06-03-23 @ 07:35) (113/69 - 131/64)  BP(mean): --  RR: 16 (06-03-23 @ 07:35) (16 - 16)  SpO2: --

## 2023-06-03 NOTE — BH INPATIENT PSYCHIATRY PROGRESS NOTE - NSBHASSESSSUMMFT_PSY_ALL_CORE
The patient is a 18 yo transgender F->M, Wolof-speaking, recently immigrated from [employer], domiciled with brother, unemployed, with psych hx of gender dysphoria, anxiety and depression, no past psychiatric hospitalizations, previous suicide attempt via choking, who is presenting at recommendation of brother and cousin for increasing SI.    Upon admission patient endorses strong feelings of depression and exhibits very dysphoric and minimally reactive affect. He endorses escalating suicidal thoughts related to feelings of worthlessness and unhappiness over his appearance. During the course of his hospitalization, his mood and affect initially improved, he became become more talkative. However, throughout the course of the hospitalization he started having near-daily anxiety attacks which he has been coping with by head banging or punching/scratching his limbs. His depression seems to have alleviated overall, however he suffers from  frequent mood shifts and anxiety/panic attacks which he has difficulty coping with. These shifts are often triggered by misgendering by others or missing his mother, and are often prevented when he is in the company of Wolof-speaking staff and is socially engaged. The patient was admitted involuntarily on 9:39 legals as he has been acutely elevated risk of suicide, now changed to voluntary legals. Chronic risk factors include history of trauma, history of persistent depressive symptoms, and history of suicidal ideation. Current risk factors include recent self inflicted choking, age, recent immigration, not understanding/speaking well language of current residence, current gender dysphoria, current social anxiety, current panic attacks and financial stressors.       04/18/23- Patient acutely dysphoric. Plan to dc mirtazapine as he notes excessive lethargy and hyperphagia, continue sertraline 50 mg  04/19/2023- Patient remains with thoughts of suicide and concerns about his future, however he feels safe while in a structured setting with people around him  04/20/23 Patient had no thoughts of suicide during interview, appeared brighter and occasionally smiled. Agreeable to increase antidepressants  04/24 Patient notes panic attack last night, alleviated talking with cousin. Brighter, future oriented. Endorses social anxiety.  04/25/23 Patient did not have panic attack in last 24 hrs. Still appears brighter on unit and during interview today. Voiced that his social anxiety hinders his ability to advocate for himself while on unit  04/26   Patient is somewhat less anxious,  spending  time outside his room and is cheerful at times.  04/27/23 Patient notes still having intermittent anxiety, however he is now able to leave his room and his speech is much more productive. Affect continues to be more supple than on initial presentation. Plan to increase Zoloft to 150 mg 04/28, with eventual plan to maximize it to treat his social anxiety disorder.  04/28 Yesterday patient had another panic attack when bullied about his gender expression and received Klonopin and Atarax PRN. Though he states his mood is "good" and has a more supple affect than on initial presentation, he continues to endorse feelings of self hatred (which he believes may be alleviated by gender affirmative care) as well as wish to no longer be alive (though no active SI)  05/01 Patient continues to endorse passive SI, appeared anxious though smiling during interview. Reported panic attack yesterday. Ok with plan to increase Zoloft to 200 mg  05/02 Pt received first dose of 200 mg Zoloft. Had panic attack yesterday triggered by existential doubts and feelings of self hatred, which resulted in him hitting himself. Today less anxious but continues to endorse ambivalence about wanting to be alive.  5/3/23   patient had significant panic attack accompanied by dread and thoughts of self destruction.  When asked if suicidal patient replies I cannot kill myself because of my family.  05/04- Acutely anxious, more so than previously (however interviewed in large group), will add 2 mg abilify. [facility]  5/5 - Pt endorses panic attack the evening before during which he hit himself in the stomach and arms and banged his head on the wall due to self hatred and feeling like he does not have a place in this world. Created signs with patient that he can use to communicate with staff nonverbally; pt stated that he will try his best to use them. Does not report adverse effects from Abilify.  5/8- Pt denies any panic attacks over the weekend. Pt states that he did not need to use the signs that he created together with the team to communicate with staff. Continues to endorse passive SI and states that his reason for living is his family, but other than that, he does not feel like he has a reason to live. Abilify increased to 5 mg  5/9- Pt denies any panic attacks since yesterday, but continues to endorse passive SI and state that his family is his only reason for living. Will continue Abilify 5 mg.  5/10- Pt states that he had a panic attack the evening prior, during which he punched himself in the stomach and banged his head against the wall. Stated again that he does not want to be alive and does not have a place in this world.  5/11- Pt stated that yesterday, he wrapped a blanket around his neck because he "wanted to feel something" and wanted to "turn off my brain." He stated that he did not do this with the intention to end his life. He stated he does not want to be alive, but cannot die because it would hurt his family. Will decrease Zoloft to 100 mg on 5/12 with the intention of cross-titrating to venlafaxine. Will continue Abilify 5 mg.  5/12 - Pt reported no panic attacks or attempts at self-harm since last conversation. Stated that he "feels sad" and like he is "descending into darkness." Explained to pt that we would be switching from Zoloft to Effexor. Gave Zoloft 100 mg today, will also be starting Effexor 75 mg today.   5/18- Pt reports feeling "the same", endorses palpitations on Venlafaxine, will continue to monitor. Panic attack yesterday, will change standing Klonopin to 2 pm dosing.  5/19- Pt reports "feeling a little sad today" and states that he is continuing to have palpitations at night. Did not have panic attack yesterday, will continuing standing Klonopin.   5/21- Pt received PRN PO Haldol and Atarax after he started head banging in the context of distressing feelings. Today appeared more dysphoric yet less anxious than usual. Appeared tired which he attributed to the Haldol.  5/22 - Pt had episode of tachycardia and was evaluated by medicine attending who deemed non pathological. Appeared anxious and tired today, when asked about suicide after discharge he was unable to contract for safety. Had an anxious and panicked episode relieved by atarax, icing forearms, and talking. Increased Klonopin to 1 mg standing, will consider adding lithium and changing to ssri tmrw  5/23 - Pt had another anxious episode relieved by icing head and forearms, as well as talking. On approach, pt is tearful and dysphoric and states that he wants to die and that he does not belong in this world. Begin lithium 300 mg BID, plan to start Lexapro 10 mg tmrw.   5/24 - Pt had two anxious episodes the previous day; music and ice helped for the first one, but no longer helped for the second one. Pt states these episodes came about in the context of thoughts of self-hatred. On approach, pt has improved affect compared to prior assessment. Start Lexapro 10 mg today.  5/25 -  Pt had an anxious episode the previous evening precipitated by thoughts of "wanting to die" requiring PO Haldol, Ativan, and Hydroxyzine. Pt attended crisis management group the previous day and reviewed DBT skills with team. On approach, pt has depressed affect. Continue Lexapro 10 mg and lithium 300 BID.   5/26 - Pt had an anxious episode at 7 AM this morning requiring Atarax 50. Pt has meeting with [facility] today at 10:30. On approach, pt has notably improved affect compared to prior assessment. Continue Lexapro 10 mg and lithium 300 BID.   5/30- Pt had an anxious episode on Sunday, Monday receiving Ativan 2 mg PO PRN for Agitation on 5/29 at 17:10. Continue lexapro 10 mg qd, Klonopin 1 mg PO qd, increase lithium 300mg BID to 450mg BID  5/31- Pt denies symptoms of suicidal ideation, or anxious episodes over the past day, but states that he does have homicidal ideations. On approach, patient has improved affect as compared to previous day. Continue lexapro 10 mg PO qd, Klonopin 1 mg PO qd, lithium 450 mg PO BID.   6/1- denies SI/HI. anxiety overnight, thoughts of self-harm during this; alleviated by speaking to 1:1. c/w lexapro 10 qd, Klonopin 1 qd, lithium 450 BID.  6/2- endorses passive SI. anxiety last afternoon with thoughts of self-harm; alleviated by PRN Atarax 50 mg. c/w lexapro 10 qd, Klonopin 1 qd, lithium 450 BID.    Impression: MDD vs Social Anxiety disorder with Panic attacks vs Gender dysphoria vs cPTSD, R/O bipolar    #MDD   #Gender dysphoria   #Social Anxiety Disorder   - 9.39 legals changed to 9.14  - c/w Lithium 450 mg BID 5/23 for suicidality and stabilization of affect; lithium level 5/30- 0.30  - c/w Lexapro 10 mg starting 5/24  - Discontinued Effexor 150 mg, 05/23  - Discontinued Abilify 5 mg, 5/16   - Tapered off Zoloft  - Melatonin for sleep phase disorder  - Left voicemail with Dr. Vincent at [facility], [phone]  - Left VM with  Kandice Acosta who work s with LGBTQ community for connection to trans affirming care  - Left VM Teagan Braunaza [phone], patient's therapist  - TSH WNL    #Panic attacks  - PRN Atarax  - Switch Klonopin 1 mg from 14:00 to HS to help with insomnia    #Gender Dysphoria  - The following programs [facility]: [facility] (does not have [facility] to [facility] to the hospital), [facility] (only provides services for homeless individuals), [facility] (left voicemail), [facility] (phone number out of service), [facility], [facility] [place] (reached out to them, meeting with Radames on 5/26), [facility] (voicemail full), [facility] (no availability to send anyone, have [facility] but referred to Jorge Alberto Mej[facility])  - Referral on discharge to [facility] (suicide hotline) and [facility] ([facility] has enrolled patient)  - [facility] plans to come on June 1st at 1pm to meet Radames, will need an , please call them back at [phone] to confirm that Radames and resident or Dr. Ruibn can be there for the meeting    #Agitation  - For episodes of acute agitation can offer PO Haldol 5 mg/Ativan 2 mg/Benadryl 50 mg Q6H PRN. If refusing PO and is in acute risk of harm to self/other, can offer IM Haldol 5 mg/Ativan 2 mg/Benadryl 50 mg Q6H PRN. If given, please repeat EKG and hold antipsychotics if QTC>500

## 2023-06-03 NOTE — BH INPATIENT PSYCHIATRY PROGRESS NOTE - CURRENT MEDICATION
MEDICATIONS  (STANDING):  clonazePAM  Tablet 1 milliGRAM(s) Oral daily  escitalopram 10 milliGRAM(s) Oral daily  lithium 450 milliGRAM(s) Oral two times a day  melatonin. 3 milliGRAM(s) Oral at bedtime    MEDICATIONS  (PRN):  aluminum hydroxide/magnesium hydroxide/simethicone Suspension 30 milliLiter(s) Oral every 6 hours PRN Dyspepsia  haloperidol     Tablet 5 milliGRAM(s) Oral every 6 hours PRN agitation  hydrOXYzine hydrochloride 50 milliGRAM(s) Oral every 6 hours PRN anxiety and insomnia

## 2023-06-04 PROCEDURE — 99231 SBSQ HOSP IP/OBS SF/LOW 25: CPT

## 2023-06-04 RX ADMIN — Medication 50 MILLIGRAM(S): at 10:00

## 2023-06-04 RX ADMIN — LITHIUM CARBONATE 450 MILLIGRAM(S): 300 TABLET, EXTENDED RELEASE ORAL at 08:13

## 2023-06-04 RX ADMIN — ESCITALOPRAM OXALATE 10 MILLIGRAM(S): 10 TABLET, FILM COATED ORAL at 08:13

## 2023-06-04 RX ADMIN — Medication 3 MILLIGRAM(S): at 20:19

## 2023-06-04 RX ADMIN — Medication 1 MILLIGRAM(S): at 20:21

## 2023-06-04 RX ADMIN — LITHIUM CARBONATE 450 MILLIGRAM(S): 300 TABLET, EXTENDED RELEASE ORAL at 20:19

## 2023-06-04 NOTE — BH INPATIENT PSYCHIATRY PROGRESS NOTE - NSBHMETABOLIC_PSY_ALL_CORE_FT
BMI: BMI (kg/m2): 32.5 (04-18-23 @ 05:33)  HbA1c: A1C with Estimated Average Glucose Result: 5.7 % (05-06-23 @ 08:53)    Glucose:   BP: 94/65 (06-04-23 @ 07:53) (94/65 - 147/76)  Lipid Panel: Date/Time: 05-06-23 @ 08:53  Cholesterol, Serum: 206  Direct LDL: --  HDL Cholesterol, Serum: 47  Total Cholesterol/HDL Ration Measurement: --  Triglycerides, Serum: 131

## 2023-06-04 NOTE — BH INPATIENT PSYCHIATRY PROGRESS NOTE - NSBHCHARTREVIEWLAB_PSY_A_CORE FT
Lithium Level, Serum (05.29.23 @ 09:06)    Lithium Level, Serum: 0.70 mmol/L  
WNL
05-30 Lithium: 0.30  
5/30 Lithium: 0.30
Lithium Level, Serum (05.29.23 @ 09:06)    Lithium Level, Serum: 0.70 mmol/L

## 2023-06-04 NOTE — BH INPATIENT PSYCHIATRY PROGRESS NOTE - NSBHASSESSSUMMFT_PSY_ALL_CORE
none Lexii Vaughan is 17 year old transgender man F->M with a history of Gender dysphoria, anxiety and depression, who was admitted to the psychiatric floor for worsening suicidal ideations.   Patient appears to be  anxious, and seems to be in engaging in the self injurious behaviors that has is likely in the context of a perceived stressors . Her impulse control is however considered unpredictable as she seems to have poor coping skills and poor frustration tolerance.   At this time, patient continues to be a danger to herself and continues to need inpatient psychiatric hospitalization for medication management and symptom stabilization.     Plan  Mood disorder:  - Continue Lithium 450mg P.o BID   - Continue Lexapro 10mg P.O daily   - Continue Klonopin 1mg p.o bedtime   - Continue Melatonin 3mg p.o bedtime

## 2023-06-04 NOTE — BH INPATIENT PSYCHIATRY PROGRESS NOTE - NSICDXBHTERTIARYDX_PSY_ALL_CORE
R/O Bipolar disorder   F31.9  R/O Social anxiety disorder   F40.10  
R/O Bipolar disorder   F31.9  
R/O Bipolar disorder   F31.9  R/O Social anxiety disorder   F40.10  
R/O Bipolar disorder   F31.9  
R/O Bipolar disorder   F31.9  R/O Social anxiety disorder   F40.10  
R/O Bipolar disorder   F31.9  R/O Social anxiety disorder   F40.10  
R/O Bipolar disorder   F31.9  
R/O Bipolar disorder   F31.9  R/O Social anxiety disorder   F40.10  
R/O Bipolar disorder   F31.9  
R/O Bipolar disorder   F31.9  R/O Social anxiety disorder   F40.10  
R/O Bipolar disorder   F31.9  
R/O Bipolar disorder   F31.9  R/O Social anxiety disorder   F40.10  
R/O Bipolar disorder   F31.9  
R/O Bipolar disorder   F31.9  
R/O Bipolar disorder   F31.9  R/O Social anxiety disorder   F40.10  
R/O Bipolar disorder   F31.9  
R/O Bipolar disorder   F31.9  
R/O Bipolar disorder   F31.9  R/O Social anxiety disorder   F40.10  

## 2023-06-04 NOTE — BH INPATIENT PSYCHIATRY PROGRESS NOTE - NSBHCHARTREVIEWVS_PSY_A_CORE FT
Vital Signs Last 24 Hrs  T(C): 36.4 (06-04-23 @ 07:53), Max: 36.4 (06-04-23 @ 07:53)  T(F): 97.5 (06-04-23 @ 07:53), Max: 97.5 (06-04-23 @ 07:53)  HR: 97 (06-04-23 @ 07:53) (97 - 111)  BP: 94/65 (06-04-23 @ 07:53) (94/65 - 122/74)  BP(mean): --  RR: 18 (06-04-23 @ 07:53) (18 - 18)  SpO2: --

## 2023-06-04 NOTE — BH INPATIENT PSYCHIATRY PROGRESS NOTE - NSBHFUPINTERVALHXFT_PSY_A_CORE
Patient seen and interviewed. 1 to 1 at  bedside .   Upon approach of the patient , she is observed to be lying down but sat up when she was asked to do so. She appeared anxious.  . When asked how she is feeling , she reports that she is very anxious and has been full of hate and anger. She reports that because of this , she started trying to scratch herself . She reports that staff gave her rubber bands to snap on her wrist when she has the urge to scratch herself and also they gave her a ball to press on. She reports that these have been helpful.  She denies having current suicidal thoughts, intent or plan.   According to nursing staff, patient received Atarax on an as needed basis for anxiety yesterday at about 6 pm and later received oral haldol to help her calm down after she started scratching herself  .

## 2023-06-04 NOTE — BH INPATIENT PSYCHIATRY PROGRESS NOTE - CURRENT MEDICATION
MEDICATIONS  (STANDING):  clonazePAM  Tablet 1 milliGRAM(s) Oral at bedtime  escitalopram 10 milliGRAM(s) Oral daily  lithium 450 milliGRAM(s) Oral two times a day  melatonin. 3 milliGRAM(s) Oral at bedtime    MEDICATIONS  (PRN):  aluminum hydroxide/magnesium hydroxide/simethicone Suspension 30 milliLiter(s) Oral every 6 hours PRN Dyspepsia  haloperidol     Tablet 5 milliGRAM(s) Oral every 6 hours PRN agitation  hydrOXYzine hydrochloride 50 milliGRAM(s) Oral every 6 hours PRN anxiety and insomnia

## 2023-06-05 RX ORDER — CLONAZEPAM 1 MG
1 TABLET ORAL
Refills: 0 | Status: DISCONTINUED | OUTPATIENT
Start: 2023-06-05 | End: 2023-06-09

## 2023-06-05 RX ADMIN — Medication 1 MILLIGRAM(S): at 20:55

## 2023-06-05 RX ADMIN — LITHIUM CARBONATE 450 MILLIGRAM(S): 300 TABLET, EXTENDED RELEASE ORAL at 08:26

## 2023-06-05 RX ADMIN — ESCITALOPRAM OXALATE 10 MILLIGRAM(S): 10 TABLET, FILM COATED ORAL at 08:26

## 2023-06-05 RX ADMIN — Medication 3 MILLIGRAM(S): at 20:55

## 2023-06-05 RX ADMIN — Medication 50 MILLIGRAM(S): at 12:32

## 2023-06-05 RX ADMIN — HALOPERIDOL DECANOATE 5 MILLIGRAM(S): 100 INJECTION INTRAMUSCULAR at 12:32

## 2023-06-05 RX ADMIN — LITHIUM CARBONATE 450 MILLIGRAM(S): 300 TABLET, EXTENDED RELEASE ORAL at 20:55

## 2023-06-05 NOTE — BH INPATIENT PSYCHIATRY PROGRESS NOTE - NSBHASSESSSUMMFT_PSY_ALL_CORE
The patient is a 18 yo transgender F->M, Kazakh-speaking, recently immigrated from [place], domiciled with brother, unemployed, with psych hx of gender dysphoria, anxiety and depression, no past psychiatric hospitalizations, previous suicide attempt via choking, who is presenting at recommendation of brother and cousin for increasing SI.    Upon admission patient endorses strong feelings of depression and exhibits very dysphoric and minimally reactive affect. He endorses escalating suicidal thoughts related to feelings of worthlessness and unhappiness over his appearance. During the course of his hospitalization, his mood and affect initially improved, he became become more talkative. However, throughout the course of the hospitalization he started having near-daily anxiety attacks which he has been coping with by head banging or punching/scratching his limbs. His depression seems to have alleviated overall, however he suffers from  frequent mood shifts and anxiety/panic attacks which he has difficulty coping with. These shifts are often triggered by misgendering by others or missing his mother, and are often prevented when he is in the company of Kazakh-speaking staff and is socially engaged. The patient was admitted involuntarily on 9:39 legals as he has been acutely elevated risk of suicide, now changed to voluntary legals. Chronic risk factors include history of trauma, history of persistent depressive symptoms, and history of suicidal ideation. Current risk factors include recent self inflicted choking, age, recent immigration, not understanding/speaking well language of current residence, current gender dysphoria, current social anxiety, current panic attacks and financial stressors.     On evaluation today, patient endorses episode of anxiety this afternoon during which he had thoughts of hitting himself in the head, which was alleviated after receiving PRN Atarax and haldol medication. The patient also endorses passive suicidal ideation, but no active suicidal ideation or plan. Goals of care will be to improve patient's symptoms of depression and anxiety which requires continued hospitalization for symptom monitoring and medication adjustment.    04/18/23- Patient acutely dysphoric. Plan to dc mirtazapine as he notes excessive lethargy and hyperphagia, continue sertraline 50 mg  04/19/2023- Patient remains with thoughts of suicide and concerns about his future, however he feels safe while in a structured setting with people around him  04/20/23 Patient had no thoughts of suicide during interview, appeared brighter and occasionally smiled. Agreeable to increase antidepressants  04/24 Patient notes panic attack last night, alleviated talking with cousin. Brighter, future oriented. Endorses social anxiety.  04/25/23 Patient did not have panic attack in last 24 hrs. Still appears brighter on unit and during interview today. Voiced that his social anxiety hinders his ability to advocate for himself while on unit  04/26   Patient is somewhat less anxious,  spending  time outside his room and is cheerful at times.  04/27/23 Patient notes still having intermittent anxiety, however he is now able to leave his room and his speech is much more productive. Affect continues to be more supple than on initial presentation. Plan to increase Zoloft to 150 mg 04/28, with eventual plan to maximize it to treat his social anxiety disorder.  04/28 Yesterday patient had another panic attack when bullied about his gender expression and received Klonopin and Atarax PRN. Though he states his mood is "good" and has a more supple affect than on initial presentation, he continues to endorse feelings of self hatred (which he believes may be alleviated by gender affirmative care) as well as wish to no longer be alive (though no active SI)  05/01 Patient continues to endorse passive SI, appeared anxious though smiling during interview. Reported panic attack yesterday. Ok with plan to increase Zoloft to 200 mg  05/02 Pt received first dose of 200 mg Zoloft. Had panic attack yesterday triggered by existential doubts and feelings of self hatred, which resulted in him hitting himself. Today less anxious but continues to endorse ambivalence about wanting to be alive.  5/3/23   patient had significant panic attack accompanied by dread and thoughts of self destruction.  When asked if suicidal patient replies I cannot kill myself because of my family.  05/04- Acutely anxious, more so than previously (however interviewed in large group), will add 2 mg abilify. Vickie in process of enrolling Rad[facility] as patient in [facility]  5/5 - Pt endorses panic attack the evening before during which he hit himself in the stomach and arms and banged his head on the wall due to self hatred and feeling like he does not have a place in this world. Created signs with patient that he can use to communicate with staff nonverbally; pt stated that he will try his best to use them. Does not report adverse effects from Abilify.  5/8- Pt denies any panic attacks over the weekend. Pt states that he did not need to use the signs that he created together with the team to communicate with staff. Continues to endorse passive SI and states that his reason for living is his family, but other than that, he does not feel like he has a reason to live. Abilify increased to 5 mg  5/9- Pt denies any panic attacks since yesterday, but continues to endorse passive SI and state that his family is his only reason for living. Will continue Abilify 5 mg.  5/10- Pt states that he had a panic attack the evening prior, during which he punched himself in the stomach and banged his head against the wall. Stated again that he does not want to be alive and does not have a place in this world.  5/11- Pt stated that yesterday, he wrapped a blanket around his neck because he "wanted to feel something" and wanted to "turn off my brain." He stated that he did not do this with the intention to end his life. He stated he does not want to be alive, but cannot die because it would hurt his family. Will decrease Zoloft to 100 mg on 5/12 with the intention of cross-titrating to venlafaxine. Will continue Abilify 5 mg.  5/12 - Pt reported no panic attacks or attempts at self-harm since last conversation. Stated that he "feels sad" and like he is "descending into darkness." Explained to pt that we would be switching from Zoloft to Effexor. Gave Zoloft 100 mg today, will also be starting Effexor 75 mg today.   5/18- Pt reports feeling "the same", endorses palpitations on Venlafaxine, will continue to monitor. Panic attack yesterday, will change standing Klonopin to 2 pm dosing.  5/19- Pt reports "feeling a little sad today" and states that he is continuing to have palpitations at night. Did not have panic attack yesterday, will continuing standing Klonopin.   5/21- Pt received PRN PO Haldol and Atarax after he started head banging in the context of distressing feelings. Today appeared more dysphoric yet less anxious than usual. Appeared tired which he attributed to the Haldol.  5/22 - Pt had episode of tachycardia and was evaluated by medicine attending who deemed non pathological. Appeared anxious and tired today, when asked about suicide after discharge he was unable to contract for safety. Had an anxious and panicked episode relieved by atarax, icing forearms, and talking. Increased Klonopin to 1 mg standing, will consider adding lithium and changing to ssri tmrw  5/23 - Pt had another anxious episode relieved by icing head and forearms, as well as talking. On approach, pt is tearful and dysphoric and states that he wants to die and that he does not belong in this world. Begin lithium 300 mg BID, plan to start Lexapro 10 mg tmrw.   5/24 - Pt had two anxious episodes the previous day; music and ice helped for the first one, but no longer helped for the second one. Pt states these episodes came about in the context of thoughts of self-hatred. On approach, pt has improved affect compared to prior assessment. Start Lexapro 10 mg today.  5/25 -  Pt had an anxious episode the previous evening precipitated by thoughts of "wanting to die" requiring PO Haldol, Ativan, and Hydroxyzine. Pt attended crisis management group the previous day and reviewed DBT skills with team. On approach, pt has depressed affect. Continue Lexapro 10 mg and lithium 300 BID.   5/26 - Pt had an anxious episode at 7 AM this morning requiring Atarax 50. Pt has meeting with [facility] today at 10:30. On approach, pt has notably improved affect compared to prior assessment. Continue Lexapro 10 mg and lithium 300 BID.   5/30- Pt had an anxious episode on Sunday, Monday receiving Ativan 2 mg PO PRN for Agitation on 5/29 at 17:10. Continue lexapro 10 mg qd, Klonopin 1 mg PO qd, increase lithium 300mg BID to 450mg BID  5/31- Pt denies symptoms of suicidal ideation, or anxious episodes over the past day, but states that he does have homicidal ideations. On approach, patient has improved affect as compared to previous day. Continue lexapro 10 mg PO qd, Klonopin 1 mg PO qd, lithium 450 mg PO BID.   6/1- denies SI/HI. anxiety overnight, thoughts of self-harm during this; alleviated by speaking to 1:1. c/w lexapro 10 qd, Klonopin 1 qd, lithium 450 BID.  6/2- endorses passive SI. anxiety last afternoon with thoughts of self-harm; alleviated by PRN Atarax 50 mg. c/w lexapro 10 qd, Klonopin 1 qd, lithium 450 BID.  6/5- added additional klonopin 1mg for noon in addition to the 1mg dose at night    Impression: MDD vs Social Anxiety disorder with Panic attacks vs Gender dysphoria vs cPTSD, R/O bipolar    #MDD   #Gender dysphoria   #Social Anxiety Disorder   - 9.39 legals changed to 9.14  - c/w Lithium 450 mg BID 5/23 for suicidality and stabilization of affect; lithium level 5/30- 0.30  - c/w Lexapro 10 mg starting 5/24  - Discontinued Effexor 150 mg, 05/23  - Discontinued Abilify 5 mg, 5/16   - Tapered off Zoloft  - Melatonin for sleep phase disorder  - Left voicemail with Dr. Vincent at [facility], [phone]  - Left VM with  Kandice Acosta who work s with LGBTQ community for connection to trans affirming care  - Left  Teagan Escobar [phone], patient's therapist  - TSH WNL    #Panic attacks  - PRN Atarax  - c/w Klonopin 1 mg BID at noon and at night    #Gender Dysphoria  - The following programs contacted: [facility] (does not have anyone to send to the hospital), [facility] (only provides services for homeless individuals), [employer] (left voicemail), [employer] [facility] (phone number out of service), [facility], [facility] (reached out to them, meeting with Radames on 5/26), [facility] (voicemail full), [facility] (no availability to send anyone, have [facility] but referred to [facility])  - Referral on discharge to the [facility] (suicide hotline) and [facility] ([facility] has enrolled patient)  - [facility] to [facility] at 1pm to meet Radames, will need an , please call them back at [phone] to confirm that Radames and resident or Dr. Rubin can be there for the meeting    #Agitation  - For episodes of acute agitation can offer PO Haldol 5 mg/Ativan 2 mg/Benadryl 50 mg Q6H PRN. If refusing PO and is in acute risk of harm to self/other, can offer IM Haldol 5 mg/Ativan 2 mg/Benadryl 50 mg Q6H PRN. If given, please repeat EKG and hold antipsychotics if QTC>500

## 2023-06-05 NOTE — BH INPATIENT PSYCHIATRY PROGRESS NOTE - NSICDXBHSECONDARYDX_PSY_ALL_CORE
Gender dysphoria in adult   F64.0  Panic attacks   F41.0  Social anxiety disorder   F40.10  Anxiety   F41.9

## 2023-06-05 NOTE — PROGRESS NOTE ADULT - SUBJECTIVE AND OBJECTIVE BOX
1:1 extension was requested by staff for safety. I spoke to RN Morteza.  Pt expresses SI, intermittently agitated and very anxious.  1;1 order was reinstated.
  LEIA PAZ  17y  Female       History of Present Illness: Patient was seen and examined today. Pt denied any complaints.       Vital Signs Last 24 Hrs  T(C): 36.6 (20 May 2023 15:53), Max: 36.6 (20 May 2023 15:53)  T(F): 97.9 (20 May 2023 15:53), Max: 97.9 (20 May 2023 15:53)  HR: 126 (20 May 2023 18:26) (90 - 153)  BP: 136/90 (20 May 2023 18:26) (127/89 - 136/90)  BP(mean): --  RR: 16 (20 May 2023 15:53) (16 - 16)  SpO2: --        PHYSICAL EXAM:  GENERAL: NAD, well-groomed, well-developed  HEENT - NC/AT, pupils equal and reactive to light,  ; Moist mucous membranes, Good dentition, No lesions  NECK: Supple, No JVD  CHEST/LUNG: Clear to auscultation bilaterally; No rales, rhonchi, wheezing  HEART: Regular rate and rhythm; No murmurs, rubs, or gallops  ABDOMEN: Soft, Nontender, Nondistended; Bowel sounds present  EXTREMITIES:  2+ Peripheral Pulses, No clubbing, cyanosis, or edema  NEURO:  No Focal deficits, sensory and motor intact        aluminum hydroxide/magnesium hydroxide/simethicone Suspension 30 milliLiter(s) Oral every 6 hours PRN  clonazePAM  Tablet 0.5 milliGRAM(s) Oral daily  haloperidol     Tablet 5 milliGRAM(s) Oral every 6 hours PRN  hydrOXYzine hydrochloride 50 milliGRAM(s) Oral every 6 hours PRN  LORazepam     Tablet 2 milliGRAM(s) Oral every 6 hours PRN  melatonin. 3 milliGRAM(s) Oral at bedtime  venlafaxine .5 milliGRAM(s) Oral daily            
  LEIA PAZ  17y  Female       History of Present Illness: Patient was seen and examined today. Pt denied any complaints.       Vital Signs Last 24 Hrs  T(C): 36.6 (21 May 2023 08:18), Max: 36.6 (20 May 2023 15:53)  T(F): 97.9 (21 May 2023 08:18), Max: 97.9 (20 May 2023 15:53)  HR: 129 (21 May 2023 08:18) (90 - 153)  BP: 113/61 (21 May 2023 08:18) (113/61 - 136/90)  BP(mean): --  RR: 18 (21 May 2023 08:18) (16 - 18)  SpO2: --        PHYSICAL EXAM:  GENERAL: NAD, well-groomed, well-developed  HEENT - NC/AT, pupils equal and reactive to light,  ; Moist mucous membranes, Good dentition, No lesions  NECK: Supple, No JVD  CHEST/LUNG: Clear to auscultation bilaterally; No rales, rhonchi, wheezing  HEART: Regular rate and rhythm; No murmurs, rubs, or gallops  ABDOMEN: Soft, Nontender, Nondistended; Bowel sounds present  EXTREMITIES:  2+ Peripheral Pulses, No clubbing, cyanosis, or edema  NEURO:  No Focal deficits, sensory and motor intact        aluminum hydroxide/magnesium hydroxide/simethicone Suspension 30 milliLiter(s) Oral every 6 hours PRN  clonazePAM  Tablet 0.5 milliGRAM(s) Oral daily  haloperidol     Tablet 5 milliGRAM(s) Oral every 6 hours PRN  hydrOXYzine hydrochloride 50 milliGRAM(s) Oral every 6 hours PRN  LORazepam     Tablet 2 milliGRAM(s) Oral every 6 hours PRN  melatonin. 3 milliGRAM(s) Oral at bedtime  venlafaxine .5 milliGRAM(s) Oral daily

## 2023-06-05 NOTE — BH INPATIENT PSYCHIATRY PROGRESS NOTE - NSBHFUPINTERVALHXFT_PSY_A_CORE
Patient was seen and evaluated this morning. Chart was reviewed and no acute events were noted. No PRN medications were administered overnight. Upon approach, patient is lying in bed comfortably. Interview conducted with  [id number] Susana. Patient is alert and oriented and engages with interviewer. Patient is cooperative and answers all questions appropriately. Patient reports over weekend he had an episode where he was crying a little and was having issues with sleep where he wanted to bang his head on the wall but was able to be calmed down. Reports that this afternoon had a panic attack which was mitigated with medication and help from 1:1 who held his hands. He reports that panic attacks occur less often at night and more in the afternoons when he has racing thoughts. He is agreeable to additional klonopin in the afternoon. Denies active suicidal ideation, intent or plan.    Lisa from [facility] [phone] was planned to come in tomorrow;  with callback left to confirm they will come tomorrow.

## 2023-06-05 NOTE — PROGRESS NOTE ADULT - PROVIDER SPECIALTY LIST ADULT
Psychiatry
Hospitalist
Hospitalist

## 2023-06-05 NOTE — BH INPATIENT PSYCHIATRY PROGRESS NOTE - NSBHMETABOLIC_PSY_ALL_CORE_FT
BMI: BMI (kg/m2): 32.5 (04-18-23 @ 05:33)  HbA1c: A1C with Estimated Average Glucose Result: 5.7 % (05-06-23 @ 08:53)    Glucose:   BP: 114/79 (06-05-23 @ 15:39) (94/65 - 127/90)  Lipid Panel: Date/Time: 05-06-23 @ 08:53  Cholesterol, Serum: 206  Direct LDL: --  HDL Cholesterol, Serum: 47  Total Cholesterol/HDL Ration Measurement: --  Triglycerides, Serum: 131

## 2023-06-05 NOTE — BH INPATIENT PSYCHIATRY PROGRESS NOTE - NSBHCHARTREVIEWVS_PSY_A_CORE FT
Vital Signs Last 24 Hrs  T(C): 35.8 (06-05-23 @ 15:39), Max: 36.7 (06-05-23 @ 08:01)  T(F): 96.4 (06-05-23 @ 15:39), Max: 98.1 (06-05-23 @ 08:01)  HR: 101 (06-05-23 @ 15:39) (101 - 103)  BP: 114/79 (06-05-23 @ 15:39) (100/66 - 114/79)  BP(mean): --  RR: 16 (06-05-23 @ 15:39) (16 - 16)  SpO2: --

## 2023-06-06 RX ADMIN — Medication 3 MILLIGRAM(S): at 20:37

## 2023-06-06 RX ADMIN — Medication 1 MILLIGRAM(S): at 12:13

## 2023-06-06 RX ADMIN — LITHIUM CARBONATE 450 MILLIGRAM(S): 300 TABLET, EXTENDED RELEASE ORAL at 08:48

## 2023-06-06 RX ADMIN — ESCITALOPRAM OXALATE 10 MILLIGRAM(S): 10 TABLET, FILM COATED ORAL at 08:47

## 2023-06-06 RX ADMIN — Medication 1 MILLIGRAM(S): at 20:39

## 2023-06-06 RX ADMIN — LITHIUM CARBONATE 450 MILLIGRAM(S): 300 TABLET, EXTENDED RELEASE ORAL at 20:37

## 2023-06-06 NOTE — BH INPATIENT PSYCHIATRY PROGRESS NOTE - NSBHFUPINTERVALHXFT_PSY_A_CORE
Patient was seen and evaluated this morning. Chart was reviewed and no acute events were noted. No PRN medications were administered overnight. Upon approach, patient is lying in bed comfortably. Patient is alert and oriented and engages with interviewer. Patient is cooperative and answers all questions appropriately. Patient reports no issues overnight. Denies any suicidal ideation, intent or plan. Reports the klonopin at noon is helpful and did not have any anxiety attacks today around that time.     Meeting was held with patient and Lisa from [facility]. Patient was informed of services offered and given email [email or web address] for reaching out in the future. Patient was visibly elated and appeared less anxious when mention of HRT and testosterone was mentioned. Patient was informed there would be [place] speaking counselors available.

## 2023-06-06 NOTE — BH INPATIENT PSYCHIATRY PROGRESS NOTE - CURRENT MEDICATION
MEDICATIONS  (STANDING):  clonazePAM  Tablet 1 milliGRAM(s) Oral <User Schedule>  clonazePAM  Tablet 1 milliGRAM(s) Oral at bedtime  escitalopram 10 milliGRAM(s) Oral daily  lithium 450 milliGRAM(s) Oral two times a day  melatonin. 3 milliGRAM(s) Oral at bedtime    MEDICATIONS  (PRN):  aluminum hydroxide/magnesium hydroxide/simethicone Suspension 30 milliLiter(s) Oral every 6 hours PRN Dyspepsia  haloperidol     Tablet 5 milliGRAM(s) Oral every 6 hours PRN agitation  hydrOXYzine hydrochloride 50 milliGRAM(s) Oral every 6 hours PRN anxiety and insomnia

## 2023-06-06 NOTE — BH INPATIENT PSYCHIATRY PROGRESS NOTE - NSBHASSESSSUMMFT_PSY_ALL_CORE
The patient is a 16 yo transgender F->M, Sami-speaking, recently immigrated from [place], domiciled with brother, unemployed, with psych hx of gender dysphoria, anxiety and depression, no past psychiatric hospitalizations, previous suicide attempt via choking, who is presenting at recommendation of brother and cousin for increasing SI.    Upon admission patient endorses strong feelings of depression and exhibits very dysphoric and minimally reactive affect. He endorses escalating suicidal thoughts related to feelings of worthlessness and unhappiness over his appearance. During the course of his hospitalization, his mood and affect initially improved, he became become more talkative. However, throughout the course of the hospitalization he started having near-daily anxiety attacks which he has been coping with by head banging or punching/scratching his limbs. His depression seems to have alleviated overall, however he suffers from  frequent mood shifts and anxiety/panic attacks which he has difficulty coping with. These shifts are often triggered by misgendering by others or missing his mother, and are often prevented when he is in the company of Sami-speaking staff and is socially engaged. The patient was admitted involuntarily on 9:39 legals as he has been acutely elevated risk of suicide, now changed to voluntary legals. Chronic risk factors include history of trauma, history of persistent depressive symptoms, and history of suicidal ideation. Current risk factors include recent self inflicted choking, age, recent immigration, not understanding/speaking well language of current residence, current gender dysphoria, current social anxiety, current panic attacks and financial stressors.     On evaluation today, patient endorses episode of anxiety this afternoon during which he had thoughts of hitting himself in the head, which was alleviated after receiving PRN Atarax and haldol medication. The patient also endorses passive suicidal ideation, but no active suicidal ideation or plan. Goals of care will be to improve patient's symptoms of depression and anxiety which requires continued hospitalization for symptom monitoring and medication adjustment.    04/18/23- Patient acutely dysphoric. Plan to dc mirtazapine as he notes excessive lethargy and hyperphagia, continue sertraline 50 mg  04/19/2023- Patient remains with thoughts of suicide and concerns about his future, however he feels safe while in a structured setting with people around him  04/20/23 Patient had no thoughts of suicide during interview, appeared brighter and occasionally smiled. Agreeable to increase antidepressants  04/24 Patient notes panic attack last night, alleviated talking with cousin. Brighter, future oriented. Endorses social anxiety.  04/25/23 Patient did not have panic attack in last 24 hrs. Still appears brighter on unit and during interview today. Voiced that his social anxiety hinders his ability to advocate for himself while on unit  04/26   Patient is somewhat less anxious,  spending  time outside his room and is cheerful at times.  04/27/23 Patient notes still having intermittent anxiety, however he is now able to leave his room and his speech is much more productive. Affect continues to be more supple than on initial presentation. Plan to increase Zoloft to 150 mg 04/28, with eventual plan to maximize it to treat his social anxiety disorder.  04/28 Yesterday patient had another panic attack when bullied about his gender expression and received Klonopin and Atarax PRN. Though he states his mood is "good" and has a more supple affect than on initial presentation, he continues to endorse feelings of self hatred (which he believes may be alleviated by gender affirmative care) as well as wish to no longer be alive (though no active SI)  05/01 Patient continues to endorse passive SI, appeared anxious though smiling during interview. Reported panic attack yesterday. Ok with plan to increase Zoloft to 200 mg  05/02 Pt received first dose of 200 mg Zoloft. Had panic attack yesterday triggered by existential doubts and feelings of self hatred, which resulted in him hitting himself. Today less anxious but continues to endorse ambivalence about wanting to be alive.  5/3/23   patient had significant panic attack accompanied by dread and thoughts of self destruction.  When asked if suicidal patient replies I cannot kill myself because of my family.  05/04- Acutely anxious, more so than previously (however interviewed in large group), will add 2 mg abilify. Vickie in process of enrolling [facility] as patient in [facility]  5/5 - Pt endorses panic attack the evening before during which he hit himself in the stomach and arms and banged his head on the wall due to self hatred and feeling like he does not have a place in this world. Created signs with patient that he can use to communicate with staff nonverbally; pt stated that he will try his best to use them. Does not report adverse effects from Abilify.  5/8- Pt denies any panic attacks over the weekend. Pt states that he did not need to use the signs that he created together with the team to communicate with staff. Continues to endorse passive SI and states that his reason for living is his family, but other than that, he does not feel like he has a reason to live. Abilify increased to 5 mg  5/9- Pt denies any panic attacks since yesterday, but continues to endorse passive SI and state that his family is his only reason for living. Will continue Abilify 5 mg.  5/10- Pt states that he had a panic attack the evening prior, during which he punched himself in the stomach and banged his head against the wall. Stated again that he does not want to be alive and does not have a place in this world.  5/11- Pt stated that yesterday, he wrapped a blanket around his neck because he "wanted to feel something" and wanted to "turn off my brain." He stated that he did not do this with the intention to end his life. He stated he does not want to be alive, but cannot die because it would hurt his family. Will decrease Zoloft to 100 mg on 5/12 with the intention of cross-titrating to venlafaxine. Will continue Abilify 5 mg.  5/12 - Pt reported no panic attacks or attempts at self-harm since last conversation. Stated that he "feels sad" and like he is "descending into darkness." Explained to pt that we would be switching from Zoloft to Effexor. Gave Zoloft 100 mg today, will also be starting Effexor 75 mg today.   5/18- Pt reports feeling "the same", endorses palpitations on Venlafaxine, will continue to monitor. Panic attack yesterday, will change standing Klonopin to 2 pm dosing.  5/19- Pt reports "feeling a little sad today" and states that he is continuing to have palpitations at night. Did not have panic attack yesterday, will continuing standing Klonopin.   5/21- Pt received PRN PO Haldol and Atarax after he started head banging in the context of distressing feelings. Today appeared more dysphoric yet less anxious than usual. Appeared tired which he attributed to the Haldol.  5/22 - Pt had episode of tachycardia and was evaluated by medicine attending who deemed non pathological. Appeared anxious and tired today, when asked about suicide after discharge he was unable to contract for safety. Had an anxious and panicked episode relieved by atarax, icing forearms, and talking. Increased Klonopin to 1 mg standing, will consider adding lithium and changing to ssri tmrw  5/23 - Pt had another anxious episode relieved by icing head and forearms, as well as talking. On approach, pt is tearful and dysphoric and states that he wants to die and that he does not belong in this world. Begin lithium 300 mg BID, plan to start Lexapro 10 mg tmrw.   5/24 - Pt had two anxious episodes the previous day; music and ice helped for the first one, but no longer helped for the second one. Pt states these episodes came about in the context of thoughts of self-hatred. On approach, pt has improved affect compared to prior assessment. Start Lexapro 10 mg today.  5/25 -  Pt had an anxious episode the previous evening precipitated by thoughts of "wanting to die" requiring PO Haldol, Ativan, and Hydroxyzine. Pt attended crisis management group the previous day and reviewed DBT skills with team. On approach, pt has depressed affect. Continue Lexapro 10 mg and lithium 300 BID.   5/26 - Pt had an anxious episode at 7 AM this morning requiring Atarax 50. Pt has meeting with [facility] today at 10:30. On approach, pt has notably improved affect compared to prior assessment. Continue Lexapro 10 mg and lithium 300 BID.   5/30- Pt had an anxious episode on Sunday, Monday receiving Ativan 2 mg PO PRN for Agitation on 5/29 at 17:10. Continue lexapro 10 mg qd, Klonopin 1 mg PO qd, increase lithium 300mg BID to 450mg BID  5/31- Pt denies symptoms of suicidal ideation, or anxious episodes over the past day, but states that he does have homicidal ideations. On approach, patient has improved affect as compared to previous day. Continue lexapro 10 mg PO qd, Klonopin 1 mg PO qd, lithium 450 mg PO BID.   6/1- denies SI/HI. anxiety overnight, thoughts of self-harm during this; alleviated by speaking to 1:1. c/w lexapro 10 qd, Klonopin 1 qd, lithium 450 BID.  6/2- endorses passive SI. anxiety last afternoon with thoughts of self-harm; alleviated by PRN Atarax 50 mg. c/w lexapro 10 qd, Klonopin 1 qd, lithium 450 BID.  6/5- added additional klonopin 1mg for noon in addition to the 1mg dose at night    Impression: MDD vs Social Anxiety disorder with Panic attacks vs Gender dysphoria vs cPTSD, R/O bipolar    #MDD   #Gender dysphoria   #Social Anxiety Disorder   - 9.39 legals changed to 9.13  - c/w Lithium 450 mg BID 5/23 for suicidality and stabilization of affect; lithium level 5/30- 0.30  - c/w Lexapro 10 mg starting 5/24  - Discontinued Effexor 150 mg, 05/23  - Discontinued Abilify 5 mg, 5/16   - Tapered off Zoloft  - Melatonin for sleep phase disorder  - Left voicemail with [facility]. Vincent at [facility], [phone]  - Left VM with  Kandice Acosta who work s with LGBTQ community for connection to trans affirming care  - Left  Teagan Escobar [phone], patient's therapist  - TSH WNL    #Panic attacks  - PRN Atarax  - c/w Klonopin 1 mg BID at noon and at night    #Gender Dysphoria  - The following programs contacted: [facility] (does not have anyone to send to the hospital), [facility] (only provides services for homeless individuals), [facility] (left voicemail), [employer] (phone number out of service), [facility], [facility] (reached out to them, meeting with Radames on 5/26), [facility] (voicemail full), [facility] (no availability to send anyone, have [facility] but referred to [facility])  - Referral on discharge to the [facility] (suicide hotline) and [facility] (Rhett has enrolled patient)  [phone] and spoke with patient 6/6/23    #Agitation  - For episodes of acute agitation can offer PO Haldol 5 mg/Ativan 2 mg/Benadryl 50 mg Q6H PRN. If refusing PO and is in acute risk of harm to self/other, can offer IM Haldol 5 mg/Ativan 2 mg/Benadryl 50 mg Q6H PRN. If given, please repeat EKG and hold antipsychotics if QTC>500   The patient is a 18 yo transgender F->M, Maori-speaking, recently immigrated from [place], domiciled with brother, unemployed, with psych hx of gender dysphoria, anxiety and depression, no past psychiatric hospitalizations, previous suicide attempt via choking, who is presenting at recommendation of brother and cousin for increasing SI.    Upon admission patient endorses strong feelings of depression and exhibits very dysphoric and minimally reactive affect. He endorses escalating suicidal thoughts related to feelings of worthlessness and unhappiness over his appearance. During the course of his hospitalization, his mood and affect initially improved, he became become more talkative. However, throughout the course of the hospitalization he started having near-daily anxiety attacks which he has been coping with by head banging or punching/scratching his limbs. His depression seems to have alleviated overall, however he suffers from  frequent mood shifts and anxiety/panic attacks which he has difficulty coping with. These shifts are often triggered by misgendering by others or missing his mother, and are often prevented when he is in the company of Maori-speaking staff and is socially engaged. The patient was admitted involuntarily on 9:39 legals as he has been acutely elevated risk of suicide, now changed to voluntary legals. Chronic risk factors include history of trauma, history of persistent depressive symptoms, and history of suicidal ideation. Current risk factors include recent self inflicted choking, age, recent immigration, not understanding/speaking well language of current residence, current gender dysphoria, current social anxiety, current panic attacks and financial stressors.     On evaluation today, patient endorses no episodes of anxiety and has had no thoughts of hurting himself today. Patient had meeting with member [facility] which patient is looking forward to. As anxiety is being better controlled with the klonopin 1mg BID, we will plan to discontinue 1:1 obs in near future during daytime and assess until we can d/c 1:1 during the nighttime as well. Goals of care will be to improve patient's symptoms of depression and anxiety which requires continued hospitalization for symptom monitoring and medication adjustment.    04/18/23- Patient acutely dysphoric. Plan to dc mirtazapine as he notes excessive lethargy and hyperphagia, continue sertraline 50 mg  04/19/2023- Patient remains with thoughts of suicide and concerns about his future, however he feels safe while in a structured setting with people around him  04/20/23 Patient had no thoughts of suicide during interview, appeared brighter and occasionally smiled. Agreeable to increase antidepressants  04/24 Patient notes panic attack last night, alleviated talking with cousin. Brighter, future oriented. Endorses social anxiety.  04/25/23 Patient did not have panic attack in last 24 hrs. Still appears brighter on unit and during interview today. Voiced that his social anxiety hinders his ability to advocate for himself while on unit  04/26   Patient is somewhat less anxious,  spending  time outside his room and is cheerful at times.  04/27/23 Patient notes still having intermittent anxiety, however he is now able to leave his room and his speech is much more productive. Affect continues to be more supple than on initial presentation. Plan to increase Zoloft to 150 mg 04/28, with eventual plan to maximize it to treat his social anxiety disorder.  04/28 Yesterday patient had another panic attack when bullied about his gender expression and received Klonopin and Atarax PRN. Though he states his mood is "good" and has a more supple affect than on initial presentation, he continues to endorse feelings of self hatred (which he believes may be alleviated by gender affirmative care) as well as wish to no longer be alive (though no active SI)  05/01 Patient continues to endorse passive SI, appeared anxious though smiling during interview. Reported panic attack yesterday. Ok with plan to increase Zoloft to 200 mg  05/02 Pt received first dose of 200 mg Zoloft. Had panic attack yesterday triggered by existential doubts and feelings of self hatred, which resulted in him hitting himself. Today less anxious but continues to endorse ambivalence about wanting to be alive.  5/3/23   patient had significant panic attack accompanied by dread and thoughts of self destruction.  When asked if suicidal patient replies I cannot kill myself because of my family.  05/04- Acutely anxious, more so than previously (however interviewed in large group), will add 2 mg abilify. Vickie in process of enrolling [facility] as patient in [facility]  5/5 - Pt endorses panic attack the evening before during which he hit himself in the stomach and arms and banged his head on the wall due to self hatred and feeling like he does not have a place in this world. Created signs with patient that he can use to communicate with staff nonverbally; pt stated that he will try his best to use them. Does not report adverse effects from Abilify.  5/8- Pt denies any panic attacks over the weekend. Pt states that he did not need to use the signs that he created together with the team to communicate with staff. Continues to endorse passive SI and states that his reason for living is his family, but other than that, he does not feel like he has a reason to live. Abilify increased to 5 mg  5/9- Pt denies any panic attacks since yesterday, but continues to endorse passive SI and state that his family is his only reason for living. Will continue Abilify 5 mg.  5/10- Pt states that he had a panic attack the evening prior, during which he punched himself in the stomach and banged his head against the wall. Stated again that he does not want to be alive and does not have a place in this world.  5/11- Pt stated that yesterday, he wrapped a blanket around his neck because he "wanted to feel something" and wanted to "turn off my brain." He stated that he did not do this with the intention to end his life. He stated he does not want to be alive, but cannot die because it would hurt his family. Will decrease Zoloft to 100 mg on 5/12 with the intention of cross-titrating to venlafaxine. Will continue Abilify 5 mg.  5/12 - Pt reported no panic attacks or attempts at self-harm since last conversation. Stated that he "feels sad" and like he is "descending into darkness." Explained to pt that we would be switching from Zoloft to Effexor. Gave Zoloft 100 mg today, will also be starting Effexor 75 mg today.   5/18- Pt reports feeling "the same", endorses palpitations on Venlafaxine, will continue to monitor. Panic attack yesterday, will change standing Klonopin to 2 pm dosing.  5/19- Pt reports "feeling a little sad today" and states that he is continuing to have palpitations at night. Did not have panic attack yesterday, will continuing standing Klonopin.   5/21- Pt received PRN PO Haldol and Atarax after he started head banging in the context of distressing feelings. Today appeared more dysphoric yet less anxious than usual. Appeared tired which he attributed to the Haldol.  5/22 - Pt had episode of tachycardia and was evaluated by medicine attending who deemed non pathological. Appeared anxious and tired today, when asked about suicide after discharge he was unable to contract for safety. Had an anxious and panicked episode relieved by atarax, icing forearms, and talking. Increased Klonopin to 1 mg standing, will consider adding lithium and changing to ssri tmrw  5/23 - Pt had another anxious episode relieved by icing head and forearms, as well as talking. On approach, pt is tearful and dysphoric and states that he wants to die and that he does not belong in this world. Begin lithium 300 mg BID, plan to start Lexapro 10 mg tmrw.   5/24 - Pt had two anxious episodes the previous day; music and ice helped for the first one, but no longer helped for the second one. Pt states these episodes came about in the context of thoughts of self-hatred. On approach, pt has improved affect compared to prior assessment. Start Lexapro 10 mg today.  5/25 -  Pt had an anxious episode the previous evening precipitated by thoughts of "wanting to die" requiring PO Haldol, Ativan, and Hydroxyzine. Pt attended crisis management group the previous day and reviewed DBT skills with team. On approach, pt has depressed affect. Continue Lexapro 10 mg and lithium 300 BID.   5/26 - Pt had an anxious episode at 7 AM this morning requiring Atarax 50. Pt has meeting with [facility] today at 10:30. On approach, pt has notably improved affect compared to prior assessment. Continue Lexapro 10 mg and lithium 300 BID.   5/30- Pt had an anxious episode on Sunday, Monday receiving Ativan 2 mg PO PRN for Agitation on 5/29 at 17:10. Continue lexapro 10 mg qd, Klonopin 1 mg PO qd, increase lithium 300mg BID to 450mg BID  5/31- Pt denies symptoms of suicidal ideation, or anxious episodes over the past day, but states that he does have homicidal ideations. On approach, patient has improved affect as compared to previous day. Continue lexapro 10 mg PO qd, Klonopin 1 mg PO qd, lithium 450 mg PO BID.   6/1- denies SI/HI. anxiety overnight, thoughts of self-harm during this; alleviated by speaking to 1:1. c/w lexapro 10 qd, Klonopin 1 qd, lithium 450 BID.  6/2- endorses passive SI. anxiety last afternoon with thoughts of self-harm; alleviated by PRN Atarax 50 mg. c/w lexapro 10 qd, Klonopin 1 qd, lithium 450 BID.  6/5- added additional klonopin 1mg for noon in addition to the 1mg dose at night    Impression: MDD vs Social Anxiety disorder with Panic attacks vs Gender dysphoria vs cPTSD, R/O bipolar    #MDD   #Gender dysphoria   #Social Anxiety Disorder   - 9.39 legals changed to 9.13  - c/w Lithium 450 mg BID 5/23 for suicidality and stabilization of affect; lithium level 5/30- 0.30  - c/w Lexapro 10 mg starting 5/24  - Discontinued Effexor 150 mg, 05/23  - Discontinued Abilify 5 mg, 5/16   - Tapered off Zoloft  - Melatonin for sleep phase disorder  - Left vo[facility] Vincent at [facility], [phone]  - Left VM with  Kandice Acosta who work s with LGBTQ community for connection to trans affirming care  - Left  Teagan Escobar [phone], patient's therapist  - TSH WNL    #Panic attacks  - PRN Atarax  - c/w Klonopin 1 mg BID at noon and at night    #Gender Dysphoria  - The following programs contacted: [email or web address] (does not have anyone to send to the hospital), [facility] (only provides services for homeless individuals), [employer] (left voicemail), [employer] [facility] (phone number out of service), [facility], [facility] (reached out to them, meeting with Radames on 5/26), [employer] [facility] (voicemail full), [facility] (no availability to send anyone, have [facility] but referred to [facility])  - Referral on discharge to the [facility] (suicide hotline) and [facility] (Rhett has enrolled patient)  - [facility], [phone] came and spoke with patient 6/6/23    #Agitation  - For episodes of acute agitation can offer PO Haldol 5 mg/Ativan 2 mg/Benadryl 50 mg Q6H PRN. If refusing PO and is in acute risk of harm to self/other, can offer IM Haldol 5 mg/Ativan 2 mg/Benadryl 50 mg Q6H PRN. If given, please repeat EKG and hold antipsychotics if QTC>500

## 2023-06-06 NOTE — BH INPATIENT PSYCHIATRY PROGRESS NOTE - NSBHCHARTREVIEWVS_PSY_A_CORE FT
Vital Signs Last 24 Hrs  T(C): 36.2 (06-06-23 @ 15:42), Max: 37.2 (06-06-23 @ 07:55)  T(F): 97.1 (06-06-23 @ 15:42), Max: 99 (06-06-23 @ 07:55)  HR: 95 (06-06-23 @ 15:42) (95 - 115)  BP: 103/76 (06-06-23 @ 15:42) (103/76 - 112/65)  BP(mean): --  RR: 16 (06-06-23 @ 15:42) (16 - 18)  SpO2: --

## 2023-06-06 NOTE — BH INPATIENT PSYCHIATRY PROGRESS NOTE - NSBHMETABOLIC_PSY_ALL_CORE_FT
BMI: BMI (kg/m2): 32.5 (04-18-23 @ 05:33)  HbA1c: A1C with Estimated Average Glucose Result: 5.7 % (05-06-23 @ 08:53)    Glucose:   BP: 103/76 (06-06-23 @ 15:42) (94/65 - 127/90)  Lipid Panel: Date/Time: 05-06-23 @ 08:53  Cholesterol, Serum: 206  Direct LDL: --  HDL Cholesterol, Serum: 47  Total Cholesterol/HDL Ration Measurement: --  Triglycerides, Serum: 131

## 2023-06-07 RX ADMIN — Medication 1 MILLIGRAM(S): at 20:30

## 2023-06-07 RX ADMIN — Medication 3 MILLIGRAM(S): at 20:30

## 2023-06-07 RX ADMIN — LITHIUM CARBONATE 450 MILLIGRAM(S): 300 TABLET, EXTENDED RELEASE ORAL at 20:30

## 2023-06-07 RX ADMIN — LITHIUM CARBONATE 450 MILLIGRAM(S): 300 TABLET, EXTENDED RELEASE ORAL at 08:23

## 2023-06-07 RX ADMIN — Medication 1 MILLIGRAM(S): at 14:21

## 2023-06-07 RX ADMIN — ESCITALOPRAM OXALATE 10 MILLIGRAM(S): 10 TABLET, FILM COATED ORAL at 08:23

## 2023-06-07 NOTE — BH INPATIENT PSYCHIATRY PROGRESS NOTE - NSBHCHARTREVIEWVS_PSY_A_CORE FT
Vital Signs Last 24 Hrs  T(C): 36.9 (06-07-23 @ 07:52), Max: 36.9 (06-07-23 @ 07:52)  T(F): 98.4 (06-07-23 @ 07:52), Max: 98.4 (06-07-23 @ 07:52)  HR: 109 (06-07-23 @ 07:52) (95 - 109)  BP: 116/75 (06-07-23 @ 07:52) (103/76 - 116/75)  BP(mean): --  RR: 16 (06-07-23 @ 07:52) (16 - 16)  SpO2: --     Vital Signs Last 24 Hrs  T(C): 36.4 (06-07-23 @ 16:25), Max: 36.9 (06-07-23 @ 07:52)  T(F): 97.5 (06-07-23 @ 16:25), Max: 98.4 (06-07-23 @ 07:52)  HR: 101 (06-07-23 @ 16:25) (101 - 109)  BP: 112/74 (06-07-23 @ 16:25) (112/74 - 116/75)  BP(mean): --  RR: 16 (06-07-23 @ 16:25) (16 - 16)  SpO2: --

## 2023-06-07 NOTE — BH INPATIENT PSYCHIATRY PROGRESS NOTE - NSBHASSESSSUMMFT_PSY_ALL_CORE
The patient is a 16 yo transgender F->M, [place]-speaking, [place] brother, unemployed, with psych hx of gender dysphoria, anxiety and depression, no past psychiatric hospitalizations, previous suicide attempt via choking, who is presenting at recommendation of brother and cousin for increasing SI.    Upon admission patient endorses strong feelings of depression and exhibits very dysphoric and minimally reactive affect. He endorses escalating suicidal thoughts related to feelings of worthlessness and unhappiness over his appearance. During the course of his hospitalization, his mood and affect initially improved, he became become more talkative. However, throughout the course of the hospitalization he started having near-daily anxiety attacks which he has been coping with by head banging or punching/scratching his limbs. His depression seems to have alleviated overall, however he suffers from  frequent mood shifts and anxiety/panic attacks which he has difficulty coping with. These shifts are often triggered by misgendering by others or missing his mother, and are often prevented when he is in the company of Albanian-speaking staff and is socially engaged. The patient was admitted involuntarily on 9:39 legals as he has been acutely elevated risk of suicide, now changed to voluntary legals. Chronic risk factors include history of trauma, history of persistent depressive symptoms, and history of suicidal ideation. Current risk factors include recent self inflicted choking, age, recent immigration, not understanding/speaking well language of current residence, current gender dysphoria, current social anxiety, current panic attacks and financial stressors.     On evaluation today, patient endorses anxiety this morning and continued passive suicidal ideations with no active plan. The patient states that she is happy with the planned outpatient interventions that have been coordinated during her hospital visit. As anxiety is being better controlled with the klonopin 1mg BID, we will plan to discontinue 1:1 obs in near future during daytime and assess until we can d/c 1:1 during the nighttime as well. Goals of care will be to improve patient's symptoms of depression and anxiety which requires continued hospitalization for symptom monitoring and medication adjustment.    04/18/23- Patient acutely dysphoric. Plan to dc mirtazapine as he notes excessive lethargy and hyperphagia, continue sertraline 50 mg  04/19/2023- Patient remains with thoughts of suicide and concerns about his future, however he feels safe while in a structured setting with people around him  04/20/23 Patient had no thoughts of suicide during interview, appeared brighter and occasionally smiled. Agreeable to increase antidepressants  04/24 Patient notes panic attack last night, alleviated talking with cousin. Brighter, future oriented. Endorses social anxiety.  04/25/23 Patient did not have panic attack in last 24 hrs. Still appears brighter on unit and during interview today. Voiced that his social anxiety hinders his ability to advocate for himself while on unit  04/26   Patient is somewhat less anxious,  spending  time outside his room and is cheerful at times.  04/27/23 Patient notes still having intermittent anxiety, however he is now able to leave his room and his speech is much more productive. Affect continues to be more supple than on initial presentation. Plan to increase Zoloft to 150 mg 04/28, with eventual plan to maximize it to treat his social anxiety disorder.  04/28 Yesterday patient had another panic attack when bullied about his gender expression and received Klonopin and Atarax PRN. Though he states his mood is "good" and has a more supple affect than on initial presentation, he continues to endorse feelings of self hatred (which he believes may be alleviated by gender affirmative care) as well as wish to no longer be alive (though no active SI)  05/01 Patient continues to endorse passive SI, appeared anxious though smiling during interview. Reported panic attack yesterday. Ok with plan to increase Zoloft to 200 mg  05/02 Pt received first dose of 200 mg Zoloft. Had panic attack yesterday triggered by existential doubts and feelings of self hatred, which resulted in him hitting himself. Today less anxious but continues to endorse ambivalence about wanting to be alive.  5/3/23   patient had significant panic attack accompanied by dread and thoughts of self destruction.  When asked if suicidal patient replies I cannot kill myself because of my family.  05/04- Acutely anxious, more so than previously (however interviewed in large group), will add 2 mg abilify. Vickie in process of enrolling [facility] as patient in [facility]  5/5 - Pt endorses panic attack the evening before during which he hit himself in the stomach and arms and banged his head on the wall due to self hatred and feeling like he does not have a place in this world. Created signs with patient that he can use to communicate with staff nonverbally; pt stated that he will try his best to use them. Does not report adverse effects from Abilify.  5/8- Pt denies any panic attacks over the weekend. Pt states that he did not need to use the signs that he created together with the team to communicate with staff. Continues to endorse passive SI and states that his reason for living is his family, but other than that, he does not feel like he has a reason to live. Abilify increased to 5 mg  5/9- Pt denies any panic attacks since yesterday, but continues to endorse passive SI and state that his family is his only reason for living. Will continue Abilify 5 mg.  5/10- Pt states that he had a panic attack the evening prior, during which he punched himself in the stomach and banged his head against the wall. Stated again that he does not want to be alive and does not have a place in this world.  5/11- Pt stated that yesterday, he wrapped a blanket around his neck because he "wanted to feel something" and wanted to "turn off my brain." He stated that he did not do this with the intention to end his life. He stated he does not want to be alive, but cannot die because it would hurt his family. Will decrease Zoloft to 100 mg on 5/12 with the intention of cross-titrating to venlafaxine. Will continue Abilify 5 mg.  5/12 - Pt reported no panic attacks or attempts at self-harm since last conversation. Stated that he "feels sad" and like he is "descending into darkness." Explained to pt that we would be switching from Zoloft to Effexor. Gave Zoloft 100 mg today, will also be starting Effexor 75 mg today.   5/18- Pt reports feeling "the same", endorses palpitations on Venlafaxine, will continue to monitor. Panic attack yesterday, will change standing Klonopin to 2 pm dosing.  5/19- Pt reports "feeling a little sad today" and states that he is continuing to have palpitations at night. Did not have panic attack yesterday, will continuing standing Klonopin.   5/21- Pt received PRN PO Haldol and Atarax after he started head banging in the context of distressing feelings. Today appeared more dysphoric yet less anxious than usual. Appeared tired which he attributed to the Haldol.  5/22 - Pt had episode of tachycardia and was evaluated by medicine attending who deemed non pathological. Appeared anxious and tired today, when asked about suicide after discharge he was unable to contract for safety. Had an anxious and panicked episode relieved by atarax, icing forearms, and talking. Increased Klonopin to 1 mg standing, will consider adding lithium and changing to ssri tmrw  5/23 - Pt had another anxious episode relieved by icing head and forearms, as well as talking. On approach, pt is tearful and dysphoric and states that he wants to die and that he does not belong in this world. Begin lithium 300 mg BID, plan to start Lexapro 10 mg tmrw.   5/24 - Pt had two anxious episodes the previous day; music and ice helped for the first one, but no longer helped for the second one. Pt states these episodes came about in the context of thoughts of self-hatred. On approach, pt has improved affect compared to prior assessment. Start Lexapro 10 mg today.  5/25 -  Pt had an anxious episode the previous evening precipitated by thoughts of "wanting to die" requiring PO Haldol, Ativan, and Hydroxyzine. Pt attended crisis management group the previous day and reviewed DBT skills with team. On approach, pt has depressed affect. Continue Lexapro 10 mg and lithium 300 BID.   5/26 - Pt had an anxious episode at 7 AM this morning requiring Atarax 50. Pt has meeting with [facility] today at 10:30. On approach, pt has notably improved affect compared to prior assessment. Continue Lexapro 10 mg and lithium 300 BID.   5/30- Pt had an anxious episode on Sunday, Monday receiving Ativan 2 mg PO PRN for Agitation on 5/29 at 17:10. Continue lexapro 10 mg qd, Klonopin 1 mg PO qd, increase lithium 300mg BID to 450mg BID  5/31- Pt denies symptoms of suicidal ideation, or anxious episodes over the past day, but states that he does have homicidal ideations. On approach, patient has improved affect as compared to previous day. Continue lexapro 10 mg PO qd, Klonopin 1 mg PO qd, lithium 450 mg PO BID.   6/1- denies SI/HI. anxiety overnight, thoughts of self-harm during this; alleviated by speaking to 1:1. c/w lexapro 10 qd, Klonopin 1 qd, lithium 450 BID.  6/2- endorses passive SI. anxiety last afternoon with thoughts of self-harm; alleviated by PRN Atarax 50 mg. c/w lexapro 10 qd, Klonopin 1 qd, lithium 450 BID.  6/5- added additional klonopin 1mg for noon in addition to the 1mg dose at night  6/7- endorses passive SI, anxiety in the morning, resolving without pharmacological intervention.  Impression: MDD vs Social Anxiety disorder with Panic attacks vs Gender dysphoria vs cPTSD, R/O bipolar    #MDD   #Gender dysphoria   #Social Anxiety Disorder   - 9.39 legals changed to 9.13  - c/w Lithium 450 mg BID 5/23 for suicidality and stabilization of affect; lithium level 5/30- 0.30  - c/w Lexapro 10 mg starting 5/24  - Discontinued Effexor 150 mg, 05/23  - Discontinued Abilify 5 mg, 5/16   - Tapered off Zoloft  - Melatonin for sleep phase disorder  - Left voicemail with Dr. Vincent at [facility], [phone]  - Left VM with  Kandice Acosta who work s with LGBTQ community for connection to trans affirming care  - Left  Teagan Escobar [phone], patient's therapist  - TSH WNL    #Panic attacks  - PRN Atarax  - c/w Klonopin 1 mg BID at noon and at night    #Gender Dysphoria  - The following programs contacted: [facility] (does not have anyone to send to the hospital), [facility] (only provides services for homeless individuals), [facility] (left voicemail), [facility] (phone number out of service), [facility], [facility] Youth Drop In program (reached out to them, meeting with Radames on 5/26), [facility] (voicemail full), [facility] (no availability to send anyone, have [facility] but referred to [facility])  - Referral on discharge to the [facility] (suicide hotline) and [facility] (Rhett has enrolled patient)  - [facility], Lisa, [phone] came and spoke with patient 6/6/23    #Agitation  - For episodes of acute agitation can offer PO Haldol 5 mg/Ativan 2 mg/Benadryl 50 mg Q6H PRN. If refusing PO and is in acute risk of harm to self/other, can offer IM Haldol 5 mg/Ativan 2 mg/Benadryl 50 mg Q6H PRN. If given, please repeat EKG and hold antipsychotics if QTC>500   The patient is a 16 yo transgender F->M, Yakut-speaking, recently immigrated from [place], domiciled with brother, unemployed, with psych hx of gender dysphoria, anxiety and depression, no past psychiatric hospitalizations, previous suicide attempt via choking, who is presenting at recommendation of brother and cousin for increasing SI.    Upon admission patient endorses strong feelings of depression and exhibits very dysphoric and minimally reactive affect. He endorses escalating suicidal thoughts related to feelings of worthlessness and unhappiness over his appearance. During the course of his hospitalization, his mood and affect initially improved, he became become more talkative. However, throughout the course of the hospitalization he started having near-daily anxiety attacks which he has been coping with by head banging or punching/scratching his limbs. His depression seems to have alleviated overall, however he suffers from  frequent mood shifts and anxiety/panic attacks which he has difficulty coping with. These shifts are often triggered by misgendering by others or missing his mother, and are often prevented when he is in the company of Yakut-speaking staff and is socially engaged. The patient was admitted involuntarily on 9:39 legals as he has been acutely elevated risk of suicide, now changed to voluntary legals. Chronic risk factors include history of trauma, history of persistent depressive symptoms, and history of suicidal ideation. Current risk factors include recent self inflicted choking, age, recent immigration, not understanding/speaking well language of current residence, current gender dysphoria, current social anxiety, current panic attacks and financial stressors.     On evaluation today, patient endorses anxiety this morning and continued passive suicidal ideations with no active plan. The patient states that he is happy with the planned outpatient interventions that have been coordinated during his hospital visit. As anxiety is being better controlled with the klonopin 1mg BID, we will plan to discontinue 1:1 obs in near future during daytime and assess until we can d/c 1:1 during the nighttime as well. Goals of care will be to improve patient's symptoms of depression and anxiety which requires continued hospitalization for symptom monitoring and medication adjustment.    04/18/23- Patient acutely dysphoric. Plan to dc mirtazapine as he notes excessive lethargy and hyperphagia, continue sertraline 50 mg  04/19/2023- Patient remains with thoughts of suicide and concerns about his future, however he feels safe while in a structured setting with people around him  04/20/23 Patient had no thoughts of suicide during interview, appeared brighter and occasionally smiled. Agreeable to increase antidepressants  04/24 Patient notes panic attack last night, alleviated talking with cousin. Brighter, future oriented. Endorses social anxiety.  04/25/23 Patient did not have panic attack in last 24 hrs. Still appears brighter on unit and during interview today. Voiced that his social anxiety hinders his ability to advocate for himself while on unit  04/26   Patient is somewhat less anxious,  spending  time outside his room and is cheerful at times.  04/27/23 Patient notes still having intermittent anxiety, however he is now able to leave his room and his speech is much more productive. Affect continues to be more supple than on initial presentation. Plan to increase Zoloft to 150 mg 04/28, with eventual plan to maximize it to treat his social anxiety disorder.  04/28 Yesterday patient had another panic attack when bullied about his gender expression and received Klonopin and Atarax PRN. Though he states his mood is "good" and has a more supple affect than on initial presentation, he continues to endorse feelings of self hatred (which he believes may be alleviated by gender affirmative care) as well as wish to no longer be alive (though no active SI)  05/01 Patient continues to endorse passive SI, appeared anxious though smiling during interview. Reported panic attack yesterday. Ok with plan to increase Zoloft to 200 mg  05/02 Pt received first dose of 200 mg Zoloft. Had panic attack yesterday triggered by existential doubts and feelings of self hatred, which resulted in him hitting himself. Today less anxious but continues to endorse ambivalence about wanting to be alive.  5/3/23   patient had significant panic attack accompanied by dread and thoughts of self destruction.  When asked if suicidal patient replies I cannot kill myself because of my family.  05/04- Acutely anxious, more so than previously (however interviewed in large group), will add 2 mg abilify. Vickie in process of enrolling [facility] as patient in [facility]  5/5 - Pt endorses panic attack the evening before during which he hit himself in the stomach and arms and banged his head on the wall due to self hatred and feeling like he does not have a place in this world. Created signs with patient that he can use to communicate with staff nonverbally; pt stated that he will try his best to use them. Does not report adverse effects from Abilify.  5/8- Pt denies any panic attacks over the weekend. Pt states that he did not need to use the signs that he created together with the team to communicate with staff. Continues to endorse passive SI and states that his reason for living is his family, but other than that, he does not feel like he has a reason to live. Abilify increased to 5 mg  5/9- Pt denies any panic attacks since yesterday, but continues to endorse passive SI and state that his family is his only reason for living. Will continue Abilify 5 mg.  5/10- Pt states that he had a panic attack the evening prior, during which he punched himself in the stomach and banged his head against the wall. Stated again that he does not want to be alive and does not have a place in this world.  5/11- Pt stated that yesterday, he wrapped a blanket around his neck because he "wanted to feel something" and wanted to "turn off my brain." He stated that he did not do this with the intention to end his life. He stated he does not want to be alive, but cannot die because it would hurt his family. Will decrease Zoloft to 100 mg on 5/12 with the intention of cross-titrating to venlafaxine. Will continue Abilify 5 mg.  5/12 - Pt reported no panic attacks or attempts at self-harm since last conversation. Stated that he "feels sad" and like he is "descending into darkness." Explained to pt that we would be switching from Zoloft to Effexor. Gave Zoloft 100 mg today, will also be starting Effexor 75 mg today.   5/18- Pt reports feeling "the same", endorses palpitations on Venlafaxine, will continue to monitor. Panic attack yesterday, will change standing Klonopin to 2 pm dosing.  5/19- Pt reports "feeling a little sad today" and states that he is continuing to have palpitations at night. Did not have panic attack yesterday, will continuing standing Klonopin.   5/21- Pt received PRN PO Haldol and Atarax after he started head banging in the context of distressing feelings. Today appeared more dysphoric yet less anxious than usual. Appeared tired which he attributed to the Haldol.  5/22 - Pt had episode of tachycardia and was evaluated by medicine attending who deemed non pathological. Appeared anxious and tired today, when asked about suicide after discharge he was unable to contract for safety. Had an anxious and panicked episode relieved by atarax, icing forearms, and talking. Increased Klonopin to 1 mg standing, will consider adding lithium and changing to ssri tmrw  5/23 - Pt had another anxious episode relieved by icing head and forearms, as well as talking. On approach, pt is tearful and dysphoric and states that he wants to die and that he does not belong in this world. Begin lithium 300 mg BID, plan to start Lexapro 10 mg tmrw.   5/24 - Pt had two anxious episodes the previous day; music and ice helped for the first one, but no longer helped for the second one. Pt states these episodes came about in the context of thoughts of self-hatred. On approach, pt has improved affect compared to prior assessment. Start Lexapro 10 mg today.  5/25 -  Pt had an anxious episode the previous evening precipitated by thoughts of "wanting to die" requiring PO Haldol, Ativan, and Hydroxyzine. Pt attended crisis management group the previous day and reviewed DBT skills with team. On approach, pt has depressed affect. Continue Lexapro 10 mg and lithium 300 BID.   5/26 - Pt had an anxious episode at 7 AM this morning requiring Atarax 50. Pt has meeting with [facility] today at 10:30. On approach, pt has notably improved affect compared to prior assessment. Continue Lexapro 10 mg and lithium 300 BID.   5/30- Pt had an anxious episode on Sunday, Monday receiving Ativan 2 mg PO PRN for Agitation on 5/29 at 17:10. Continue lexapro 10 mg qd, Klonopin 1 mg PO qd, increase lithium 300mg BID to 450mg BID  5/31- Pt denies symptoms of suicidal ideation, or anxious episodes over the past day, but states that he does have homicidal ideations. On approach, patient has improved affect as compared to previous day. Continue lexapro 10 mg PO qd, Klonopin 1 mg PO qd, lithium 450 mg PO BID.   6/1- denies SI/HI. anxiety overnight, thoughts of self-harm during this; alleviated by speaking to 1:1. c/w lexapro 10 qd, Klonopin 1 qd, lithium 450 BID.  6/2- endorses passive SI. anxiety last afternoon with thoughts of self-harm; alleviated by PRN Atarax 50 mg. c/w lexapro 10 qd, Klonopin 1 qd, lithium 450 BID.  6/5- added additional klonopin 1mg for noon in addition to the 1mg dose at night  6/7- endorses passive SI, anxiety in the morning, resolving without pharmacological intervention.  Impression: MDD vs Social Anxiety disorder with Panic attacks vs Gender dysphoria vs cPTSD, R/O bipolar    #MDD   #Gender dysphoria   #Social Anxiety Disorder   - 9.39 legals changed to 9.13  - c/w Lithium 450 mg BID 5/23 for suicidality and stabilization of affect; lithium level 5/30- 0.30  - c/w Lexapro 10 mg starting 5/24  - Discontinued Effexor 150 mg, 05/23  - Discontinued Abilify 5 mg, 5/16   - Tapered off Zoloft  - Melatonin for sleep phase disorder  - Left voicemail with Dr. Vincent at [facility], [phone]  - Left VM with  Kandice Acosta who work s with LGBTQ community for connection to trans affirming care  - Left  Teagan Escobar [phone], patient's therapist  - TSH WNL    #Panic attacks  - PRN Atarax  - c/w Klonopin 1 mg BID at noon and at night    #Gender Dysphoria  - The following programs contacted: [facility] (does not have anyone to send to the hospital), [facility] (only provides services for homeless individuals), [facility] (left voicemail), [facility] (phone number out of service), [facility], [facility] Youth Drop In program (reached out to them, meeting with Radames on 5/26), [facility] (voicemail full), [facility] (no availability to send anyone, have [facility] but referred to [facility])  - Referral on discharge to the [facility] (suicide hotline) and [facility] (Rhett has enrolled patient)  - [facility], Lisa, [phone] came and spoke with patient 6/6/23    #Agitation  - For episodes of acute agitation can offer PO Haldol 5 mg/Ativan 2 mg/Benadryl 50 mg Q6H PRN. If refusing PO and is in acute risk of harm to self/other, can offer IM Haldol 5 mg/Ativan 2 mg/Benadryl 50 mg Q6H PRN. If given, please repeat EKG and hold antipsychotics if QTC>500

## 2023-06-07 NOTE — BH INPATIENT PSYCHIATRY PROGRESS NOTE - NSBHMETABOLIC_PSY_ALL_CORE_FT
BMI: BMI (kg/m2): 32.5 (04-18-23 @ 05:33)  HbA1c: A1C with Estimated Average Glucose Result: 5.7 % (05-06-23 @ 08:53)    Glucose:   BP: 116/75 (06-07-23 @ 07:52) (100/66 - 127/90)  Lipid Panel: Date/Time: 05-06-23 @ 08:53  Cholesterol, Serum: 206  Direct LDL: --  HDL Cholesterol, Serum: 47  Total Cholesterol/HDL Ration Measurement: --  Triglycerides, Serum: 131   BMI: BMI (kg/m2): 32.5 (04-18-23 @ 05:33)  HbA1c: A1C with Estimated Average Glucose Result: 5.7 % (05-06-23 @ 08:53)    Glucose:   BP: 112/74 (06-07-23 @ 16:25) (100/66 - 116/75)  Lipid Panel: Date/Time: 05-06-23 @ 08:53  Cholesterol, Serum: 206  Direct LDL: --  HDL Cholesterol, Serum: 47  Total Cholesterol/HDL Ration Measurement: --  Triglycerides, Serum: 131

## 2023-06-07 NOTE — BH INPATIENT PSYCHIATRY PROGRESS NOTE - NSBHFUPINTERVALCCFT_PSY_A_CORE
80F with PMH asthma, diabetes, hyperlipidemia, atrial fibrillation, hypertension, and hyperlipidemia, who was found to have a right superficial femoral vein thrombosis while on warfarin and acute kidney injury. She was also noted to have been recently diagnosed with COVID-19. She has had acute on chronic diastolic heart failure, progressive THERESA requiring dialysis, and hypoxic respiratory failure.  She now has increased bilateral pleural effusions, L>R.    12/19 left pigtail catheter inserted 600 cc removed   12/20 chest tube still with high output    "Feeling not too good, not too bad ... in the middle"

## 2023-06-07 NOTE — BH INPATIENT PSYCHIATRY PROGRESS NOTE - NSBHFUPINTERVALHXFT_PSY_A_CORE
Patient was seen and evaluated this morning. Chart was reviewed and no acute events were noted. No PRN medications were administered overnight. Upon approach, patient is sitting in bed comfortably. Patient is alert and oriented and engages with interviewer. Patient is cooperative and answers all questions appropriately.  Patient denies homicidal ideations, but endorses passive thoughts of suicidal ideation. The patient states that he feels hopeless, and that he is a coward; he still wants to die, however has not attempted suicide as it would upset his family. The patient is eating and sleeping well, but states that he wakes up at 5:30 due to noise from other patients, and cannot fall back asleep. The patient still has feelings of anxiety but has been attempting to control these feelings. The patient feels that he is ready for discharge at times, but feels unready to leave when he has episodes of anxiety. He is aware of the outpatient services being coordinated for him, and is looking forward to receiving outpatient therapy from these services when he is discharged.

## 2023-06-08 RX ADMIN — ESCITALOPRAM OXALATE 10 MILLIGRAM(S): 10 TABLET, FILM COATED ORAL at 08:13

## 2023-06-08 RX ADMIN — Medication 50 MILLIGRAM(S): at 10:32

## 2023-06-08 RX ADMIN — HALOPERIDOL DECANOATE 5 MILLIGRAM(S): 100 INJECTION INTRAMUSCULAR at 10:32

## 2023-06-08 RX ADMIN — LITHIUM CARBONATE 450 MILLIGRAM(S): 300 TABLET, EXTENDED RELEASE ORAL at 08:13

## 2023-06-08 RX ADMIN — Medication 1 MILLIGRAM(S): at 21:04

## 2023-06-08 RX ADMIN — Medication 3 MILLIGRAM(S): at 20:56

## 2023-06-08 RX ADMIN — Medication 1 MILLIGRAM(S): at 12:57

## 2023-06-08 RX ADMIN — LITHIUM CARBONATE 450 MILLIGRAM(S): 300 TABLET, EXTENDED RELEASE ORAL at 20:56

## 2023-06-08 NOTE — BH TREATMENT PLAN - NSTXSUICIDGOAL_PSY_ALL_CORE
Be able to state 3 reasons for living
Will verbalize a decrease in preoccupation with suicidal thoughts and / or intent to commit suicide to 2 on a 10-point scale
Be able to state 3 reasons for living
Will express feelings associated with suicidal ideation
Will express feelings associated with suicidal ideation
Will identify and utilize 2 coping skills

## 2023-06-08 NOTE — BH INPATIENT PSYCHIATRY PROGRESS NOTE - NSBHFUPINTERVALHXFT_PSY_A_CORE
was used for this interview; Reyes #[id number]. Patient was seen and evaluated this morning. Chart was reviewed and no acute events were noted. No PRN medications were administered overnight. Upon approach, patient is sitting in bed, and looks anxious. Patient is alert and oriented and engages with interviewer. Patient is cooperative and answers all questions appropriately. The patient states that he has not been sleeping well, and has been feeling anxious since this morning. The patient states that he barely slept at night, only sleeping for 1-2 hours before waking up. He states that he has been anxious since this morning, and has had episodes of racing thoughts. The patient did not receive PRN medication at that time as he did not think medication had been helping him. The patient also states that he is feeling depressed, and continues to endorse suicidal ideations; he states that the only reason he has not killed himself is because he doesn't want to upset his mother and sister. The patient denies homicidal ideations at this time.  was used for this interview; Reyes #[id number]. Patient was seen and evaluated this morning. Chart was reviewed and no acute events were noted. No PRN medications were administered overnight. Per nursing staff, patient was sitting on the floor in the shower earlier this morning. They report that patient typically does this when he is very anxious. Upon approach, patient is sitting in bed, and looks anxious. Patient is alert and oriented and engages with interviewer. Patient is cooperative and answers all questions appropriately. The patient states that he did not sleep much last night, only 1-2 hours due to racing thoughts and has been feeling anxious since this morning. The patient also states that he is feeling depressed, and continues to endorse suicidal ideations; he states that the only reason he has not killed himself is because he doesn't want to upset his mother and sister. He reports that these two desires are very conflicting for him and that on one end he has no desire to continue living, but he also does not want to hurt his mother or sister. Patient reports continued passive suicidal ideation but denies active suicidal ideation, intent or plan at this time.     The patient had an anxiety attack shortly after interview lasting 20 minutes. Patient wanted to end his life due to worsening anxiety and says "I need to wrap this shirt around my neck in order to feel pain so I don't feel this way." Patient received haldol 5mg and atarax 50mg during this period. Walked and spoke with provider to help relieve symptoms of anxiety.

## 2023-06-08 NOTE — BH TREATMENT PLAN - NSTXCOPEINTERSW_PSY_ALL_CORE
Sw will provide support contacts education and referrals for healthy coping skills.
Sw will continue to provide support contacts education and referrals for healthy coping skills. Pt encouraged to attend at least 1 group per day.
Sw will provide support contacts education and referrals for healthy coping skills. Sw will encourage pt to attend SW groups that focus on crisis skills and building healthy coing skills.
Sw will provide support contacts education and referrals for healthy coping skills. Pt encouraged to attend at least 1 group per day.

## 2023-06-08 NOTE — BH TREATMENT PLAN - NSBHPRIMARYDX_PSY_ALL_CORE
Major depressive disorder    
Major depressive disorder, remission status unspecified, unspecified whether recurrent    
Major depressive disorder    
Major depressive disorder, remission status unspecified, unspecified whether recurrent

## 2023-06-08 NOTE — BH TREATMENT PLAN - NSTXPLANTHERAPYSESSIONSFT_PSY_ALL_CORE
05-19-23  Type of therapy: Dialectical behavior therapy, Coping skills  Type of session: Group  Level of patient participation: Quiet  Duration of participation: 30 minutes  Therapy conducted by: Social work  Therapy Summary: Patient attended the Crisis Skills Group with  and peers. The TIPP Skill was reviewed which identifies ways to help us cope with extreme emotions (by “tipping” our body chemistry). The following steps were reviewed: “Temperature, Intense Exercise, Paced Breathing, and Progressive Muscle Relaxation”.  Patients offered support and feedback while  facilitated the group.    Radames sat quietly during the group. Participation was limited due to language barrier. He left the group early and is encouraged to attend future sessions.    05-24-23  Type of therapy: Dialectical behavior therapy, Coping skills  Type of session: Group  Level of patient participation: Attentive, Engaged  Duration of participation: 45 minutes  Therapy conducted by: Social work  Therapy Summary: Patient attended the Crisis Skills Group with  and peers. “Urge Surfing” was taught which helps with managing unwanted behaviors by acknowledging the urge, riding it out, managing triggers and using delay and distraction skills. Patients offered support and feedback while  facilitated the group.    Radames appeared focused on the dialogue and open to the therapeutic intervention. Polish speaking staff assisted in translating the materials (verbally) and materials were printed in Polish.  
  05-05-23  Type of therapy: Coping skills,Inspiration and motiviation,Social skills training,Stress management  Type of session: Group  Level of patient participation: Participates,Quiet  Duration of participation: 15 minutes  Therapy conducted by: Psych rehab  Therapy Summary: Pt is spending time in the dayroom , pt passively participated in group , calm and cooperative .  
  04-24-23  Type of therapy: Dialectical behavior therapy,Coping skills  Type of session: Group  Level of patient participation: Attentive,Quiet  Duration of participation: 45 minutes  Therapy conducted by: Social work  Therapy Summary: Patient attended the Crisis Skills Group with  and peers. The Self-Soothe with the Six Senses Skill was reviewed, a Dialectical Behavior Therapy skill that uses the senses to help you calm down during times of distress. The following skills were reviewed: “vision, hearing, smell, taste, touch and movement”.     Radames sat quietly, appearing open to the therapeutic intervention. Language barrier exists as patient is predominantly Thai speaking.  Radames appeared to be in good behavioral control, remaining present for the groups duration.    04-24-23  Type of therapy: Coping skills,Creative arts therapy,Stress management  Type of session: Individual  Level of patient participation: Participated with encouragement  Duration of participation: Less than 15 minutes  Therapy conducted by: Psych rehab  Therapy Summary: Pt is more visable on the unit , pt engaged and has agreed to attend RT sessions .    04-25-23  Type of therapy: Coping skills,Inspiration and motiviation,Stress management  Type of session: Individual  Level of patient participation: Participated with encouragement  Duration of participation: Less than 15 minutes  Therapy conducted by: Psych rehab  Therapy Summary: Dispite ongoing encouragement pt has not attended groups , During conversation Alyssa rider) agrees to attend but does not . Pt is more visable spending time in the dayroom , viewing TV . Pt remains isolative , limited socialization with others.    04-27-23  Type of therapy: Coping skills,Self esteem,Stress management  Type of session: Individual  Level of patient participation: Quiet,Resistance to participation  Duration of participation: Less than 15 minutes  Therapy conducted by: Psych rehab  Therapy Summary: Pt is now spending time in the dayroom , less isolative , but is not attending groups dispite encouragement.  
  05-26-23  Type of therapy: Cognitive behavior therapy, Coping skills  Type of session: Group  Level of patient participation: Attentive  Duration of participation: 45 minutes  Therapy conducted by: Social work  Therapy Summary: Patient attended the Patient Education Group with  and peers. SW provided an introduction to “The Cognitive Model” (CBT), encouraging patients to identify and challenge their own irrational thoughts. Patients offered support and feedback while  facilitated the discussion.      Patient Response: Radames was an active listener. He appeared open to reviewing the materials presented. Written materials were presented to him in Bengali.  
  06-02-23  Type of therapy: Self esteem, Stress management  Type of session: Group  Level of patient participation: Attentive  Duration of participation: 45 minutes  Therapy conducted by: Social work  Therapy Summary: Patient attended the Social Work group with SW and peers and were asked to identify times where they have shown positive qualities. Patients were encouraged to consider their strengths and how they can be used to help overcome challenges. The goal of this exercise was to increase self-esteem and positive thoughts. SW facilitated the group and patients provided feedback and support.    06-07-23  Type of therapy: Coping skills, Self esteem, Stress management  Type of session: Individual  Level of patient participation: Quiet, Resistance to participation  Duration of participation: Less than 15 minutes  Therapy conducted by: Psych rehab  Therapy Summary: Dispite ongoing encouragement and support pt is refusing to attend therapeutic groups , pt is spending time in the dayroom viewing TV.Overall mood seems slightly improved .  
  05-03-23  Type of therapy: Daily living skills  Type of session: Group  Level of patient participation: Not engaged  Duration of participation: 30 minutes  Therapy conducted by: Nursing  --    05-04-23  Type of therapy: Coping skills,Self esteem,Stress management  Type of session: Individual  Level of patient participation: Participated with encouragement  Duration of participation: Less than 15 minutes  Therapy conducted by: Psych rehab  Therapy Summary: Dispite ongoing encouragement pt does not want to attend groups . Pt is visable on the unit , sitting in the dayroom viewing TV , pt has been calm and  cooperative .  
  05-12-23  Type of therapy: Dialectical behavior therapy, Coping skills  Type of session: Group  Level of patient participation: Attentive  Duration of participation: 45 minutes  Therapy conducted by: Social work  Therapy Summary: Patient attended the Crisis Skills Group with  and peers. The Self-Soothe with the Six Senses Skill was reviewed, a Dialectical Behavior Therapy skill that uses the senses to help you calm down during times of distress. The following skills were reviewed: “vision, hearing, smell, taste, touch and movement”. Patients offered support and feedback while  facilitated the group.    Patient sat quietly and appeared open to the therapeutic intervention (pt is Mohawk speaking). He remained present for the groups duration.    05-18-23  Type of therapy: Coping skills, Inspiration and motiviation, Stress management  Type of session: Individual  Level of patient participation: Participated with encouragement  Duration of participation: 15 minutes  Therapy conducted by: Psych rehab  Therapy Summary: Pt is spending time watching TV , in the dayroom . Mood seems calm . Pt refuses to attend RT groups .

## 2023-06-08 NOTE — BH TREATMENT PLAN - NSTXCOPEINTERMD_PSY_ALL_CORE
Optimization of medications

## 2023-06-08 NOTE — BH INPATIENT PSYCHIATRY PROGRESS NOTE - NSBHASSESSSUMMFT_PSY_ALL_CORE
The patient is a 18 yo transgender F->M, Mohawk-speaking, recently immigrated from [place], domiciled with brother, unemployed, with psych hx of gender dysphoria, anxiety and depression, no past psychiatric hospitalizations, previous suicide attempt via choking, who is presenting at recommendation of brother and cousin for increasing SI.    Upon admission patient endorses strong feelings of depression and exhibits very dysphoric and minimally reactive affect. He endorses escalating suicidal thoughts related to feelings of worthlessness and unhappiness over his appearance. During the course of his hospitalization, his mood and affect initially improved, he became become more talkative. However, throughout the course of the hospitalization he started having near-daily anxiety attacks which he has been coping with by head banging or punching/scratching his limbs. His depression seems to have alleviated overall, however he suffers from  frequent mood shifts and anxiety/panic attacks which he has difficulty coping with. These shifts are often triggered by misgendering by others or missing his mother, and are often prevented when he is in the company of Mohawk-speaking staff and is socially engaged. The patient was admitted involuntarily on 9:39 legals as he has been acutely elevated risk of suicide, now changed to voluntary legals. Chronic risk factors include history of trauma, history of persistent depressive symptoms, and history of suicidal ideation. Current risk factors include recent self inflicted choking, age, recent immigration, not understanding/speaking well language of current residence, current gender dysphoria, current social anxiety, current panic attacks and financial stressors.     On evaluation today, patient endorses anxiety this morning and continued passive suicidal ideations. We had planned to discontinue 1:1 obs in near future during daytime and assess until we can d/c 1:1 during the nighttime as well. Past attempts at discontinuing 1:1 have led to patient having worsening suicidal ideation as well as episodes of self injurious behavior. We are approaching improved management of patient's anxiety and severity of anxiety attacks with recent change to patient's klonopin dosing and timing, but need continued adjustment in order to ensure that patient is on proper medication regimen to prevent further self injurious behavior during anxiety attacks and better control patient's impulsivity during these attacks to better ensure patient does not act out on suicidal ideations during those periods of time. Goals of care will be to improve patient's symptoms of depression and anxiety which requires continued hospitalization for symptom monitoring and medication adjustment.     04/18/23- Patient acutely dysphoric. Plan to dc mirtazapine as he notes excessive lethargy and hyperphagia, continue sertraline 50 mg  04/19/2023- Patient remains with thoughts of suicide and concerns about his future, however he feels safe while in a structured setting with people around him  04/20/23 Patient had no thoughts of suicide during interview, appeared brighter and occasionally smiled. Agreeable to increase antidepressants  04/24 Patient notes panic attack last night, alleviated talking with cousin. Brighter, future oriented. Endorses social anxiety.  04/25/23 Patient did not have panic attack in last 24 hrs. Still appears brighter on unit and during interview today. Voiced that his social anxiety hinders his ability to advocate for himself while on unit  04/26   Patient is somewhat less anxious,  spending  time outside his room and is cheerful at times.  04/27/23 Patient notes still having intermittent anxiety, however he is now able to leave his room and his speech is much more productive. Affect continues to be more supple than on initial presentation. Plan to increase Zoloft to 150 mg 04/28, with eventual plan to maximize it to treat his social anxiety disorder.  04/28 Yesterday patient had another panic attack when bullied about his gender expression and received Klonopin and Atarax PRN. Though he states his mood is "good" and has a more supple affect than on initial presentation, he continues to endorse feelings of self hatred (which he believes may be alleviated by gender affirmative care) as well as wish to no longer be alive (though no active SI)  05/01 Patient continues to endorse passive SI, appeared anxious though smiling during interview. Reported panic attack yesterday. Ok with plan to increase Zoloft to 200 mg  05/02 Pt received first dose of 200 mg Zoloft. Had panic attack yesterday triggered by existential doubts and feelings of self hatred, which resulted in him hitting himself. Today less anxious but continues to endorse ambivalence about wanting to be alive.  5/3/23   patient had significant panic attack accompanied by dread and thoughts of self destruction.  When asked if suicidal patient replies I cannot kill myself because of my family.  05/04- Acutely anxious, more so than previously (however interviewed in large group), will add 2 mg abilify. Vickie in process of enrolling Rad[facility] as patient in [facility]  5/5 - Pt endorses panic attack the evening before during which he hit himself in the stomach and arms and banged his head on the wall due to self hatred and feeling like he does not have a place in this world. Created signs with patient that he can use to communicate with staff nonverbally; pt stated that he will try his best to use them. Does not report adverse effects from Abilify.  5/8- Pt denies any panic attacks over the weekend. Pt states that he did not need to use the signs that he created together with the team to communicate with staff. Continues to endorse passive SI and states that his reason for living is his family, but other than that, he does not feel like he has a reason to live. Abilify increased to 5 mg  5/9- Pt denies any panic attacks since yesterday, but continues to endorse passive SI and state that his family is his only reason for living. Will continue Abilify 5 mg.  5/10- Pt states that he had a panic attack the evening prior, during which he punched himself in the stomach and banged his head against the wall. Stated again that he does not want to be alive and does not have a place in this world.  5/11- Pt stated that yesterday, he wrapped a blanket around his neck because he "wanted to feel something" and wanted to "turn off my brain." He stated that he did not do this with the intention to end his life. He stated he does not want to be alive, but cannot die because it would hurt his family. Will decrease Zoloft to 100 mg on 5/12 with the intention of cross-titrating to venlafaxine. Will continue Abilify 5 mg.  5/12 - Pt reported no panic attacks or attempts at self-harm since last conversation. Stated that he "feels sad" and like he is "descending into darkness." Explained to pt that we would be switching from Zoloft to Effexor. Gave Zoloft 100 mg today, will also be starting Effexor 75 mg today.   5/18- Pt reports feeling "the same", endorses palpitations on Venlafaxine, will continue to monitor. Panic attack yesterday, will change standing Klonopin to 2 pm dosing.  5/19- Pt reports "feeling a little sad today" and states that he is continuing to have palpitations at night. Did not have panic attack yesterday, will continuing standing Klonopin.   5/21- Pt received PRN PO Haldol and Atarax after he started head banging in the context of distressing feelings. Today appeared more dysphoric yet less anxious than usual. Appeared tired which he attributed to the Haldol.  5/22 - Pt had episode of tachycardia and was evaluated by medicine attending who deemed non pathological. Appeared anxious and tired today, when asked about suicide after discharge he was unable to contract for safety. Had an anxious and panicked episode relieved by atarax, icing forearms, and talking. Increased Klonopin to 1 mg standing, will consider adding lithium and changing to ssri tmrw  5/23 - Pt had another anxious episode relieved by icing head and forearms, as well as talking. On approach, pt is tearful and dysphoric and states that he wants to die and that he does not belong in this world. Begin lithium 300 mg BID, plan to start Lexapro 10 mg tmrw.   5/24 - Pt had two anxious episodes the previous day; music and ice helped for the first one, but no longer helped for the second one. Pt states these episodes came about in the context of thoughts of self-hatred. On approach, pt has improved affect compared to prior assessment. Start Lexapro 10 mg today.  5/25 -  Pt had an anxious episode the previous evening precipitated by thoughts of "wanting to die" requiring PO Haldol, Ativan, and Hydroxyzine. Pt attended crisis management group the previous day and reviewed DBT skills with team. On approach, pt has depressed affect. Continue Lexapro 10 mg and lithium 300 BID.   5/26 - Pt had an anxious episode at 7 AM this morning requiring Atarax 50. Pt has meeting with [facility] today at 10:30. On approach, pt has notably improved affect compared to prior assessment. Continue Lexapro 10 mg and lithium 300 BID.   5/30- Pt had an anxious episode on Sunday, Monday receiving Ativan 2 mg PO PRN for Agitation on 5/29 at 17:10. Continue lexapro 10 mg qd, Klonopin 1 mg PO qd, increase lithium 300mg BID to 450mg BID  5/31- Pt denies symptoms of suicidal ideation, or anxious episodes over the past day, but states that he does have homicidal ideations. On approach, patient has improved affect as compared to previous day. Continue lexapro 10 mg PO qd, Klonopin 1 mg PO qd, lithium 450 mg PO BID.   6/1- denies SI/HI. anxiety overnight, thoughts of self-harm during this; alleviated by speaking to 1:1. c/w lexapro 10 qd, Klonopin 1 qd, lithium 450 BID.  6/2- endorses passive SI. anxiety last afternoon with thoughts of self-harm; alleviated by PRN Atarax 50 mg. c/w lexapro 10 qd, Klonopin 1 qd, lithium 450 BID.  6/5- added additional klonopin 1mg for noon in addition to the 1mg dose at night  6/7- endorses passive SI, anxiety in the morning, resolving without pharmacological intervention.    Impression: MDD vs Social Anxiety disorder with Panic attacks vs Gender dysphoria vs cPTSD, R/O bipolar    #MDD   #Gender dysphoria   #Social Anxiety Disorder   - 9.39 legals changed to 9.13  - c/w Lithium 450 mg BID 5/23 for suicidality and stabilization of affect; lithium level 5/30- 0.30  - c/w Lexapro 10 mg starting 5/24  - Discontinued Effexor 150 mg, 05/23  - Discontinued Abilify 5 mg, 5/16   - Tapered off Zoloft  - Melatonin for sleep phase disorder  - Left voicemail with Dr. Vincent at [facility], [phone]  - Left VM with  Kandice Acosta who work s with LGBTQ community for connection to trans affirming care  - Left  Teagan Escobar [phone], patient's therapist  - TSH WNL    #Panic attacks  - PRN Atarax  - c/w Klonopin 1 mg BID at noon and at night    #Gender Dysphoria  - The following programs contacted: [facility] (does not have anyone to send to the hospital), [employer] [facility] (only provides services for homeless individuals), [facility] (left voicemail), [facility] (phone number out of service), [facility], [facility] program (reached out to them, meeting with Radames on 5/26), [facility] (voicemail full), [facility] (no availability to send anyone, have [facility] but referred to [facility])  - Referral on discharge to the [facility] (suicide hotline) and [facility] ([facility] has enrolled patient)  - [facility], Lisa, [phone] came and spoke with patient 6/6/23    #Agitation  - For episodes of acute agitation can offer PO Haldol 5 mg/Ativan 2 mg/Benadryl 50 mg Q6H PRN. If refusing PO and is in acute risk of harm to self/other, can offer IM Haldol 5 mg/Ativan 2 mg/Benadryl 50 mg Q6H PRN. If given, please repeat EKG and hold antipsychotics if QTC>500

## 2023-06-08 NOTE — BH TREATMENT PLAN - NSDCCRITERIA_PSY_ALL_CORE
When patient is no longer at acutely elevated risk of harm to self and is able to safety plan.

## 2023-06-08 NOTE — BH TREATMENT PLAN - NSTXSUICIDINTERMD_PSY_ALL_CORE
Use of antidepressant medications

## 2023-06-08 NOTE — BH INPATIENT PSYCHIATRY PROGRESS NOTE - NSBHMETABOLIC_PSY_ALL_CORE_FT
BMI: BMI (kg/m2): 32.5 (04-18-23 @ 05:33)  HbA1c: A1C with Estimated Average Glucose Result: 5.7 % (05-06-23 @ 08:53)    Glucose:   BP: 118/83 (06-08-23 @ 07:48) (103/76 - 118/83)  Lipid Panel: Date/Time: 05-06-23 @ 08:53  Cholesterol, Serum: 206  Direct LDL: --  HDL Cholesterol, Serum: 47  Total Cholesterol/HDL Ration Measurement: --  Triglycerides, Serum: 131

## 2023-06-08 NOTE — BH TREATMENT PLAN - NSTXCOPEGOAL_PSY_ALL_CORE
Other...
Identify and utilize 2 coping skills that meet their needs

## 2023-06-08 NOTE — BH INPATIENT PSYCHIATRY PROGRESS NOTE - NSBHCHARTREVIEWVS_PSY_A_CORE FT
Vital Signs Last 24 Hrs  T(C): 36.4 (06-08-23 @ 07:48), Max: 36.4 (06-07-23 @ 16:25)  T(F): 97.5 (06-08-23 @ 07:48), Max: 97.5 (06-07-23 @ 16:25)  HR: 100 (06-08-23 @ 07:48) (88 - 101)  BP: 118/83 (06-08-23 @ 07:48) (104/66 - 118/83)  BP(mean): --  RR: 18 (06-08-23 @ 07:48) (16 - 18)  SpO2: --

## 2023-06-08 NOTE — BH TREATMENT PLAN - NSTXCOPEINTERPR_PSY_ALL_CORE
Rt will provide support and encouragement .
Pt is offered ongoing support and encouragement .
Rt will offer support and encourage pt to attend therapeutic groups .
RT will offer support and encouragement .
Rt will provide support and encouragement .
Rt will offer support and encouragement

## 2023-06-09 RX ORDER — ESCITALOPRAM OXALATE 10 MG/1
20 TABLET, FILM COATED ORAL DAILY
Refills: 0 | Status: DISCONTINUED | OUTPATIENT
Start: 2023-06-09 | End: 2023-06-16

## 2023-06-09 RX ORDER — CLONAZEPAM 1 MG
1 TABLET ORAL AT BEDTIME
Refills: 0 | Status: DISCONTINUED | OUTPATIENT
Start: 2023-06-09 | End: 2023-06-16

## 2023-06-09 RX ORDER — CLONAZEPAM 1 MG
1 TABLET ORAL
Refills: 0 | Status: DISCONTINUED | OUTPATIENT
Start: 2023-06-09 | End: 2023-06-16

## 2023-06-09 RX ADMIN — ESCITALOPRAM OXALATE 10 MILLIGRAM(S): 10 TABLET, FILM COATED ORAL at 08:09

## 2023-06-09 RX ADMIN — Medication 1 MILLIGRAM(S): at 11:26

## 2023-06-09 RX ADMIN — LITHIUM CARBONATE 450 MILLIGRAM(S): 300 TABLET, EXTENDED RELEASE ORAL at 21:28

## 2023-06-09 RX ADMIN — Medication 3 MILLIGRAM(S): at 21:29

## 2023-06-09 RX ADMIN — LITHIUM CARBONATE 450 MILLIGRAM(S): 300 TABLET, EXTENDED RELEASE ORAL at 08:09

## 2023-06-09 RX ADMIN — Medication 1 MILLIGRAM(S): at 21:48

## 2023-06-09 NOTE — BH INPATIENT PSYCHIATRY PROGRESS NOTE - NSBHCHARTREVIEWVS_PSY_A_CORE FT
Vital Signs Last 24 Hrs  T(C): 36.2 (06-09-23 @ 07:53), Max: 36.8 (06-08-23 @ 15:27)  T(F): 97.1 (06-09-23 @ 07:53), Max: 98.2 (06-08-23 @ 15:27)  HR: 105 (06-09-23 @ 07:53) (105 - 125)  BP: 118/79 (06-09-23 @ 07:53) (111/71 - 118/79)  BP(mean): --  RR: 18 (06-09-23 @ 07:53) (16 - 18)  SpO2: --

## 2023-06-09 NOTE — BH INPATIENT PSYCHIATRY PROGRESS NOTE - NSBHMETABOLIC_PSY_ALL_CORE_FT
BMI: BMI (kg/m2): 32.5 (04-18-23 @ 05:33)  HbA1c: A1C with Estimated Average Glucose Result: 5.7 % (05-06-23 @ 08:53)    Glucose:   BP: 118/79 (06-09-23 @ 07:53) (103/76 - 118/83)  Lipid Panel: Date/Time: 05-06-23 @ 08:53  Cholesterol, Serum: 206  Direct LDL: --  HDL Cholesterol, Serum: 47  Total Cholesterol/HDL Ration Measurement: --  Triglycerides, Serum: 131

## 2023-06-09 NOTE — BH INPATIENT PSYCHIATRY PROGRESS NOTE - NSBHASSESSSUMMFT_PSY_ALL_CORE
The patient is a 16 yo transgender F->M, Slovak-speaking, recently immigrated from [place], domiciled with brother, unemployed, with psych hx of gender dysphoria, anxiety and depression, no past psychiatric hospitalizations, previous suicide attempt via choking, who is presenting at recommendation of brother and cousin for increasing SI.    Upon admission patient endorses strong feelings of depression and exhibits very dysphoric and minimally reactive affect. He endorses escalating suicidal thoughts related to feelings of worthlessness and unhappiness over his appearance. During the course of his hospitalization, his mood and affect initially improved, he became become more talkative. However, throughout the course of the hospitalization he started having near-daily anxiety attacks which he has been coping with by head banging or punching/scratching his limbs. His depression seems to have alleviated overall, however he suffers from  frequent mood shifts and anxiety/panic attacks which he has difficulty coping with. These shifts are often triggered by misgendering by others or missing his mother, and are often prevented when he is in the company of Slovak-speaking staff and is socially engaged. The patient was admitted involuntarily on 9:39 legals as he has been acutely elevated risk of suicide, now changed to voluntary legals. Chronic risk factors include history of trauma, history of persistent depressive symptoms, and history of suicidal ideation. Current risk factors include recent self inflicted choking, age, recent immigration, not understanding/speaking well language of current residence, current gender dysphoria, current social anxiety, current panic attacks and financial stressors.     On evaluation today, patient continues to endorse passive suicidal ideations. Our goal is to observe patient being able to approach periods of panic attacks or heightened anxiety with effective coping strategies which have been introduced to him this unit.     Past attempts at discontinuing 1:1 have led to patient having worsening suicidal ideation as well as episodes of self injurious behavior. We are approaching improved management of patient's anxiety and severity of anxiety attacks with recent change to patient's klonopin dosing and timing, but need continued adjustment in order to ensure that patient is on proper medication regimen to prevent further self injurious behavior during anxiety attacks and better control patient's impulsivity during these attacks to better ensure patient does not act out on suicidal ideations during those periods of time. Goals of care will be to improve patient's symptoms of depression and anxiety which requires continued hospitalization for symptom monitoring and medication adjustment.     04/18/23- Patient acutely dysphoric. Plan to dc mirtazapine as he notes excessive lethargy and hyperphagia, continue sertraline 50 mg  04/19/2023- Patient remains with thoughts of suicide and concerns about his future, however he feels safe while in a structured setting with people around him  04/20/23 Patient had no thoughts of suicide during interview, appeared brighter and occasionally smiled. Agreeable to increase antidepressants  04/24 Patient notes panic attack last night, alleviated talking with cousin. Brighter, future oriented. Endorses social anxiety.  04/25/23 Patient did not have panic attack in last 24 hrs. Still appears brighter on unit and during interview today. Voiced that his social anxiety hinders his ability to advocate for himself while on unit  04/26   Patient is somewhat less anxious,  spending  time outside his room and is cheerful at times.  04/27/23 Patient notes still having intermittent anxiety, however he is now able to leave his room and his speech is much more productive. Affect continues to be more supple than on initial presentation. Plan to increase Zoloft to 150 mg 04/28, with eventual plan to maximize it to treat his social anxiety disorder.  04/28 Yesterday patient had another panic attack when bullied about his gender expression and received Klonopin and Atarax PRN. Though he states his mood is "good" and has a more supple affect than on initial presentation, he continues to endorse feelings of self hatred (which he believes may be alleviated by gender affirmative care) as well as wish to no longer be alive (though no active SI)  05/01 Patient continues to endorse passive SI, appeared anxious though smiling during interview. Reported panic attack yesterday. Ok with plan to increase Zoloft to 200 mg  05/02 Pt received first dose of 200 mg Zoloft. Had panic attack yesterday triggered by existential doubts and feelings of self hatred, which resulted in him hitting himself. Today less anxious but continues to endorse ambivalence about wanting to be alive.  5/3/23   patient had significant panic attack accompanied by dread and thoughts of self destruction.  When asked if suicidal patient replies I cannot kill myself because of my family.  05/04- Acutely anxious, more so than previously (however interviewed in large group), will add 2 mg abilify. Vickie in process of enrolling Radames as patient in [facility]  5/5 - Pt endorses panic attack the evening before during which he hit himself in the stomach and arms and banged his head on the wall due to self hatred and feeling like he does not have a place in this world. Created signs with patient that he can use to communicate with staff nonverbally; pt stated that he will try his best to use them. Does not report adverse effects from Abilify.  5/8- Pt denies any panic attacks over the weekend. Pt states that he did not need to use the signs that he created together with the team to communicate with staff. Continues to endorse passive SI and states that his reason for living is his family, but other than that, he does not feel like he has a reason to live. Abilify increased to 5 mg  5/9- Pt denies any panic attacks since yesterday, but continues to endorse passive SI and state that his family is his only reason for living. Will continue Abilify 5 mg.  5/10- Pt states that he had a panic attack the evening prior, during which he punched himself in the stomach and banged his head against the wall. Stated again that he does not want to be alive and does not have a place in this world.  5/11- Pt stated that yesterday, he wrapped a blanket around his neck because he "wanted to feel something" and wanted to "turn off my brain." He stated that he did not do this with the intention to end his life. He stated he does not want to be alive, but cannot die because it would hurt his family. Will decrease Zoloft to 100 mg on 5/12 with the intention of cross-titrating to venlafaxine. Will continue Abilify 5 mg.  5/12 - Pt reported no panic attacks or attempts at self-harm since last conversation. Stated that he "feels sad" and like he is "descending into darkness." Explained to pt that we would be switching from Zoloft to Effexor. Gave Zoloft 100 mg today, will also be starting Effexor 75 mg today.   5/18- Pt reports feeling "the same", endorses palpitations on Venlafaxine, will continue to monitor. Panic attack yesterday, will change standing Klonopin to 2 pm dosing.  5/19- Pt reports "feeling a little sad today" and states that he is continuing to have palpitations at night. Did not have panic attack yesterday, will continuing standing Klonopin.   5/21- Pt received PRN PO Haldol and Atarax after he started head banging in the context of distressing feelings. Today appeared more dysphoric yet less anxious than usual. Appeared tired which he attributed to the Haldol.  5/22 - Pt had episode of tachycardia and was evaluated by medicine attending who deemed non pathological. Appeared anxious and tired today, when asked about suicide after discharge he was unable to contract for safety. Had an anxious and panicked episode relieved by atarax, icing forearms, and talking. Increased Klonopin to 1 mg standing, will consider adding lithium and changing to ssri tmrw  5/23 - Pt had another anxious episode relieved by icing head and forearms, as well as talking. On approach, pt is tearful and dysphoric and states that he wants to die and that he does not belong in this world. Begin lithium 300 mg BID, plan to start Lexapro 10 mg tmrw.   5/24 - Pt had two anxious episodes the previous day; music and ice helped for the first one, but no longer helped for the second one. Pt states these episodes came about in the context of thoughts of self-hatred. On approach, pt has improved affect compared to prior assessment. Start Lexapro 10 mg today.  5/25 -  Pt had an anxious episode the previous evening precipitated by thoughts of "wanting to die" requiring PO Haldol, Ativan, and Hydroxyzine. Pt attended crisis management group the previous day and reviewed DBT skills with team. On approach, pt has depressed affect. Continue Lexapro 10 mg and lithium 300 BID.   5/26 - Pt had an anxious episode at 7 AM this morning requiring Atarax 50. Pt has meeting with [facility] today at 10:30. On approach, pt has notably improved affect compared to prior assessment. Continue Lexapro 10 mg and lithium 300 BID.   5/30- Pt had an anxious episode on Sunday, Monday receiving Ativan 2 mg PO PRN for Agitation on 5/29 at 17:10. Continue lexapro 10 mg qd, Klonopin 1 mg PO qd, increase lithium 300mg BID to 450mg BID  5/31- Pt denies symptoms of suicidal ideation, or anxious episodes over the past day, but states that he does have homicidal ideations. On approach, patient has improved affect as compared to previous day. Continue lexapro 10 mg PO qd, Klonopin 1 mg PO qd, lithium 450 mg PO BID.   6/1- denies SI/HI. anxiety overnight, thoughts of self-harm during this; alleviated by speaking to 1:1. c/w lexapro 10 qd, Klonopin 1 qd, lithium 450 BID.  6/2- endorses passive SI. anxiety last afternoon with thoughts of self-harm; alleviated by PRN Atarax 50 mg. c/w lexapro 10 qd, Klonopin 1 qd, lithium 450 BID.  6/5- added additional klonopin 1mg for noon in addition to the 1mg dose at night  6/7- endorses passive SI, anxiety in the morning, resolving without pharmacological intervention.    Impression: MDD vs Social Anxiety disorder with Panic attacks vs Gender dysphoria vs cPTSD, R/O bipolar    #MDD   #Gender dysphoria   #Social Anxiety Disorder   - 9.39 legals changed to 9.13  - c/w Lithium 450 mg BID 5/23 for suicidality and stabilization of affect; lithium level 5/30- 0.30  - c/w Lexapro 10 mg starting 5/24  - Discontinued Effexor 150 mg, 05/23  - Discontinued Abilify 5 mg, 5/16   - Tapered off Zoloft  - Melatonin for sleep phase disorder  - Left voicemail with Dr. Vincent at [facility], [phone]  - Left VM with  Kandice Acosta who work s with LGBTQ community for connection to trans affirming care  - Left  Teagan Escobar [phone], patient's therapist  - TSH WNL    #Panic attacks  - PRN Atarax  - c/w Klonopin 1 mg BID at noon and at night    #Gender Dysphoria  - The following programs contacted: [facility] (does not have anyone to send to the hospital), [facility] (only provides services for homeless individuals), [facility] (left voicemail), [facility] (phone number out of service), [facility], [facility] (reached out to them, meeting with Radames on 5/26), [facility] (voicemail full), [facility] (no availability to send anyone, have [facility] but referred to [facility])  - Referral on discharge to the [facility] (suicide hotline) and [facility] ([facility] has enrolled patient)  - [facility], [place], [phone] came and spoke with patient 6/6/23    #Agitation  - For episodes of acute agitation can offer PO Haldol 5 mg/Ativan 2 mg/Benadryl 50 mg Q6H PRN. If refusing PO and is in acute risk of harm to self/other, can offer IM Haldol 5 mg/Ativan 2 mg/Benadryl 50 mg Q6H PRN. If given, please repeat EKG and hold antipsychotics if QTC>500   The patient is a 18 yo transgender F->M, Amharic-speaking, recently immigrated from [place], domiciled with brother, unemployed, with psych hx of gender dysphoria, anxiety and depression, no past psychiatric hospitalizations, previous suicide attempt via choking, who is presenting at recommendation of brother and cousin for increasing SI.    Upon admission patient endorses strong feelings of depression and exhibits very dysphoric and minimally reactive affect. He endorses escalating suicidal thoughts related to feelings of worthlessness and unhappiness over his appearance. During the course of his hospitalization, his mood and affect initially improved, he became become more talkative. However, throughout the course of the hospitalization he started having near-daily anxiety attacks which he has been coping with by head banging or punching/scratching his limbs. His depression seems to have alleviated overall, however he suffers from  frequent mood shifts and anxiety/panic attacks which he has difficulty coping with. These shifts are often triggered by misgendering by others or missing his mother, and are often prevented when he is in the company of Amharic-speaking staff and is socially engaged. The patient was admitted involuntarily on 9:39 legals as he has been acutely elevated risk of suicide, now changed to voluntary legals. Chronic risk factors include history of trauma, history of persistent depressive symptoms, and history of suicidal ideation. Current risk factors include recent self inflicted choking, age, recent immigration, not understanding/speaking well language of current residence, current gender dysphoria, current social anxiety, current panic attacks and financial stressors.     On evaluation today, patient continues to endorse passive suicidal ideations. Our goal is to observe patient being able to approach periods of panic attacks or heightened anxiety with effective coping strategies which have been introduced to him this unit. We need to ensure patient is able to contract for safety, establish plan for outpatient follow-up appointments immediately after discharge from inpatient unit, complete titration of anti-depressant medication and anxiolytic medications. We plan to increase lexapro 10mg to 20mg for depressive symptoms, continue with klonopin 1mg BID, slowly taper patient off of 1:1 constant observation prior to discharge. Past attempts at discontinuing 1:1 have led to patient having worsening suicidal ideation as well as episodes of self injurious behavior. Yesterday morning, patient had severe panic attack where he was experiencing suicidal ideation, restless and agitated during this episode, requiring PRN haldol and atarax. We plan to prepare patient for safe discharge home by end of next week, given we are able to accomplish all tasks mentioned above. Goals of care will be to improve patient's symptoms of depression and anxiety which requires continued hospitalization for symptom monitoring and medication adjustment.     04/18/23- Patient acutely dysphoric. Plan to dc mirtazapine as he notes excessive lethargy and hyperphagia, continue sertraline 50 mg  04/19/2023- Patient remains with thoughts of suicide and concerns about his future, however he feels safe while in a structured setting with people around him  04/20/23 Patient had no thoughts of suicide during interview, appeared brighter and occasionally smiled. Agreeable to increase antidepressants  04/24 Patient notes panic attack last night, alleviated talking with cousin. Brighter, future oriented. Endorses social anxiety.  04/25/23 Patient did not have panic attack in last 24 hrs. Still appears brighter on unit and during interview today. Voiced that his social anxiety hinders his ability to advocate for himself while on unit  04/26   Patient is somewhat less anxious,  spending  time outside his room and is cheerful at times.  04/27/23 Patient notes still having intermittent anxiety, however he is now able to leave his room and his speech is much more productive. Affect continues to be more supple than on initial presentation. Plan to increase Zoloft to 150 mg 04/28, with eventual plan to maximize it to treat his social anxiety disorder.  04/28 Yesterday patient had another panic attack when bullied about his gender expression and received Klonopin and Atarax PRN. Though he states his mood is "good" and has a more supple affect than on initial presentation, he continues to endorse feelings of self hatred (which he believes may be alleviated by gender affirmative care) as well as wish to no longer be alive (though no active SI)  05/01 Patient continues to endorse passive SI, appeared anxious though smiling during interview. Reported panic attack yesterday. Ok with plan to increase Zoloft to 200 mg  05/02 Pt received first dose of 200 mg Zoloft. Had panic attack yesterday triggered by existential doubts and feelings of self hatred, which resulted in him hitting himself. Today less anxious but continues to endorse ambivalence about wanting to be alive.  5/3/23   patient had significant panic attack accompanied by dread and thoughts of self destruction.  When asked if suicidal patient replies I cannot kill myself because of my family.  05/04- Acutely anxious, more so than previously (however interviewed in large group), will add 2 mg abilify. Vickie in process of enrolling [facility] as patient in [facility]  5/5 - Pt endorses panic attack the evening before during which he hit himself in the stomach and arms and banged his head on the wall due to self hatred and feeling like he does not have a place in this world. Created signs with patient that he can use to communicate with staff nonverbally; pt stated that he will try his best to use them. Does not report adverse effects from Abilify.  5/8- Pt denies any panic attacks over the weekend. Pt states that he did not need to use the signs that he created together with the team to communicate with staff. Continues to endorse passive SI and states that his reason for living is his family, but other than that, he does not feel like he has a reason to live. Abilify increased to 5 mg  5/9- Pt denies any panic attacks since yesterday, but continues to endorse passive SI and state that his family is his only reason for living. Will continue Abilify 5 mg.  5/10- Pt states that he had a panic attack the evening prior, during which he punched himself in the stomach and banged his head against the wall. Stated again that he does not want to be alive and does not have a place in this world.  5/11- Pt stated that yesterday, he wrapped a blanket around his neck because he "wanted to feel something" and wanted to "turn off my brain." He stated that he did not do this with the intention to end his life. He stated he does not want to be alive, but cannot die because it would hurt his family. Will decrease Zoloft to 100 mg on 5/12 with the intention of cross-titrating to venlafaxine. Will continue Abilify 5 mg.  5/12 - Pt reported no panic attacks or attempts at self-harm since last conversation. Stated that he "feels sad" and like he is "descending into darkness." Explained to pt that we would be switching from Zoloft to Effexor. Gave Zoloft 100 mg today, will also be starting Effexor 75 mg today.   5/18- Pt reports feeling "the same", endorses palpitations on Venlafaxine, will continue to monitor. Panic attack yesterday, will change standing Klonopin to 2 pm dosing.  5/19- Pt reports "feeling a little sad today" and states that he is continuing to have palpitations at night. Did not have panic attack yesterday, will continuing standing Klonopin.   5/21- Pt received PRN PO Haldol and Atarax after he started head banging in the context of distressing feelings. Today appeared more dysphoric yet less anxious than usual. Appeared tired which he attributed to the Haldol.  5/22 - Pt had episode of tachycardia and was evaluated by medicine attending who deemed non pathological. Appeared anxious and tired today, when asked about suicide after discharge he was unable to contract for safety. Had an anxious and panicked episode relieved by atarax, icing forearms, and talking. Increased Klonopin to 1 mg standing, will consider adding lithium and changing to ssri tmrw  5/23 - Pt had another anxious episode relieved by icing head and forearms, as well as talking. On approach, pt is tearful and dysphoric and states that he wants to die and that he does not belong in this world. Begin lithium 300 mg BID, plan to start Lexapro 10 mg tmrw.   5/24 - Pt had two anxious episodes the previous day; music and ice helped for the first one, but no longer helped for the second one. Pt states these episodes came about in the context of thoughts of self-hatred. On approach, pt has improved affect compared to prior assessment. Start Lexapro 10 mg today.  5/25 -  Pt had an anxious episode the previous evening precipitated by thoughts of "wanting to die" requiring PO Haldol, Ativan, and Hydroxyzine. Pt attended crisis management group the previous day and reviewed DBT skills with team. On approach, pt has depressed affect. Continue Lexapro 10 mg and lithium 300 BID.   5/26 - Pt had an anxious episode at 7 AM this morning requiring Atarax 50. Pt has meeting with [facility] today at 10:30. On approach, pt has notably improved affect compared to prior assessment. Continue Lexapro 10 mg and lithium 300 BID.   5/30- Pt had an anxious episode on Sunday, Monday receiving Ativan 2 mg PO PRN for Agitation on 5/29 at 17:10. Continue lexapro 10 mg qd, Klonopin 1 mg PO qd, increase lithium 300mg BID to 450mg BID  5/31- Pt denies symptoms of suicidal ideation, or anxious episodes over the past day, but states that he does have homicidal ideations. On approach, patient has improved affect as compared to previous day. Continue lexapro 10 mg PO qd, Klonopin 1 mg PO qd, lithium 450 mg PO BID.   6/1- denies SI/HI. anxiety overnight, thoughts of self-harm during this; alleviated by speaking to 1:1. c/w lexapro 10 qd, Klonopin 1 qd, lithium 450 BID.  6/2- endorses passive SI. anxiety last afternoon with thoughts of self-harm; alleviated by PRN Atarax 50 mg. c/w lexapro 10 qd, Klonopin 1 qd, lithium 450 BID.  6/5- added additional klonopin 1mg for noon in addition to the 1mg dose at night  6/7- endorses passive SI, anxiety in the morning, resolving without pharmacological intervention.  6/8- patient had anxiety attack with severe suicidal ideations requiring PRN haldol and atarax  6/9- increase lexapro 10mg qd to 20mg qd    Impression: MDD vs Social Anxiety disorder with Panic attacks vs Gender dysphoria vs cPTSD, R/O bipolar    #MDD   #Gender dysphoria   #Social Anxiety Disorder   - 9.39 legals changed to 9.13  - c/w Lithium 450 mg BID 5/23 for suicidality and stabilization of affect; lithium level 5/30- 0.30  - increase Lexapro 10 mg to 10mg qd (started 5/24)  - Discontinued Effexor 150 mg, 05/23  - Discontinued Abilify 5 mg, 5/16   - Tapered off Zoloft  - Melatonin for sleep phase disorder  - Left voicemail with Dr. Vincent at [facility], [phone]  - Left VM with  Kandice Acosta who work s with LGBTQ community for connection to trans affirming care  - Left  Teagan Escobar [phone], patient's therapist  - TSH WNL    #Panic attacks  - PRN Atarax  - c/w Klonopin 1 mg BID at noon and at night    #Gender Dysphoria  - The following programs contacted: [facility] (does not have anyone to send to the hospital), [facility] (only provides services for homeless individuals), [facility] (left voicemail), [facility] (phone number out of service), [facility], [facility] Youth [facility] In program (reached out to them, meeting with Radames on 5/26), [facility] (voicemail full), [facility] (no availability to send anyone, have [facility] but referred to [facility])  - Referral on discharge to the [employer] (suicide hotline) and Jorge Alberto Mejía (Rhett has enrolled patient)  - [facility] [place], Lisa, [phone] came and spoke with patient 6/6/23    #Agitation  - For episodes of acute agitation can offer PO Haldol 5 mg/Ativan 2 mg/Benadryl 50 mg Q6H PRN. If refusing PO and is in acute risk of harm to self/other, can offer IM Haldol 5 mg/Ativan 2 mg/Benadryl 50 mg Q6H PRN. If given, please repeat EKG and hold antipsychotics if QTC>500

## 2023-06-09 NOTE — BH INPATIENT PSYCHIATRY PROGRESS NOTE - NSBHFUPINTERVALHXFT_PSY_A_CORE
Patient was seen and evaluated this morning. Chart was reviewed and no acute events were noted. No PRN medications were administered overnight. Upon approach, patient is seen walking around the unit comfortably. Patient is alert and oriented and engages with interviewer. Patient is cooperative and answers all questions appropriately.  [id number] Janes was used for interview. Patient reports that he was able to sleep well last night. Reports good appetite. Patient reports he did not have any panic attacks overnight or this morning. He reports he continues to have passive suicidal ideations, however, no active suicidal ideation, intent or plan at this time. Patient is agreeable to plan to discontinue 1:1 observation early next week. Patient reports he is nervous, as removing 1:1 observation in the past has induced worsening anxiety resulting in desire to self-harm via banging his head on the wall, or choking himself. He reports he is agreeable to try this in the daytime prior to discontinuing at the night.

## 2023-06-10 RX ADMIN — Medication 3 MILLIGRAM(S): at 20:39

## 2023-06-10 RX ADMIN — Medication 1 MILLIGRAM(S): at 11:47

## 2023-06-10 RX ADMIN — ESCITALOPRAM OXALATE 20 MILLIGRAM(S): 10 TABLET, FILM COATED ORAL at 08:07

## 2023-06-10 RX ADMIN — LITHIUM CARBONATE 450 MILLIGRAM(S): 300 TABLET, EXTENDED RELEASE ORAL at 20:39

## 2023-06-10 RX ADMIN — Medication 1 MILLIGRAM(S): at 20:44

## 2023-06-10 RX ADMIN — Medication 50 MILLIGRAM(S): at 15:53

## 2023-06-10 RX ADMIN — LITHIUM CARBONATE 450 MILLIGRAM(S): 300 TABLET, EXTENDED RELEASE ORAL at 08:07

## 2023-06-10 NOTE — BH INPATIENT PSYCHIATRY PROGRESS NOTE - NSBHMETABOLIC_PSY_ALL_CORE_FT
BMI: BMI (kg/m2): 32.5 (04-18-23 @ 05:33)  HbA1c: A1C with Estimated Average Glucose Result: 5.7 % (05-06-23 @ 08:53)    Glucose:   BP: 102/54 (06-10-23 @ 08:15) (102/54 - 122/79)  Lipid Panel: Date/Time: 05-06-23 @ 08:53  Cholesterol, Serum: 206  Direct LDL: --  HDL Cholesterol, Serum: 47  Total Cholesterol/HDL Ration Measurement: --  Triglycerides, Serum: 131

## 2023-06-10 NOTE — BH INPATIENT PSYCHIATRY PROGRESS NOTE - NSBHASSESSSUMMFT_PSY_ALL_CORE
The patient is a 18 yo transgender F->M, Icelandic-speaking, recently immigrated from [place], domiciled with brother, unemployed, with psych hx of gender dysphoria, anxiety and depression, no past psychiatric hospitalizations, previous suicide attempt via choking, who is presenting at recommendation of brother and cousin for increasing SI.    Upon admission patient endorsed strong feelings of depression, worthlessness, SI and exhibited very dysphoric and minimally reactive affect. During the course of his hospitalization, his mood and affect initially improved, he became become more talkative but eventually started having near-daily anxiety attacks typically casused by being misgendered and which they coped by head banging or punching/scratching his limbs.     Chronic risk factors include history of trauma, history of persistent depressive symptoms, and history of suicidal ideation. Current risk factors include recent self inflicted choking, age, recent immigration, not understanding/speaking well language of current residence, current gender dysphoria, current social anxiety, current panic attacks and financial stressors.     On evaluation today, patient reports difficulty with sleep and fear over self harm and panic attacks. The patient is preparing for taper off of 1:1 constant observation prior to discharge. Past attempts at discontinuing 1:1 have led to patient having worsening suicidal ideation as well as episodes of self injurious behavior. We plan to prepare patient for safe discharge home by end of next week, given we are able to accomplish all tasks mentioned above. Goals of care will be to improve patient's symptoms of depression and anxiety which requires continued hospitalization for symptom monitoring and medication adjustment.     6/2- endorses passive SI. anxiety last afternoon with thoughts of self-harm; alleviated by PRN Atarax 50 mg.  6/5- added additional klonopin 1mg for noon in addition to the 1mg dose at night  6/7- endorses passive SI, anxiety in the morning, resolving without pharmacological intervention.  6/8- patient had anxiety attack with severe suicidal ideations requiring PRN haldol and atarax  6/9- increased lexapro 10mg qd to 20mg qd      #MDD   #Gender dysphoria   #Social Anxiety Disorder   - c/w Lithium 450 mg BID 5/23 for suicidality and stabilization of affect; lithium level 5/30- 0.30  - continue Lexapro 20 qd   - patient s/p trial of Effexor, zoloft, Abilify   - Melatonin for sleep phase disorder  - Left voicemail with Dr. Vincent at [facility], [phone]  - Left VM with  Kandice Acosta who work s with LGBTQ community for connection to trans affirming care  - Left VM Teagan Escobar [phone], patient's therapist  - TSH WNL    #Panic attacks  - PRN Atarax  - c/w Klonopin 1 mg BID at noon and at night    #Gender Dysphoria  - The following programs contacted: [facility] (does not have anyone to send to the hospital), [employer] (only provides services for homeless individuals), [employer] (left voicemail), [facility] (phone number out of service), [facility], [facility] [place] program (reached out to them, meeting with Radames on 5/26), [facility] (voicemail full), [facility] (no availability to send anyone, have [facility] but referred to Jorge Alberto Mejía)  - Referral on discharge to the [employer] (suicide hotline) and Jorge Alberto Mejía (Rhett has enrolled patient)  - [facility] [place], Lisa, [phone] came and spoke with patient 6/6/23    #Agitation  - For episodes of acute agitation can offer PO Haldol 5 mg/Ativan 2 mg/Benadryl 50 mg Q6H PRN. If refusing PO and is in acute risk of harm to self/other, can offer IM Haldol 5 mg/Ativan 2 mg/Benadryl 50 mg Q6H PRN. If given, please repeat EKG and hold antipsychotics if QTC>500

## 2023-06-10 NOTE — BH INPATIENT PSYCHIATRY PROGRESS NOTE - NSBHCHARTREVIEWVS_PSY_A_CORE FT
Vital Signs Last 24 Hrs  T(C): 36.1 (06-10-23 @ 08:15), Max: 36.7 (06-09-23 @ 16:42)  T(F): 96.9 (06-10-23 @ 08:15), Max: 98.1 (06-09-23 @ 16:42)  HR: 121 (06-10-23 @ 08:15) (99 - 121)  BP: 102/54 (06-10-23 @ 08:15) (102/54 - 122/79)  BP(mean): --  RR: 18 (06-10-23 @ 08:15) (16 - 18)  SpO2: --

## 2023-06-10 NOTE — BH INPATIENT PSYCHIATRY PROGRESS NOTE - CURRENT MEDICATION
none MEDICATIONS  (STANDING):  clonazePAM  Tablet 1 milliGRAM(s) Oral at bedtime  clonazePAM  Tablet 1 milliGRAM(s) Oral <User Schedule>  escitalopram 20 milliGRAM(s) Oral daily  lithium 450 milliGRAM(s) Oral two times a day  melatonin. 3 milliGRAM(s) Oral at bedtime    MEDICATIONS  (PRN):  aluminum hydroxide/magnesium hydroxide/simethicone Suspension 30 milliLiter(s) Oral every 6 hours PRN Dyspepsia  haloperidol     Tablet 5 milliGRAM(s) Oral every 6 hours PRN agitation  hydrOXYzine hydrochloride 50 milliGRAM(s) Oral every 6 hours PRN anxiety and insomnia   MEDICATIONS  (STANDING):  clonazePAM  Tablet 1 milliGRAM(s) Oral <User Schedule>  clonazePAM  Tablet 1 milliGRAM(s) Oral at bedtime  escitalopram 20 milliGRAM(s) Oral daily  lithium 450 milliGRAM(s) Oral two times a day  melatonin. 3 milliGRAM(s) Oral at bedtime    MEDICATIONS  (PRN):  aluminum hydroxide/magnesium hydroxide/simethicone Suspension 30 milliLiter(s) Oral every 6 hours PRN Dyspepsia  haloperidol     Tablet 5 milliGRAM(s) Oral every 6 hours PRN agitation  hydrOXYzine hydrochloride 50 milliGRAM(s) Oral every 6 hours PRN anxiety and insomnia

## 2023-06-10 NOTE — BH INPATIENT PSYCHIATRY PROGRESS NOTE - NSBHFUPINTERVALHXFT_PSY_A_CORE
Chart reviewed. Patient seen and evaluated at bedside. No acute overnight events reported.     Patient awake, alert, calm cooperative during interview. Patient reports today that she find herself to neither be happy nor sad. She indicated that this is a net positive change as it is better than constantly feeling down. They also report that they are not certain but report that recently since starting the medication they have had difficulty with sleep at night, indicating that they will wake up multiple times at night without a clear cause.  Chart reviewed. Patient seen and evaluated at bedside. No acute overnight events reported.     Patient awake, alert, calm cooperative during interview. Patient reports today that she find herself to neither be happy nor sad. She indicated that this is a net positive change as it is better than constantly feeling down. They also report that they are not certain but report that recently since starting the medication they have had difficulty with sleep at night, indicating that they will wake up multiple times at night without a clear cause. Despite this, they reported feeling okay. Patient was offered to come off 1:1, however, she reported she still did not feel safe being alone. Currently they denied si/hi, denied avh

## 2023-06-11 RX ADMIN — LITHIUM CARBONATE 450 MILLIGRAM(S): 300 TABLET, EXTENDED RELEASE ORAL at 08:05

## 2023-06-11 RX ADMIN — Medication 3 MILLIGRAM(S): at 20:46

## 2023-06-11 RX ADMIN — Medication 1 MILLIGRAM(S): at 11:22

## 2023-06-11 RX ADMIN — LITHIUM CARBONATE 450 MILLIGRAM(S): 300 TABLET, EXTENDED RELEASE ORAL at 20:44

## 2023-06-11 RX ADMIN — ESCITALOPRAM OXALATE 20 MILLIGRAM(S): 10 TABLET, FILM COATED ORAL at 08:05

## 2023-06-11 RX ADMIN — Medication 1 MILLIGRAM(S): at 20:48

## 2023-06-11 NOTE — BH INPATIENT PSYCHIATRY PROGRESS NOTE - NSBHFUPINTERVALHXFT_PSY_A_CORE
Chart reviewed. Patient seen and evaluated at bedside. No acute overnight events reported.     Patient awake, alert, calm cooperative during interview. Patient reports today that she find herself to "neither be happy nor sad". She indicated that this is a net positive change as it is better than constantly feeling down. They also report that they are not certain but report that recently since starting the medication they have had difficulty with sleep at night, indicating that they will wake up multiple times at night without a clear cause. Despite this, they reported feeling okay. Patient was offered to come off 1:1, however, she reported she still did not feel safe being alone. Currently they denied si/hi, denied avh

## 2023-06-11 NOTE — BH INPATIENT PSYCHIATRY PROGRESS NOTE - NSBHASSESSSUMMFT_PSY_ALL_CORE
The patient is a 16 yo transgender F->M, Latvian-speaking, recently immigrated from [place], domiciled with brother, unemployed, with psych hx of gender dysphoria, anxiety and depression, no past psychiatric hospitalizations, previous suicide attempt via choking, who is presenting at recommendation of brother and cousin for increasing SI.    Upon admission patient endorsed strong feelings of depression, worthlessness, SI and exhibited very dysphoric and minimally reactive affect. During the course of his hospitalization, his mood and affect initially improved, he became become more talkative but eventually started having near-daily anxiety attacks typically casused by being misgendered and which they coped by head banging or punching/scratching his limbs.     Chronic risk factors include history of trauma, history of persistent depressive symptoms, and history of suicidal ideation. Current risk factors include recent self inflicted choking, age, recent immigration, not understanding/speaking well language of current residence, current gender dysphoria, current social anxiety, current panic attacks and financial stressors.     On evaluation today, patient reports difficulty with sleep and fear over self harm and panic attacks. The patient is preparing for taper off of 1:1 constant observation prior to discharge. Past attempts at discontinuing 1:1 have led to patient having worsening suicidal ideation as well as episodes of self injurious behavior. We plan to prepare patient for safe discharge home by end of next week, given we are able to accomplish all tasks mentioned above. Goals of care will be to improve patient's symptoms of depression and anxiety which requires continued hospitalization for symptom monitoring and medication adjustment.     6/2- endorses passive SI. anxiety last afternoon with thoughts of self-harm; alleviated by PRN Atarax 50 mg.  6/5- added additional klonopin 1mg for noon in addition to the 1mg dose at night  6/7- endorses passive SI, anxiety in the morning, resolving without pharmacological intervention.  6/8- patient had anxiety attack with severe suicidal ideations requiring PRN haldol and atarax  6/9- increased lexapro 10mg qd to 20mg qd      #MDD   #Gender dysphoria   #Social Anxiety Disorder   - c/w Lithium 450 mg BID 5/23 for suicidality and stabilization of affect; lithium level 5/30- 0.30  - continue Lexapro 20 qd   - patient s/p trial of Effexor, zoloft, Abilify   - Melatonin for sleep phase disorder  - Left voicemail with Dr. Vincent at [facility], [phone]  - Left VM with  Kandice Acosta who work s with LGBTQ community for connection to trans affirming care  - Left VM Teagan Escobar [phone], patient's therapist  - TSH WNL    #Panic attacks  - PRN Atarax  - c/w Klonopin 1 mg BID at noon and at night    #Gender Dysphoria  - The following programs contacted: [facility] (does not have anyone to send to the hospital), [employer] (only provides services for homeless individuals), [employer] (left voicemail), [employer] (phone number out of service), [facility], [facility] In program (reached out to them, meeting with Radames on 5/26), [facility] (voicemail full), [facility] (no availability to send anyone, have [facility] but referred to Jorge Alberto Mejía)  - Referral on discharge to the [employer] (suicide hotline) and Jorge Alberto Mejía (Rhett has enrolled patient)  - [facility] [place], Lisa, [phone] came and spoke with patient 6/6/23    #Agitation  - For episodes of acute agitation can offer PO Haldol 5 mg/Ativan 2 mg/Benadryl 50 mg Q6H PRN. If refusing PO and is in acute risk of harm to self/other, can offer IM Haldol 5 mg/Ativan 2 mg/Benadryl 50 mg Q6H PRN. If given, please repeat EKG and hold antipsychotics if QTC>500

## 2023-06-11 NOTE — BH INPATIENT PSYCHIATRY PROGRESS NOTE - CURRENT MEDICATION
MEDICATIONS  (STANDING):  clonazePAM  Tablet 1 milliGRAM(s) Oral <User Schedule>  clonazePAM  Tablet 1 milliGRAM(s) Oral at bedtime  escitalopram 20 milliGRAM(s) Oral daily  lithium 450 milliGRAM(s) Oral two times a day  melatonin. 3 milliGRAM(s) Oral at bedtime    MEDICATIONS  (PRN):  aluminum hydroxide/magnesium hydroxide/simethicone Suspension 30 milliLiter(s) Oral every 6 hours PRN Dyspepsia  haloperidol     Tablet 5 milliGRAM(s) Oral every 6 hours PRN agitation  hydrOXYzine hydrochloride 50 milliGRAM(s) Oral every 6 hours PRN anxiety and insomnia

## 2023-06-11 NOTE — BH INPATIENT PSYCHIATRY PROGRESS NOTE - NSBHCONSDANGERSELF_PSY_A_CORE
suicidal ideation with plan and means
suicidal behavior/suicidal ideation with plan and means
unable to care for self
suicidal behavior
unable to care for self
suicidal behavior/suicidal ideation with plan and means
suicidal ideation with plan and means
suicidal behavior/suicidal ideation with plan and means
suicidal ideation with plan and means
suicidal behavior/suicidal ideation with plan and means
suicidal ideation with plan and means
suicidal ideation with plan and means
unable to care for self
unable to care for self
suicidal ideation with plan and means
suicidal behavior

## 2023-06-11 NOTE — BH INPATIENT PSYCHIATRY PROGRESS NOTE - NSBHMETABOLIC_PSY_ALL_CORE_FT
BMI: BMI (kg/m2): 32.5 (04-18-23 @ 05:33)  HbA1c: A1C with Estimated Average Glucose Result: 5.7 % (05-06-23 @ 08:53)    Glucose:   BP: 106/59 (06-11-23 @ 08:06) (102/54 - 122/79)  Lipid Panel: Date/Time: 05-06-23 @ 08:53  Cholesterol, Serum: 206  Direct LDL: --  HDL Cholesterol, Serum: 47  Total Cholesterol/HDL Ration Measurement: --  Triglycerides, Serum: 131

## 2023-06-11 NOTE — BH INPATIENT PSYCHIATRY PROGRESS NOTE - NSBHCHARTREVIEWVS_PSY_A_CORE FT
Vital Signs Last 24 Hrs  T(C): 36.8 (06-11-23 @ 08:06), Max: 36.8 (06-11-23 @ 08:06)  T(F): 98.3 (06-11-23 @ 08:06), Max: 98.3 (06-11-23 @ 08:06)  HR: 101 (06-11-23 @ 08:06) (101 - 101)  BP: 106/59 (06-11-23 @ 08:06) (106/59 - 106/59)  BP(mean): --  RR: 18 (06-11-23 @ 08:06) (18 - 18)  SpO2: --

## 2023-06-12 RX ORDER — IBUPROFEN 200 MG
600 TABLET ORAL EVERY 8 HOURS
Refills: 0 | Status: DISCONTINUED | OUTPATIENT
Start: 2023-06-12 | End: 2023-06-16

## 2023-06-12 RX ADMIN — LITHIUM CARBONATE 450 MILLIGRAM(S): 300 TABLET, EXTENDED RELEASE ORAL at 21:19

## 2023-06-12 RX ADMIN — Medication 1 MILLIGRAM(S): at 21:19

## 2023-06-12 RX ADMIN — Medication 3 MILLIGRAM(S): at 21:19

## 2023-06-12 RX ADMIN — Medication 1 MILLIGRAM(S): at 11:55

## 2023-06-12 RX ADMIN — ESCITALOPRAM OXALATE 20 MILLIGRAM(S): 10 TABLET, FILM COATED ORAL at 08:02

## 2023-06-12 RX ADMIN — LITHIUM CARBONATE 450 MILLIGRAM(S): 300 TABLET, EXTENDED RELEASE ORAL at 08:02

## 2023-06-12 RX ADMIN — Medication 600 MILLIGRAM(S): at 13:35

## 2023-06-12 NOTE — BH INPATIENT PSYCHIATRY PROGRESS NOTE - NSBHCHARTREVIEWVS_PSY_A_CORE FT
Vital Signs Last 24 Hrs  T(C): 36.7 (06-12-23 @ 08:09), Max: 36.7 (06-12-23 @ 08:09)  T(F): 98 (06-12-23 @ 08:09), Max: 98 (06-12-23 @ 08:09)  HR: 104 (06-12-23 @ 08:09) (85 - 104)  BP: 105/58 (06-12-23 @ 08:09) (105/58 - 125/83)  BP(mean): --  RR: 18 (06-12-23 @ 08:09) (18 - 20)  SpO2: --

## 2023-06-12 NOTE — BH INPATIENT PSYCHIATRY PROGRESS NOTE - NSBHMETABOLIC_PSY_ALL_CORE_FT
BMI: BMI (kg/m2): 32.5 (04-18-23 @ 05:33)  HbA1c: A1C with Estimated Average Glucose Result: 5.7 % (05-06-23 @ 08:53)    Glucose:   BP: 105/58 (06-12-23 @ 08:09) (102/54 - 125/83)  Lipid Panel: Date/Time: 05-06-23 @ 08:53  Cholesterol, Serum: 206  Direct LDL: --  HDL Cholesterol, Serum: 47  Total Cholesterol/HDL Ration Measurement: --  Triglycerides, Serum: 131

## 2023-06-12 NOTE — BH INPATIENT PSYCHIATRY PROGRESS NOTE - NSBHFUPINTERVALHXFT_PSY_A_CORE
Patient was seen and evaluated this morning. Chart was reviewed and no acute events were noted. No PRN medications were administered overnight. Upon approach, patient is sitting in bed comfortably. Patient is alert and oriented and engages with interviewer. Patient is cooperative and answers all questions appropriately.  [id number] Galindo used for interview. Patient reports that on Saturday, he was anxious and had worsening thoughts that he wanted to die, but received atarax and returned to baseline within 20 minutes. Patient reports good sleep and appetite. Patient denies any current passive or active suicidal ideation, intent or plan. Reports that he only gets thoughts that he wants to die in the setting of anxiety attacks. Patient made aware that 1:1 will be disocntinued for tomorrow morning but re-instated in the evening. He is agreeable to this plan. No noted side effects from increase to lexapro.

## 2023-06-12 NOTE — BH INPATIENT PSYCHIATRY PROGRESS NOTE - NSBHMSEATTEN_PSY_A_CORE

## 2023-06-12 NOTE — BH INPATIENT PSYCHIATRY PROGRESS NOTE - NSBHASSESSSUMMFT_PSY_ALL_CORE
The patient is a 18 yo transgender F->M, Japanese-speaking, recently immigrated from [place], domiciled with brother, unemployed, with psych hx of gender dysphoria, anxiety and depression, no past psychiatric hospitalizations, previous suicide attempt via choking, who is presenting at recommendation of brother and cousin for increasing SI.    Upon admission patient endorses strong feelings of depression and exhibits very dysphoric and minimally reactive affect. He endorses escalating suicidal thoughts related to feelings of worthlessness and unhappiness over his appearance. During the course of his hospitalization, his mood and affect initially improved, he became become more talkative. However, throughout the course of the hospitalization he started having near-daily anxiety attacks which he has been coping with by head banging or punching/scratching his limbs. His depression seems to have alleviated overall, however he suffers from  frequent mood shifts and anxiety/panic attacks which he has difficulty coping with. These shifts are often triggered by misgendering by others or missing his mother, and are often prevented when he is in the company of Japanese-speaking staff and is socially engaged. The patient was admitted involuntarily on 9:39 legals as he has been acutely elevated risk of suicide, now changed to voluntary legals. Chronic risk factors include history of trauma, history of persistent depressive symptoms, and history of suicidal ideation. Current risk factors include recent self inflicted choking, age, recent immigration, not understanding/speaking well language of current residence, current gender dysphoria, current social anxiety, current panic attacks and financial stressors.     On evaluation today, patient continues to endorse passive suicidal ideations at times. Our goal is to observe patient being able to approach periods of panic attacks or heightened anxiety with effective coping strategies which have been introduced to him this unit. We need to ensure patient is able to contract for safety, establish plan for outpatient follow-up appointments immediately after discharge from inpatient unit, complete titration of anti-depressant medication and anxiolytic medications. We recently increased lexapro 10mg to 20mg for depressive symptoms, plan to continue with klonopin 1mg BID, slowly taper patient off of 1:1 constant observation prior to discharge. Past attempts at discontinuing 1:1 have led to patient having worsening suicidal ideation as well as episodes of self injurious behavior. We plan to prepare patient for safe discharge home by end of this week, given we are able to accomplish all tasks mentioned above. Goals of care will be to improve patient's symptoms of depression and anxiety which requires continued hospitalization for symptom monitoring and medication adjustment.       5/31- Pt denies symptoms of suicidal ideation, or anxious episodes over the past day, but states that he does have homicidal ideations. On approach, patient has improved affect as compared to previous day. Continue lexapro 10 mg PO qd, Klonopin 1 mg PO qd, lithium 450 mg PO BID.   6/1- denies SI/HI. anxiety overnight, thoughts of self-harm during this; alleviated by speaking to 1:1. c/w lexapro 10 qd, Klonopin 1 qd, lithium 450 BID.  6/2- endorses passive SI. anxiety last afternoon with thoughts of self-harm; alleviated by PRN Atarax 50 mg. c/w lexapro 10 qd, Klonopin 1 qd, lithium 450 BID.  6/5- added additional klonopin 1mg for noon in addition to the 1mg dose at night  6/7- endorses passive SI, anxiety in the morning, resolving without pharmacological intervention.  6/8- patient had anxiety attack with severe suicidal ideations requiring PRN haldol and atarax  6/9- increase lexapro 10mg qd to 20mg qd    Impression: MDD vs Social Anxiety disorder with Panic attacks vs Gender dysphoria vs cPTSD, R/O bipolar    #MDD   #Gender dysphoria   #Social Anxiety Disorder   - 9.39 legals changed to 9.13  - c/w Lithium 450 mg BID 5/23 for suicidality and stabilization of affect; lithium level 5/30- 0.30  - c/w Lexapro 20mg qd (started 5/24, increased 6/9)  - Discontinued Effexor 150 mg, 05/23  - Discontinued Abilify 5 mg, 5/16   - Tapered off Zoloft  - Melatonin for sleep phase disorder  - Left voicemail with Dr. Vincent at [facility], [phone]  - Left VM with  Kandice Acosta who work s with LGBTQ community for connection to trans affirming care  - Left  Teagan Escobar [phone], patient's therapist  - TSH WNL    #Panic attacks  - PRN Atarax  - c/w Klonopin 1 mg BID at noon and at night    #Gender Dysphoria  - The following programs contacted: [facility] (does not have anyone to send to the hospital), [employer] (only provides services for homeless individuals), [employer] (left voicemail), [facility] (phone number out of service), [facility], [facility]'s [facility] Drop In program (reached out to them, meeting with Radames on 5/26), [facility] (voicemail full), [facility] (no availability to send anyone, have [facility] but referred to Jorge Alberto Mejía)  - Referral on discharge to the [employer] (suicide hotline) and Jorge Alberto Mejía (Rhett has enrolled patient)  - [facility], Lisa, [phone] came and spoke with patient 6/6/23    #Agitation  - For episodes of acute agitation can offer PO Haldol 5 mg/Ativan 2 mg/Benadryl 50 mg Q6H PRN. If refusing PO and is in acute risk of harm to self/other, can offer IM Haldol 5 mg/Ativan 2 mg/Benadryl 50 mg Q6H PRN. If given, please repeat EKG and hold antipsychotics if QTC>500

## 2023-06-13 PROCEDURE — 73130 X-RAY EXAM OF HAND: CPT | Mod: 26,RT

## 2023-06-13 RX ADMIN — Medication 600 MILLIGRAM(S): at 14:27

## 2023-06-13 RX ADMIN — LITHIUM CARBONATE 450 MILLIGRAM(S): 300 TABLET, EXTENDED RELEASE ORAL at 20:29

## 2023-06-13 RX ADMIN — Medication 1 MILLIGRAM(S): at 20:29

## 2023-06-13 RX ADMIN — Medication 1 MILLIGRAM(S): at 11:34

## 2023-06-13 RX ADMIN — LITHIUM CARBONATE 450 MILLIGRAM(S): 300 TABLET, EXTENDED RELEASE ORAL at 08:04

## 2023-06-13 RX ADMIN — Medication 3 MILLIGRAM(S): at 20:29

## 2023-06-13 RX ADMIN — Medication 600 MILLIGRAM(S): at 09:01

## 2023-06-13 RX ADMIN — ESCITALOPRAM OXALATE 20 MILLIGRAM(S): 10 TABLET, FILM COATED ORAL at 08:04

## 2023-06-13 NOTE — BH INPATIENT PSYCHIATRY PROGRESS NOTE - NSBHMETABOLIC_PSY_ALL_CORE_FT
BMI: BMI (kg/m2): 32.5 (04-18-23 @ 05:33)  HbA1c: A1C with Estimated Average Glucose Result: 5.7 % (05-06-23 @ 08:53)    Glucose:   BP: 125/69 (06-13-23 @ 08:02) (105/58 - 125/83)  Lipid Panel: Date/Time: 05-06-23 @ 08:53  Cholesterol, Serum: 206  Direct LDL: --  HDL Cholesterol, Serum: 47  Total Cholesterol/HDL Ration Measurement: --  Triglycerides, Serum: 131

## 2023-06-13 NOTE — BH INPATIENT PSYCHIATRY PROGRESS NOTE - NSBHATTESTTYPESTAFF_PSY_A_CORE
Resident

## 2023-06-13 NOTE — BH INPATIENT PSYCHIATRY PROGRESS NOTE - PRN MEDS
MEDICATIONS  (PRN):  aluminum hydroxide/magnesium hydroxide/simethicone Suspension 30 milliLiter(s) Oral every 6 hours PRN Dyspepsia  haloperidol     Tablet 5 milliGRAM(s) Oral every 6 hours PRN agitation  hydrOXYzine hydrochloride 50 milliGRAM(s) Oral every 6 hours PRN anxiety and insomnia  ibuprofen  Tablet. 600 milliGRAM(s) Oral every 8 hours PRN Moderate Pain (4 - 6)

## 2023-06-13 NOTE — BH INPATIENT PSYCHIATRY PROGRESS NOTE - NSBHCHARTREVIEWVS_PSY_A_CORE FT
Vital Signs Last 24 Hrs  T(C): 36.3 (06-13-23 @ 08:02), Max: 36.3 (06-13-23 @ 08:02)  T(F): 97.4 (06-13-23 @ 08:02), Max: 97.4 (06-13-23 @ 08:02)  HR: 97 (06-13-23 @ 08:02) (97 - 98)  BP: 125/69 (06-13-23 @ 08:02) (115/69 - 125/69)  BP(mean): --  RR: 17 (06-13-23 @ 08:02) (16 - 17)  SpO2: --

## 2023-06-13 NOTE — BH INPATIENT PSYCHIATRY PROGRESS NOTE - NSBHFUPINTERVALHXFT_PSY_A_CORE
Patient was seen and evaluated this morning. Chart was reviewed and no acute events were noted. No PRN medications were administered overnight. Upon approach, patient is lying in bed comfortably. Patient is alert and oriented and engages with interviewer. Patient is cooperative and answers all questions appropriately.  [id number] Nory used for duration of interview. Patient reports he did not have any anxiety attacks yesterday. Patient denies any current passive or active suicidal ideation, intent or plan. Reports sleeping on and off throughout the night. Reports good appetite. Informs provider that he is ready to try this morning without constant observation.     Spoke with cousin Acacia. She is available to  patient on Thursday or Monday. Reports that she might be able to have patient's Godmother come and  patient on Friday and bring to therapist appointment.

## 2023-06-13 NOTE — BH INPATIENT PSYCHIATRY PROGRESS NOTE - NSBHASSESSSUMMFT_PSY_ALL_CORE
The patient is a 18 yo transgender F->M, Moroccan-speaking, recently immigrated from [place], domiciled with brother, unemployed, with psych hx of gender dysphoria, anxiety and depression, no past psychiatric hospitalizations, previous suicide attempt via choking, who is presenting at recommendation of brother and cousin for increasing SI.    Upon admission patient endorses strong feelings of depression and exhibits very dysphoric and minimally reactive affect. He endorses escalating suicidal thoughts related to feelings of worthlessness and unhappiness over his appearance. During the course of his hospitalization, his mood and affect initially improved, he became become more talkative. However, throughout the course of the hospitalization he started having near-daily anxiety attacks which he has been coping with by head banging or punching/scratching his limbs. His depression seems to have alleviated overall, however he suffers from  frequent mood shifts and anxiety/panic attacks which he has difficulty coping with. These shifts are often triggered by misgendering by others or missing his mother, and are often prevented when he is in the company of Moroccan-speaking staff and is socially engaged. The patient was admitted involuntarily on 9:39 legals as he has been acutely elevated risk of suicide, now changed to voluntary legals. Chronic risk factors include history of trauma, history of persistent depressive symptoms, and history of suicidal ideation. Current risk factors include recent self inflicted choking, age, recent immigration, not understanding/speaking well language of current residence, current gender dysphoria, current social anxiety, current panic attacks and financial stressors.     On evaluation today, patient reports not having any panic attacks yesterday and has not had any thoughts yesterday or today about passive suicidality. Our goal is to observe patient being able to approach periods of panic attacks or heightened anxiety with effective coping strategies which have been introduced to him this unit. We need to ensure patient is able to contract for safety, establish plan for outpatient follow-up appointments immediately after discharge from inpatient unit, complete titration of anti-depressant medication and anxiolytic medications. We recently increased lexapro 10mg to 20mg for depressive symptoms, plan to continue with klonopin 1mg BID, slowly taper patient off of 1:1 constant observation prior to discharge. Past attempts at discontinuing 1:1 have led to patient having worsening suicidal ideation as well as episodes of self injurious behavior. We plan to prepare patient for safe discharge home by end of this week, given we are able to accomplish all tasks mentioned above. Goals of care will be to improve patient's symptoms of depression and anxiety which requires continued hospitalization for symptom monitoring and medication adjustment.       5/31- Pt denies symptoms of suicidal ideation, or anxious episodes over the past day, but states that he does have homicidal ideations. On approach, patient has improved affect as compared to previous day. Continue lexapro 10 mg PO qd, Klonopin 1 mg PO qd, lithium 450 mg PO BID.   6/1- denies SI/HI. anxiety overnight, thoughts of self-harm during this; alleviated by speaking to 1:1. c/w lexapro 10 qd, Klonopin 1 qd, lithium 450 BID.  6/2- endorses passive SI. anxiety last afternoon with thoughts of self-harm; alleviated by PRN Atarax 50 mg. c/w lexapro 10 qd, Klonopin 1 qd, lithium 450 BID.  6/5- added additional klonopin 1mg for noon in addition to the 1mg dose at night  6/7- endorses passive SI, anxiety in the morning, resolving without pharmacological intervention.  6/8- patient had anxiety attack with severe suicidal ideations requiring PRN haldol and atarax  6/9- increase lexapro 10mg qd to 20mg qd    Impression: MDD vs Social Anxiety disorder with Panic attacks vs Gender dysphoria vs cPTSD, R/O bipolar    #MDD   #Gender dysphoria   #Social Anxiety Disorder   - 9.39 legals changed to 9.13  - c/w Lithium 450 mg BID 5/23 for suicidality and stabilization of affect; lithium level 5/30- 0.30  - c/w Lexapro 20mg qd (started 5/24, increased 6/9)  - Discontinued Effexor 150 mg, 05/23  - Discontinued Abilify 5 mg, 5/16   - Tapered off Zoloft  - Melatonin for sleep phase disorder  - Left voicemail with [facility]. Vincent at [facility], [phone]  - Left VM with  Kandice Acosta who work s with LGBTQ community for connection to trans affirming care  - Left  Teagan Escobar [phone], patient's therapist  - TSH WNL    #Panic attacks  - PRN Atarax  - c/w Klonopin 1 mg BID at noon and at night    #Gender Dysphoria  - The following programs contacted: [facility] (does not have anyone to send to the hospital), [facility] (only provides services for homeless individuals), [facility] (left voicemail), [facility] (phone number out of service), [facility], [facility] program (reached out to them, meeting with Radames on 5/26), [facility] (voicemail full), [facility] (no availability to send anyone, have [facility] but referred to Jorge Alberto Mej[facility])  - Referral on discharge to the [facility] (suicide hotline) and [facility] ([facility]he[facility] has enrolled patient)  - [facility], Lisa, [phone] came and spoke with patient 6/6/23    #Agitation  - For episodes of acute agitation can offer PO Haldol 5 mg/Ativan 2 mg/Benadryl 50 mg Q6H PRN. If refusing PO and is in acute risk of harm to self/other, can offer IM Haldol 5 mg/Ativan 2 mg/Benadryl 50 mg Q6H PRN. If given, please repeat EKG and hold antipsychotics if QTC>500

## 2023-06-13 NOTE — CHART NOTE - NSCHARTNOTEFT_GEN_A_CORE
Patient punched the wall with his right hand  PE  patient unable to fully close his right hand        painful to palpation of middle knuckle: metacarpal with ecchemosis    Plan:  Xray right hand to r/o Fracture Patient punched the wall with his right hand  PE  patient unable to fully close his right hand        painful to palpation of middle knuckle: metacarpal with ecchemosis    Plan:  Xray right hand to r/o Fracture           RN to apply ICE to hand

## 2023-06-13 NOTE — BH INPATIENT PSYCHIATRY PROGRESS NOTE - CURRENT MEDICATION
MEDICATIONS  (STANDING):  clonazePAM  Tablet 1 milliGRAM(s) Oral <User Schedule>  clonazePAM  Tablet 1 milliGRAM(s) Oral at bedtime  escitalopram 20 milliGRAM(s) Oral daily  lithium 450 milliGRAM(s) Oral two times a day  melatonin. 3 milliGRAM(s) Oral at bedtime    MEDICATIONS  (PRN):  aluminum hydroxide/magnesium hydroxide/simethicone Suspension 30 milliLiter(s) Oral every 6 hours PRN Dyspepsia  haloperidol     Tablet 5 milliGRAM(s) Oral every 6 hours PRN agitation  hydrOXYzine hydrochloride 50 milliGRAM(s) Oral every 6 hours PRN anxiety and insomnia  ibuprofen  Tablet. 600 milliGRAM(s) Oral every 8 hours PRN Moderate Pain (4 - 6)

## 2023-06-13 NOTE — BH INPATIENT PSYCHIATRY PROGRESS NOTE - NSBHATTESTSTAFFAMEND_PSY_A_CORE
I have personally seen and examined this patient. I fully participated in the care of this patient. I have made amendments to the documentation where appropriate and otherwise agree with the history, physical exam, and plan as documented by the

## 2023-06-14 PROCEDURE — 73130 X-RAY EXAM OF HAND: CPT | Mod: 26,RT

## 2023-06-14 PROCEDURE — 99232 SBSQ HOSP IP/OBS MODERATE 35: CPT

## 2023-06-14 RX ADMIN — LITHIUM CARBONATE 450 MILLIGRAM(S): 300 TABLET, EXTENDED RELEASE ORAL at 20:18

## 2023-06-14 RX ADMIN — Medication 1 MILLIGRAM(S): at 20:18

## 2023-06-14 RX ADMIN — Medication 1 MILLIGRAM(S): at 12:03

## 2023-06-14 RX ADMIN — LITHIUM CARBONATE 450 MILLIGRAM(S): 300 TABLET, EXTENDED RELEASE ORAL at 08:01

## 2023-06-14 RX ADMIN — Medication 3 MILLIGRAM(S): at 20:18

## 2023-06-14 RX ADMIN — ESCITALOPRAM OXALATE 20 MILLIGRAM(S): 10 TABLET, FILM COATED ORAL at 08:01

## 2023-06-14 NOTE — BH INPATIENT PSYCHIATRY PROGRESS NOTE - NSBHMETABOLIC_PSY_ALL_CORE_FT
BMI: BMI (kg/m2): 32.5 (04-18-23 @ 05:33)  HbA1c: A1C with Estimated Average Glucose Result: 5.7 % (05-06-23 @ 08:53)    Glucose:   BP: 119/73 (06-14-23 @ 08:24) (105/58 - 126/81)  Lipid Panel: Date/Time: 05-06-23 @ 08:53  Cholesterol, Serum: 206  Direct LDL: --  HDL Cholesterol, Serum: 47  Total Cholesterol/HDL Ration Measurement: --  Triglycerides, Serum: 131

## 2023-06-14 NOTE — BH INPATIENT PSYCHIATRY PROGRESS NOTE - NSTREATMENTCERTY_PSY_ALL_CORE

## 2023-06-14 NOTE — BH INPATIENT PSYCHIATRY PROGRESS NOTE - NSBHASSESSSUMMFT_PSY_ALL_CORE
The patient is a 16 yo transgender F->M, Bel[place]ian-speaking, recently immigrated from [place], domiciled with brother, unemployed, with psych hx of gender dysphoria, anxiety and depression, no past psychiatric hospitalizations, previous suicide attempt via choking, who is presenting at recommendation of brother and cousin for increasing SI.    Upon admission patient endorses strong feelings of depression and exhibits very dysphoric and minimally reactive affect. He endorses escalating suicidal thoughts related to feelings of worthlessness and unhappiness over his appearance. During the course of his hospitalization, his mood and affect initially improved, he became become more talkative. However, throughout the course of the hospitalization he started having near-daily anxiety attacks which he has been coping with by head banging or punching/scratching his limbs. His depression seems to have alleviated overall, however he suffers from  frequent mood shifts and anxiety/panic attacks which he has difficulty coping with. These shifts are often triggered by misgendering by others or missing his mother, and are often prevented when he is in the company of Belarusian-speaking staff and is socially engaged. The patient was admitted involuntarily on 9:39 legals as he has been acutely elevated risk of suicide, now changed to voluntary legals. Chronic risk factors include history of trauma, history of persistent depressive symptoms, and history of suicidal ideation. Current risk factors include recent self inflicted choking, age, recent immigration, not understanding/speaking well language of current residence, current gender dysphoria, current social anxiety, current panic attacks and financial stressors.     On evaluation today, patient reports not having any panic attacks yesterday and has not had any thoughts yesterday or today about passive suicidality. Our goal is to observe patient being able to approach periods of panic attacks or heightened anxiety with effective coping strategies which have been introduced to him this unit. We need to ensure patient is able to contract for safety, establish plan for outpatient follow-up appointments immediately after discharge from inpatient unit, complete titration of anti-depressant medication and anxiolytic medications. We recently increased lexapro 10mg to 20mg for depressive symptoms, plan to continue with klonopin 1mg BID, slowly taper patient off of 1:1 constant observation prior to discharge. Past attempts at discontinuing 1:1 have led to patient having worsening suicidal ideation as well as episodes of self injurious behavior. We plan to prepare patient for safe discharge home by end of this week, given we are able to accomplish all tasks mentioned above. Goals of care will be to improve patient's symptoms of depression and anxiety which requires continued hospitalization for symptom monitoring and medication adjustment.       6-  patient hit her hand against the wall bruising it significantly and requiring an x-ray.  After discussion with writer and other staff as to alternatives to this behavior, she   Did it again sometime later, reporting as she has in the past "I need to feel pain".  see HPI for measures taken.      5/31- Pt denies symptoms of suicidal ideation, or anxious episodes over the past day, but states that he does have homicidal ideations. On approach, patient has improved affect as compared to previous day. Continue lexapro 10 mg PO qd, Klonopin 1 mg PO qd, lithium 450 mg PO BID.   6/1- denies SI/HI. anxiety overnight, thoughts of self-harm during this; alleviated by speaking to 1:1. c/w lexapro 10 qd, Klonopin 1 qd, lithium 450 BID.  6/2- endorses passive SI. anxiety last afternoon with thoughts of self-harm; alleviated by PRN Atarax 50 mg. c/w lexapro 10 qd, Klonopin 1 qd, lithium 450 BID.  6/5- added additional klonopin 1mg for noon in addition to the 1mg dose at night  6/7- endorses passive SI, anxiety in the morning, resolving without pharmacological intervention.  6/8- patient had anxiety attack with severe suicidal ideations requiring PRN haldol and atarax  6/9- increase lexapro 10mg qd to 20mg qd    Impression: MDD vs Social Anxiety disorder with Panic attacks vs Gender dysphoria vs cPTSD, R/O bipolar    #MDD   #Gender dysphoria   #Social Anxiety Disorder   - 9.39 legals changed to 9.13  - c/w Lithium 450 mg BID 5/23 for suicidality and stabilization of affect; lithium level 5/30- 0.30  - c/w Lexapro 20mg qd (started 5/24, increased 6/9)  - Discontinued Effexor 150 mg, 05/23  - Discontinued Abilify 5 mg, 5/16   - Tapered off Zoloft  - Melatonin for sleep phase disorder  - Left voicemail with [facility] Vincent at [facility], [phone]  - Left VM with  Kandice Acosta who work s with LGBTQ community for connection to trans affirming care  - Left VM Teagan Escobar [phone], patient's therapist  - TSH WNL    #Panic attacks  - PRN Atarax  - c/w Klonopin 1 mg BID at noon and at night    #Gender Dysphoria  - The following programs contacted: [facility] (does not have anyone to send to the hospital), [facility] (only provides services for homeless individuals), [facility] (left voicemail), [facility] (phone number out of service), [facility], [facility] In program (reached out to them, meeting with Radames on 5/26), [facility] (voicemail full), [facility] (no availability to send anyone, have [facility] but referred to Jorge Alberto Mejía)  - Referral on discharge to the [facility] (suicide hotline) and [facility] ([facility]hett has enrolled patient)  - [facility], Lisa, [phone] came and spoke with patient 6/6/23    #Agitation  - For episodes of acute agitation can offer PO Haldol 5 mg/Ativan 2 mg/Benadryl 50 mg Q6H PRN. If refusing PO and is in acute risk of harm to self/other, can offer IM Haldol 5 mg/Ativan 2 mg/Benadryl 50 mg Q6H PRN. If given, please repeat EKG and hold antipsychotics if QTC>500

## 2023-06-14 NOTE — BH INPATIENT PSYCHIATRY PROGRESS NOTE - NSTREATMENTCERT_PSY_ALL_CORE
.

## 2023-06-14 NOTE — BH INPATIENT PSYCHIATRY PROGRESS NOTE - NSBHFUPINTERVALHXFT_PSY_A_CORE
Met with patient  after she had punched a wall injuring her hand, bruising it sufficiently to require an x-ray.   She reports she does that because when she gets upset and anxious she "needs to feel pain".  We discussed alternative measures that she could take to displace her anxiety by exercising, hitting a mattress, talking to people as soon as she gets the feeling trying to calm herself etc.  She agrees to do this.  At approximately  12 noon today received report that she had not done it again.  When I spoke with the patient who reports the same "I need to feel pain".  Writer examined hand and told her that she is likely to do very serious damage if she continues doing this and she says "I will try but when I get the feeling I cannot stop myself".  She points out that she used to hit her head and she feels this is  "better".  She has been observed hitting her  hand and she does it very hard.  We are increasing her level of supervision and will consider one-to-one supervision if she cannot be redirected from this behavior.

## 2023-06-14 NOTE — BH INPATIENT PSYCHIATRY PROGRESS NOTE - NSBHCHARTREVIEWVS_PSY_A_CORE FT
Vital Signs Last 24 Hrs  T(C): 36.8 (06-14-23 @ 08:24), Max: 36.8 (06-13-23 @ 15:36)  T(F): 98.2 (06-14-23 @ 08:24), Max: 98.3 (06-13-23 @ 15:36)  HR: 100 (06-14-23 @ 08:24) (82 - 100)  BP: 119/73 (06-14-23 @ 08:24) (119/73 - 126/81)  BP(mean): --  RR: 18 (06-14-23 @ 08:24) (18 - 18)  SpO2: --

## 2023-06-15 RX ORDER — CLONAZEPAM 1 MG
1 TABLET ORAL
Qty: 7 | Refills: 0
Start: 2023-06-15 | End: 2023-06-21

## 2023-06-15 RX ORDER — CLONAZEPAM 1 MG
1 TABLET ORAL
Qty: 14 | Refills: 0
Start: 2023-06-15 | End: 2023-06-21

## 2023-06-15 RX ORDER — HYDROXYZINE HCL 10 MG
1 TABLET ORAL
Qty: 56 | Refills: 0
Start: 2023-06-15 | End: 2023-06-28

## 2023-06-15 RX ORDER — LITHIUM CARBONATE 300 MG/1
3 TABLET, EXTENDED RELEASE ORAL
Qty: 84 | Refills: 0
Start: 2023-06-15 | End: 2023-06-28

## 2023-06-15 RX ORDER — ESCITALOPRAM OXALATE 10 MG/1
1 TABLET, FILM COATED ORAL
Qty: 14 | Refills: 0
Start: 2023-06-15 | End: 2023-06-28

## 2023-06-15 RX ADMIN — LITHIUM CARBONATE 450 MILLIGRAM(S): 300 TABLET, EXTENDED RELEASE ORAL at 20:29

## 2023-06-15 RX ADMIN — LITHIUM CARBONATE 450 MILLIGRAM(S): 300 TABLET, EXTENDED RELEASE ORAL at 08:22

## 2023-06-15 RX ADMIN — Medication 1 MILLIGRAM(S): at 11:22

## 2023-06-15 RX ADMIN — Medication 3 MILLIGRAM(S): at 20:30

## 2023-06-15 RX ADMIN — ESCITALOPRAM OXALATE 20 MILLIGRAM(S): 10 TABLET, FILM COATED ORAL at 08:22

## 2023-06-15 RX ADMIN — Medication 1 MILLIGRAM(S): at 20:29

## 2023-06-15 NOTE — BH INPATIENT PSYCHIATRY PROGRESS NOTE - NSTXSUICIDINTERMD_PSY_ALL_CORE
Use of antidepressant medications

## 2023-06-15 NOTE — BH INPATIENT PSYCHIATRY PROGRESS NOTE - NS ED [facility] MED ROS PSYCHIATRIC
See HPI

## 2023-06-15 NOTE — BH INPATIENT PSYCHIATRY PROGRESS NOTE - CURRENT MEDICATION
MEDICATIONS  (STANDING):  clonazePAM  Tablet 1 milliGRAM(s) Oral <User Schedule>  clonazePAM  Tablet 1 milliGRAM(s) Oral at bedtime  escitalopram 20 milliGRAM(s) Oral daily  lithium 450 milliGRAM(s) Oral two times a day  melatonin. 3 milliGRAM(s) Oral at bedtime    MEDICATIONS  (PRN):  aluminum hydroxide/magnesium hydroxide/simethicone Suspension 30 milliLiter(s) Oral every 6 hours PRN Dyspepsia  haloperidol     Tablet 5 milliGRAM(s) Oral every 6 hours PRN agitation  hydrOXYzine hydrochloride 50 milliGRAM(s) Oral every 6 hours PRN anxiety and insomnia  ibuprofen  Tablet. 600 milliGRAM(s) Oral every 8 hours PRN Moderate Pain (4 - 6)   MEDICATIONS  (STANDING):  clonazePAM  Tablet 1 milliGRAM(s) Oral at bedtime  clonazePAM  Tablet 1 milliGRAM(s) Oral <User Schedule>  escitalopram 20 milliGRAM(s) Oral daily  lithium 450 milliGRAM(s) Oral two times a day  melatonin. 3 milliGRAM(s) Oral at bedtime    MEDICATIONS  (PRN):  aluminum hydroxide/magnesium hydroxide/simethicone Suspension 30 milliLiter(s) Oral every 6 hours PRN Dyspepsia  haloperidol     Tablet 5 milliGRAM(s) Oral every 6 hours PRN agitation  hydrOXYzine hydrochloride 50 milliGRAM(s) Oral every 6 hours PRN anxiety and insomnia  ibuprofen  Tablet. 600 milliGRAM(s) Oral every 8 hours PRN Moderate Pain (4 - 6)

## 2023-06-15 NOTE — BH INPATIENT PSYCHIATRY PROGRESS NOTE - NSBHMETABOLIC_PSY_ALL_CORE_FT
BMI: BMI (kg/m2): 32.5 (04-18-23 @ 05:33)  HbA1c: A1C with Estimated Average Glucose Result: 5.7 % (05-06-23 @ 08:53)    Glucose:   BP: 108/74 (06-15-23 @ 07:58) (108/74 - 126/81)  Lipid Panel: Date/Time: 05-06-23 @ 08:53  Cholesterol, Serum: 206  Direct LDL: --  HDL Cholesterol, Serum: 47  Total Cholesterol/HDL Ration Measurement: --  Triglycerides, Serum: 131   BMI: BMI (kg/m2): 32.5 (04-18-23 @ 05:33)  HbA1c: A1C with Estimated Average Glucose Result: 5.7 % (05-06-23 @ 08:53)    Glucose:   BP: 119/77 (06-15-23 @ 15:30) (108/74 - 126/81)  Lipid Panel: Date/Time: 05-06-23 @ 08:53  Cholesterol, Serum: 206  Direct LDL: --  HDL Cholesterol, Serum: 47  Total Cholesterol/HDL Ration Measurement: --  Triglycerides, Serum: 131

## 2023-06-15 NOTE — BH INPATIENT PSYCHIATRY PROGRESS NOTE - NSBHROSSTATEMENT_PSY_A_CORE
.

## 2023-06-15 NOTE — BH INPATIENT PSYCHIATRY PROGRESS NOTE - NSBHASSESSSUMMFT_PSY_ALL_CORE
The patient is a 16 yo transgender F->M, Maltese-speaking, recently immigrated from [place], domiciled with brother, unemployed, with psych hx of gender dysphoria, anxiety and depression, no past psychiatric hospitalizations, previous suicide attempt via choking, who is presenting at recommendation of brother and cousin for increasing SI.    Upon admission patient endorses strong feelings of depression and exhibits very dysphoric and minimally reactive affect. He endorses escalating suicidal thoughts related to feelings of worthlessness and unhappiness over his appearance. During the course of his hospitalization, his mood and affect initially improved, he became become more talkative. However, throughout the course of the hospitalization he started having near-daily anxiety attacks which he has been coping with by head banging or punching/scratching his limbs. His depression seems to have alleviated overall, however he suffers from  frequent mood shifts and anxiety/panic attacks which he has difficulty coping with. These shifts are often triggered by misgendering by others or missing his mother, and are often prevented when he is in the company of Maltese-speaking staff and is socially engaged. The patient was admitted involuntarily on 9:39 legals as he has been acutely elevated risk of suicide, now changed to voluntary legals. Chronic risk factors include history of trauma, history of persistent depressive symptoms, and history of suicidal ideation. Current risk factors include recent self inflicted choking, age, recent immigration, not understanding/speaking well language of current residence, current gender dysphoria, current social anxiety, current panic attacks and financial stressors.     On evaluation today, patient reports not having any panic attacks yesterday and has not had any thoughts yesterday or today about active suicidality. Our goal is to observe patient being able to approach periods of panic attacks or heightened anxiety with effective coping strategies which have been introduced to him this unit. We need to ensure patient is able to contract for safety, establish plan for outpatient follow-up appointments immediately after discharge from inpatient unit, complete titration of anti-depressant medication and anxiolytic medications. We recently increased lexapro 10mg to 20mg for depressive symptoms, plan to continue with klonopin 1mg BID, slowly taper patient off of 1:1 constant observation prior to discharge. We have goals of getting patient connected to partial hospitalization program with . At this time, earliest available opening is more than a week away. We believe going straight to partial program may be safest option for patient. Goals of care will be to improve patient's symptoms of depression and anxiety which requires continued hospitalization for symptom monitoring and medication adjustment.     5/31- Pt denies symptoms of suicidal ideation, or anxious episodes over the past day, but states that he does have homicidal ideations. On approach, patient has improved affect as compared to previous day. Continue lexapro 10 mg PO qd, Klonopin 1 mg PO qd, lithium 450 mg PO BID.   6/1- denies SI/HI. anxiety overnight, thoughts of self-harm during this; alleviated by speaking to 1:1. c/w lexapro 10 qd, Klonopin 1 qd, lithium 450 BID.  6/2- endorses passive SI. anxiety last afternoon with thoughts of self-harm; alleviated by PRN Atarax 50 mg. c/w lexapro 10 qd, Klonopin 1 qd, lithium 450 BID.  6/5- added additional klonopin 1mg for noon in addition to the 1mg dose at night  6/7- endorses passive SI, anxiety in the morning, resolving without pharmacological intervention.  6/8- patient had anxiety attack with severe suicidal ideations requiring PRN haldol and atarax  6/9- increase lexapro 10mg qd to 20mg qd  6-  patient hit hand against the wall bruising it significantly and requiring an x-ray. negative for acute fracture or dislocation     Impression: MDD vs Social Anxiety disorder with Panic attacks vs Gender dysphoria vs cPTSD, R/O bipolar    #MDD   #Gender dysphoria   #Social Anxiety Disorder   - 9.39 legals changed to 9.13  - c/w Lithium 450 mg BID 5/23 for suicidality and stabilization of affect; lithium level 5/30- 0.30  - c/w Lexapro 20mg qd (started 5/24, increased 6/9)  - Discontinued Effexor 150 mg, 05/23  - Discontinued Abilify 5 mg, 5/16   - Tapered off Zoloft  - Melatonin for sleep phase disorder  - Left voicemail with Dr. Vincent at [facility], [phone]  - Left VM with  Kandice Acosta who work s with [employer] community for connection to trans affirming care  - Left  Teagan Escobar [phone], patient's therapist  - TSH WNL    #Panic attacks  - PRN Atarax  - c/w Klonopin 1 mg BID at noon and at night    #Gender Dysphoria  - The following programs contacted: [facility] (does not have anyone to send to the hospital), [facility] (only provides services for homeless individuals), [facility] (left voicemail), [facility] (phone number out of service), [facility], [facility] program (reached out to them, meeting with Radames on 5/26), [facility] (voicemail full), [facility] (no availability to send anyone, have [facility] but referred to Jorge Alberto Mejía)  - Referral on discharge to the [facility] (suicide hotline) and [facility] ([facility] has enrolled patient)  - [facility], Lisa, [phone] came and spoke with patient 6/6/23    #Agitation  - For episodes of acute agitation can offer PO Haldol 5 mg/Ativan 2 mg/Benadryl 50 mg Q6H PRN. If refusing PO and is in acute risk of harm to self/other, can offer IM Haldol 5 mg/Ativan 2 mg/Benadryl 50 mg Q6H PRN. If given, please repeat EKG and hold antipsychotics if QTC>500

## 2023-06-15 NOTE — BH INPATIENT PSYCHIATRY PROGRESS NOTE - NSBHMSETHTASSOC_PSY_A_CORE
Normal

## 2023-06-15 NOTE — BH INPATIENT PSYCHIATRY PROGRESS NOTE - NSBHMSEMUSCLE_PSY_A_CORE
Normal muscle tone/strength

## 2023-06-15 NOTE — BH INPATIENT PSYCHIATRY PROGRESS NOTE - NSTXCOPEDATENEW_PSY_ALL_CORE
01-Jun-2023
08-Jun-2023
01-Jun-2023
22-Jun-2023
01-Jun-2023
08-Jun-2023
01-Jun-2023
01-Jun-2023
08-Jun-2023
08-Jun-2023
22-Jun-2023
01-Jun-2023
08-Jun-2023
01-Jun-2023
08-Jun-2023
01-Jun-2023
22-Jun-2023
08-Jun-2023
22-Jun-2023
08-Jun-2023
08-Jun-2023
22-Jun-2023
01-Jun-2023
22-Jun-2023
01-Jun-2023
01-Jun-2023
08-Jun-2023
01-Jun-2023

## 2023-06-15 NOTE — BH INPATIENT PSYCHIATRY PROGRESS NOTE - NSBHMSEINTELL_PSY_A_CORE
Average

## 2023-06-15 NOTE — BH INPATIENT PSYCHIATRY PROGRESS NOTE - NSTXSUICIDDATETRGT_PSY_ALL_CORE
18-May-2023
18-May-2023
25-May-2023
11-May-2023
25-May-2023
25-May-2023
11-May-2023
18-May-2023
11-May-2023
22-Jun-2023
11-May-2023
18-May-2023
02-May-2023
02-May-2023
18-Apr-2023
25-May-2023
02-May-2023
02-May-2023
25-May-2023
02-May-2023
18-Apr-2023
11-May-2023
18-Apr-2023
11-May-2023
18-Apr-2023
18-May-2023
05-Jun-2023
25-May-2023
11-May-2023
18-Apr-2023
11-May-2023
02-May-2023
11-May-2023
11-May-2023
18-Apr-2023
25-May-2023
18-Apr-2023
11-May-2023
18-Apr-2023
02-May-2023
05-Jun-2023
02-May-2023
18-Apr-2023
18-Apr-2023
25-May-2023
25-May-2023
18-Apr-2023
18-Apr-2023
18-May-2023
11-May-2023
18-May-2023

## 2023-06-15 NOTE — BH INPATIENT PSYCHIATRY PROGRESS NOTE - NSBHMSEKNOWHOW_PSY_ALL_CORE
Vocabulary

## 2023-06-15 NOTE — BH INPATIENT PSYCHIATRY PROGRESS NOTE - NSTXPROBSUICID_PSY_ALL_CORE
SUICIDE/SELF-INJURIOUS BEHAVIOR

## 2023-06-15 NOTE — BH INPATIENT PSYCHIATRY PROGRESS NOTE - NSTXPROBCOPE_PSY_ALL_CORE
COPING, INEFFECTIVE

## 2023-06-15 NOTE — BH INPATIENT PSYCHIATRY PROGRESS NOTE - NSBHPROGSUIC_PSY_A_CORE
no

## 2023-06-15 NOTE — BH INPATIENT PSYCHIATRY PROGRESS NOTE - NSTXSUICIDDATENEW_PSY_ALL_CORE
04-Jun-2023

## 2023-06-15 NOTE — BH INPATIENT PSYCHIATRY PROGRESS NOTE - NSBHROSSYSTEMS_PSY_ALL_CORE
Cardiovascular.../Psychiatric
Psychiatric

## 2023-06-15 NOTE — BH INPATIENT PSYCHIATRY PROGRESS NOTE - MSE OPTIONS
Structured MSE

## 2023-06-15 NOTE — BH INPATIENT PSYCHIATRY PROGRESS NOTE - NSBHANTIPSYCHOTIC_PSY_ALL_CORE
No
Yes...
No
Yes...
Yes...
No
No
Yes...
No
Yes...
No

## 2023-06-15 NOTE — BH INPATIENT PSYCHIATRY PROGRESS NOTE - NSBHMSEBEHAV_PSY_A_CORE
Cooperative

## 2023-06-15 NOTE — BH INPATIENT PSYCHIATRY PROGRESS NOTE - NSTXCOPEINTERMD_PSY_ALL_CORE
Optimization of medications

## 2023-06-15 NOTE — BH INPATIENT PSYCHIATRY PROGRESS NOTE - NSTXSUICIDGOAL_PSY_ALL_CORE
Will identify and utilize 2 coping skills
Will identify and utilize 2 coping skills
Will verbalize a decrease in preoccupation with suicidal thoughts and / or intent to commit suicide to 2 on a 10-point scale
Be able to state 3 reasons for living
Will express feelings associated with suicidal ideation
Will express feelings associated with suicidal ideation
Be able to state 3 reasons for living
Will express feelings associated with suicidal ideation
Will verbalize a decrease in preoccupation with suicidal thoughts and / or intent to commit suicide to 2 on a 10-point scale
Will identify and utilize 2 coping skills
Be able to state 3 reasons for living
Will identify and utilize 2 coping skills
Will express feelings associated with suicidal ideation
Will identify and utilize 2 coping skills
Be able to state 3 reasons for living
Will verbalize a decrease in preoccupation with suicidal thoughts and / or intent to commit suicide to 2 on a 10-point scale
Be able to state 3 reasons for living
Will express feelings associated with suicidal ideation
Will verbalize a decrease in preoccupation with suicidal thoughts and / or intent to commit suicide to 2 on a 10-point scale
Will express feelings associated with suicidal ideation
Will identify and utilize 2 coping skills
Will identify and utilize 2 coping skills
Be able to state 3 reasons for living
Will express feelings associated with suicidal ideation
Will verbalize a decrease in preoccupation with suicidal thoughts and / or intent to commit suicide to 2 on a 10-point scale
Will identify and utilize 2 coping skills
Be able to state 3 reasons for living
Will express feelings associated with suicidal ideation
Be able to state 3 reasons for living
Be able to state 3 reasons for living
Will verbalize a decrease in preoccupation with suicidal thoughts and / or intent to commit suicide to 2 on a 10-point scale
Be able to state 3 reasons for living
Will express feelings associated with suicidal ideation
Be able to state 3 reasons for living
Will verbalize a decrease in preoccupation with suicidal thoughts and / or intent to commit suicide to 2 on a 10-point scale
Will express feelings associated with suicidal ideation
Will identify and utilize 2 coping skills
Be able to state 3 reasons for living
Will identify and utilize 2 coping skills
Be able to state 3 reasons for living
Be able to state 3 reasons for living

## 2023-06-15 NOTE — BH INPATIENT PSYCHIATRY PROGRESS NOTE - NSTXCOPEDATETRGT_PSY_ALL_CORE
01-Jun-2023
04-May-2023
04-Jun-2023
04-May-2023
04-Jun-2023
04-May-2023
22-Jun-2023
29-Jun-2023
22-Jun-2023
11-May-2023
18-May-2023
22-Jun-2023
01-Jun-2023
01-Jun-2023
18-May-2023
22-Jun-2023
04-Jun-2023
01-Jun-2023
04-Jun-2023
04-May-2023
01-Jun-2023
01-Jun-2023
04-May-2023
11-May-2023
11-May-2023
01-Jun-2023
11-May-2023
18-May-2023
04-May-2023
22-Jun-2023
04-Jun-2023
11-May-2023
04-May-2023
11-May-2023
07-May-2023
07-May-2023
04-May-2023
18-May-2023
18-May-2023
04-May-2023
22-Jun-2023
01-Jun-2023
01-Jun-2023
04-May-2023
01-Jun-2023
04-Jun-2023
01-Jun-2023
22-Jun-2023
01-Jun-2023

## 2023-06-15 NOTE — BH INPATIENT PSYCHIATRY PROGRESS NOTE - NSBHMSEPERCEPT_PSY_A_CORE
No abnormalities

## 2023-06-15 NOTE — BH INPATIENT PSYCHIATRY PROGRESS NOTE - NSTXCOPEDATEEST_PSY_ALL_CORE
18-Apr-2023
20-Apr-2023
18-Apr-2023
19-Apr-2023
18-Apr-2023
20-Apr-2023
18-Apr-2023
18-Apr-2023
19-Apr-2023
18-Apr-2023
20-Apr-2023
19-Apr-2023
18-Apr-2023
20-Apr-2023
19-Apr-2023
18-Apr-2023
20-Apr-2023
20-Apr-2023
18-Apr-2023
20-Apr-2023
18-Apr-2023
20-Apr-2023
19-Apr-2023
19-Apr-2023
18-Apr-2023
19-Apr-2023
18-Apr-2023
20-Apr-2023
18-Apr-2023
20-Apr-2023
18-Apr-2023
18-Apr-2023
20-Apr-2023
20-Apr-2023

## 2023-06-15 NOTE — BH INPATIENT PSYCHIATRY PROGRESS NOTE - NSTXSUICIDDATEEST_PSY_ALL_CORE
18-Apr-2023

## 2023-06-15 NOTE — BH INPATIENT PSYCHIATRY PROGRESS NOTE - NSBHCHARTREVIEWINVESTIGATE_PSY_A_CORE FT
Sinus tachycardia
Ventricular Rate 127 BPM    Atrial Rate 127 BPM    P-R Interval 156 ms    QRS Duration 66 ms    Q-T Interval 286 ms    QTC Calculation(Bazett) 415 ms    P Axis 66 degrees    R Axis 96 degrees    T Axis 30 degrees    Diagnosis Line Sinus tachycardia  Rightward axis  Borderline ECG    Confirmed by NEREIDA ALVARADO, TEREZA ([id number]) on 5/20/2023 7:19:45 PM
05-20 ECG  Ventricular Rate 127 BPM    Atrial Rate 127 BPM    P-R Interval 156 ms    QRS Duration 66 ms    Q-T Interval 286 ms    QTC Calculation(Bazett) 415 ms    P Axis 66 degrees    R Axis 96 degrees    T Axis 30 degrees    Diagnosis Line Sinus tachycardia  Rightward axis  Borderline ECG  
Ventricular Rate 127 BPM    Atrial Rate 127 BPM    P-R Interval 156 ms    QRS Duration 66 ms    Q-T Interval 286 ms    QTC Calculation(Bazett) 415 ms    P Axis 66 degrees    R Axis 96 degrees    T Axis 30 degrees    Diagnosis Line Sinus tachycardia  Rightward axis  Borderline ECG  
  Ventricular Rate 127 BPM    Atrial Rate 127 BPM    P-R Interval 156 ms    QRS Duration 66 ms    Q-T Interval 286 ms    QTC Calculation(Bazett) 415 ms    P Axis 66 degrees    R Axis 96 degrees    T Axis 30 degrees    Diagnosis Line Sinus tachycardia  Rightward axis  Borderline ECG    Confirmed by NEREIDA ALVARADO, TEREZA ([id number]) on 5/20/2023 7:19:45 PM  
Ventricular Rate 127 BPM    Atrial Rate 127 BPM    P-R Interval 156 ms    QRS Duration 66 ms    Q-T Interval 286 ms    QTC Calculation(Bazett) 415 ms    P Axis 66 degrees    R Axis 96 degrees    T Axis 30 degrees    Diagnosis Line Sinus tachycardia  Rightward axis  Borderline ECG  
Ventricular Rate 127 BPM    Atrial Rate 127 BPM    P-R Interval 156 ms    QRS Duration 66 ms    Q-T Interval 286 ms    QTC Calculation(Bazett) 415 ms    P Axis 66 degrees    R Axis 96 degrees    T Axis 30 degrees    Diagnosis Line Sinus tachycardia  Rightward axis  Borderline ECG    Confirmed by NEREIDA ALVARADO, TEREZA ([id number]) on 5/20/2023 7:19:45 PM
  Ventricular Rate 127 BPM    Atrial Rate 127 BPM    P-R Interval 156 ms    QRS Duration 66 ms    Q-T Interval 286 ms    QTC Calculation(Bazett) 415 ms    P Axis 66 degrees    R Axis 96 degrees    T Axis 30 degrees    Diagnosis Line Sinus tachycardia  Rightward axis  Borderline ECG    Confirmed by NEREIDA ALVARADO, TEREZA ([id number]) on 5/20/2023 7:19:45 PM  
Sinus tachycardia
05-20 EKG    Ventricular Rate 127 BPM    Atrial Rate 127 BPM    P-R Interval 156 ms    QRS Duration 66 ms    Q-T Interval 286 ms    QTC Calculation(Bazett) 415 ms    P Axis 66 degrees    R Axis 96 degrees    T Axis 30 degrees    Diagnosis Line Sinus tachycardia  Rightward axis  Borderline ECG  
  ACC: [id number] EXAM:  XR HAND MIN 3 VIEWS RT   ORDERED BY: WESLY CROCKER     PROCEDURE DATE:  06/14/2023          INTERPRETATION:  CLINICAL HISTORY: Trauma; right hand injury    TECHNIQUE: 3 views of the right hand    COMPARISON: Right hand radiograph 6/13/2023    FINDINGS:    No acute displaced fracture or dislocation. Normal joint spaces. Normal   osseous mineralization.    IMPRESSION:    No acute fracture.    --- End of Report ---          ALLA SEARS MD; Resident Radiologist  This document has been electronically signed.  GARDENIA WILLINGHAM MD; Attending Radiologist  This document has been electronically signed. Jun 14 2023 11:21AM  
Ventricular Rate 127 BPM    Atrial Rate 127 BPM    P-R Interval 156 ms    QRS Duration 66 ms    Q-T Interval 286 ms    QTC Calculation(Bazett) 415 ms    P Axis 66 degrees    R Axis 96 degrees    T Axis 30 degrees    Diagnosis Line Sinus tachycardia  Rightward axis  Borderline ECG    Confirmed by NEREIDA ALVARADO, TEREZA ([id number]) on 5/20/2023 7:19:45 PM
05-20 EKG    Ventricular Rate 127 BPM    Atrial Rate 127 BPM    P-R Interval 156 ms    QRS Duration 66 ms    Q-T Interval 286 ms    QTC Calculation(Bazett) 415 ms    P Axis 66 degrees    R Axis 96 degrees    T Axis 30 degrees    Diagnosis Line Sinus tachycardia  Rightward axis  Borderline ECG  
  Ventricular Rate 127 BPM    Atrial Rate 127 BPM    P-R Interval 156 ms    QRS Duration 66 ms    Q-T Interval 286 ms    QTC Calculation(Bazett) 415 ms    P Axis 66 degrees    R Axis 96 degrees    T Axis 30 degrees    Diagnosis Line Sinus tachycardia  Rightward axis  Borderline ECG    Confirmed by NEREIDA ALVARADO, TEREZA ([id number]) on 5/20/2023 7:19:45 PM  
Ventricular Rate 127 BPM    Atrial Rate 127 BPM    P-R Interval 156 ms    QRS Duration 66 ms    Q-T Interval 286 ms    QTC Calculation(Bazett) 415 ms    P Axis 66 degrees    R Axis 96 degrees    T Axis 30 degrees    Diagnosis Line Sinus tachycardia  Rightward axis  Borderline ECG    Confirmed by NEREIDA ALVARADO, TEREZA ([id number]) on 5/20/2023 7:19:45 PM
Sinus tachycardia
Ventricular Rate 127 BPM    Atrial Rate 127 BPM    P-R Interval 156 ms    QRS Duration 66 ms    Q-T Interval 286 ms    QTC Calculation(Bazett) 415 ms    P Axis 66 degrees    R Axis 96 degrees    T Axis 30 degrees    Diagnosis Line Sinus tachycardia  Rightward axis  Borderline ECG    Confirmed by NEREIDA ALVARADO, TEREZA ([id number]) on 5/20/2023 7:19:45 PM

## 2023-06-15 NOTE — BH INPATIENT PSYCHIATRY PROGRESS NOTE - NSBHCONSULTIPREASON_PSY_A_CORE
danger to self; mental illness expected to respond to inpatient care

## 2023-06-15 NOTE — BH INPATIENT PSYCHIATRY PROGRESS NOTE - NSBHMSEMOOD_PSY_A_CORE
Depressed Azithromycin Pregnancy And Lactation Text: This medication is considered safe during pregnancy and is also secreted in breast milk.

## 2023-06-15 NOTE — BH INPATIENT PSYCHIATRY PROGRESS NOTE - NSTXSUICIDPROGRES_PSY_ALL_CORE
Improving
Improving
No Change
Improving
No Change
Improving
No Change
Improving
No Change
No Change
Improving
No Change
No Change

## 2023-06-15 NOTE — BH INPATIENT PSYCHIATRY PROGRESS NOTE - NSDCCRITERIA_PSY_ALL_CORE
When patient is no longer at acutely elevated risk of harm to self and is able to safety plan.

## 2023-06-15 NOTE — BH INPATIENT PSYCHIATRY PROGRESS NOTE - NSBHFUPINTERVALHXFT_PSY_A_CORE
Patient was seen and evaluated this morning. Chart was reviewed and acute events were noted from yesterday with punching the wall. No PRN medications were administered overnight. Upon approach, patient is lying in bed comfortably. Patient is alert and oriented and engages with interviewer. Patient is cooperative and answers all questions appropriately. Patient reports that hand feels better today. Patient reports that he has not had any panic attacks yesterday or so far today. Patient reports that he continues to have passive suicidal ideation that he does not want to be on this Earth, but has not had any active suicidal ideation, plan or intent in several days. Patient reports sleep as on and off, and good appetite.  Patient was seen and evaluated this morning. Chart was reviewed and acute events were noted from yesterday with punching the wall. No PRN medications were administered overnight.  [id number] Amos. Upon approach, patient is lying in bed comfortably. Patient is alert and oriented and engages with interviewer. Patient is cooperative and answers all questions appropriately. Patient reports that hand feels better today. Patient reports that he has not had any panic attacks yesterday or so far today. Patient reports that he continues to have passive suicidal ideation that he does not want to be on this Earth, but has not had any active suicidal ideation, plan or intent in several days. Patient reports sleep as on and off, and good appetite.

## 2023-06-15 NOTE — BH INPATIENT PSYCHIATRY PROGRESS NOTE - NSBHMSEAFFCONG_PSY_A_CORE
Congruent

## 2023-06-15 NOTE — BH INPATIENT PSYCHIATRY PROGRESS NOTE - NSBHCHARTREVIEWVS_PSY_A_CORE FT
Vital Signs Last 24 Hrs  T(C): 36.9 (06-15-23 @ 07:58), Max: 36.9 (06-15-23 @ 07:58)  T(F): 98.5 (06-15-23 @ 07:58), Max: 98.5 (06-15-23 @ 07:58)  HR: 98 (06-15-23 @ 07:58) (98 - 118)  BP: 108/74 (06-15-23 @ 07:58) (108/74 - 114/75)  BP(mean): --  RR: 18 (06-15-23 @ 07:58) (16 - 18)  SpO2: --     Vital Signs Last 24 Hrs  T(C): 35.9 (06-15-23 @ 15:30), Max: 35.9 (06-15-23 @ 15:30)  T(F): 96.6 (06-15-23 @ 15:30), Max: 96.6 (06-15-23 @ 15:30)  HR: 104 (06-15-23 @ 15:30) (104 - 104)  BP: 119/77 (06-15-23 @ 15:30) (119/77 - 119/77)  BP(mean): --  RR: 16 (06-15-23 @ 15:30) (16 - 16)  SpO2: --

## 2023-06-15 NOTE — BH INPATIENT PSYCHIATRY PROGRESS NOTE - NSBHLEGALSTATUSCHANGE_PSY_ALL_CORE
No
Yes...
No
Yes...
No
No
Yes...
No
Yes...
No
Yes...
Yes...
No
Yes...
No
Yes...
No
Yes...
No
Yes...
No
No
Yes...
No
Yes...
No

## 2023-06-15 NOTE — BH INPATIENT PSYCHIATRY PROGRESS NOTE - NSTXCOPEGOAL_PSY_ALL_CORE
Identify and utilize 2 coping skills that meet their needs
Other...
Identify and utilize 2 coping skills that meet their needs
Other...
Identify and utilize 2 coping skills that meet their needs
Other...
Identify and utilize 2 coping skills that meet their needs
Other...
Identify and utilize 2 coping skills that meet their needs
Other...
Identify and utilize 2 coping skills that meet their needs
Other...
Identify and utilize 2 coping skills that meet their needs
Other...
Identify and utilize 2 coping skills that meet their needs

## 2023-06-15 NOTE — BH INPATIENT PSYCHIATRY PROGRESS NOTE - LEVEL OF CONSCIOUSNESS
Alert

## 2023-06-15 NOTE — BH INPATIENT PSYCHIATRY PROGRESS NOTE - NSBHMSESPEECH_PSY_A_CORE
Abnormal as indicated, otherwise normal...
Normal volume, rate, productivity, spontaneity and articulation
Abnormal as indicated, otherwise normal...
Normal volume, rate, productivity, spontaneity and articulation
Abnormal as indicated, otherwise normal...
Normal volume, rate, productivity, spontaneity and articulation
Abnormal as indicated, otherwise normal...
Normal volume, rate, productivity, spontaneity and articulation
Abnormal as indicated, otherwise normal...
Normal volume, rate, productivity, spontaneity and articulation
Abnormal as indicated, otherwise normal...
Normal volume, rate, productivity, spontaneity and articulation
Abnormal as indicated, otherwise normal...
Normal volume, rate, productivity, spontaneity and articulation
Abnormal as indicated, otherwise normal...
Normal volume, rate, productivity, spontaneity and articulation
Abnormal as indicated, otherwise normal...
Normal volume, rate, productivity, spontaneity and articulation
Abnormal as indicated, otherwise normal...
Normal volume, rate, productivity, spontaneity and articulation
Abnormal as indicated, otherwise normal...
Normal volume, rate, productivity, spontaneity and articulation
Abnormal as indicated, otherwise normal...
Normal volume, rate, productivity, spontaneity and articulation
Normal volume, rate, productivity, spontaneity and articulation
Abnormal as indicated, otherwise normal...
Normal volume, rate, productivity, spontaneity and articulation
Abnormal as indicated, otherwise normal...
Normal volume, rate, productivity, spontaneity and articulation
Normal volume, rate, productivity, spontaneity and articulation
Abnormal as indicated, otherwise normal...
Normal volume, rate, productivity, spontaneity and articulation

## 2023-06-15 NOTE — BH INPATIENT PSYCHIATRY PROGRESS NOTE - NS ED BHA REVIEW OF ED CHART VITAL SIGNS REVIEWED
Yes

## 2023-06-15 NOTE — BH INPATIENT PSYCHIATRY PROGRESS NOTE - NSBHMSELANG_PSY_A_CORE
No abnormalities noted

## 2023-06-15 NOTE — BH INPATIENT PSYCHIATRY PROGRESS NOTE - NSTXCOPEPROGRES_PSY_ALL_CORE
Improving
No Change
Worsening
Worsening
No Change
Improving
No Change
Improving
Worsening
Improving
No Change
Improving
No Change
Worsening
Improving
Worsening
No Change
Improving
Improving
Worsening
Improving
Improving
Worsening
Improving
No Change
Improving

## 2023-06-15 NOTE — BH INPATIENT PSYCHIATRY PROGRESS NOTE - NSBHPSYCHOLCOGORIENT_PSY_A_CORE
Oriented to time, place, person, situation

## 2023-06-16 VITALS
TEMPERATURE: 97 F | RESPIRATION RATE: 16 BRPM | DIASTOLIC BLOOD PRESSURE: 77 MMHG | HEART RATE: 92 BPM | SYSTOLIC BLOOD PRESSURE: 112 MMHG

## 2023-06-16 RX ADMIN — Medication 1 MILLIGRAM(S): at 11:49

## 2023-06-16 RX ADMIN — LITHIUM CARBONATE 450 MILLIGRAM(S): 300 TABLET, EXTENDED RELEASE ORAL at 08:36

## 2023-06-16 RX ADMIN — ESCITALOPRAM OXALATE 20 MILLIGRAM(S): 10 TABLET, FILM COATED ORAL at 08:34

## 2023-06-16 NOTE — BH CHART NOTE - NSCOMMENTSUICRISKFACT_PSY_ALL_CORE
patient reports he experiences passive suicidal ideations only at times of panic attacks. he reports he would not act out on these suicidal ideations as he does not want to hurt his mother or sister

## 2023-06-16 NOTE — BH CHART NOTE - RISK ASSESSMENT
Modifiable risk factors include current or past psychiatric illness, impulsivity, hopelessness, transgender  Protective factors include social support/family connectiveness, seeking out treatment/hopefullness, residential stability

## 2023-06-16 NOTE — BH CHART NOTE - NSDETAILSOTHERINTERV_PSY_ALL_CORE
patient has demonstrated ability to cope with panic attacks without the need for constant observation.

## 2023-06-16 NOTE — BH CHART NOTE - NSSUIIDEDETER_PSY_ALL_CORE
Patient with hgb of 12.0     - Daily cbcs  - transfusion goal greater than 7     (1) Deterrents definitely stopped you from attempting suicide

## 2023-06-16 NOTE — BH CHART NOTE - DETAILS
patient reports has passive suicidal ideations only at times of panic attacks; does not endorse any active suicidal ideation, intent or plans. Reports these thoughts of passive suicidal ideation are relieved once panic attack resumes, which lasts at most 30 minutes after taking medication

## 2023-06-16 NOTE — BH CHART NOTE - NSEVENTNOTEFT_PSY_ALL_CORE
Pt was seen, evaluated, chart reviewed.  As per nursing staff, no events overnight. On evaluation, pt reports that he is doing well. Offered no new complaints. Is compliant with medication, denies negative side effects. Endorsed good sleep and appetite. Denied A/V hallucinations. Denied paranoia. Denied suicidal/homicidal ideation, intent or plan. Pt is for discharge today. Agreeable with outpatient follow up.     Spoke with patient's brother Rhett [phone]. He confirms he will  patient from therapist office at 5pm today. Provider explained to him about medications patient is taking and to bring patient back to hospital if patient appears to have worsening suicidal ideations that are not well controlled on current medication regimen.   used [id number]. Pt was seen, evaluated, chart reviewed.  As per nursing staff, no events overnight. On evaluation, pt reports that he is doing well. Offered no new complaints. Is compliant with medication, denies negative side effects. Endorsed good sleep and appetite. Denied A/V hallucinations. Denied paranoia. Denied suicidal/homicidal ideation, intent or plan. Pt is for discharge today. Agreeable with outpatient follow up.     Spoke with patient's brother Rhett [phone]. He confirms he will  patient from therapist office at 5pm today. Provider explained to him about medications patient is taking and to bring patient back to hospital if patient appears to have worsening suicidal ideations that are not well controlled on current medication regimen.

## 2023-06-16 NOTE — BH SOCIAL WORK CONFIRMATION FOLLOW UP NOTE - NSLINKEDTOLOC_PSY_ALL_CORE
Radames was discharged today and made his outpatient mental health appointment with Teagan at [facility] as scheduled, [phone].

## 2023-06-19 DIAGNOSIS — R45.851 SUICIDAL IDEATIONS: ICD-10-CM

## 2023-06-19 DIAGNOSIS — S60.921A UNSPECIFIED SUPERFICIAL INJURY OF RIGHT HAND, INITIAL ENCOUNTER: ICD-10-CM

## 2023-06-19 DIAGNOSIS — R45.1 RESTLESSNESS AND AGITATION: ICD-10-CM

## 2023-06-19 DIAGNOSIS — F64.9 GENDER IDENTITY DISORDER, UNSPECIFIED: ICD-10-CM

## 2023-06-19 DIAGNOSIS — F32.9 MAJOR DEPRESSIVE DISORDER, SINGLE EPISODE, UNSPECIFIED: ICD-10-CM
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
## 2023-06-19 DIAGNOSIS — G47.09 OTHER INSOMNIA: ICD-10-CM

## 2023-06-19 DIAGNOSIS — R00.0 TACHYCARDIA, UNSPECIFIED: ICD-10-CM

## 2023-06-19 DIAGNOSIS — F41.0 PANIC DISORDER [EPISODIC PAROXYSMAL ANXIETY]: ICD-10-CM

## 2023-06-19 DIAGNOSIS — Z56.0 UNEMPLOYMENT, UNSPECIFIED: ICD-10-CM

## 2023-06-19 DIAGNOSIS — F40.10 SOCIAL PHOBIA, UNSPECIFIED: ICD-10-CM
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
## 2023-06-19 DIAGNOSIS — Z87.828 PERSONAL HISTORY OF OTHER (HEALED) PHYSICAL INJURY AND TRAUMA: ICD-10-CM

## 2023-06-19 DIAGNOSIS — Z20.822 CONTACT WITH AND (SUSPECTED) EXPOSURE TO COVID-19: ICD-10-CM
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
## 2023-06-19 SDOH — ECONOMIC STABILITY - INCOME SECURITY: UNEMPLOYMENT, UNSPECIFIED: Z56.0

## 2023-07-19 PROBLEM — F32.A DEPRESSION, UNSPECIFIED: Chronic | Status: ACTIVE | Noted: 2023-04-17

## 2023-07-25 PROBLEM — Z00.00 ENCOUNTER FOR PREVENTIVE HEALTH EXAMINATION: Status: ACTIVE | Noted: 2023-07-25

## 2023-08-04 ENCOUNTER — APPOINTMENT (OUTPATIENT)
Dept: PEDIATRIC ADOLESCENT MEDICINE | Facility: CLINIC | Age: 18
End: 2023-08-04

## 2024-01-17 NOTE — ED BEHAVIORAL HEALTH ASSESSMENT NOTE - SUMMARY
Home The patient is a 18 yo transgender F->M, domiciled with brother, unemployed, with psych hx of gender dysphoria, anxiety and depression, no past psychiatric hospitalizations, no previous suicide attempts, who is presenting at recommendation of brother and cousin for increasing SI.    The patient at this time endorses strong feelings of depression and exhibits very dysphoric and minimally reactive affect. She also endorses escalating suicidal thoughts including intrusive thoughts about overdosing on medications or of stabbing or cutting self. Patient is very withdrawn and unable to clearly disclose her internal state or thoughts. She has reported to family members her recent thoughts about wanting to die, and she expresses strong feelings of not wanting to be alive. Given these details, patient requires psychiatric hospitalization at this time for safety, stabilization, treatment of her depression.    - admit to psych involuntary, 9.39  - can continue home sertraline 25mg, mirtazapine 15mg qhs. Reduce Klonopin to 0.25mg daily. The patient is a 18 yo transgender F->M, domiciled with brother, unemployed, with psych hx of gender dysphoria, anxiety and depression, no past psychiatric hospitalizations, no previous suicide attempts, who is presenting at recommendation of brother and cousin for increasing SI.    The patient at this time endorses strong feelings of depression and exhibits very dysphoric and minimally reactive affect. She also endorses escalating suicidal thoughts including intrusive thoughts about overdosing on medications or of stabbing or cutting self. Patient is very withdrawn and unable to clearly disclose her internal state or thoughts. She has reported to family members her recent thoughts about wanting to die, and she expresses strong feelings of not wanting to be alive. Given these details, patient requires psychiatric hospitalization at this time for safety, stabilization, treatment of her depression.    - admit to psych involuntary, 9.39  - increase sertraline to 50mg  - continue mirtazapine 15mg qhs  - reduce Klonopin to 0.25mg daily.

## 2024-08-02 NOTE — BH INPATIENT PSYCHIATRY PROGRESS NOTE - NS ED [facility] REVIEW OF ED CHART AVAILABLE LABS REVIEWED
None available
Plan:   Focal Epilepsy  - s/p right temporal lobectomy  - continue with Levetiracetam 1000mg twice a day      Intractable Migraines  - Increase topiramate to 100mg at bedtime  - check BMP, topiramate, and levetiracetam level in 4 weeks  - please make sure you take riboflavin 400mg once a day (check how much riboflavin is in your B complex vitamin, take addition riboflavin to make 400mg total dose)  - limit the use of acetaminophen and ibuprofen to no more than 2 days a week as these medications can cause worsening migraines  - use rizatriptan (Maxalt) 10mg tab take one tab at the start of a headache, may repeat in 2 hours.      Follow-up in 3 months with advanced practice provider  
Yes
None available
Yes
None available
None available
Yes
None available
Yes
None available
Yes
Yes
None available
None available
Yes
None available
None available
Yes
None available
Yes
None available
None available
Yes
None available
Yes
